# Patient Record
Sex: FEMALE | Race: WHITE | Employment: OTHER | URBAN - METROPOLITAN AREA
[De-identification: names, ages, dates, MRNs, and addresses within clinical notes are randomized per-mention and may not be internally consistent; named-entity substitution may affect disease eponyms.]

---

## 2019-06-08 ENCOUNTER — APPOINTMENT (OUTPATIENT)
Dept: GENERAL RADIOLOGY | Age: 69
End: 2019-06-08
Payer: MEDICARE

## 2019-06-08 ENCOUNTER — HOSPITAL ENCOUNTER (EMERGENCY)
Age: 69
Discharge: HOME | End: 2019-06-08
Payer: MEDICARE

## 2019-06-08 VITALS
TEMPERATURE: 97.7 F | BODY MASS INDEX: 27.31 KG/M2 | WEIGHT: 160 LBS | DIASTOLIC BLOOD PRESSURE: 88 MMHG | SYSTOLIC BLOOD PRESSURE: 170 MMHG | HEART RATE: 83 BPM | OXYGEN SATURATION: 94 % | HEIGHT: 64 IN

## 2019-06-08 DIAGNOSIS — M79.662 PAIN AND SWELLING OF LEFT LOWER LEG: ICD-10-CM

## 2019-06-08 DIAGNOSIS — M54.16 LUMBAR BACK PAIN WITH RADICULOPATHY AFFECTING LEFT LOWER EXTREMITY: Primary | ICD-10-CM

## 2019-06-08 DIAGNOSIS — M79.89 PAIN AND SWELLING OF LEFT LOWER LEG: ICD-10-CM

## 2019-06-08 LAB
ANION GAP SERPL CALCULATED.3IONS-SCNC: 13 MMOL/L (ref 3–16)
BASOPHILS ABSOLUTE: 0 K/UL (ref 0–0.2)
BASOPHILS RELATIVE PERCENT: 0.6 %
BUN BLDV-MCNC: 14 MG/DL (ref 7–20)
CALCIUM SERPL-MCNC: 10.2 MG/DL (ref 8.3–10.6)
CHLORIDE BLD-SCNC: 97 MMOL/L (ref 99–110)
CO2: 24 MMOL/L (ref 21–32)
CREAT SERPL-MCNC: 0.7 MG/DL (ref 0.6–1.2)
D DIMER: <200 NG/ML DDU (ref 0–229)
EOSINOPHILS ABSOLUTE: 0.2 K/UL (ref 0–0.6)
EOSINOPHILS RELATIVE PERCENT: 3.1 %
GFR AFRICAN AMERICAN: >60
GFR NON-AFRICAN AMERICAN: >60
GLUCOSE BLD-MCNC: 207 MG/DL (ref 70–99)
HCT VFR BLD CALC: 41.2 % (ref 36–48)
HEMOGLOBIN: 13.6 G/DL (ref 12–16)
INR BLD: 1.05 (ref 0.86–1.14)
LYMPHOCYTES ABSOLUTE: 1.1 K/UL (ref 1–5.1)
LYMPHOCYTES RELATIVE PERCENT: 17.7 %
MCH RBC QN AUTO: 30.4 PG (ref 26–34)
MCHC RBC AUTO-ENTMCNC: 32.9 G/DL (ref 31–36)
MCV RBC AUTO: 92.4 FL (ref 80–100)
MONOCYTES ABSOLUTE: 0.5 K/UL (ref 0–1.3)
MONOCYTES RELATIVE PERCENT: 7.5 %
NEUTROPHILS ABSOLUTE: 4.5 K/UL (ref 1.7–7.7)
NEUTROPHILS RELATIVE PERCENT: 71.1 %
PDW BLD-RTO: 13.2 % (ref 12.4–15.4)
PLATELET # BLD: 282 K/UL (ref 135–450)
PMV BLD AUTO: 8.2 FL (ref 5–10.5)
POTASSIUM REFLEX MAGNESIUM: 4.7 MMOL/L (ref 3.5–5.1)
PROTHROMBIN TIME: 12 SEC (ref 9.8–13)
RBC # BLD: 4.46 M/UL (ref 4–5.2)
SODIUM BLD-SCNC: 134 MMOL/L (ref 136–145)
WBC # BLD: 6.3 K/UL (ref 4–11)

## 2019-06-08 PROCEDURE — 80048 BASIC METABOLIC PNL TOTAL CA: CPT

## 2019-06-08 PROCEDURE — 85379 FIBRIN DEGRADATION QUANT: CPT

## 2019-06-08 PROCEDURE — 85025 COMPLETE CBC W/AUTO DIFF WBC: CPT

## 2019-06-08 PROCEDURE — 99284 EMERGENCY DEPT VISIT MOD MDM: CPT

## 2019-06-08 PROCEDURE — 6370000000 HC RX 637 (ALT 250 FOR IP): Performed by: PHYSICIAN ASSISTANT

## 2019-06-08 PROCEDURE — 73502 X-RAY EXAM HIP UNI 2-3 VIEWS: CPT

## 2019-06-08 PROCEDURE — 85610 PROTHROMBIN TIME: CPT

## 2019-06-08 PROCEDURE — 72100 X-RAY EXAM L-S SPINE 2/3 VWS: CPT

## 2019-06-08 RX ORDER — METHOCARBAMOL 750 MG/1
750 TABLET, FILM COATED ORAL EVERY 8 HOURS PRN
Qty: 30 TABLET | Refills: 0 | Status: SHIPPED | OUTPATIENT
Start: 2019-06-08 | End: 2019-06-18

## 2019-06-08 RX ORDER — TRAMADOL HYDROCHLORIDE 50 MG/1
50 TABLET ORAL EVERY 6 HOURS PRN
Qty: 20 TABLET | Refills: 0 | Status: SHIPPED | OUTPATIENT
Start: 2019-06-08 | End: 2019-06-15

## 2019-06-08 RX ORDER — ACETAMINOPHEN 500 MG
1000 TABLET ORAL ONCE
Status: COMPLETED | OUTPATIENT
Start: 2019-06-08 | End: 2019-06-08

## 2019-06-08 RX ORDER — NAPROXEN 500 MG/1
500 TABLET ORAL 2 TIMES DAILY PRN
Qty: 20 TABLET | Refills: 0 | Status: ON HOLD | OUTPATIENT
Start: 2019-06-08 | End: 2020-10-23 | Stop reason: HOSPADM

## 2019-06-08 RX ADMIN — ACETAMINOPHEN 1000 MG: 500 TABLET, FILM COATED ORAL at 13:21

## 2019-06-08 ASSESSMENT — ENCOUNTER SYMPTOMS
ABDOMINAL PAIN: 0
CHEST TIGHTNESS: 0
WHEEZING: 0
DIARRHEA: 0
SHORTNESS OF BREATH: 0
NAUSEA: 0
SORE THROAT: 0
COUGH: 0
COLOR CHANGE: 0
VOMITING: 0
BACK PAIN: 1

## 2019-06-08 ASSESSMENT — PAIN DESCRIPTION - LOCATION: LOCATION: LEG

## 2019-06-08 ASSESSMENT — PAIN DESCRIPTION - ORIENTATION: ORIENTATION: LEFT

## 2019-06-08 ASSESSMENT — PAIN SCALES - GENERAL
PAINLEVEL_OUTOF10: 9
PAINLEVEL_OUTOF10: 9

## 2019-06-08 NOTE — ED PROVIDER NOTES
**EVALUATED BY ADVANCED PRACTICE PROVIDER**        Chris [address] ENCOUNTER      Pt Name: Tito Guzman  RBW:[id number]  [place] 1950  Date of evaluation: 6/8/2019  Provider: Soto Adams PA-C      Chief Complaint:    Chief Complaint   Patient presents with    Leg Pain     left thigh/leg pain that started at work this am.        Nursing Notes, Past Medical Hx, Past Surgical Hx, Social Hx, Allergies, and Family Hx were all reviewed and agreed with or any disagreements were addressed in the HPI.    HPI:  (Location, Duration, Timing, Severity,Quality, Assoc Sx, Context, Modifying factors)  This is a  77 y.o. female presents the emergency department with reports of achy pain and discomfort in her left leg. Patient states that this began this morning. She reports that she was fine yesterday. She was standing at work when she experiences severe pain and discomfort in her leg. She states that she feels it over the lateral border of her hip, and into the region of her thigh. She states it's worse with physical activity. She reports that she did take some Aleve this morning for this pain and discomfort that the symptoms of increased. She goes on to report that she's had problems with this leg in the past.  She had had an injury from a fall and maybe even had some difficulty as it pertains to her back but states the pain is never felt like this. She goes on to report she is a heavy achiness. She states she has had some swelling in her left lower extremity but states \"she thinks it's always swollen\". Patient states that she has had previous lumbar surgery. She states that she has had radicular symptoms in the past but this does not like that. She goes on to report at the present time she is not currently experiencing any chest pain and her shortness of breath.   She states she has not had any recent injuries and or falls which could've precipitated anemia above-mentioned symptoms. Upon further questioning she denies a history of DVT and her PE. She goes on to report that she had been anticoagulated with Eliquis for some kind of cardiac issue related to a clot in her heart. Patient states she has not had any recent travel. She goes on to report she is not having fevers and or chills. She denies abdominal pain. She denies nausea vomiting or diarrhea. She denies hematuria or flank pain. She denies dysuria, urgency, frequency. She denies bowel or bladder dysfunction. She denies saddle anesthesia. She denies numbness and or tingling. Patient reports that she was able to independently ambulate here into the emergency department today. Patient reports that she was also able to operate a motor vehicle to drive herself here. Because of increasing levels of pain and discomfort she presents for evaluation and treatment. Currently reporting pain and discomfort to be 9 out of 10.       PastMedical/Surgical History:      Diagnosis Date    Actinic keratosis     Arthritis     Asthma     UNSPECIFIED    Atrial fibrillation (HCC)     CAD (coronary artery disease)     Depression     worse with pain    Diabetes mellitus ([facility] [address]     TYPE II    Disc displacement, lumbar     Diverticulosis of colon     Hard of hearing     left    Hyperlipidemia     Hypertension     Ischemic heart disease     Past heart attack     28 YRS AGO    Poor vision     legally blind    Right leg weakness     Scoliosis          Procedure Laterality Date    APPENDECTOMY      BACK SURGERY      CARDIAC SURGERY       SECTION      ONE    CORONARY ARTERY BYPASS GRAFT      HAND SURGERY      LUMPS/BONE SPURS    LUMBAR [facility] [address] Last SURGERY  8-    Microlumbar discectomy L4-5 right    LUMBAR SPINE SURGERY  10-11-12    MICROLUMBAR RE-EXPLORATION L4-5 RIGHT, DECOMPRESSION OF    OVARIAN CYST REMOVAL      SKIN BIOPSY      PRE CANCERS FROZEN OFF    TONSILLECTOMY  VASCULAR SURGERY         Medications:  Previous Medications    ALBUTEROL (PROVENTIL HFA;VENTOLIN HFA) 108 (90 BASE) MCG/ACT INHALER    Inhale 2 puffs into the lungs every 6 hours as needed. ALBUTEROL (PROVENTIL) (2.5 MG/3ML) 0.083% NEBULIZER SOLUTION    Take 2.5 mg by nebulization every 6 hours as needed for Wheezing    ASPIRIN 81 MG TABLET    Take 243 mg by mouth daily    BUDESONIDE-FORMOTEROL (SYMBICORT) 160-4.5 MCG/ACT AERO    Inhale 2 puffs into the lungs 2 times daily    GLIMEPIRIDE (AMARYL) 4 MG TABLET    Take 4 mg by mouth every morning (before breakfast)    METFORMIN (GLUCOPHAGE-XR) 500 MG XR TABLET    Take 2,000 mg by mouth daily (with breakfast)     METOPROLOL (TOPROL-XL) 50 MG XL TABLET    Take 50 mg by mouth daily. MOMETASONE (ASMANEX) 220 MCG/INH INHALER    Inhale 2 puffs into the lungs daily as needed. SAXAGLIPTIN (ONGLYZA) 5 MG TABS TABLET    Take 5 mg by mouth daily. SERTRALINE (ZOLOFT) 100 MG TABLET    Take 100 mg by mouth daily         Review of Systems:  Review of Systems   Constitutional: Negative for activity change, chills and fever. HENT: Negative for ear pain and sore throat. Respiratory: Negative for cough, chest tightness, shortness of breath and wheezing. Cardiovascular: Positive for leg swelling. Negative for chest pain and palpitations. Gastrointestinal: Negative for abdominal pain, diarrhea, nausea and vomiting. Genitourinary: Negative for dysuria and flank pain. Musculoskeletal: Positive for arthralgias, back pain (history of) and gait problem. Negative for neck pain and neck stiffness. Skin: Negative for color change and wound. Neurological: Negative for dizziness, seizures, syncope, weakness, light-headedness, numbness and headaches. Positives and Pertinent negatives as per HPI. Except as noted above in the ROS, problem specific ROS was completed and is negative.     Physical Exam:  Physical Exam   Constitutional: She is oriented to person, place, and time. She appears well-developed and well-nourished. No distress. HENT:   Head: Normocephalic and atraumatic. Right Ear: External ear normal.   Left Ear: External ear normal.   Eyes: Conjunctivae are normal. Right eye exhibits no discharge. Left eye exhibits no discharge. No scleral icterus. Neck: Normal range of motion. No JVD present. Cardiovascular: Normal rate and regular rhythm. Exam reveals no gallop and no friction rub. No murmur heard. Pulmonary/Chest: Effort normal and breath sounds normal. No accessory muscle usage. No respiratory distress. She has no wheezes. She has no rhonchi. She has no rales. Musculoskeletal:        Left hip: She exhibits decreased range of motion, tenderness and bony tenderness. She exhibits normal strength, no swelling, no crepitus, no deformity and no laceration. Left knee: Normal.        Lumbar back: She exhibits decreased range of motion, tenderness and pain. She exhibits no bony tenderness, no swelling, no edema, no deformity, no laceration, no spasm and normal pulse. Right lower leg: She exhibits tenderness and swelling. She exhibits no bony tenderness, no edema, no deformity and no laceration. Patient does have some mild tenderness noted of the bilateral lumbar paraspinous musculature. She has no bere midline tenderness. Previous surgical sites benign. Patient has a negative straight leg raise. The patient demonstrates 5/5 muscle strength to hip flexion, knee flexion/extension, plantar/dorsiflexion of the ankle and extensor hallicus longus testing. Patient has tenderness to palpation of the lateral complex of the hip. She has negative Trendelenburg sign. Negative logroll test.  Range of motion through the hip is good. She has good range of motion noted to the knee. Left calf is noted to have increased circumferential girth 2 cm larger than the right. No palpable cords. No significant varicosities.   No evidence of venous congestion. Calf is supple. Homans test is positive only on the left. Neurological: She is alert and oriented to person, place, and time. She has normal strength. No cranial nerve deficit or sensory deficit. Coordination normal. GCS eye subscore is 4. GCS verbal subscore is 5. GCS motor subscore is 6. Skin: Skin is warm and dry. She is not diaphoretic. Psychiatric: She has a normal mood and affect. Her behavior is normal.   Nursing note and vitals reviewed. MEDICAL DECISION MAKING    Vitals:    Vitals:    06/08/19 1256   BP: (!) 170/88   Pulse: 83   Temp: 97.7 °F (36.5 °C)   TempSrc: Oral   SpO2: 94%   Weight: 160 lb (72.6 kg)   Height: 5' 4\" (1.626 m)       LABS:  Labs Reviewed   BASIC METABOLIC PANEL W/ REFLEX TO MG FOR LOW K - Abnormal; Notable for the following components:       Result Value    Sodium 134 (*)     Chloride 97 (*)     Glucose 207 (*)     All other components within normal limits    Narrative:     Performed at:  [facility] [address]   Phone [phone]   CBC WITH AUTO DIFFERENTIAL    Narrative:     Performed at:  [facility] [address]   Phone [phone]   PROTIME-INR    Narrative:     Performed at:  [facility] [address]   Phone [phone]   D-DIMER, QUANTITATIVE    Narrative:     Performed at:  [facility] [address] [facility] [address]  [address]   Phone [phone] of labs reviewed and werenegative at this time or not returned at the time of this note.     RADIOLOGY:   Non-plain film images such as CT, Ultrasound and MRI are read by the radiologist. Trip Eubanks PA-C have directly visualized the radiologic plain film image(s) with the below findings:      Interpretation per the Radiologist below, if available at the time of elevated at 207 today. Coagulation profile is unremarkable and d-dimer is negative predictive value of less than 200. Radiographs of the hip demonstrate bilateral osteoarthritis were radius grafts of the lumbar spine demonstrate severe multilevel degenerative disc disease no significant at L4-L5 as well as L5 on S1 where she's had previous surgery. Patient's clinical presentation again does appear most consistent with a left lumbar radiculopathy but of DVT will need to be ruled out. Utilizing shared decision making and explaining the d-dimer I do believe that the patient is a low probability of a DVT in her left lower extremity and I do not believe that she needs to be prophylaxed in the interim and light of the above-mentioned. She is aware she still needs a Doppler examination the left leg. She has also been given very strict potential instructions for return. She will need medications and follow-up arranged on an outpatient basis with Dr. Missy Ponce who is her treating primary care provider. The patient tolerated their visit well. I evaluated the patient. The physician was available for consultation as needed. The patient and / or the family were informed of the results of anytests, a time was given to answer questions, a plan was proposed and they agreed with plan. CLINICAL IMPRESSION:  1. Lumbar back pain with radiculopathy affecting left lower extremity    2.  Pain and swelling of left lower leg (chronic)        DISPOSITION: Discharged to home        PATIENT REFERRED TO:  Bina Reyes [address]  [phone]    Schedule an appointment as soon as possible for a visit       [facility] Emergency Department  [address]  [phone]    If symptoms worsen      DISCHARGE MEDICATIONS:  New Prescriptions    METHOCARBAMOL (ROBAXIN-750) 750 MG TABLET    Take 1 tablet by mouth every 8 hours as needed (muscle cramps or pain)    NAPROXEN (NAPROSYN) 500 MG TABLET    Take 1 tablet by mouth 2 times daily as needed for Pain    TRAMADOL (ULTRAM) 50 MG TABLET    Take 1 tablet by mouth every 6 hours as needed for Pain for up to 7 days. DISCONTINUED MEDICATIONS:  Discontinued Medications    CEPHALEXIN (KEFLEX) 500 MG CAPSULE    Take 1 capsule by mouth 4 times daily    DULOXETINE (CYMBALTA) 60 MG CAPSULE    Take 60 mg by mouth daily. GABAPENTIN (NEURONTIN) 300 MG CAPSULE    Take 300 mg by mouth 4 times daily. GLUCOSE BLOOD VI TEST STRIPS (ASCENSIA AUTODISC VI;ONE TOUCH ULTRA TEST VI) STRIP    by In Vitro route as needed. As needed. LOVASTATIN (MEVACOR) 40 MG TABLET    Take 40 mg by mouth nightly. RIVAROXABAN (XARELTO) 20 MG TABS TABLET    Take 20 mg by mouth    VALACYCLOVIR (VALTREX) 500 MG TABLET    Take 500 mg by mouth daily as needed.             (Please note the MDM and HPI sections of this note were completed with a voice recognition program.  Efforts weremade to edit the dictations but occasionally words are mis-transcribed.)    Electronically signed, Yennifer Ball PA-C,          Rohan Giordano PA-C  06/08/19 4496

## 2019-06-08 NOTE — ED NOTES
Pt Discharged in stable condition, VSS, no signs of distress, discharge instructions and meds reviewed. Pt verbalizes understanding and states no further questions or concerns unaddressed.        Serge Perez RN  06/08/19 7624

## 2019-07-11 ENCOUNTER — HOSPITAL ENCOUNTER (EMERGENCY)
Age: 69
Discharge: HOME | End: 2019-07-11
Payer: MEDICARE

## 2019-07-11 VITALS
WEIGHT: 158 LBS | TEMPERATURE: 98.1 F | OXYGEN SATURATION: 97 % | HEART RATE: 78 BPM | HEIGHT: 64 IN | SYSTOLIC BLOOD PRESSURE: 173 MMHG | BODY MASS INDEX: 26.98 KG/M2 | DIASTOLIC BLOOD PRESSURE: 82 MMHG | RESPIRATION RATE: 16 BRPM

## 2019-07-11 DIAGNOSIS — S81.812A LACERATION OF LEFT LOWER EXTREMITY, INITIAL ENCOUNTER: Primary | ICD-10-CM

## 2019-07-11 PROCEDURE — 4500000022 HC ED LEVEL 2 PROCEDURE

## 2019-07-11 PROCEDURE — 99282 EMERGENCY DEPT VISIT SF MDM: CPT

## 2019-07-11 RX ORDER — ASPIRIN 325 MG
325 TABLET ORAL DAILY
Status: ON HOLD | COMMUNITY
End: 2020-10-23 | Stop reason: HOSPADM

## 2019-07-11 RX ORDER — CLOPIDOGREL BISULFATE 75 MG/1
75 TABLET ORAL DAILY
Status: ON HOLD | COMMUNITY
End: 2020-10-23 | Stop reason: HOSPADM

## 2019-07-11 ASSESSMENT — ENCOUNTER SYMPTOMS
DIARRHEA: 0
COUGH: 0
ABDOMINAL PAIN: 0
RHINORRHEA: 0
WHEEZING: 0
VOMITING: 0
NAUSEA: 0
SHORTNESS OF BREATH: 0

## 2020-10-20 ENCOUNTER — APPOINTMENT (OUTPATIENT)
Dept: GENERAL RADIOLOGY | Age: 70
DRG: 061 | End: 2020-10-20
Payer: MEDICARE

## 2020-10-20 ENCOUNTER — APPOINTMENT (OUTPATIENT)
Dept: CT IMAGING | Age: 70
DRG: 061 | End: 2020-10-20
Payer: MEDICARE

## 2020-10-20 ENCOUNTER — APPOINTMENT (OUTPATIENT)
Dept: MRI IMAGING | Age: 70
DRG: 061 | End: 2020-10-20
Payer: MEDICARE

## 2020-10-20 ENCOUNTER — HOSPITAL ENCOUNTER (INPATIENT)
Age: 70
LOS: 3 days | Discharge: NURSING, REHAB OR LONG-TERM CARE | DRG: 061 | End: 2020-10-23
Attending: EMERGENCY MEDICINE | Admitting: INTERNAL MEDICINE
Payer: MEDICARE

## 2020-10-20 PROBLEM — I63.9 ACUTE CVA (CEREBROVASCULAR ACCIDENT) (HCC): Status: ACTIVE | Noted: 2020-10-20

## 2020-10-20 LAB
A/G RATIO: 1.8 (ref 1.1–2.2)
ALBUMIN SERPL-MCNC: 4.5 G/DL (ref 3.4–5)
ALP BLD-CCNC: 97 U/L (ref 40–129)
ALT SERPL-CCNC: 14 U/L (ref 10–40)
ANION GAP SERPL CALCULATED.3IONS-SCNC: 11 MMOL/L (ref 3–16)
APTT: 32 SEC (ref 24.2–36.2)
AST SERPL-CCNC: 18 U/L (ref 15–37)
BASOPHILS ABSOLUTE: 0 K/UL (ref 0–0.2)
BASOPHILS RELATIVE PERCENT: 0.5 %
BILIRUB SERPL-MCNC: 0.3 MG/DL (ref 0–1)
BUN BLDV-MCNC: 10 MG/DL (ref 7–20)
CALCIUM SERPL-MCNC: 10.1 MG/DL (ref 8.3–10.6)
CHLORIDE BLD-SCNC: 101 MMOL/L (ref 99–110)
CO2: 24 MMOL/L (ref 21–32)
CREAT SERPL-MCNC: 0.7 MG/DL (ref 0.6–1.2)
EKG ATRIAL RATE: 74 BPM
EKG DIAGNOSIS: NORMAL
EKG Q-T INTERVAL: 384 MS
EKG QRS DURATION: 122 MS
EKG QTC CALCULATION (BAZETT): 467 MS
EKG R AXIS: -62 DEGREES
EKG T AXIS: -21 DEGREES
EKG VENTRICULAR RATE: 89 BPM
EOSINOPHILS ABSOLUTE: 0.1 K/UL (ref 0–0.6)
EOSINOPHILS RELATIVE PERCENT: 1.2 %
GFR AFRICAN AMERICAN: >60
GFR NON-AFRICAN AMERICAN: >60
GLOBULIN: 2.5 G/DL
GLUCOSE BLD-MCNC: 127 MG/DL (ref 70–99)
GLUCOSE BLD-MCNC: 224 MG/DL (ref 70–99)
GLUCOSE BLD-MCNC: 230 MG/DL (ref 70–99)
HCT VFR BLD CALC: 40.3 % (ref 36–48)
HEMOGLOBIN: 13.3 G/DL (ref 12–16)
INR BLD: 0.97 (ref 0.86–1.14)
LYMPHOCYTES ABSOLUTE: 1.1 K/UL (ref 1–5.1)
LYMPHOCYTES RELATIVE PERCENT: 11.9 %
MCH RBC QN AUTO: 30.9 PG (ref 26–34)
MCHC RBC AUTO-ENTMCNC: 33.1 G/DL (ref 31–36)
MCV RBC AUTO: 93.4 FL (ref 80–100)
MONOCYTES ABSOLUTE: 0.5 K/UL (ref 0–1.3)
MONOCYTES RELATIVE PERCENT: 5.7 %
NEUTROPHILS ABSOLUTE: 7.5 K/UL (ref 1.7–7.7)
NEUTROPHILS RELATIVE PERCENT: 80.7 %
PDW BLD-RTO: 13 % (ref 12.4–15.4)
PERFORMED ON: ABNORMAL
PERFORMED ON: ABNORMAL
PLATELET # BLD: 265 K/UL (ref 135–450)
PMV BLD AUTO: 8.6 FL (ref 5–10.5)
POTASSIUM SERPL-SCNC: 4.2 MMOL/L (ref 3.5–5.1)
PRO-BNP: 741 PG/ML (ref 0–124)
PROTHROMBIN TIME: 11.2 SEC (ref 10–13.2)
RBC # BLD: 4.32 M/UL (ref 4–5.2)
SODIUM BLD-SCNC: 136 MMOL/L (ref 136–145)
TOTAL PROTEIN: 7 G/DL (ref 6.4–8.2)
TROPONIN: <0.01 NG/ML
WBC # BLD: 9.3 K/UL (ref 4–11)

## 2020-10-20 PROCEDURE — 6360000004 HC RX CONTRAST MEDICATION: Performed by: PHYSICIAN ASSISTANT

## 2020-10-20 PROCEDURE — 85730 THROMBOPLASTIN TIME PARTIAL: CPT

## 2020-10-20 PROCEDURE — 93010 ELECTROCARDIOGRAM REPORT: CPT | Performed by: INTERNAL MEDICINE

## 2020-10-20 PROCEDURE — 85610 PROTHROMBIN TIME: CPT

## 2020-10-20 PROCEDURE — 2500000003 HC RX 250 WO HCPCS: Performed by: PHYSICIAN ASSISTANT

## 2020-10-20 PROCEDURE — 6360000002 HC RX W HCPCS: Performed by: PHYSICIAN ASSISTANT

## 2020-10-20 PROCEDURE — 83880 ASSAY OF NATRIURETIC PEPTIDE: CPT

## 2020-10-20 PROCEDURE — 94640 AIRWAY INHALATION TREATMENT: CPT

## 2020-10-20 PROCEDURE — 2000000000 HC ICU R&B

## 2020-10-20 PROCEDURE — 6370000000 HC RX 637 (ALT 250 FOR IP): Performed by: INTERNAL MEDICINE

## 2020-10-20 PROCEDURE — 94761 N-INVAS EAR/PLS OXIMETRY MLT: CPT

## 2020-10-20 PROCEDURE — 96374 THER/PROPH/DIAG INJ IV PUSH: CPT

## 2020-10-20 PROCEDURE — 3E03317 INTRODUCTION OF OTHER THROMBOLYTIC INTO PERIPHERAL VEIN, PERCUTANEOUS APPROACH: ICD-10-PCS | Performed by: INTERNAL MEDICINE

## 2020-10-20 PROCEDURE — 70496 CT ANGIOGRAPHY HEAD: CPT

## 2020-10-20 PROCEDURE — 2580000003 HC RX 258: Performed by: PHYSICIAN ASSISTANT

## 2020-10-20 PROCEDURE — 2580000003 HC RX 258: Performed by: INTERNAL MEDICINE

## 2020-10-20 PROCEDURE — 99285 EMERGENCY DEPT VISIT HI MDM: CPT

## 2020-10-20 PROCEDURE — 93005 ELECTROCARDIOGRAM TRACING: CPT | Performed by: PHYSICIAN ASSISTANT

## 2020-10-20 PROCEDURE — 36415 COLL VENOUS BLD VENIPUNCTURE: CPT

## 2020-10-20 PROCEDURE — 80053 COMPREHEN METABOLIC PANEL: CPT

## 2020-10-20 PROCEDURE — 84484 ASSAY OF TROPONIN QUANT: CPT

## 2020-10-20 PROCEDURE — 85025 COMPLETE CBC W/AUTO DIFF WBC: CPT

## 2020-10-20 PROCEDURE — 70450 CT HEAD/BRAIN W/O DYE: CPT

## 2020-10-20 PROCEDURE — 70551 MRI BRAIN STEM W/O DYE: CPT

## 2020-10-20 PROCEDURE — 71045 X-RAY EXAM CHEST 1 VIEW: CPT

## 2020-10-20 RX ORDER — SODIUM CHLORIDE 0.9 % (FLUSH) 0.9 %
10 SYRINGE (ML) INJECTION PRN
Status: DISCONTINUED | OUTPATIENT
Start: 2020-10-20 | End: 2020-10-23 | Stop reason: HOSPADM

## 2020-10-20 RX ORDER — BUDESONIDE AND FORMOTEROL FUMARATE DIHYDRATE 160; 4.5 UG/1; UG/1
2 AEROSOL RESPIRATORY (INHALATION) 2 TIMES DAILY
Status: DISCONTINUED | OUTPATIENT
Start: 2020-10-20 | End: 2020-10-23 | Stop reason: HOSPADM

## 2020-10-20 RX ORDER — DEXTROSE MONOHYDRATE 25 G/50ML
12.5 INJECTION, SOLUTION INTRAVENOUS PRN
Status: DISCONTINUED | OUTPATIENT
Start: 2020-10-20 | End: 2020-10-23 | Stop reason: HOSPADM

## 2020-10-20 RX ORDER — NICOTINE POLACRILEX 4 MG
15 LOZENGE BUCCAL PRN
Status: DISCONTINUED | OUTPATIENT
Start: 2020-10-20 | End: 2020-10-23 | Stop reason: HOSPADM

## 2020-10-20 RX ORDER — GLIMEPIRIDE 2 MG/1
4 TABLET ORAL
Status: DISCONTINUED | OUTPATIENT
Start: 2020-10-21 | End: 2020-10-23 | Stop reason: HOSPADM

## 2020-10-20 RX ORDER — SODIUM CHLORIDE 0.9 % (FLUSH) 0.9 %
10 SYRINGE (ML) INJECTION 2 TIMES DAILY
Status: DISCONTINUED | OUTPATIENT
Start: 2020-10-20 | End: 2020-10-23 | Stop reason: HOSPADM

## 2020-10-20 RX ORDER — SODIUM CHLORIDE 0.9 % (FLUSH) 0.9 %
10 SYRINGE (ML) INJECTION PRN
Status: DISCONTINUED | OUTPATIENT
Start: 2020-10-20 | End: 2020-10-20

## 2020-10-20 RX ORDER — SODIUM CHLORIDE 0.9 % (FLUSH) 0.9 %
10 SYRINGE (ML) INJECTION EVERY 12 HOURS SCHEDULED
Status: DISCONTINUED | OUTPATIENT
Start: 2020-10-20 | End: 2020-10-20

## 2020-10-20 RX ORDER — DEXTROSE MONOHYDRATE 25 G/50ML
12.5 INJECTION, SOLUTION INTRAVENOUS
Status: DISCONTINUED | OUTPATIENT
Start: 2020-10-20 | End: 2020-10-20

## 2020-10-20 RX ORDER — ALOGLIPTIN 6.25 MG/1
25 TABLET, FILM COATED ORAL DAILY
Status: DISCONTINUED | OUTPATIENT
Start: 2020-10-21 | End: 2020-10-23 | Stop reason: HOSPADM

## 2020-10-20 RX ORDER — DEXTROSE MONOHYDRATE 50 MG/ML
100 INJECTION, SOLUTION INTRAVENOUS PRN
Status: DISCONTINUED | OUTPATIENT
Start: 2020-10-20 | End: 2020-10-23 | Stop reason: HOSPADM

## 2020-10-20 RX ORDER — METFORMIN HYDROCHLORIDE 500 MG/1
2000 TABLET, EXTENDED RELEASE ORAL
Status: DISCONTINUED | OUTPATIENT
Start: 2020-10-21 | End: 2020-10-23 | Stop reason: HOSPADM

## 2020-10-20 RX ORDER — 0.9 % SODIUM CHLORIDE 0.9 %
50 INTRAVENOUS SOLUTION INTRAVENOUS ONCE
Status: COMPLETED | OUTPATIENT
Start: 2020-10-20 | End: 2020-10-20

## 2020-10-20 RX ORDER — EMPAGLIFLOZIN 10 MG/1
10 TABLET, FILM COATED ORAL DAILY
Status: ON HOLD | COMMUNITY
End: 2020-11-02 | Stop reason: HOSPADM

## 2020-10-20 RX ORDER — LABETALOL HYDROCHLORIDE 5 MG/ML
10 INJECTION, SOLUTION INTRAVENOUS ONCE
Status: COMPLETED | OUTPATIENT
Start: 2020-10-20 | End: 2020-10-20

## 2020-10-20 RX ORDER — METOPROLOL SUCCINATE 50 MG/1
50 TABLET, EXTENDED RELEASE ORAL DAILY
Status: DISCONTINUED | OUTPATIENT
Start: 2020-10-20 | End: 2020-10-23 | Stop reason: HOSPADM

## 2020-10-20 RX ADMIN — ALTEPLASE 62.3 MG: KIT at 10:34

## 2020-10-20 RX ADMIN — ALTEPLASE 6.9 MG: KIT at 10:31

## 2020-10-20 RX ADMIN — SERTRALINE HYDROCHLORIDE 100 MG: 50 TABLET, FILM COATED ORAL at 16:41

## 2020-10-20 RX ADMIN — METOPROLOL SUCCINATE 50 MG: 50 TABLET, EXTENDED RELEASE ORAL at 16:41

## 2020-10-20 RX ADMIN — LABETALOL HYDROCHLORIDE 10 MG: 5 INJECTION INTRAVENOUS at 10:26

## 2020-10-20 RX ADMIN — Medication 10 ML: at 20:17

## 2020-10-20 RX ADMIN — SODIUM CHLORIDE 50 ML: 9 INJECTION, SOLUTION INTRAVENOUS at 11:37

## 2020-10-20 RX ADMIN — IOPAMIDOL 75 ML: 755 INJECTION, SOLUTION INTRAVENOUS at 08:49

## 2020-10-20 RX ADMIN — Medication 2 PUFF: at 20:35

## 2020-10-20 ASSESSMENT — ENCOUNTER SYMPTOMS
RHINORRHEA: 0
DIARRHEA: 0
VOMITING: 0
SHORTNESS OF BREATH: 0
NAUSEA: 0
COUGH: 0
ABDOMINAL PAIN: 0

## 2020-10-20 ASSESSMENT — PAIN SCALES - GENERAL: PAINLEVEL_OUTOF10: 0

## 2020-10-20 NOTE — ED NOTES
Pt able to lift right arm in the air and hold it for 10 seconds without a drift which is an improvement. Pt resting comfortably with no signs of distress. Denies any needs at this time. Bed locked and in lowest position with both side rails raise. Call light within reach. Writer remains at bedside.      Anthony Sandoval, RN  10/20/20 1026

## 2020-10-20 NOTE — PLAN OF CARE
Problem: Falls - Risk of:  Goal: Will remain free from falls  Description: Will remain free from falls  10/20/2020 1849 by Anthony Gunn RN  Outcome: Ongoing  10/20/2020 1848 by Anthony Gunn RN  Outcome: Ongoing   Bed alarm in place    Problem: Skin Integrity:  Goal: Will show no infection signs and symptoms  Description: Will show no infection signs and symptoms  10/20/2020 1849 by Anthony Gunn RN  Outcome: Ongoing  10/20/2020 1848 by Anthony Gunn RN  Outcome: Ongoing   Pt able to turn in bed as needed. On specialty mattress. Problem: ACTIVITY INTOLERANCE/IMPAIRED MOBILITY  Goal: Mobility/activity is maintained at optimum level for patient  10/20/2020 1849 by Anthony Gunn RN  Outcome: Ongoing  10/20/2020 1848 by nAthony Gunn RN  Outcome: Ongoing   Rt side limited fine motor skills    Problem: COMMUNICATION IMPAIRMENT  Goal: Ability to express needs and understand communication  10/20/2020 1849 by Anthony Gunn RN  Outcome: Ongoing  10/20/2020 1848 by Anthony Gunn RN  Outcome: Ongoing   Expressive aphasia    Problem: Serum Glucose Level - Abnormal:  Goal: Ability to maintain appropriate glucose levels has stabilized  Description: Ability to maintain appropriate glucose levels has stabilized  10/20/2020 1849 by Anthony Gunn RN  Outcome: Ongoing  10/20/2020 1848 by Anthony Gunn RN  Outcome: Ongoing   Will continue to monitor glucose.  On oral hypoglycemics at home

## 2020-10-20 NOTE — ED NOTES
Bed: 02  Expected date:   Expected time:   Means of arrival:   Comments:  Stroke alert     Pedro Field RN  10/20/20 2755

## 2020-10-20 NOTE — ED NOTES
Tele stroke used and MD requested MRi to determine if tPA shall be given. MRI checklist completed and sent to MRI.      Kathryn Sanchez RN  10/20/20 6586

## 2020-10-20 NOTE — FLOWSHEET NOTE
10/20/20 1315   Provider Notification   Reason for Communication Evaluate   Provider Name [facility]   Provider Notification Physician   Method of Communication Secure Message   Response Waiting for response   Notification Time 1315   Pt nauseated suddenly, no vomiting , NIHSS stable. Do I need to notify stoke team or can we give her something?

## 2020-10-20 NOTE — ED NOTES
Pt reporting she feels less \"weird\". Pt is speaking in full sentences and able to move her right arm better. Pt also reporting she is feeling sensation in her right arm.      Chico Gold RN  10/20/20 1121

## 2020-10-20 NOTE — ED NOTES
Pt resting comfortably with no signs of distress. Denies any needs at this time. Bed locked and in lowest position with both side rails raise. Call light within reach. Writer remains at bedside, no s/s of bleeding or decreased mental status noted. Will continue to monitor.      Renee Marshall RN  10/20/20 2071

## 2020-10-20 NOTE — ED NOTES
Pharmacy to Dose Alteplase    Patient Weight: 76.9 kg  Alteplase Bolus: 0.09 mg/kg x 76.9 kg = 6.9 mg  Alteplase Infusion: 0.81 mg/kg x 76.9 kg = 62.3 mg    Sophia CastanonD  PGY1 Pharmacy Resident  [id number]

## 2020-10-20 NOTE — CONSULTS
Subjective:      Nicko Drake is a 65 y.o. woman with a PMH of atrial fibrillation not on A/C, CAD with a CABG in the past who presents after waking up this morning with R sided weakness, aphasia, and dysarthria. Ms. Shannon Nuñez is healthy and functional at baseline. She was last definitively well yesterday evening at ~8 pm when her daughter talked to her. Then. She awoke this morning at ~8am, at which point she reported feeling \"funny. \" She went to the bathroom and noticed that her R sided was weak. She presented to the [facility] Emergency Department, at approximately 8:30 am. CT was negative, and CT-A showed chronic occlusion of the R carotid with intracranial reconstitution, mild-moderate stenosis in the l common carotid and in the cavernosal carotid. Considering that the patient woke up with these deficits, decisions was made to obtain a STAT MRI. A Stat MRI was obtained by ~10am, which I personally reviewed with Dr. Ralph La from [facility]. The verdict was DWI hyperintensity in the L MCA territory with no corresponding FLAIR abnormalities. She required a few Prn doses of labetalol to get her blood pressure into the <180 range prior to tPA. Bolus given at 10:30am.      Outside reports reviewed: CT head with no acute process. CT A as described with multifocal chronic stenoses and occlusions. MRI with DWI bright region in distal LMCA with no corresponding FLAIR hyperintensity.       Patient Active Problem List    Diagnosis Date Noted    Acute CVA (cerebrovascular accident) ([address] 10/20/2020    Lumbar radiculopathy 10/11/2012     Past Medical History:   Diagnosis Date    Actinic keratosis     Arthritis     Asthma     UNSPECIFIED    Atrial fibrillation ([address]     CAD (coronary artery disease)     Depression     worse with pain    Diabetes mellitus ([address]     TYPE II    Disc displacement, lumbar     Diverticulosis of colon     Hard of hearing     left    Hyperlipidemia     Hypertension     Tobacco Use    Smoking status: Former Smoker     Packs/day: 1.00     Years: 40.00     Pack years: 40.00    Smokeless tobacco: Never Used   Substance and Sexual Activity    Alcohol use: No     Comment: RARELY NOW    Drug use: No    Sexual activity: Not on file   Lifestyle    Physical activity     Days per week: Not on file     Minutes per session: Not on file    Stress: Not on file   Relationships    Social connections     Talks on phone: Not on file     Gets together: Not on file     Attends Rastafarian service: Not on file     Active member of club or organization: Not on file     Attends meetings of clubs or organizations: Not on file     Relationship status: Not on file    Intimate partner violence     Fear of current or ex partner: Not on file     Emotionally abused: Not on file     Physically abused: Not on file     Forced sexual activity: Not on file   Other Topics Concern    Not on file   Social History Narrative    Not on file       Review of Systems  A comprehensive review of systems was negative. Objective:     BP (!) 169/75   Pulse 102   Resp 24   Wt 169 lb 8 oz (76.9 kg)   SpO2 94%   BMI 29.09 kg/m²   NIH Stroke Scale      Interval: Baseline  Time: 11:05 AM  Person Administering Scale: Alysia Brooks    Administer stroke scale items in the order listed. Record performance in each category after each subscale exam. Do not go back and change scores. Follow directions provided for each exam technique. Scores should reflect what the patient does, not what the clinician thinks the patient can do. The clinician should record answers while administering the exam and work quickly. Except where indicated, the patient should not be coached (i.e., repeated requests to patient to make a special effort). 1a  Level of consciousness: 0=alert; keenly responsive   1b. LOC questions:  0=Performs both tasks correctly   1c. LOC commands: 0=Performs both tasks correctly   2.   Best Gaze: 0=normal 3.  Visual: 1=Partial hemianopia   4. Facial Palsy: 1=Minor paralysis (flattened nasolabial fold, asymmetric on smiling)   5a. Motor left arm: 0=No drift, limb holds 90 (or 45) degrees for full 10 seconds   5b. Motor right arm: 2=Some effort against gravity, limb cannot get to or maintain (if cured) 90 (or 45) degrees, drifts down to bed, but has some effort against gravity   6a. motor left le=No drift, limb holds 90 (or 45) degrees for full 10 seconds   6b  Motor right le=Drift, limb holds 90 (or 45) degrees but drifts down before full 10 seconds: does not hit bed   7. Limb Ataxia: 0=Absent   8. Sensory: 1=Mild to moderate sensory loss; patient feels pinprick is less sharp or is dull on the affected side; there is a loss of superficial pain with pinprick but patient is aware She is being touched   9. Best Language:  2   10. Dysarthria: 1=Mild to moderate, patient slurs at least some words and at worst, can be understood with some difficulty   11. Extinction and Inattention: 0=No abnormality   12. Distal motor function: 0=Normal    Total:   9         Lab Review  Lab Results   Component Value Date    WBC 9.3 10/20/2020    RBC 4.32 10/20/2020     10/20/2020     Lab Results   Component Value Date    CREATININE 0.7 10/20/2020    BUN 10 10/20/2020    GLUCOSE 230 10/20/2020     Lab Results   Component Value Date    APTT 32.0 10/20/2020     Lab Results   Component Value Date    INR 0.97 10/20/2020         Assessment:   65year old woman with a PMH of afib not on A/C per patient choice who presents with aphasia and R sided weakness from distal LMCA stroke. Based upon the [facility] trial, she is a candidate for IV tPA. I discussed the risks and benefits of this medication with the patient and daughter, including the risk of major intracranial bleeding and worsening of her symptoms.  The patient and daughter understood the risks and requested we proceed with treatment.  -Post tPA protocol, maintain SBP<180,

## 2020-10-20 NOTE — ED NOTES
Pt states she was normal when she went to bed last night. Upon waking this morning she said she got up around 730 and her leg just felt weird. Pt then called daughter and EMS was called. Pt has hx of afib and is not on blood thinners.      Airam Lopes, RN  10/20/20 1007

## 2020-10-20 NOTE — ED NOTES
NIH assessed, modified performed due to pt having some trouble understanding gaze commands.      Zi Shukla RN  10/20/20 0576

## 2020-10-20 NOTE — ED PROVIDER NOTES
improvement in her motor function after ministration of TPA. Further management per the admitting medical staff and consulting neurology service. Treatment for chronic atrial fibrillation anticoagulation will be necessary as well as her dual antiplatelet agents she has a history of chronic thrombosis now of the carotid which is not a surgically amenable lesion as per neurosurgery. Total critical care time provided today was 65  minutes. This excludes seperately billable procedures and family discussion time. Critical care time provided for obtaining history, conducting a physical exam, performing and monitoring interventions, ordering, collecting and interpreting tests, and establishing medical decision-making. There was a potential for life/limb threatening pathology requiring close evaluation and intervention with concern for patient decompensation. Impression: Hemiplegia dysarthria acute stroke.   Atrial fibrillation chronic      Salvador Castanon MD  53/20/66 7825

## 2020-10-20 NOTE — FLOWSHEET NOTE
Pt admitted to 3912 from ED. NIHSS conducted together. Then got VS. Pt unable to see any fingers from the side. Daughter here. Pt has expressive apphasia and sometimes gets upset with not getting the right words out. Pt has dark bruises on her right shoulder, hip and lower rt arm. Stated that she fell at home.

## 2020-10-20 NOTE — CARE COORDINATION
[facility] Case Management Discharge Planning Assessment     RN/SW discharge planner met with patient (and family member) to discuss reason for admission, current living situation, and potential needs at the time of discharge    Insurance/Demographics Verified:    Current Living Arrangements   [] Apartment [] Condo [] Hotel [] Homeless [x] House [] Shelter   []  SNF           []  LTC     Confirmed w/:   Living w/: [x] Alone [] Children [] Parent [] Spouse          Primary Care Provider    Khushi Neither - updated in EPIC Last Seen:   9/11/20   Specialty Providers   [x] n/a  Last Seen:          Level of Functioning / DME   [x] no DME [] Independent w/ ADLs [] Dependent w/ ADLs   [] BiPAP/CPAP [] BSC [] Cane [] Rollator   [] Hospital Bed [] Home O2   w/   [] Scooter [] Shower Chair [] Gregon hCas [] Wheelchair          Current [employer]   [x] n/a  []  CoA: w/: [] HD [] PD   [] [address]                                           [] [place]   [] RN   [] PT   [] OT   [] SW         Medication compliance issues: No - uses [facility] [address]    Financial issues that could impact healthcare: NO    Discussed and provided facilities of choice if transition to a skilled nursing facility is required at the time of discharge. Tentative Discharge Planning   [x] Acute Rehab   - CVA [] Home Alone [] Home w/ Family   [] HHC        [] [place]   []  RN   [] PT   [] OT   [] SW   [] Hospice [] LTAC/Select   [] SNF /  [] LTC        Discussed with patient and/or family that on the day of discharge home tentative time of discharge will be between 10 AM and noon.      Transportation at the time of discharge: TBD    Met with patient and her daughter Qasim Alejandro at bedside to confirm information    Marcel Law RN BSN  Case Management  [phone]

## 2020-10-20 NOTE — ED PROVIDER NOTES
Cardiovascular: Negative for chest pain. Gastrointestinal: Negative for abdominal pain, diarrhea, nausea and vomiting. Genitourinary: Negative for difficulty urinating, dysuria and hematuria. Neurological: Positive for weakness. Negative for dizziness, numbness and headaches. Positives and Pertinent negatives as per HPI. Except as noted above in the ROS, all other systems were reviewed and negative. PAST MEDICAL HISTORY     Past Medical History:   Diagnosis Date    Actinic keratosis     Arthritis     Asthma     UNSPECIFIED    Atrial fibrillation ([address]     CAD (coronary artery disease)     Depression     worse with pain    Diabetes mellitus ([address]     TYPE II    Disc displacement, lumbar     Diverticulosis of colon     Hard of hearing     left    Hyperlipidemia     Hypertension     Ischemic heart disease     Past heart attack     29 YRS AGO    Poor vision     legally blind    Right leg weakness     Scoliosis          SURGICAL HISTORY     Past Surgical History:   Procedure Laterality Date    APPENDECTOMY      BACK SURGERY      CARDIAC SURGERY  1984     SECTION      ONE    CORONARY ARTERY BYPASS GRAFT      HAND SURGERY      LUMPS/BONE SPURS    LUMBAR One [address] Last SURGERY  8-    Microlumbar discectomy L4-5 right    LUMBAR SPINE SURGERY  10-11-12    MICROLUMBAR RE-EXPLORATION L4-5 RIGHT, DECOMPRESSION OF    OVARIAN CYST REMOVAL      SKIN BIOPSY      PRE CANCERS FROZEN OFF    TONSILLECTOMY      VASCULAR SURGERY           CURRENTMEDICATIONS       Previous Medications    ALBUTEROL (PROVENTIL HFA;VENTOLIN HFA) 108 (90 BASE) MCG/ACT INHALER    Inhale 2 puffs into the lungs every 6 hours as needed.       ALBUTEROL (PROVENTIL) (2.5 MG/3ML) 0.083% NEBULIZER SOLUTION    Take 2.5 mg by nebulization every 6 hours as needed for Wheezing    ASPIRIN 325 MG TABLET    Take 325 mg by mouth daily    ASPIRIN 81 MG TABLET    Take 243 mg by mouth daily    BUDESONIDE-FORMOTEROL DIAGNOSTIC RESULTS   LABS:    Labs Reviewed   COMPREHENSIVE METABOLIC PANEL - Abnormal; Notable for the following components:       Result Value    Glucose 230 (*)     All other components within normal limits    Narrative:     Performed at:  [facility] [address]   Phone [phone]   BRAIN NATRIURETIC PEPTIDE - Abnormal; Notable for the following components:    Pro- (*)     All other components within normal limits    Narrative:     Performed at:  [facility] [address], [place]   Phone [phone]   POCT GLUCOSE - Abnormal; Notable for the following components:    POC Glucose 224 (*)     All other components within normal limits    Narrative:     Performed at:  [facility] [address], [place]   Phone [phone]   CBC WITH AUTO DIFFERENTIAL    Narrative:     Performed at:  [facility] [address]   Phone [phone]   TROPONIN    Narrative:     Performed at:  [facility] [address], [place]   Phone [phone]   APTT    Narrative:     Performed at:  [facility] [address]   Phone [phone]   PROTIME-INR    Narrative:     Performed at:  [facility] [address]   Phone [phone]       All other labs were within normal range or not returned as of this dictation. EKG: All EKG's are interpreted by the Emergency Department Physician in the absence of a cardiologist.  Please see their note for interpretation of EKG.       RADIOLOGY:   Non-plain film images such as CT, Ultrasound and MRI are read by the radiologist. Plain radiographic images are visualized and preliminarily interpreted by the ED Provider with the below findings:        Interpretation per the Radiologist below, if available at the time of this note:    XR CHEST PORTABLE   Final Result   Findings as above which may be related to congestive heart failure and edema. MRI BRAIN WO CONTRAST MR WITNESS   Final Result   Acute infarction in the left parietal temporal lobes and posterior left   insula cortex. Acute infarction at the junction of the posterior left temporal lobe and the   left occipital lobe. Mild parenchymal volume loss. Mild chronic microvascular disease. Decreased normal flow void in the right internal carotid artery, consistent   with known occlusion. Results were reported to Dr. Koko Alfredo at 9:54 a.m. on October 20, 2020. CTA HEAD NECK W CONTRAST   Final Result   Complete occlusion of the right internal carotid artery starting from its   origin with retro-fill at the level of the supraclinoid segment. 60% stenosis in the left cavernous/supraclinoid internal carotid artery. 40% stenosis at the origin of the left common carotid artery. Results were reported to Dr. Nuha Srivastava at 7:86 a.m. on October 20, 2020. CT HEAD WO CONTRAST   Preliminary Result   No evidence of acute intracranial abnormality. Critical results were called by Dr. Eliane Mcbride. Lonny Smith MD to Dr. Nuha Srivastava on   07/22/4101 at 08:59. Ct Head Wo Contrast    Result Date: 10/20/2020  EXAMINATION: CT OF THE HEAD WITHOUT CONTRAST,  10/20/2020 8:38 am TECHNIQUE: CT of the head was performed without the administration of intravenous contrast. Dose modulation, iterative reconstruction, and/or weight based adjustment of the mA/kV was utilized to reduce the radiation dose to as low as reasonably achievable. COMPARISON: 06/01/2007.  HISTORY: ORDERING SYSTEM PROVIDED HISTORY: Slurred speech, R arm weakness TECHNOLOGIST PROVIDED HISTORY: Reason for exam:-> Slurred speech, R arm weakness Has a \"code stroke\" or appearance of the lungs bilaterally, new. Findings as above which may be related to congestive heart failure and edema. Cta Head Neck W Contrast    Result Date: 10/20/2020  EXAMINATION: CTA OF THE HEAD AND NECK WITH CONTRAST 10/20/2020 8:40 am: TECHNIQUE: CTA of the head and neck was performed with the administration of intravenous contrast. Multiplanar reformatted images are provided for review. MIP images are provided for review. Stenosis of the internal carotid arteries measured using NASCET criteria. Dose modulation, iterative reconstruction, and/or weight based adjustment of the mA/kV was utilized to reduce the radiation dose to as low as reasonably achievable. COMPARISON: Noncontrast CT head from earlier today HISTORY: ORDERING SYSTEM PROVIDED HISTORY: r/o stroke r arm weakness TECHNOLOGIST PROVIDED HISTORY: Reason for exam:->r/o stroke r arm weakness FINDINGS: CTA NECK: AORTIC ARCH/ARCH VESSELS: There is retroesophageal course of the right subclavian artery, normal variant. No dissection or arterial injury. No significant stenosis of the brachiocephalic or subclavian arteries. CAROTID ARTERIES: There is complete occlusion of the right internal carotid artery starting from its origin with retro-fill at the level of the supraclinoid segment. There is 40% stenosis at the origin of the left common carotid artery. Otherwise, no acute abnormality or flow-limiting stenosis in the left internal carotid artery or remainder of the bilateral common carotid arteries by NASCET criteria. VERTEBRAL ARTERIES: No dissection, arterial injury, or significant stenosis. SOFT TISSUES: The patient is status post median sternotomy. There is septal thickening at the lung apices, likely related to minimal pulmonary vascular congestion. There is bronchial wall thickening, likely related to small airway disease or bronchiolitis. No cervical or superior mediastinal lymphadenopathy.   There is mild prominence of the bilateral palatine and lingual tonsils, likely reactive. No acute abnormality of the salivary glands. There is 1.2 cm right thyroid nodule, does not require follow-up due to small size. BONES: No acute osseous abnormality. There are moderate degenerative changes in the cervical spine. CTA HEAD: ANTERIOR CIRCULATION: There is complete occlusion of the right internal carotid artery starting from its origin with retro-fill at the level of the supraclinoid segment. There is 60% stenosis in the left cavernous/supraclinoid internal carotid artery. No significant stenosis of the anterior cerebral, or middle cerebral arteries. No aneurysm. POSTERIOR CIRCULATION: There is fetal origin of the left PCA, a normal variant. The right posterior communicating artery is patent. No significant stenosis of the vertebral, basilar, or posterior cerebral arteries. No aneurysm. OTHER: No dural venous sinus thrombosis on this non-dedicated study. BRAIN: There is mild parenchymal volume loss. There is periventricular white matter low attenuation, likely related to minimal chronic microvascular disease. No mass effect or midline shift. No extra-axial fluid collection. The gray-white differentiation is maintained. Complete occlusion of the right internal carotid artery starting from its origin with retro-fill at the level of the supraclinoid segment. 60% stenosis in the left cavernous/supraclinoid internal carotid artery. 40% stenosis at the origin of the left common carotid artery. Results were reported to Dr. Willian Cornell at 4:88 a.m. on October 20, 2020.      Mri Brain Wo Contrast Mr Witness    Result Date: 10/20/2020  EXAMINATION: MRI OF THE BRAIN WITHOUT CONTRAST  10/20/2020 9:40 am TECHNIQUE: Multiplanar multisequence MRI of the brain was performed without the administration of intravenous contrast. COMPARISON: Noncontrast CT head and CTA head and neck from earlier today HISTORY: ORDERING SYSTEM PROVIDED HISTORY: stroke TECHNOLOGIST PROVIDED HISTORY: Reason for exam:->stroke Reason for Exam: Wake-up stroke, right sided weakness, right facial droop, speech problems, MR Witness brain protocol FINDINGS: INTRACRANIAL STRUCTURES/VENTRICLES: There is acute infarction in the left parietal temporal lobes and posterior left insula cortex. There is acute infarction at the junction of the posterior left temporal lobe and the left occipital lobe. There is no significant associated mass effect or significant associated signal abnormality on FLAIR images in these regions. There is mild parenchymal volume loss. There is periventricular and subcortical white matter FLAIR hyperintensity, likely related to mild chronic microvascular disease. No midline shift. No evidence of an acute intracranial hemorrhage. No evidence of hydrocephalus. There is decreased normal flow void in the right internal carotid artery, consistent with known occlusion. Acute infarction in the left parietal temporal lobes and posterior left insula cortex. Acute infarction at the junction of the posterior left temporal lobe and the left occipital lobe. Mild parenchymal volume loss. Mild chronic microvascular disease. Decreased normal flow void in the right internal carotid artery, consistent with known occlusion. Results were reported to Dr. Idalia Lagunas at 9:54 a.m. on October 20, 2020. PROCEDURES   Unless otherwise noted below, none     Procedures    CRITICAL CARE TIME   Critical Care  There was a high probability of life-threatening clinical deterioration in the patient's condition requiring my urgent intervention. Total critical care time with the patient was 45 minutes excluding separately reportable procedures.   Critical care required due to patients acute CVA, multiple imaging studies obtained, TPA, and [address]    CONSULTS:  None      EMERGENCY DEPARTMENT COURSE and DIFFERENTIAL DIAGNOSIS/MDM:   Vitals:    Vitals:    10/20/20 1025 10/20/20 1030 risks/benefits of TPA, patient and daughter are aware of these, they do elect for TPA, blood pressure was controlled with labetalol, TPA bolus and infusion is running. Does have a complete occlusion of the right internal carotid artery from its origin with retrofilled the level of the supraclinoid segment, neurosurgery was consulted, this is a chronic finding, that this is not a contraindication to receiving TPA. Lab work is otherwise unremarkable, patient will need to be admitted for further care and evaluation, the hospitalist was agreed for admission. Patient and family are stable for admission    FINAL IMPRESSION      1. Cerebrovascular accident (CVA), unspecified mechanism ([facility] [address]          DISPOSITION/PLAN   DISPOSITION Decision To Admit 10/20/2020 10:30:07 AM      PATIENT REFERREDTO:  No follow-up provider specified.     DISCHARGE MEDICATIONS:  New Prescriptions    No medications on file       DISCONTINUED MEDICATIONS:  Discontinued Medications    No medications on file              (Please note that portions of this note were completed with a voice recognition program.  Efforts were made to edit the dictations but occasionally words are mis-transcribed.)    Meredith Rodriguez PA-C (electronically signed)           Meredith Rodriguez PA-C  10/20/20 7838

## 2020-10-20 NOTE — ED NOTES
Report given to Ghassan Rand RN at bedside. Pt alert and oriented and shows no signs of distress at time of transfer to [address] [place] [address]. Pt taken to room by Tyson Allen RN in bed and NIH done at handoff.        Kathryn Sanchez RN  10/20/20 8553

## 2020-10-20 NOTE — ED NOTES
tPA started per Stroke team. Pt a/ox4, NIH charted. Medicine verified by Joanne Sharp Pharmacist, and [facility] Pharmacist. Daughter at bedside. Writer remains at bedside.      Kamla Damon RN  10/20/20 1037

## 2020-10-20 NOTE — ED NOTES
Pt resting comfortably with no signs of distress. Denies any needs at this time. Bed locked and in lowest position with both side rails raise. Call light within reach. Writer remains at bedside. No bleeding or complications noted at this time.      Valerio Mcneil RN  10/20/20 0118

## 2020-10-21 ENCOUNTER — APPOINTMENT (OUTPATIENT)
Dept: CT IMAGING | Age: 70
DRG: 061 | End: 2020-10-21
Payer: MEDICARE

## 2020-10-21 LAB
GLUCOSE BLD-MCNC: 126 MG/DL (ref 70–99)
PERFORMED ON: ABNORMAL

## 2020-10-21 PROCEDURE — 92610 EVALUATE SWALLOWING FUNCTION: CPT

## 2020-10-21 PROCEDURE — 6370000000 HC RX 637 (ALT 250 FOR IP): Performed by: INTERNAL MEDICINE

## 2020-10-21 PROCEDURE — 94640 AIRWAY INHALATION TREATMENT: CPT

## 2020-10-21 PROCEDURE — 2580000003 HC RX 258: Performed by: INTERNAL MEDICINE

## 2020-10-21 PROCEDURE — 2000000000 HC ICU R&B

## 2020-10-21 PROCEDURE — 92523 SPEECH SOUND LANG COMPREHEN: CPT

## 2020-10-21 PROCEDURE — 99223 1ST HOSP IP/OBS HIGH 75: CPT | Performed by: PSYCHIATRY & NEUROLOGY

## 2020-10-21 PROCEDURE — 92526 ORAL FUNCTION THERAPY: CPT

## 2020-10-21 PROCEDURE — 70450 CT HEAD/BRAIN W/O DYE: CPT

## 2020-10-21 PROCEDURE — 94761 N-INVAS EAR/PLS OXIMETRY MLT: CPT

## 2020-10-21 RX ADMIN — METOPROLOL SUCCINATE 50 MG: 50 TABLET, EXTENDED RELEASE ORAL at 09:32

## 2020-10-21 RX ADMIN — Medication 2 PUFF: at 08:10

## 2020-10-21 RX ADMIN — GLIMEPIRIDE 4 MG: 2 TABLET ORAL at 06:00

## 2020-10-21 RX ADMIN — SERTRALINE HYDROCHLORIDE 100 MG: 50 TABLET, FILM COATED ORAL at 09:32

## 2020-10-21 RX ADMIN — METFORMIN HYDROCHLORIDE 2000 MG: 500 TABLET, EXTENDED RELEASE ORAL at 09:32

## 2020-10-21 RX ADMIN — ALOGLIPTIN 25 MG: 6.25 TABLET, FILM COATED ORAL at 09:34

## 2020-10-21 RX ADMIN — Medication 10 ML: at 21:40

## 2020-10-21 RX ADMIN — Medication 10 ML: at 09:32

## 2020-10-21 RX ADMIN — Medication 2 PUFF: at 20:10

## 2020-10-21 ASSESSMENT — PAIN SCALES - GENERAL
PAINLEVEL_OUTOF10: 0

## 2020-10-21 NOTE — CARE COORDINATION
Chart reviewed from multiple sources for Stroke Team: Dr. Shoshana Vasquez    Date of admission: 10/20/2020  Today's date: 10/21/20    Hospital Diagnoses:  1. Cerebrovascular accident (CVA), unspecified mechanism ([facility] [address]          ED arrival: 0832  IV tPA bolus: 1031  DTN (minutes): 119  Delay for acute MRI and the patient was hypertensive SBP >180    Shakira Edmonds is 65 y.o. female with PMHx of 65 y.o. woman with a PMH of atrial fibrillation not on A/C, CAD s/p CABG who presented on 10/20/2020  after waking up that morning with R sided weakness, aphasia, and dysarthria. This was considered a WAKEUP stroke and a STAT MRI was obtained for consideration of tPA, which revealed an area of DWI hyperintensity in the L MCA territory with no corresponding FLAIR hyperintensity. Presenting NIHSS 9. She was given tPA after multiple doses of PRN labetalol to bring her SBP <180, and admitted to the ICU for further management. Today, s/p tPA exam is clinically improved with an NIHSS of 5. She has mild expressive aphasia and dysarthria, but is able to follow commands. Her right sided weakness is resolved. She also has some mild sensory loss on the right side. Radiology:    CT HEAD WO CONTRAST 10/21   Final Result   Evolving infarct with small amount of parenchymal hemorrhage in the left   frontal lobe portion of the infarct. MRI BRAIN WO CONTRAST MR WITNESS 10/20   Final Result   Acute infarction in the left parietal temporal lobes and posterior left   insula cortex. Acute infarction at the junction of the posterior left temporal lobe and the   left occipital lobe. Mild parenchymal volume loss. Mild chronic microvascular disease. Decreased normal flow void in the right internal carotid artery, consistent   with known occlusion. Results were reported to Dr. Shoshana Vasquez at 9:54 a.m. on October 20, 2020.          CTA HEAD NECK W CONTRAST 10/20   Final Result   Complete occlusion of the right internal carotid artery starting from its   origin with retro-fill at the level of the supraclinoid segment. 60% stenosis in the left cavernous/supraclinoid internal carotid artery. 40% stenosis at the origin of the left common carotid artery. Results were reported to Dr. Lexis Pruitt at 5:65 a.m. on October 20, 2020. CT HEAD WO CONTRAST 10/20   Final Result   No evidence of acute intracranial abnormality. Critical results were called by Dr. Tanya Mancilla. Lexie Millan MD to Dr. Lexis Pruitt on   23/50/6059 at 08:59.              Stroke Risk Factor(s): Afib, CAD   No components found for: HGBA1C, LDL

## 2020-10-21 NOTE — PROGRESS NOTES
Hospitalist Progress Note      PCP: Yadi Piper    Date of Admission: 10/20/2020    Chief Complaint:   Right sided weakness  Expressive aphasia    Hospital Course:   Gilberto de la torre 65 y. o. female presents to the emergency department today for evaluation for right-sided facial droop, right arm weakness.  The patient states that she woke up around 730 this morning, around 730/8 AM this morning she noticed that her face was drooping when she went to the restroom and she states that she noticed some weakness to her right arm.  EMS was called.  The patient is a history of A. fib, she is on aspirin and Plavix.  She is not on any blood thinners.  The patient believes that she was awake, and then her symptoms started.  The patient has no headaches. Hilario Alberts has no visual changes.  The patient denies any chest pain or shortness of breath.  She denies any recent illnesses.  She denies any fall or injury    Subjective:   Doing well  CT shows a small bleed. Symptomatically patient is better but still has expressive aphasia.       Medications:  Reviewed    Infusion Medications    dextrose       Scheduled Medications    budesonide-formoterol  2 puff Inhalation BID    glimepiride  4 mg Oral QAM AC    metFORMIN  2,000 mg Oral Daily with breakfast    metoprolol succinate  50 mg Oral Daily    alogliptin  25 mg Oral Daily    sertraline  100 mg Oral Daily    sodium chloride flush  10 mL Intravenous BID     PRN Meds: glucose, dextrose, glucagon (rDNA), dextrose, sodium chloride flush      Intake/Output Summary (Last 24 hours) at 10/21/2020 1811  Last data filed at 10/21/2020 1445  Gross per 24 hour   Intake 830 ml   Output 2275 ml   Net -1445 ml       Physical Exam Performed:    BP (!) 148/72   Pulse 78   Temp 99.3 °F (37.4 °C) (Temporal)   Resp 22   Ht 5' 3\" (1.6 m)   Wt 155 lb 13.8 oz (70.7 kg)   SpO2 96%   BMI 27.61 kg/m²     General appearance: No apparent distress appears stated age and cooperative. HEENT Normal cephalic, atraumatic without obvious deformity. Pupils equal, round, and reactive to light. Extra ocular muscles intact. Conjunctivae/corneas clear. Neck: Supple, No jugular venous distention/bruits. Trachea midline without thyromegaly or adenopathy with full range of motion. Lungs: Clear to auscultation, bilaterally without Rales/Wheezes/Rhonchi with good respiratory effort. Heart: irregular rhythm with Normal rate and  S1/S2 without murmurs, rubs or gallops, point of maximum impulse non-displaced  Abdomen: Soft, non-tender or non-distended without rigidity or guarding and positive bowel sounds all four quadrants. Extremities: No clubbing, cyanosis, or edema bilaterally. Full range of motion without deformity and normal gait intact. Skin: Skin color, texture, turgor normal.  No rashes or lesions. Neurologic: Alert demonstrating expressive aphasia strength on the right is good. Mental status: Alert, difficult to assess because of expressive aphasia   Capillary Refill: Acceptable  < 3 seconds  Peripheral Pulses: +3 Easily felt, not easily obliterated with pressure       Labs:   Recent Labs     10/20/20  0825   WBC 9.3   HGB 13.3   HCT 40.3        Recent Labs     10/20/20  0825      K 4.2      CO2 24   BUN 10   CREATININE 0.7   CALCIUM 10.1     Recent Labs     10/20/20  0825   AST 18   ALT 14   BILITOT 0.3   ALKPHOS 97     Recent Labs     10/20/20  0825   INR 0.97     Recent Labs     10/20/20  0825   TROPONINI <0.01       Urinalysis:    No results found for: Dellie Raiza, BACTERIA, RBCUA, BLOODU, [address], [address] [place] [address]    Radiology:  CT HEAD WO CONTRAST   Final Result   Evolving infarct with small amount of parenchymal hemorrhage in the left   frontal lobe portion of the infarct. XR CHEST PORTABLE   Final Result   Findings as above which may be related to congestive heart failure and edema.          MRI BRAIN WO CONTRAST MR WITNESS   Final Result   Acute infarction in the left parietal temporal lobes and posterior left   insula cortex. Acute infarction at the junction of the posterior left temporal lobe and the   left occipital lobe. Mild parenchymal volume loss. Mild chronic microvascular disease. Decreased normal flow void in the right internal carotid artery, consistent   with known occlusion. Results were reported to Dr. Deidra Montanez at 9:54 a.m. on October 20, 2020. CTA HEAD NECK W CONTRAST   Final Result   Complete occlusion of the right internal carotid artery starting from its   origin with retro-fill at the level of the supraclinoid segment. 60% stenosis in the left cavernous/supraclinoid internal carotid artery. 40% stenosis at the origin of the left common carotid artery. Results were reported to Dr. Willian Cornell at 7:64 a.m. on October 20, 2020. CT HEAD WO CONTRAST   Final Result   No evidence of acute intracranial abnormality. Critical results were called by Dr. Marlena Prescott. [place] Simeon MD to Dr. Willian Cornell on   05/01/0737 at 08:59. Assessment/Plan:    Active Hospital Problems    Diagnosis    Acute CVA (cerebrovascular accident) ([facility] [address] [I63.9]     . Acute stroke  - felt to be debilitating and within the time frame so TPA initiated in the ED.  - patient is reportedly improved from where she was when she came to the ED. - will admit to ICU to monitor as she has gotten TPA. - Hold ASA and plavix for now. - will need repeat CT if has HA or vomiting  -repeat  Ct 24 hours post TPA admin shows hemorrhagic conversion with a tiny bleed in the area of the stroke  - neuro is consulted. Patient is not showing any worsening and is improving.      2. A.fib  - was not on any type of AC prior. Will likely need to be on DOAC when she leaves for the A.fib.       3.  Diabetes mellitus 2  - usually controlled with oral meds  -continue these, may add sliding scale if sugars are running high.       DVT Prophylaxis: SCDs  Diet: DIET DYSPHAGIA SOFT AND BITE-SIZED; No Drinking Straw  Code Status: Prior    PT/OT Eval Status: eval and treat     Dispo - CCC    30 MIN of critical care time spent.      Robbin Bedolla MD

## 2020-10-21 NOTE — H&P
Hospital Medicine History & Physical      PCP: Richard Grigsby    Date of Admission: 10/20/2020    Date of Service: Pt seen/examined on 10/20/2020 and Admitted to Inpatient     Chief Complaint:    Wake up stroke, right sided weakness, word finding difficulty. History Of Present Illness:  Jeniffer Mathew is a 65 y.o. female presents to the emergency department today for evaluation for right-sided facial droop, right arm weakness. The patient states that she woke up around 730 this morning, around 730/8 AM this morning she noticed that her face was drooping when she went to the restroom and she states that she noticed some weakness to her right arm. EMS was called. The patient is a history of A. fib, she is on aspirin and Plavix. She is not on any blood thinners. The patient believes that she was awake, and then her symptoms started. The patient has no headaches. She has no visual changes. The patient denies any chest pain or shortness of breath. She denies any recent illnesses.   She denies any fall or injury  Past Medical History:        Diagnosis Date    Actinic keratosis     Arthritis     Asthma     UNSPECIFIED    Atrial fibrillation (HCC)     CAD (coronary artery disease)     Depression     worse with pain    Diabetes mellitus ([facility] [address]     TYPE II    Disc displacement, lumbar     Diverticulosis of colon     Hard of hearing     left    Hyperlipidemia     Hypertension     Ischemic heart disease     Past heart attack     29 YRS AGO    Poor vision     legally blind    Right leg weakness     Scoliosis        Past Surgical History:        Procedure Laterality Date    APPENDECTOMY      BACK SURGERY      CARDIAC SURGERY  1984     SECTION      ONE    CORONARY ARTERY BYPASS GRAFT      HAND SURGERY      LUMPS/BONE SPURS    LUMBAR One Arch Last SURGERY 8-    Microlumbar discectomy L4-5 right    LUMBAR SPINE SURGERY  10-11-12    MICROLUMBAR RE-EXPLORATION L4-5 RIGHT, DECOMPRESSION OF    OVARIAN CYST REMOVAL      SKIN BIOPSY      PRE CANCERS FROZEN OFF    TONSILLECTOMY      VASCULAR SURGERY         Medications Prior to Admission:    Prior to Admission medications    Medication Sig Start Date End Date Taking? Authorizing Provider   empagliflozin (JARDIANCE) 10 MG tablet Take 10 mg by mouth daily   Yes Historical Provider, MD   clopidogrel (PLAVIX) 75 MG tablet Take 75 mg by mouth daily   Yes Historical Provider, MD   sertraline (ZOLOFT) 100 MG tablet Take 100 mg by mouth daily   Yes Historical Provider, MD   metformin (GLUCOPHAGE-XR) 500 MG XR tablet Take 2,000 mg by mouth daily (with breakfast)    Yes Historical Provider, MD   aspirin 325 MG tablet Take 325 mg by mouth daily    Historical Provider, MD   naproxen (NAPROSYN) 500 MG tablet Take 1 tablet by mouth 2 times daily as needed for Pain 6/8/19 6/18/19  Park Blair PA-C   aspirin 81 MG tablet Take 243 mg by mouth daily    Historical Provider, MD   glimepiride (AMARYL) 4 MG tablet Take 4 mg by mouth every morning (before breakfast)    Historical Provider, MD   budesonide-formoterol (SYMBICORT) 160-4.5 MCG/ACT AERO Inhale 2 puffs into the lungs 2 times daily    Historical Provider, MD   albuterol (PROVENTIL) (2.5 MG/3ML) 0.083% nebulizer solution Take 2.5 mg by nebulization every 6 hours as needed for Wheezing    Historical Provider, MD   saxagliptin (ONGLYZA) 5 MG TABS tablet Take 5 mg by mouth daily. Historical Provider, MD   metoprolol (TOPROL-XL) 50 MG XL tablet Take 50 mg by mouth daily. Historical Provider, MD   albuterol (PROVENTIL HFA;VENTOLIN HFA) 108 (90 BASE) MCG/ACT inhaler Inhale 2 puffs into the lungs every 6 hours as needed. Historical Provider, MD   mometasone [facility]) 220 MCG/INH inhaler Inhale 2 puffs into the lungs daily as needed.     Historical Provider, MD Allergies:  Morphine and Codeine    Social History:  The patient currently lives at home     TOBACCO:   reports that she has quit smoking. She has a 40.00 pack-year smoking history. She has never used smokeless tobacco.  ETOH:   reports no history of alcohol use. Family History:  Reviewed in detail and negative for DM, Early CAD, Cancer, CVA. Positive as follows:        Problem Relation Age of Onset    Bipolar Disorder Mother     Mental Illness Mother         SUICIDE ATTEMPTS    Diabetes Mother     High Blood Pressure Mother     Heart Disease Father     Heart Attack Father     Heart Surgery Father     Early Death Father     Cancer Father     High Blood Pressure Father     Heart Disease Sister     Heart Surgery Sister     High Blood Pressure Sister     Diabetes Sister        REVIEW OF SYSTEMS:   Positive for right sided arm weakness, word finding difficulty. and as noted in the HPI. All other systems reviewed and negative. PHYSICAL EXAM:    BP (!) 153/64   Pulse 85   Temp 99 °F (37.2 °C) (Temporal)   Resp 20   Ht 5' 3\" (1.6 m)   Wt 155 lb 13.8 oz (70.7 kg)   SpO2 98%   BMI 27.61 kg/m²     General appearance: No apparent distress appears stated age and cooperative. HEENT Normal cephalic, atraumatic without obvious deformity. Pupils equal, round, and reactive to light. Extra ocular muscles intact. Conjunctivae/corneas clear. Neck: Supple, No jugular venous distention/bruits. Trachea midline without thyromegaly or adenopathy with full range of motion. Lungs: Clear to auscultation, bilaterally without Rales/Wheezes/Rhonchi with good respiratory effort. Heart: irregular rhythm with Normal rate and  S1/S2 without murmurs, rubs or gallops, point of maximum impulse non-displaced  Abdomen: Soft, non-tender or non-distended without rigidity or guarding and positive bowel sounds all four quadrants. Extremities: No clubbing, cyanosis, or edema bilaterally.   Full range of motion without deformity and normal gait intact. Skin: Skin color, texture, turgor normal.  No rashes or lesions. Neurologic: Alert demonstrating expressive aphasia and decreased strength, RUE, can lift right leg against gravity but decreased sensation. Mental status: Alert, difficult to assess because of expressive aphasia   Capillary Refill: Acceptable  < 3 seconds  Peripheral Pulses: +3 Easily felt, not easily obliterated with pressure      CXR:  I have reviewed the CXR with the following interpretation: clear   EKG:  I have reviewed the EKG with the following interpretation:    Atrial fibrillationRight bundle branch blockLeft anterior fascicular block Bifascicular block  Moderate voltage criteria for LVH, may be normal variantAbnormal ECGSince previous tracing 11/11/12,atrial fibrillation has replaced sinus rhythm         CBC   Recent Labs     10/20/20  0825   WBC 9.3   HGB 13.3   HCT 40.3         RENAL  Recent Labs     10/20/20  0825      K 4.2      CO2 24   BUN 10   CREATININE 0.7     LFT'S  Recent Labs     10/20/20  0825   AST 18   ALT 14   BILITOT 0.3   ALKPHOS 97     COAG  Recent Labs     10/20/20  0825   INR 0.97     CARDIAC ENZYMES  Recent Labs     10/20/20  0825   TROPONINI <0.01       U/A:  No results found for: NITRITE, COLORU, WBCUA, RBCUA, MUCUS, BACTERIA, CLARITYU, SPECGRAV, LEUKOCYTESUR, BLOODU, GLUCOSEU, AMORPHOUS    ABG  No results found for: DKJ8PMH, BEART, K4HAJKNF, PHART, THGBART, TKI6BMD, PO2ART, PIN8KWC        Active Hospital Problems    Diagnosis Date Noted    Acute CVA (cerebrovascular accident) ([facility] [address] [I63.9] 10/20/2020         PHYSICIANS CERTIFICATION:    I certify that Kennedy Diop is expected to be hospitalized for Greater than 2 midnights based on the following assessment and plan:      ASSESSMENT/PLAN:    1.  Acute stroke  - felt to be debilitating and within the time frame so TPA initiated in the ED.  - patient is reportedly improved from where she was when she came to the ED. - will admit to ICU to monitor as she has gotten TPA. - Hold ASA and plavix for now. - will need repeat CT if has HA or vomiting  -repeat 24 hours post TPA admin. 2. A.fib  - was not on any type of AC prior. Will likely need to be on DOAC when she leaves for the A.fib.    3. Diabetes mellitus 2  - usually controlled with oral meds  -continue these, may add sliding scale if sugars are running high. DVT Prophylaxis: SCDs  Diet: DIET DYSPHAGIA SOFT AND BITE-SIZED; No Drinking Straw  Code Status: Prior  PT/OT Eval Status: eval and treat. Speech eval and treat      Dispo - admit to ICU    30 minutes of critical care time spent. Paula Cosme MD    Thank you Jacob Chandra for the opportunity to be involved in this patient's care.  If you have any questions or concerns please feel free to contact me at [phone]

## 2020-10-21 NOTE — PROGRESS NOTES
Speech Language/Pathology   SPEECH LANGUAGE AND CLINICAL BEDSIDE SWALLOWING EVALUATION   Speech Therapy Department     Patient Name:  Shakira Edmonds  :  1950  Pain level: Pt denies pain at this time  Medical Diagnosis:  Acute CVA (cerebrovascular accident) ([facility] [I63.9]     Georgette Calderon is a 65 y.o. female presents to the emergency department today for evaluation for right-sided facial droop, right arm weakness. The patient states that she woke up around 730 this morning, around 730/8 AM this morning she noticed that her face was drooping when she went to the restroom and she states that she noticed some weakness to her right arm. EMS was called. The patient is a history of A. fib, she is on aspirin and Plavix. She is not on any blood thinners. The patient believes that she was awake, and then her symptoms started. The patient has no headaches. She has no visual changes. The patient denies any chest pain or shortness of breath. She denies any recent illnesses. She denies any fall or injury  MRI completed on 10/21/20 with the following results:  Impression    Acute infarction in the left parietal temporal lobes and posterior left    insula cortex.         Acute infarction at the junction of the posterior left temporal lobe and the    left occipital lobe.         Mild parenchymal volume loss.         Mild chronic microvascular disease.         Decreased normal flow void in the right internal carotid artery, consistent    with known occlusion. Speech Therapy/Clinical Swallow Evaluation order received. Pt is currently alert and verbally responsive but presents with impaired self expression with expressive aphasia noted with open ended verbal attempts. Pt is interactive with intermittent appropriate spontaneous verbalizations noted (see SLP Evaluation, below).  Pt currently held NPO pending SLP Clinical Swallow Evaluation at bedside    CLINICAL SWALLOW EVALUATION:  Dysphagia Treatment Diagnosis:  Mild Oropharyngeal Dysphagia     Impressions: Pt demonstrates mild R facial droop at rest. Suspected oral apraxia noted with attempted completion of OME with visual demonstration required and with perseveration of oral movements noted. Mild reduced oral strength, ROM and coordination noted with po trials. Mild/mod reduced bolus control, mastication and A-P propulsion noted with all textures. Clinical symptoms of mild delayed swallow initiation, mild/mod reduced laryngeal excursion and intermittent suspected reduced pharyngeal clearing noted with all textures. Impaired consistent pharyngeal sensation is suspected with intermittent ABSENT timely swallow initiation noted with thin liquids in small volume amounts. Although no overt signs of aspiration noted with any texture, inconsistent sensation for swallow initiation increases aspiration potential if precautions are not followed. Dietary Recommendations: Dysphagia III Soft and Bite-Sized with thin liquids/no straws/meds with puree    Strategies: 90 degree positioning with all p.o. intake; small bites/sips; alternate textures through meal; reduce rate of intake; No straws     Dysphagia Treatment/Goals: Speech therapy for dysphagia tx 3-5 times per week. ST.) Pt will tolerate recommended diet without s/s of aspiration   2.) If clinical symptoms of penetration/aspiration continue to be noted,Pt will tolerate MBS to r/o aspiration and determine appropriate diet/liquid level. 3.) Pt will tolerate diet advance to least restricted diet, as clinically indicated, with no signs of aspiration   4.) Pt will improve oral motor function via bolus control exercises      SPEECH LANGUAGE EVALUATION:  Speech Language Treatment Diagnosis:  Speech Language Deficits    Speech Language Impressions:Pt presents with oral and verbal apraxia impacting volitional speech production.  Moderate expressive aphasia also noted characterized by fluent aphasia, perseveration and impaired functional self expression. Although the Pt demonstrates improved auditory processing/comprehension in conversation, impaired accuracy of Y/N responses with perseveration of \"yes\" noted. Pt demonstrates orientation to person and place given active questioning. However, high level cognitive function unable to be assess at this time due to improved expressive and receptive function. Oral Motor exam/Motor Speech:  · Suspected oral apraxia with impaired motor initiation and need for multimodality prompts to imitate OME.; motor perseveration also noted  · No overt dysarthria noted with intermittent spontaneous verbalizations     Verbal Expression:   · Fluent expressive aphasia with persistent perseveration noted  · Intermittent spontaneous greetings/closings noted   · Confrontational object namin% spontaneously; 50% with sentence completion format  · Impaired functional self expression due to expressive aphasia; Pt demonstrates spontaneous use of gestures to assist with functional self expression    Auditory Comprehension:   · Improved auditory processing/comprehension in the context of conversation  · 1-unit commands; 80%; 2- unit commands: 70%  · Simple Y/N questions: <50% due to perseveration of yes response    Cognitive-Linguistic:  · Pt demonstrates orientation to person/place with multimodality prompting  · Moderately impaired self monitoring/self correction of verbal errors  · High level cognitive linguistic function unable to be fully assessed due to expressive/receptive deficits     Plan: Speech therapy 3-5 times a weeks for speech-language treatment, and dysphagia treatment     Discharge Recommendations:  Pt will benefit from continued skilled Speech Therapy for Speech and Dysphagia services, prior to returning home. Consult to Inpatient ARU is recommended      Speech Language Short-term goals: 1. Pt will improve single word initiation via graded tasks to 80%   2.Pt will improve functional verbal expression via multimodality graded tasks to 80%   3. Pt will improveauditory processing/comprehension of commands and questions to 80%, via graded tasks   4. Pt will improve self monitoring/self correction of verbal errors  via graded tasks to 80%  5. Ongoing assessment of cognitive linguistic function and receptive and expressive skills improve    Patient/Family Education:Education, results and recommendations given to the Pt and nurse, who verbalized understanding    Timed Code Treatment: 0 min    Total Treatment Time: 45 min     If patient discharges prior to next session this note will serve as a discharge summary.      Desi Lopez [id number]   Speech-Language Pathologist

## 2020-10-21 NOTE — CONSULTS
NEUROLOGY CONSULTATION     Patient's Name :   Yoli Mancilla        YOB: 1950                    Date of Consultation : 10/21/2020 3:46 PM    Referring Physician :  Alisa Christopher MD    Reason for Consultation :  Acute stroke, status post TPA    HISTORY OF PRESENT ILLNESS      Ralph Smith is a 65 y.o. female   History was obtained from patient and from dictations in the chart. Additional history was obtained from the patient's daughter at the bedside. Patient has known atrial fibrillation. She had been on Xarelto in the past but this was stopped a while ago because of recurrent falls unsteadiness and increased risk of head injury. Patient developed acute onset of right-sided facial weakness and then weakness of the right arm and leg. Patient had been on aspirin and Plavix. Patient came to the hospital.  She was evaluated by the stroke team and given TPA. Patient symptoms improved somewhat but she still has significant aphasia and right-sided weakness. Repeat CT head done 24 hours after the TPA infusion shows mild hemorrhagic conversion of the left hemispheric stroke. Patient denies any headache at this time. There is no vertigo or diplopia. Symptom onset was acute abrupt and moderately severe without any other aggravating or relieving factors. REVIEW OF SYSTEMS     Denies any chest pain palpitations breathlessness abdominal pain fever or weight loss. Rest of the 14 system review was reviewed and was negative except for the symptoms noted above.     Past Medical History:   Diagnosis Date    Actinic keratosis     Arthritis     Asthma     UNSPECIFIED    Atrial fibrillation (HCC)     CAD (coronary artery disease)     Depression     worse with pain    Diabetes mellitus ([address]     TYPE II    Disc displacement, lumbar     Diverticulosis of colon     Hard of hearing     left    Hyperlipidemia     Hypertension     Ischemic heart disease     Past heart attack     28 YRS AGO    Poor vision     legally blind    Right leg weakness     Scoliosis      Family History   Problem Relation Age of Onset    Bipolar Disorder Mother     Mental Illness Mother         SUICIDE ATTEMPTS    Diabetes Mother     High Blood Pressure Mother     Heart Disease Father     Heart Attack Father     Heart Surgery Father     Early Death Father     Cancer Father     High Blood Pressure Father     Heart Disease Sister     Heart Surgery Sister     High Blood Pressure Sister     Diabetes Sister      Social History     Socioeconomic History    Marital status:       Spouse name: None    Number of children: None    Years of education: None    Highest education level: None   Occupational History    None   Social Needs    Financial resource strain: None    Food insecurity     Worry: None     Inability: None    Transportation needs     Medical: None     Non-medical: None   Tobacco Use    Smoking status: Former Smoker     Packs/day: 1.00     Years: 40.00     Pack years: 40.00    Smokeless tobacco: Never Used   Substance and Sexual Activity    Alcohol use: No     Comment: RARELY NOW    Drug use: No    Sexual activity: None   Lifestyle    Physical activity     Days per week: None     Minutes per session: None    Stress: None   Relationships    Social connections     Talks on phone: None     Gets together: None     Attends Mosque service: None     Active member of club or organization: None     Attends meetings of clubs or organizations: None     Relationship status: None    Intimate partner violence     Fear of current or ex partner: None     Emotionally abused: None     Physically abused: None     Forced sexual activity: None   Other Topics Concern    None   Social History Narrative    None     Current Facility-Administered Medications   Medication Dose Route Frequency Provider Last Rate Last Dose    budesonide-formoterol (SYMBICORT) 160-4.5 MCG/ACT inhaler 2 puff  2 puff Inhalation BID Goyo Milligan MD   2 puff at 10/21/20 0810    glimepiride (AMARYL) tablet 4 mg  4 mg Oral QAM AC Goyo Milligan MD   4 mg at 10/21/20 0600    metFORMIN (GLUCOPHAGE-XR) extended release tablet 2,000 mg  2,000 mg Oral Daily with breakfast Goyo Milligan MD   2,000 mg at 10/21/20 0932    metoprolol succinate (TOPROL XL) extended release tablet 50 mg  50 mg Oral Daily Goyo Milligan MD   50 mg at 10/21/20 0932    alogliptin (NESINA) tablet 25 mg  25 mg Oral Daily Goyo Milligan MD   25 mg at 10/21/20 6336    sertraline (ZOLOFT) tablet 100 mg  100 mg Oral Daily Goyo Milligan MD   100 mg at 10/21/20 0932    glucose (GLUTOSE) 40 % oral gel 15 g  15 g Oral PRN Goyo Milligan MD        dextrose 50 % IV solution  12.5 g Intravenous PRN Goyo Milligan MD        glucagon (rDNA) injection 1 mg  1 mg Intramuscular PRN Goyo Milligan MD        dextrose 5 % solution  100 mL/hr Intravenous PRN Goyo Milligan MD        sodium chloride flush 0.9 % injection 10 mL  10 mL Intravenous PRN Goyo Milligan MD        sodium chloride flush 0.9 % injection 10 mL  10 mL Intravenous BID Goyo Milligan MD   10 mL at 10/21/20 0932     Allergies   Allergen Reactions    Morphine Other (See Comments)     BLOOD PRESSURE BOTTOMED OUT    Codeine Nausea Only and Other (See Comments)     SWEATS       PHYSICAL EXAMINATION:  BP (!) 124/53   Pulse 92   Temp 99.3 °F (37.4 °C) (Temporal)   Resp 23   Ht 5' 3\" (1.6 m)   Wt 155 lb 13.8 oz (70.7 kg)   SpO2 92%   BMI 27.61 kg/m²   Appearance: Well appearing, well nourished and in no distress  Mental Status Exam: Patient is awake and alert. She has significant expressive aphasia and hence she is having difficulty participating in detailed mental status exam.  Memory could not be assessed.   Judgment was normal.  Fund of knowledge was normal..  Language : Expressive aphasia  Funduscopic Exam: sharp disc margins  Cranial Nerves:   II: Visual fields:  Full to confrontation  III: Pupils:  equal, round, reactive to light  III,IV,VI: Extra Ocular Movements are intact. No nystagmus  V: Facial sensation is intact to pin prick and light touch  VII: Right facial weakness  VIII: Hearing:  Intact to finger rub bilaterally  IX: Palate  elevation is symmetric  XI: Shoulder shrug is intact  XII: Tongue movements are normal  Motor:  Muscle tone is diminished on the right side  Strength is 4/5 on the right side and 5/5 on the left side  Sensory: Intact to light touch and  pin prick in all four extremities  Coordination:  Normal  Finger to Nose and Heel to Mitchell on the left side and impaired on the right side    . Reflexes:  DTR 1 and symmetric bilaterally  Plantar response: Flexor on the left side and extensor on the right side  Gait: Gait is impaired  Romberg: negative  Vascular: No carotid bruit bilaterally        DATA:  LABS:  General Labs:    CBC:   Lab Results   Component Value Date    WBC 9.3 10/20/2020    RBC 4.32 10/20/2020    HGB 13.3 10/20/2020    HCT 40.3 10/20/2020    MCV 93.4 10/20/2020    MCH 30.9 10/20/2020    MCHC 33.1 10/20/2020    RDW 13.0 10/20/2020     10/20/2020    MPV 8.6 10/20/2020     BMP:    Lab Results   Component Value Date     10/20/2020    K 4.2 10/20/2020    K 4.7 06/08/2019     10/20/2020    CO2 24 10/20/2020    BUN 10 10/20/2020    LABALBU 4.5 10/20/2020    CREATININE 0.7 10/20/2020    CALCIUM 10.1 10/20/2020    GFRAA >60 10/20/2020    GFRAA >60 11/11/2012    LABGLOM >60 10/20/2020    GLUCOSE 230 10/20/2020     RADIOLOGY REVIEW:  I have reviewed radiology image(s) and reports(s) of: MRI brain and CT head    IMPRESSION :  Acute embolic cerebral infarction  MRI brain images were independently reviewed with the patient and her family at the bedside  CT head images were also independently reviewed  The CT head done 24 hours after TPA shows mild hemorrhagic transformation  Patient has known atrial fibrillation. CT angiogram showed complete occlusion of the right internal carotid artery and a 60% stenosis in the left cavernous portion internal carotid artery. Patient Active Problem List   Diagnosis    Lumbar radiculopathy    Acute CVA (cerebrovascular accident) ([address]     RECOMMENDATIONS ;  Discussed at length with patient and her daughter at the bedside  Discussed with patient's nurse  Because of the hemorrhagic transformation in the stroke, we will hold all antiplatelet therapy including aspirin and Plavix for the next 48 hours. I will repeat her CT head on Friday and if there is no worsening of the minimal hemorrhagic transformation, we could hopefully start her back on low-dose aspirin. Since patient had been determined to be at increased risk of falls in the past with unsteadiness and was high risk for anticoagulation, she may be a candidate for left atrial appendage closure. Patient has a regular cardiologist and this could be discussed as an outpatient with him. Thank you for this consultation. Please note a portion of this chart was generated using dragon dictation software. Although every effort was made to ensure the accuracy of this automated transcription, some errors in transcription may have occurred.

## 2020-10-22 LAB
GLUCOSE BLD-MCNC: 180 MG/DL (ref 70–99)
GLUCOSE BLD-MCNC: 201 MG/DL (ref 70–99)
GLUCOSE BLD-MCNC: 317 MG/DL (ref 70–99)
PERFORMED ON: ABNORMAL

## 2020-10-22 PROCEDURE — 6370000000 HC RX 637 (ALT 250 FOR IP): Performed by: INTERNAL MEDICINE

## 2020-10-22 PROCEDURE — 99233 SBSQ HOSP IP/OBS HIGH 50: CPT | Performed by: PSYCHIATRY & NEUROLOGY

## 2020-10-22 PROCEDURE — 97530 THERAPEUTIC ACTIVITIES: CPT

## 2020-10-22 PROCEDURE — 94761 N-INVAS EAR/PLS OXIMETRY MLT: CPT

## 2020-10-22 PROCEDURE — 2580000003 HC RX 258: Performed by: INTERNAL MEDICINE

## 2020-10-22 PROCEDURE — 92526 ORAL FUNCTION THERAPY: CPT

## 2020-10-22 PROCEDURE — 97116 GAIT TRAINING THERAPY: CPT

## 2020-10-22 PROCEDURE — 97162 PT EVAL MOD COMPLEX 30 MIN: CPT

## 2020-10-22 PROCEDURE — 2000000000 HC ICU R&B

## 2020-10-22 PROCEDURE — 97129 THER IVNTJ 1ST 15 MIN: CPT

## 2020-10-22 PROCEDURE — 92507 TX SP LANG VOICE COMM INDIV: CPT

## 2020-10-22 PROCEDURE — 94640 AIRWAY INHALATION TREATMENT: CPT

## 2020-10-22 PROCEDURE — 97166 OT EVAL MOD COMPLEX 45 MIN: CPT

## 2020-10-22 PROCEDURE — 97535 SELF CARE MNGMENT TRAINING: CPT

## 2020-10-22 RX ADMIN — SERTRALINE HYDROCHLORIDE 100 MG: 50 TABLET, FILM COATED ORAL at 09:38

## 2020-10-22 RX ADMIN — Medication 2 PUFF: at 08:16

## 2020-10-22 RX ADMIN — METFORMIN HYDROCHLORIDE 2000 MG: 500 TABLET, EXTENDED RELEASE ORAL at 09:38

## 2020-10-22 RX ADMIN — Medication 2 PUFF: at 20:05

## 2020-10-22 RX ADMIN — ALOGLIPTIN 25 MG: 6.25 TABLET, FILM COATED ORAL at 09:38

## 2020-10-22 RX ADMIN — Medication 10 ML: at 09:39

## 2020-10-22 RX ADMIN — METOPROLOL SUCCINATE 50 MG: 50 TABLET, EXTENDED RELEASE ORAL at 09:38

## 2020-10-22 RX ADMIN — GLIMEPIRIDE 4 MG: 2 TABLET ORAL at 09:38

## 2020-10-22 RX ADMIN — Medication 10 ML: at 21:01

## 2020-10-22 ASSESSMENT — PAIN SCALES - GENERAL
PAINLEVEL_OUTOF10: 0

## 2020-10-22 NOTE — PROGRESS NOTES
Occupational Therapy   Occupational Therapy Initial Assessment  Date: 10/22/2020   Patient Name: Li Bermudez  MRN: [id number]     : 1950    Date of Service: 10/22/2020    Discharge Recommendations: Li Bermudez scored a 12 on the AM-PAC ADL Inpatient form. Current research shows that an AM-PAC score of 17 or less is typically not associated with a discharge to the patient's home setting. Based on the patient's AM-PAC score and their current ADL deficits, it is recommended that the patient have 5-7 sessions per week of Occupational Therapy at d/c to increase the patient's independence. At this time, this patient demonstrates the endurance, and/or tolerance for 3 hours of therapy each day, with a treatment frequency of 5-7x/wk. Please see assessment section for further patient specific details. If patient discharges prior to next session this note will serve as a discharge summary. Please see below for the latest assessment towards goals. OT Equipment Recommendations  Equipment Needed: No(Continue to assess pending progress)    Assessment   Performance deficits / Impairments: Decreased functional mobility ; Decreased strength;Decreased endurance;Decreased coordination;Decreased sensation;Decreased safe awareness;Decreased ADL status; Decreased balance;Decreased cognition;Decreased high-level IADLs;Decreased fine motor control  Assessment: Pt is far below baseline of function and requires assistance for ADL and functional transfers at this time. Pt would benefit from further skilled OT services to maximize independence and to return to OF. Treatment Diagnosis: Above deficits associated with Left acute CVA  Prognosis: Good  Decision Making: Medium Complexity  OT Education: OT Role;Plan of Care;Transfer Training;ADL Adaptive Strategies; Equipment  Patient Education: Pt verbalized understanding of plan of care, assistance needed for transfer training, and adaptive equipment for feeding, would benefit from reinforcement  Barriers to Learning: language / aphasia  REQUIRES OT FOLLOW UP: Yes  Activity Tolerance  Activity Tolerance: Patient Tolerated treatment well;Treatment limited secondary to medical complications (free text)  Activity Tolerance: Symptomatic with ambulation leading to hypotension, able to recover quickly after rest break  Safety Devices  Safety Devices in place: Yes  Type of devices: All fall risk precautions in place;Call light within reach; Patient at risk for falls;Gait belt;Nurse notified; Left in chair;Chair alarm in place  Restraints  Initially in place: No           Patient Diagnosis(es): The encounter diagnosis was Cerebrovascular accident (CVA), unspecified mechanism ([address] has a past medical history of Actinic keratosis, Arthritis, Asthma, Atrial fibrillation ([address] CAD (coronary artery disease), Depression, Diabetes mellitus ([address] Disc displacement, lumbar, Diverticulosis of colon, Hard of hearing, Hyperlipidemia, Hypertension, Ischemic heart disease, Past heart attack, Poor vision, Right leg weakness, and Scoliosis. has a past surgical history that includes Coronary artery bypass graft;  section; Appendectomy; ovarian cyst removal; Hand surgery; Cardiac surgery (); skin biopsy; vascular surgery; Tonsillectomy; Lumbar disc surgery (8-); Lumbar spine surgery (10-11-12); and back surgery. Treatment Diagnosis: Above deficits associated with Left acute CVA      Restrictions  Restrictions/Precautions  Restrictions/Precautions: Fall Risk, Modified Diet(High fall risk; Diet dysphagia soft and bite sized, no drinking straw)  Required Braces or Orthoses?: No  Position Activity Restriction  Other position/activity restrictions: Jeniffer Mathew is a 65 y.o. female presents to the emergency department today for evaluation for right-sided facial droop, right arm weakness.   The patient states that she woke up around 730 this morning, around 730/8 AM this morning she noticed that her face was drooping when she went to the restroom and she states that she noticed some weakness to her right arm. EMS was called. The patient is a history of A. fib, she is on aspirin and Plavix. She is not on any blood thinners. The patient believes that she was awake, and then her symptoms started. TPA given 10/20. Today, s/p tPA exam is clinically improved with an NIHSS of 5. She has mild expressive aphasia and dysarthria, but is able to follow commands. Her right sided weakness is resolved. She also has some mild sensory loss on the right side. The CT head done 24 hours after TPA shows mild hemorrhagic transformation. Acute embolic cerebral infarction    Subjective   General  Chart Reviewed: Yes  Patient assessed for rehabilitation services?: Yes  Additional Pertinent Hx: Afib, CAD s/p CABG, DM2  Family / Caregiver Present: No  Diagnosis: Acute CVA (left)  Subjective  Subjective: Patient supine in bed upon arrival, agreeable to OT/PT evaluation. Pt pleasant and motivated during evaluation.   Vital Signs  Pulse: 83  Resp: 20  BP: (!) 131/106(error/not accurate)  MAP (mmHg): 112  Social/Functional History  Social/Functional History  Lives With: Alone  Type of Home: House  Home Layout: One level  Home Access: Level entry  Bathroom Shower/Tub: Tub/Shower unit  Bathroom Toilet: Standard  Bathroom Equipment: Hand-held shower, Grab bars in shower, Grab bars around toilet  Home Equipment: Rolling walker, Cane, Reacher  ADL Assistance: Independent  Homemaking Assistance: Independent  Homemaking Responsibilities: Yes  Ambulation Assistance: Independent  Transfer Assistance: Independent  Active : Yes  Leisure & Hobbies: playing games on tablet  Additional Comments: Pt unable to report number of falls, sounds like multiple falls due to being dizzy       Objective   Vision: Within Functional Limits  Hearing: Within functional limits       Observation/Palpation  Posture: Good  Observation: R UE/LE bruising  Balance  Sitting Balance: Stand by assistance(CGA - SBA)  Standing Balance: Minimal assistance(w/ RW)  Functional Mobility  Functional - Mobility Device: Rolling Walker  Activity: Other  Assist Level: Minimal assistance  Functional Mobility Comments: Pt ambulated from EOB ~10' to the door w/ cues to fix shuffling gait; pt became symptomatic and had a ~5 min seated rest break on EOB. BP taken when symptomatic 92/55 (HR 62), BP after 2 min when pt was asymptomatic 97/49 (62). Pt adamant about walking to the chair, took 4-5 steps from EOB > chair w/ Min(a)  ADL  Feeding: Adaptive utensils (comment); Setup(Built up utensils due to decreased grasp strength and coordination in RUE - set up to open container and cut food)  Grooming: Setup(Washed face EOB)  UE Bathing: Setup;Stand by assistance;Verbal cueing(Warm wipes EOB)  LE Bathing: Setup;Verbal cueing;Minimal assistance(VC to wash legs, anticipate min A for posterior lc)  UE Dressing: Setup(gown)  LE Dressing: Dependent/Total(Don socks EOB; pt felt weak and afraid to fall which prevented completing this task)  Tone RUE  RUE Tone: Normotonic  Tone LUE  LUE Tone: Normotonic  Coordination  Movements Are Fluid And Coordinated: No  Coordination and Movement description: Decreased speed;Decreased accuracy; Right UE;Fine motor impairments     Bed mobility  Sit to Supine: Stand by assistance  Scooting: Stand by assistance  Transfers  Stand Step Transfers: Minimal assistance(w/ RW)  Sit to stand: Minimal assistance  Stand to sit: Minimal assistance  Vision - Basic Assessment  Prior Vision: Other (add comment)  Visual History: Cataracts  Patient Visual Report: No visual complaint reported. Visual Field Cut: No  Oculo Motor Control: WNL  Cognition  Overall Cognitive Status: Exceptions  Arousal/Alertness: Appropriate responses to stimuli(Increased time to respond due to aphasia)  Following Commands:  Follows one step commands with increased time;Follows one step commands with repetition(Unable to consistently follow multistep commands when ambulating with RW)  Attention Span: Appears intact  Memory: Appears intact  Safety Judgement: Good awareness of safety precautions(Pt continously said she felt weaker on R side and aware of needing assistance for transfers/ambulation)  Problem Solving: Decreased awareness of errors;Assistance required to correct errors made;Assistance required to generate solutions;Assistance required to identify errors made;Assistance required to implement solutions  Insights: Decreased awareness of deficits  Initiation: Requires cues for some  Sequencing: Requires cues for some        Sensation  Overall Sensation Status: Impaired  Light Touch: Partial deficits in the RLE;Partial deficits in the LUE  Additional Comments: Pt reports feeling light touch, cannot describe where she is being touched on UEs; reports having more feeling in LUE, reports numbness in RUE        LUE AROM (degrees)  LUE AROM : WFL  RUE AROM (degrees)  RUE AROM : WFL  RUE General AROM: slight lag with increased time require  LUE Strength  Gross LUE Strength: WFL  L Hand General: 5/5  RUE Strength  Gross RUE Strength: WFL  R Hand General: 4+/5  RUE Strength Comment: Bilateral strength with hand squeeze; RUE feels weaker in general, however it is [facility]         Plan   Plan  Times per week: 5-7x/wk  Times per day: Daily  Current Treatment Recommendations: Strengthening, ROM, Balance Training, Safety Education & Training, Self-Care / ADL, Endurance Training, Functional Mobility Training, Gait Training, Cognitive Reorientation, Patient/Caregiver Education & Training, Neuromuscular Re-education      AM-PAC Score  AM-[facility] Inpatient Daily Activity Raw Score: 12 (10/22/20 1013)  AM-PAC Inpatient ADL T-Scale Score : 30.6 (10/22/20 1013)  ADL Inpatient CMS 0-100% Score: 66.57 (10/22/20 1013)  ADL Inpatient CMS G-Code Modifier : CL (10/22/20 1013)    Goals  Short term

## 2020-10-22 NOTE — PROGRESS NOTES
Hospitalist Progress Note      PCP: Pebbles Conner    Date of Admission: 10/20/2020    Chief Complaint:   Right sided weakness  Expressive aphasia    Hospital Course:   Cristin Metcalf a 65 y. o. female presents to the emergency department today for evaluation for right-sided facial droop, right arm weakness.  The patient states that she woke up around 730 this morning, around 730/8 AM this morning she noticed that her face was drooping when she went to the restroom and she states that she noticed some weakness to her right arm.  EMS was called.  The patient is a history of A. fib, she is on aspirin and Plavix.  She is not on any blood thinners.  The patient believes that she was awake, and then her symptoms started.  The patient has no headaches. Rodolfo Camargo has no visual changes.  The patient denies any chest pain or shortness of breath.  She denies any recent illnesses.  She denies any fall or injury    Subjective:   Doing well  CT shows a small bleed. Symptomatically patient is better but still has expressive aphasia.       Medications:  Reviewed    Infusion Medications    dextrose       Scheduled Medications    budesonide-formoterol  2 puff Inhalation BID    glimepiride  4 mg Oral QAM AC    metFORMIN  2,000 mg Oral Daily with breakfast    metoprolol succinate  50 mg Oral Daily    alogliptin  25 mg Oral Daily    sertraline  100 mg Oral Daily    sodium chloride flush  10 mL Intravenous BID     PRN Meds: glucose, dextrose, glucagon (rDNA), dextrose, sodium chloride flush      Intake/Output Summary (Last 24 hours) at 10/22/2020 1728  Last data filed at 10/22/2020 1458  Gross per 24 hour   Intake 10 ml   Output 1525 ml   Net -1515 ml       Physical Exam Performed:    BP (!) 131/106 Comment: error/not accurate  Pulse 83   Temp 100 °F (37.8 °C) (Temporal)   Resp 20   Ht 5' 3\" (1.6 m)   Wt 163 lb 9.3 oz (74.2 kg)   SpO2 96%   BMI 28.98 kg/m²     General appearance: No apparent distress appears stated age and cooperative. HEENT Normal cephalic, atraumatic without obvious deformity. Pupils equal, round, and reactive to light. Extra ocular muscles intact. Conjunctivae/corneas clear. Neck: Supple, No jugular venous distention/bruits. Trachea midline without thyromegaly or adenopathy with full range of motion. Lungs: Clear to auscultation, bilaterally without Rales/Wheezes/Rhonchi with good respiratory effort. Heart: irregular rhythm with Normal rate and  S1/S2 without murmurs, rubs or gallops, point of maximum impulse non-displaced  Abdomen: Soft, non-tender or non-distended without rigidity or guarding and positive bowel sounds all four quadrants. Extremities: No clubbing, cyanosis, or edema bilaterally. Full range of motion without deformity and normal gait intact. Skin: Skin color, texture, turgor normal.  No rashes or lesions. Neurologic: Alert demonstrating expressive aphasia strength on the right is good. Mental status: Alert, difficult to assess because of expressive aphasia   Capillary Refill: Acceptable  < 3 seconds  Peripheral Pulses: +3 Easily felt, not easily obliterated with pressure       Labs:   Recent Labs     10/20/20  0825   WBC 9.3   HGB 13.3   HCT 40.3        Recent Labs     10/20/20  0825      K 4.2      CO2 24   BUN 10   CREATININE 0.7   CALCIUM 10.1     Recent Labs     10/20/20  0825   AST 18   ALT 14   BILITOT 0.3   ALKPHOS 97     Recent Labs     10/20/20  0825   INR 0.97     Recent Labs     10/20/20  0825   TROPONINI <0.01       Urinalysis:    No results found for: Jake Parent, BACTERIA, RBCUA, BLOODU, [address], [address]    Radiology:  CT HEAD WO CONTRAST   Final Result   Evolving infarct with small amount of parenchymal hemorrhage in the left   frontal lobe portion of the infarct. XR CHEST PORTABLE   Final Result   Findings as above which may be related to congestive heart failure and edema.          MRI BRAIN WO CONTRAST MR WITNESS   Final Result usually controlled with oral meds  -continue these, may add sliding scale if sugars are running high.       DVT Prophylaxis: SCDs  Diet: DIET DYSPHAGIA SOFT AND BITE-SIZED;  No Drinking Straw  Code Status: Prior    PT/OT Eval Status: eval and treat     Dispo - Lc Sanchez MD

## 2020-10-22 NOTE — PLAN OF CARE
Problem: Falls - Risk of:  Goal: Will remain free from falls  Description: Will remain free from falls  Outcome: Ongoing  Goal: Absence of physical injury  Description: Absence of physical injury  Outcome: Ongoing     Problem: Skin Integrity:  Goal: Will show no infection signs and symptoms  Description: Will show no infection signs and symptoms  Outcome: Ongoing  Goal: Absence of new skin breakdown  Description: Absence of new skin breakdown  Outcome: Ongoing     Problem: HEMODYNAMIC STATUS  Goal: Patient has stable vital signs and fluid balance  Outcome: Ongoing     Problem: ACTIVITY INTOLERANCE/IMPAIRED MOBILITY  Goal: Mobility/activity is maintained at optimum level for patient  Outcome: Ongoing     Problem: COMMUNICATION IMPAIRMENT  Goal: Ability to express needs and understand communication  Outcome: Ongoing     Problem: Serum Glucose Level - Abnormal:  Goal: Ability to maintain appropriate glucose levels has stabilized  Description: Ability to maintain appropriate glucose levels has stabilized  Outcome: Ongoing

## 2020-10-22 NOTE — PROGRESS NOTES
NEUROLOGY FOLLOWUP    HISTORY OF PRESENT ILLNESS :     Edilma Martinez is a 65 y.o. female   History was obtained from the patient and dictations in the chart. Additional history was obtained from the patient's nurse at the bedside. Patient speech may be a little better today. She continues to have some right-sided weakness. She denies any headache. Detailed history:  Patient has known atrial fibrillation. She had been on Xarelto in the past but this was stopped a while ago because of recurrent falls unsteadiness and increased risk of head injury. Patient developed acute onset of right-sided facial weakness and then weakness of the right arm and leg. Patient had been on aspirin and Plavix. Patient came to the hospital.  She was evaluated by the stroke team and given TPA. Patient symptoms improved somewhat but she still has significant aphasia and right-sided weakness. Repeat CT head done 24 hours after the TPA infusion shows mild hemorrhagic conversion of the left hemispheric stroke. Patient denies any headache at this time. There is no vertigo or diplopia.   Symptom onset was acute abrupt and moderately severe without any other aggravating or relieving factors    REVIEW OF SYSTEMS     [2]  Constitutional:  []   Chills   []  Fatigue   []  Fevers   []  Malaise   []  Weight loss     [] Denies all of the above    Respiratory:   []  Cough    []  Shortness of breath         [] Denies all of the above     Cardiovascular:   []  Chest pain    []  Exertional chest pressure/discomfort           [] Palpitations    []  Syncope     [] Denies all of the above    Past Medical History:   Diagnosis Date    Actinic keratosis     Arthritis     Asthma     UNSPECIFIED    Atrial fibrillation ([facility] [address]     CAD (coronary artery disease)     Depression     worse with pain    Diabetes mellitus ([address]     TYPE II    Disc displacement, lumbar     Diverticulosis of colon     Hard of hearing     left    Hyperlipidemia     Hypertension     Ischemic heart disease     Past heart attack     29 YRS AGO    Poor vision     legally blind    Right leg weakness     Scoliosis      Family History   Problem Relation Age of Onset    Bipolar Disorder Mother     Mental Illness Mother         SUICIDE ATTEMPTS    Diabetes Mother     High Blood Pressure Mother     Heart Disease Father     Heart Attack Father     Heart Surgery Father     Early Death Father     Cancer Father     High Blood Pressure Father     Heart Disease Sister     Heart Surgery Sister     High Blood Pressure Sister     Diabetes Sister      Social History     Socioeconomic History    Marital status:       Spouse name: None    Number of children: None    Years of education: None    Highest education level: None   Occupational History    None   Social Needs    Financial resource strain: None    Food insecurity     Worry: None     Inability: None    Transportation needs     Medical: None     Non-medical: None   Tobacco Use    Smoking status: Former Smoker     Packs/day: 1.00     Years: 40.00     Pack years: 40.00    Smokeless tobacco: Never Used   Substance and Sexual Activity    Alcohol use: No     Comment: RARELY NOW    Drug use: No    Sexual activity: None   Lifestyle    Physical activity     Days per week: None     Minutes per session: None    Stress: None   Relationships    Social connections     Talks on phone: None     Gets together: None     Attends Mandaen service: None     Active member of club or organization: None     Attends meetings of clubs or organizations: None     Relationship status: None    Intimate partner violence     Fear of current or ex partner: None     Emotionally abused: None     Physically abused: None     Forced sexual activity: None   Other Topics Concern    None   Social History Narrative    None        PHYSICAL EXAMINATION BP (!) 111/49   Pulse 73   Temp 100 °F (37.8 °C) (Temporal)   Resp 18   Ht 5' 3\" (1.6 m)   Wt 163 lb 9.3 oz (74.2 kg)   SpO2 96%   BMI 28.98 kg/m²   This is a well-nourished patient in no acute distress  Patient is awake and alert. Patient has some expressive aphasia even though it is slightly improved compared to yesterday  Pupils equal round reacting to light. Visual fields full. External ocular movements intact. Right facial weakness. Tongue midline. Strength is 4/5 on the right side and 5/5 on the left side. Sensory exam normal.  Coordination impaired on the right side. Plantar response extensor on the right side. Gait impaired. No carotid bruit. No neck stiffness. DATA :  LABS:  General Labs:    CBC:   Lab Results   Component Value Date    WBC 9.3 10/20/2020    RBC 4.32 10/20/2020    HGB 13.3 10/20/2020    HCT 40.3 10/20/2020    MCV 93.4 10/20/2020    MCH 30.9 10/20/2020    MCHC 33.1 10/20/2020    RDW 13.0 10/20/2020     10/20/2020    MPV 8.6 10/20/2020     BMP:    Lab Results   Component Value Date     10/20/2020    K 4.2 10/20/2020    K 4.7 06/08/2019     10/20/2020    CO2 24 10/20/2020    BUN 10 10/20/2020    LABALBU 4.5 10/20/2020    CREATININE 0.7 10/20/2020    CALCIUM 10.1 10/20/2020    GFRAA >60 10/20/2020    GFRAA >60 11/11/2012    LABGLOM >60 10/20/2020    GLUCOSE 230 10/20/2020     RADIOLOGY REVIEW:  I have reviewed radiology image(s) and reports(s) of: MRI brain and CT head      IMPRESSION :  Acute embolic cerebral infarction  Expressive aphasia right-sided weakness  Ataxia  The CT head done 24 hours after TPA shows mild hemorrhagic transformation  Patient has known atrial fibrillation.   CT angiogram showed complete occlusion of the right internal carotid artery and a 60% stenosis in the left cavernous portion internal carotid artery    Patient Active Problem List   Diagnosis    Lumbar radiculopathy    Acute CVA (cerebrovascular accident) ([facility] [address] RECOMMENDATIONS :  Discussed with patient  Discussed with patient's nurse  Repeat CT head tomorrow. If there is no worsening of the hemorrhagic transformation, we can restart low-dose aspirin. Patient may be an ideal candidate for watchman procedure given her atrial fibrillation, risk of falls and high risk of anticoagulation. She has a regular neurologist at [address] [place] [address] and I have told her to follow-up with her cardiologist after discharge to decide on the watchman procedure          Please note a portion of this chart was generated using dragon dictation software. Although every effort was made to ensure the accuracy of this automated transcription, some errors in transcription may have occurred.          Ralph Ramirez M.D.

## 2020-10-22 NOTE — CARE COORDINATION
Discharge planning note:    Chart reviewed for discharge planning. Barrier(s) to discharge-  Repeat CT to track stability on Friday    Tentative discharge plan-  PT/OT evals pending    Tentative discharge date-  Fri or Sat    Case management will continue to follow progress and update discharge plan as needed.       Andrea Brownlee RN BSN  Case Management  [phone]

## 2020-10-22 NOTE — PROGRESS NOTES
these concerns and return to independent level of function. Treatment Diagnosis: Decreased functional mobility with transfers and ambulation, decreased balance/strength/endurance  Prognosis: Good  Decision Making: Medium Complexity  Clinical Presentation: evolving  PT Education: PT Role;Plan of Care  Patient Education: Discussed DC plan with pt, verbalized understanding  Barriers to Learning: language - difficulty following multi-step commands during transfers and ambulation and expressive aphasia  REQUIRES PT FOLLOW UP: Yes  Activity Tolerance  Activity Tolerance: Patient limited by fatigue;Patient limited by endurance  Activity Tolerance: Pt was limited this session by fatigue and endurance with transfers and ambulation. She was unable to make it to chair after ambulation due to fatigue, but was then able to continue after sitting rest.       Patient Diagnosis(es): The encounter diagnosis was Cerebrovascular accident (CVA), unspecified mechanism ([address] has a past medical history of Actinic keratosis, Arthritis, Asthma, Atrial fibrillation ([address] CAD (coronary artery disease), Depression, Diabetes mellitus ([address] Disc displacement, lumbar, Diverticulosis of colon, Hard of hearing, Hyperlipidemia, Hypertension, Ischemic heart disease, Past heart attack, Poor vision, Right leg weakness, and Scoliosis. has a past surgical history that includes Coronary artery bypass graft;  section; Appendectomy; ovarian cyst removal; Hand surgery; Cardiac surgery (); skin biopsy; vascular surgery; Tonsillectomy; Lumbar disc surgery (8-); Lumbar spine surgery (10-11-12); and back surgery.     Restrictions  Restrictions/Precautions  Restrictions/Precautions: Fall Risk, Modified Diet(High fall risk; Diet dysphagia soft and bite sized, no drinking straw)  Required Braces or Orthoses?: No  Position Activity Restriction  Other position/activity restrictions: Nicko Drake is a 65 y.o. female presents to the emergency department today for evaluation for right-sided facial droop, right arm weakness. The patient states that she woke up around 730 this morning, around 730/8 AM this morning she noticed that her face was drooping when she went to the restroom and she states that she noticed some weakness to her right arm. EMS was called. The patient is a history of A. fib, she is on aspirin and Plavix. She is not on any blood thinners. The patient believes that she was awake, and then her symptoms started. TPA given 10/20. Today, s/p tPA exam is clinically improved with an NIHSS of 5. She has mild expressive aphasia and dysarthria, but is able to follow commands. Her right sided weakness is resolved. She also has some mild sensory loss on the right side. Vision/Hearing  Vision: Within Functional Limits  Hearing: Within functional limits     Subjective  General  Chart Reviewed: Yes  Patient assessed for rehabilitation services?: Yes  Additional Pertinent Hx: Pt demonstrates expressive aphasia, difficulty with word finding and speech  Family / Caregiver Present: No  Diagnosis: Acute CVA (cerbrovascular accident)  Follows Commands: Impaired  Other (Comment): Pt able to understand commands, but has greater difficulty following multi-step commands during transfers and ambulation secondary to CVA  General Comment  Comments: Pt was found laying in bed awake and with alarm on  Subjective  Subjective: Pt denies pain at this time. Was having difficulty with word finding and speech during subjective history. Responded much better to yes/no or simple word answer type of questions like \"I live in a house\"  Pain Screening  Patient Currently in Pain: Denies  Vital Signs  Patient Currently in Pain: Denies     Orientation  Orientation  Overall Orientation Status: Within Functional Limits(Difficulty answering due to expressive aphasia and word finding issues.  Was able to give correct answers with stating orientation questions in yes/no fashion. Unsure of accuracy of yes/no responses. Social/Functional History  Social/Functional History  Lives With: Alone  Type of Home: House  Home Layout: One level  Home Access: Level entry  Bathroom Shower/Tub: Tub/Shower unit  Bathroom Toilet: Standard  Bathroom Equipment: Hand-held shower, Grab bars in shower, Grab bars around toilet  Home Equipment: Rolling walker, Cane, Reacher  ADL Assistance: [address]: Independent  Homemaking Responsibilities: Yes  Ambulation Assistance: Independent  Transfer Assistance: Independent  Active : Yes  Leisure & Hobbies: playing games on tablet  Additional Comments: Pt unable to report number of falls, sounds like multiple falls due to being dizzy  Cognition      Objective  Observation/Palpation  Posture: Good  Observation: R UE/LE bruising    AROM RLE (degrees)  RLE AROM: WFL  AROM LLE (degrees)  LLE AROM : WFL  Strength RLE  Strength RLE: WFL  Comment: hip flexion 4/5, knee extension 4+/5, dorsiflexion 5/5  Strength LLE  Strength LLE: WFL  Comment: hip flexion 4/5, knee extension 4+/5, dorsiflexion 5/5  Coordination  Heel to Shin: Normal(performed in supine)  Sensation  Overall Sensation Status: Impaired(Pt reported less feeling in R lateral LE and R foot compared to the L)  Bed mobility  Sit to Supine: Stand by assistance  Scooting: Stand by assistance  Comment: HOB elevated  Transfers  Sit to Stand: Minimal Assistance(x2 from EOB with RW)  Stand to sit: Minimal Assistance(x1 to EOB, x1 to chair with RW)  Ambulation  Ambulation?: Yes  Ambulation 1  Surface: level tile  Device: Rolling Walker  Assistance: Minimal assistance  Quality of Gait: Pt demonstrated small steps with a semi-step through gait pattern. She had a widened SARWAT as well while relying heavily on UE support on walker  Gait Deviations: Slow Pooja; Increased SARWAT; Decreased step length;Decreased step height  Distance: 10 feet (in room from EOB back to EOB)+ 4-5 steps (from EOB to recliner)  Comments: Pt was greatly fatigued with small amounts of ambulation and required sitting rest EOB prior to moving to recliner. BP was monitored since pt was expressing fatigue and reported increase in dizziness. BP was 90/55. Pt rested for 2-3 min EOB. BP was then 103/54. Pt reported less dizziness though and was able to complete a few steps to recliner from EOB. Stairs/Curb  Stairs?: No     Balance  Posture: Fair  Sitting - Static: Fair;+(CGA to SBA during EOB sitting for about 10 min.)  Sitting - Dynamic: Fair;+(CGA to SBA for reaching outside SARWAT sitting EOB.)  Standing - Static: Fair(Shay and RW used for balance in standing)  Standing - Dynamic: Fair(Shay and RW used for balance while walking)  Comments: Pt reported weakness in RUE while sitting EOB and felt like she may fall over. CGA to SBA given, but pt was able to maintain balance in neutral without falling over. Plan   Plan  Times per week: 3-5x/week  Times per day: Daily  Current Treatment Recommendations: Strengthening, Balance Training, Functional Mobility Training, Transfer Training, Gait Training, Safety Education & Training  Safety Devices  Type of devices:  All fall risk precautions in place, Call light within reach, Chair alarm in place, Patient at risk for falls, Nurse notified, Left in chair  Restraints  Initially in place: No    AM-PAC Score  AM-PAC Inpatient Mobility Raw Score : 17 (10/22/20 1022)  AM-PAC Inpatient T-Scale Score : 42.13 (10/22/20 1022)  Mobility Inpatient CMS 0-100% Score: 50.57 (10/22/20 1022)  Mobility Inpatient CMS G-Code Modifier : CK (10/22/20 1022)     Goals  Short term goals  Time Frame for Short term goals: at discharge  Short term goal 1: Bed mobility with supervision  Short term goal 2: Sit to stand transfers with CGA and RW  Short term goal 3: Ambulation of 50 feet with CGA and RW  Patient Goals   Patient goals : Pt will return to home at baseline function     Therapy Time   Individual Concurrent Group Co-treatment   Time In 4179         Time Out 0931         Minutes 56         Timed Code Treatment Minutes: Bure 190, SPT  Cat Hoang, LEXIE   Therapist observed and directed the above evaluation.  Thanks, Francisco Owens, DPT [id number]

## 2020-10-22 NOTE — PROGRESS NOTES
Speech Language Pathology  Speech  Language And Dysphagia Treatment Note    Name: Jeniffer Mathew  : 1950  Medical Diagnosis: Acute CVA (cerebrovascular accident) [facility]) [I63.9]  Treatment Diagnosis: Oropharyngeal dysphagia; Speech Language Deficits; Cognitive Deficits  Pain: 0/10 reported by Pt    Patient's response to therapy:  Pt awake, alert and more verbally responsive this date. However, Pt demonstrates increased frustration with inability to verbalize wants/needs this date. Pt also noted with paper and pen at bedside. Written communication is not functional for the Pt at this time, and was clarified with the Pt and nurse. Dysphagia Treatment:  Current Diet Level: Dysphagia III Soft and Bite-Sized with thin liquids/no straws/meds with puree  Tolerance of Current Diet Level: no overt signs of aspiration reported    Assessment of Texture Tolerance:  -Impressions: Improved facial symmetry noted but Pt continues to present with mild R facial weakness. With po trials, premature bolus loss to pharynx continues to be noted with thin liquids. Improved timely oral clearing noted with solids. Pt impulsive with self feeding thins via cup with multiple successive swallows noted with self feeding. Improved consistency of swallow initiation noted with all textures. However, reduced pharyngeal sensation with ABSENT timely swallow initiation noted with multiple successive sips of thins via cup. Delayed cough noted with thins via successive sips and with a thin liquid \"rinse\" given in combination with solids    Dysphagia Goals, addressed this date:  1.) Pt will tolerate recommended diet without s/s of aspiration   2.) If clinical symptoms of penetration/aspiration continue to be noted,Pt will tolerate MBS to r/o aspiration and determine appropriate diet/liquid level.    3.) Pt will tolerate diet advance to least restricted diet, as clinically indicated, with no signs of aspiration   4.) Pt will improve oral motor function via bolus control exercises     Diet and Treatment Recommendations:  Continue current diet per established plan of care    Speech Language Treatment:  Impressions: Pt demonstrates increased spontaneous speech this date in the context of conversation. However moderately impaired functional self expression of wants needs with increased frustration noted this date. Improved auditory processing/comprehension of basic commands and Y/N questions. Moderate/severely impaired short term recall of new learning and recent events. Speech Language STG, addressed this date: 1. Pt will improve single word initiation via graded tasks to 80% (ongoing 10/22/2020)   · Automatics: counting 1-5 and 1-10: min cues with visual prompts  · Automatic pairs: 50% with reduced understanding of concept  · Closed set sentence completion: 75%   2. Pt will improve functional verbal expression via multimodality graded tasks to 80% (ongoing 10/22/2020)   · Object namin%; does not benefit from semantic cues; phonemic cues: 1/2  · Printed word prompts: 50% with mod cues  · Written expression: perseveration and aphasic responses  · Picture ID( f=2): 80%  · Printed word ID: 80%  · Functional use of communication board: (pictures): 50%; words/sentences: 0%  3. Pt will improveauditory processing/comprehension of commands and questions to 80%, via graded tasks (ongoing 10/22/2020)   · 1-unit commands:60%  · BASIC Y/N questions: 90%  · Complex Y/N questions: <50%  4. Pt will improve self monitoring/self correction of verbal errors  via graded tasks to 80%(ongoing 10/22/2020)   · Requires prompting for awareness and self correction of perseveration errors with written and verbal attempts  5. Ongoing assessment of cognitive linguistic function and receptive and expressive skills improve (ongoing 10/22/2020)  · Orientation: with active questioning and Y/N format: oriented to person, place and situation in general  · Moderately impaired short term recall of recent events, prior dysphagia treatment and recent new information     Patient/Family Education:Education given to the Pt and nurse, who verbalized understanding    Assessment: Patient progressing toward goals    Plan: Continue as per plan of care    Discharge Recommendations: Pt will benefit from continued skilled Speech Therapy for Speech and Dysphagia services, prior to returning home. Treatment time  Timed Code Treatment Minutes: 15 min  Total Treatment time: 40 min    If patient discharges prior to next session this note will serve as a discharge summary.       London Thompson [id number]   -

## 2020-10-22 NOTE — PROGRESS NOTES
Pt resting in bed. Alert and follows commands appropriately. Appears comfortable in no distress. NIHSS remains at 5, with mild expressive aphasia and dysarthria. [facility] assessment charted, see flowsheet for assessment. Call light is in reach. Instructed to use for assistance. Denies needs at this time. Will monitor.

## 2020-10-23 ENCOUNTER — APPOINTMENT (OUTPATIENT)
Dept: CT IMAGING | Age: 70
DRG: 061 | End: 2020-10-23
Payer: MEDICARE

## 2020-10-23 ENCOUNTER — HOSPITAL ENCOUNTER (INPATIENT)
Age: 70
LOS: 10 days | Discharge: HOME HEALTH | DRG: 057 | End: 2020-11-02
Attending: PHYSICAL MEDICINE & REHABILITATION | Admitting: PHYSICAL MEDICINE & REHABILITATION
Payer: MEDICARE

## 2020-10-23 VITALS
OXYGEN SATURATION: 95 % | TEMPERATURE: 97 F | RESPIRATION RATE: 18 BRPM | BODY MASS INDEX: 28.12 KG/M2 | HEIGHT: 63 IN | HEART RATE: 82 BPM | SYSTOLIC BLOOD PRESSURE: 160 MMHG | WEIGHT: 158.73 LBS | DIASTOLIC BLOOD PRESSURE: 72 MMHG

## 2020-10-23 PROBLEM — I63.9 ACUTE ISCHEMIC STROKE (HCC): Status: ACTIVE | Noted: 2020-10-23

## 2020-10-23 LAB
GLUCOSE BLD-MCNC: 124 MG/DL (ref 70–99)
GLUCOSE BLD-MCNC: 174 MG/DL (ref 70–99)
PERFORMED ON: ABNORMAL
PERFORMED ON: ABNORMAL

## 2020-10-23 PROCEDURE — 94640 AIRWAY INHALATION TREATMENT: CPT

## 2020-10-23 PROCEDURE — 97535 SELF CARE MNGMENT TRAINING: CPT

## 2020-10-23 PROCEDURE — 70450 CT HEAD/BRAIN W/O DYE: CPT

## 2020-10-23 PROCEDURE — 97129 THER IVNTJ 1ST 15 MIN: CPT

## 2020-10-23 PROCEDURE — 92526 ORAL FUNCTION THERAPY: CPT

## 2020-10-23 PROCEDURE — 6370000000 HC RX 637 (ALT 250 FOR IP): Performed by: PHYSICAL MEDICINE & REHABILITATION

## 2020-10-23 PROCEDURE — 94760 N-INVAS EAR/PLS OXIMETRY 1: CPT

## 2020-10-23 PROCEDURE — 97116 GAIT TRAINING THERAPY: CPT

## 2020-10-23 PROCEDURE — 94761 N-INVAS EAR/PLS OXIMETRY MLT: CPT

## 2020-10-23 PROCEDURE — 2580000003 HC RX 258: Performed by: INTERNAL MEDICINE

## 2020-10-23 PROCEDURE — 97530 THERAPEUTIC ACTIVITIES: CPT

## 2020-10-23 PROCEDURE — 1280000000 HC REHAB R&B

## 2020-10-23 PROCEDURE — 92507 TX SP LANG VOICE COMM INDIV: CPT

## 2020-10-23 PROCEDURE — 6370000000 HC RX 637 (ALT 250 FOR IP): Performed by: INTERNAL MEDICINE

## 2020-10-23 PROCEDURE — 99233 SBSQ HOSP IP/OBS HIGH 50: CPT | Performed by: PSYCHIATRY & NEUROLOGY

## 2020-10-23 RX ORDER — INSULIN LISPRO 100 [IU]/ML
0-6 INJECTION, SOLUTION INTRAVENOUS; SUBCUTANEOUS NIGHTLY
Status: DISCONTINUED | OUTPATIENT
Start: 2020-10-23 | End: 2020-10-26

## 2020-10-23 RX ORDER — TRAZODONE HYDROCHLORIDE 50 MG/1
50 TABLET ORAL NIGHTLY PRN
Status: DISCONTINUED | OUTPATIENT
Start: 2020-10-23 | End: 2020-11-02 | Stop reason: HOSPADM

## 2020-10-23 RX ORDER — INSULIN LISPRO 100 [IU]/ML
0-12 INJECTION, SOLUTION INTRAVENOUS; SUBCUTANEOUS
Status: DISCONTINUED | OUTPATIENT
Start: 2020-10-24 | End: 2020-10-26

## 2020-10-23 RX ORDER — SODIUM CHLORIDE 0.9 % (FLUSH) 0.9 %
10 SYRINGE (ML) INJECTION 2 TIMES DAILY
Status: CANCELLED | OUTPATIENT
Start: 2020-10-23

## 2020-10-23 RX ORDER — HYDRALAZINE HYDROCHLORIDE 25 MG/1
50 TABLET, FILM COATED ORAL EVERY 8 HOURS PRN
Status: CANCELLED | OUTPATIENT
Start: 2020-10-23

## 2020-10-23 RX ORDER — ATORVASTATIN CALCIUM 40 MG/1
40 TABLET, FILM COATED ORAL NIGHTLY
Qty: 30 TABLET | Refills: 3 | Status: ON HOLD | OUTPATIENT
Start: 2020-10-23 | End: 2020-11-02 | Stop reason: SDUPTHER

## 2020-10-23 RX ORDER — METOPROLOL SUCCINATE 50 MG/1
50 TABLET, EXTENDED RELEASE ORAL DAILY
Status: CANCELLED | OUTPATIENT
Start: 2020-10-24

## 2020-10-23 RX ORDER — SODIUM CHLORIDE 0.9 % (FLUSH) 0.9 %
10 SYRINGE (ML) INJECTION PRN
Status: CANCELLED | OUTPATIENT
Start: 2020-10-23

## 2020-10-23 RX ORDER — ACETAMINOPHEN 325 MG/1
650 TABLET ORAL EVERY 4 HOURS PRN
Status: DISCONTINUED | OUTPATIENT
Start: 2020-10-23 | End: 2020-11-02 | Stop reason: HOSPADM

## 2020-10-23 RX ORDER — ATORVASTATIN CALCIUM 40 MG/1
40 TABLET, FILM COATED ORAL NIGHTLY
Status: DISCONTINUED | OUTPATIENT
Start: 2020-10-23 | End: 2020-10-23 | Stop reason: HOSPADM

## 2020-10-23 RX ORDER — DEXTROSE MONOHYDRATE 25 G/50ML
12.5 INJECTION, SOLUTION INTRAVENOUS PRN
Status: DISCONTINUED | OUTPATIENT
Start: 2020-10-23 | End: 2020-11-02 | Stop reason: HOSPADM

## 2020-10-23 RX ORDER — ONDANSETRON 4 MG/1
4 TABLET, ORALLY DISINTEGRATING ORAL EVERY 8 HOURS PRN
Status: CANCELLED | OUTPATIENT
Start: 2020-10-23

## 2020-10-23 RX ORDER — ALOGLIPTIN 25 MG/1
25 TABLET, FILM COATED ORAL DAILY
Status: DISCONTINUED | OUTPATIENT
Start: 2020-10-24 | End: 2020-11-02 | Stop reason: HOSPADM

## 2020-10-23 RX ORDER — DEXTROSE MONOHYDRATE 25 G/50ML
12.5 INJECTION, SOLUTION INTRAVENOUS PRN
Status: CANCELLED | OUTPATIENT
Start: 2020-10-23

## 2020-10-23 RX ORDER — TRAMADOL HYDROCHLORIDE 50 MG/1
50 TABLET ORAL EVERY 6 HOURS PRN
Status: CANCELLED | OUTPATIENT
Start: 2020-10-23

## 2020-10-23 RX ORDER — GLIMEPIRIDE 2 MG/1
4 TABLET ORAL
Status: CANCELLED | OUTPATIENT
Start: 2020-10-24

## 2020-10-23 RX ORDER — ACETAMINOPHEN 325 MG/1
650 TABLET ORAL EVERY 4 HOURS PRN
Status: CANCELLED | OUTPATIENT
Start: 2020-10-23

## 2020-10-23 RX ORDER — SODIUM CHLORIDE 0.9 % (FLUSH) 0.9 %
10 SYRINGE (ML) INJECTION 2 TIMES DAILY
Status: DISCONTINUED | OUTPATIENT
Start: 2020-10-23 | End: 2020-10-25

## 2020-10-23 RX ORDER — TRAZODONE HYDROCHLORIDE 50 MG/1
50 TABLET ORAL NIGHTLY PRN
Status: CANCELLED | OUTPATIENT
Start: 2020-10-23

## 2020-10-23 RX ORDER — DEXTROSE MONOHYDRATE 50 MG/ML
100 INJECTION, SOLUTION INTRAVENOUS PRN
Status: CANCELLED | OUTPATIENT
Start: 2020-10-23

## 2020-10-23 RX ORDER — GLIMEPIRIDE 2 MG/1
4 TABLET ORAL
Status: DISCONTINUED | OUTPATIENT
Start: 2020-10-24 | End: 2020-11-02 | Stop reason: HOSPADM

## 2020-10-23 RX ORDER — NICOTINE POLACRILEX 4 MG
15 LOZENGE BUCCAL PRN
Status: CANCELLED | OUTPATIENT
Start: 2020-10-23

## 2020-10-23 RX ORDER — TRAMADOL HYDROCHLORIDE 50 MG/1
50 TABLET ORAL EVERY 6 HOURS PRN
Status: DISCONTINUED | OUTPATIENT
Start: 2020-10-23 | End: 2020-11-02 | Stop reason: HOSPADM

## 2020-10-23 RX ORDER — NICOTINE POLACRILEX 4 MG
15 LOZENGE BUCCAL PRN
Status: DISCONTINUED | OUTPATIENT
Start: 2020-10-23 | End: 2020-11-02 | Stop reason: HOSPADM

## 2020-10-23 RX ORDER — ALOGLIPTIN 6.25 MG/1
25 TABLET, FILM COATED ORAL DAILY
Status: CANCELLED | OUTPATIENT
Start: 2020-10-24

## 2020-10-23 RX ORDER — BUDESONIDE AND FORMOTEROL FUMARATE DIHYDRATE 160; 4.5 UG/1; UG/1
2 AEROSOL RESPIRATORY (INHALATION) 2 TIMES DAILY
Status: CANCELLED | OUTPATIENT
Start: 2020-10-23

## 2020-10-23 RX ORDER — BUDESONIDE AND FORMOTEROL FUMARATE DIHYDRATE 160; 4.5 UG/1; UG/1
2 AEROSOL RESPIRATORY (INHALATION) 2 TIMES DAILY
Status: DISCONTINUED | OUTPATIENT
Start: 2020-10-23 | End: 2020-11-02 | Stop reason: HOSPADM

## 2020-10-23 RX ORDER — ONDANSETRON 4 MG/1
4 TABLET, ORALLY DISINTEGRATING ORAL EVERY 8 HOURS PRN
Status: DISCONTINUED | OUTPATIENT
Start: 2020-10-23 | End: 2020-11-02 | Stop reason: HOSPADM

## 2020-10-23 RX ORDER — METOPROLOL SUCCINATE 50 MG/1
50 TABLET, EXTENDED RELEASE ORAL DAILY
Status: DISCONTINUED | OUTPATIENT
Start: 2020-10-24 | End: 2020-11-02 | Stop reason: HOSPADM

## 2020-10-23 RX ORDER — HYDRALAZINE HYDROCHLORIDE 25 MG/1
50 TABLET, FILM COATED ORAL EVERY 8 HOURS PRN
Status: DISCONTINUED | OUTPATIENT
Start: 2020-10-23 | End: 2020-11-02 | Stop reason: HOSPADM

## 2020-10-23 RX ORDER — SODIUM CHLORIDE 0.9 % (FLUSH) 0.9 %
10 SYRINGE (ML) INJECTION PRN
Status: DISCONTINUED | OUTPATIENT
Start: 2020-10-23 | End: 2020-11-02 | Stop reason: HOSPADM

## 2020-10-23 RX ORDER — METFORMIN HYDROCHLORIDE 500 MG/1
2000 TABLET, EXTENDED RELEASE ORAL
Status: CANCELLED | OUTPATIENT
Start: 2020-10-24

## 2020-10-23 RX ORDER — DEXTROSE MONOHYDRATE 50 MG/ML
100 INJECTION, SOLUTION INTRAVENOUS PRN
Status: DISCONTINUED | OUTPATIENT
Start: 2020-10-23 | End: 2020-11-02 | Stop reason: HOSPADM

## 2020-10-23 RX ORDER — ASPIRIN 81 MG/1
81 TABLET, CHEWABLE ORAL DAILY
Qty: 30 TABLET | Refills: 3 | Status: ON HOLD | OUTPATIENT
Start: 2020-10-23 | End: 2020-11-02 | Stop reason: SDUPTHER

## 2020-10-23 RX ORDER — ATORVASTATIN CALCIUM 40 MG/1
40 TABLET, FILM COATED ORAL NIGHTLY
Status: DISCONTINUED | OUTPATIENT
Start: 2020-10-23 | End: 2020-11-02 | Stop reason: HOSPADM

## 2020-10-23 RX ORDER — DIPHENHYDRAMINE HCL 25 MG
25 TABLET ORAL EVERY 6 HOURS PRN
Status: DISCONTINUED | OUTPATIENT
Start: 2020-10-23 | End: 2020-11-02 | Stop reason: HOSPADM

## 2020-10-23 RX ORDER — DIPHENHYDRAMINE HCL 25 MG
25 TABLET ORAL EVERY 6 HOURS PRN
Status: CANCELLED | OUTPATIENT
Start: 2020-10-23

## 2020-10-23 RX ADMIN — SERTRALINE HYDROCHLORIDE 100 MG: 50 TABLET, FILM COATED ORAL at 09:56

## 2020-10-23 RX ADMIN — Medication 2 PUFF: at 10:18

## 2020-10-23 RX ADMIN — GLIMEPIRIDE 4 MG: 2 TABLET ORAL at 09:56

## 2020-10-23 RX ADMIN — DESMOPRESSIN ACETATE 40 MG: 0.2 TABLET ORAL at 22:08

## 2020-10-23 RX ADMIN — TRAMADOL HYDROCHLORIDE 50 MG: 50 TABLET, FILM COATED ORAL at 22:15

## 2020-10-23 RX ADMIN — Medication 10 ML: at 09:56

## 2020-10-23 RX ADMIN — Medication 2 PUFF: at 19:23

## 2020-10-23 RX ADMIN — METOPROLOL SUCCINATE 50 MG: 50 TABLET, EXTENDED RELEASE ORAL at 09:56

## 2020-10-23 RX ADMIN — METFORMIN HYDROCHLORIDE 2000 MG: 500 TABLET, EXTENDED RELEASE ORAL at 09:55

## 2020-10-23 RX ADMIN — ALOGLIPTIN 25 MG: 6.25 TABLET, FILM COATED ORAL at 09:55

## 2020-10-23 ASSESSMENT — PAIN DESCRIPTION - LOCATION
LOCATION: BACK
LOCATION: BACK

## 2020-10-23 ASSESSMENT — PAIN DESCRIPTION - PROGRESSION: CLINICAL_PROGRESSION: NOT CHANGED

## 2020-10-23 ASSESSMENT — PAIN SCALES - GENERAL
PAINLEVEL_OUTOF10: 6
PAINLEVEL_OUTOF10: 0
PAINLEVEL_OUTOF10: 2
PAINLEVEL_OUTOF10: 0

## 2020-10-23 ASSESSMENT — PAIN DESCRIPTION - DESCRIPTORS
DESCRIPTORS: ACHING
DESCRIPTORS: DISCOMFORT

## 2020-10-23 ASSESSMENT — PAIN DESCRIPTION - PAIN TYPE
TYPE: ACUTE PAIN
TYPE: ACUTE PAIN

## 2020-10-23 ASSESSMENT — PAIN DESCRIPTION - FREQUENCY: FREQUENCY: INTERMITTENT

## 2020-10-23 ASSESSMENT — PAIN DESCRIPTION - ONSET: ONSET: ON-GOING

## 2020-10-23 ASSESSMENT — PAIN DESCRIPTION - ORIENTATION
ORIENTATION: LEFT
ORIENTATION: LEFT

## 2020-10-23 NOTE — PROGRESS NOTES
for duration of session. She demonstrates frustration in regards to her communication deficits. Pt intermittently able to produce intact spontaneous phrases however overall demonstrated moderate expressive aphasia with anomia, paraphasias, perseveration and syntax errors. She has some awareness for errors however, is unable to consistently self correct. Extensive education was provided to pt re; aphasia, strategies, recommended ongoing therapy etc. Pt reported she has been utilizing paper communication board provided to her and demonstrated improved ability to navigate board to communicate simple wants/needs. Speech Language STG, addressed this date: 1. Pt will improve single word initiation via graded tasks to 80% (ongoing 10/23/2020)   · Automatics: counting 1-5 and 1-10: min/mod cues with approx 80% accuracy   · Closed set sentence completion: 75%   ·   2. Pt will improve functional verbal expression via multimodality graded tasks to 80% (ongoing 10/23/2020)   · Object namin% continues to only benefit from phonemic cues   · Picture ID( f=2): 90%  · Printed word ID: 80%  · Functional use of communication board: (pictures):75% accuracy   · Pt demonstrated difficulty communicating functional information (I.e., name/ with paraphasias)     3. Pt will improveauditory processing/comprehension of commands and questions to 80%, via graded tasks (ongoing 10/23/2020)   · 2-unit commands:75%  · BASIC Y/N questions: 90%    4. Pt will improve self monitoring/self correction of verbal errors  via graded tasks to 80%(ongoing 10/23/2020)   · Pt demonstrating minimally improved awareness for errors in conversation however, demonstrates some perseveration and difficulty with self correction of errors     5. Ongoing assessment of cognitive linguistic function and receptive and expressive skills improve (ongoing 10/23/2020)  · Orientation: with active questioning and Y/N format: oriented to person, place and situation in general  · Appropriate sustained attention to task   · Difficulty noted with alternating attention  · Suspected reduced short term recall     Patient/Family Education:Education given to the Pt and nurse, who verbalized understanding    Assessment: Patient progressing toward goals    Plan: Continue as per plan of care    Discharge Recommendations: Pt will benefit from continued skilled Speech Therapy for Speech and Dysphagia services, prior to returning home. Treatment time  Timed Code Treatment Minutes: 15 min  Total Treatment time: 40 min    If patient discharges prior to next session this note will serve as a discharge summary.       Pati Rock, [address] [facility]  Speech Language Pathologist      -

## 2020-10-23 NOTE — H&P
Patient: Zach Yates  [id number]  Date: 10/23/2020      Chief Complaint: difficulty speaking    History of Present Illness/Hospital Course:  68-year-old female with a history of asthma, A. Fib, diabetes, depression, low back pain, HTN, and HLD who was admitted on 10/20 with right-sided weakness and word finding difficulty. CT head was unremarkable. CTA revealed a complete occlusion of the right ICA, 60% stenosis of the left ICA. MRI revealed an acute infarct in the left parietal temporal lobes and posterior left insular cortex. She is deemed a candidate for TPA and received the medication on the same date. Postop CT had revealed a small amount of parenchymal hemorrhage in the left frontal lobe infarct zone. Follow-up CT had revealed stable parenchymal hemorrhage. Neurology evaluated and suggested aspirin and statin with routine DVT prophylaxis. She is not deemed a candidate for long-term anticoagulation. She was evaluated by therapy suggested continuing inpatient setting prior to returning home. She is aphasic and frustrated by her aphasia currently. Prior Level of Function:  Independent    Current Level of Function:  Darrius BREAUX     has a past medical history of Actinic keratosis, Arthritis, Asthma, Atrial fibrillation ([address] CAD (coronary artery disease), Depression, Diabetes mellitus ([address] Disc displacement, lumbar, Diverticulosis of colon, Hard of hearing, Hyperlipidemia, Hypertension, Ischemic heart disease, Past heart attack, Poor vision, Right leg weakness, and Scoliosis. has a past surgical history that includes Coronary artery bypass graft;  section; Appendectomy; ovarian cyst removal; Hand surgery; Cardiac surgery (); skin biopsy; vascular surgery; Tonsillectomy; Lumbar disc surgery (8-); Lumbar spine surgery (10-11-12); and back surgery. reports that she has quit smoking. She has a 40.00 pack-year smoking history.  She has never used smokeless tobacco. She reports that she does not drink alcohol or use drugs. family history includes Bipolar Disorder in her mother; Cancer in her father; Diabetes in her mother and sister; Early Death in her father; Heart Attack in her father; Heart Disease in her father and sister; Heart Surgery in her father and sister; High Blood Pressure in her father, mother, and sister; Mental Illness in her mother.     Allergies: Morphine and Codeine    Current Facility-Administered Medications   Medication Dose Route Frequency Provider Last Rate Last Dose    dextrose 5 % solution  100 mL/hr Intravenous PRN Frankie Boothe MD        dextrose 50 % IV solution  12.5 g Intravenous PRN Frankie Boothe MD        glucagon (rDNA) injection 1 mg  1 mg Intramuscular PRN Frankie Boothe MD        glucose (GLUTOSE) 40 % oral gel 15 g  15 g Oral PRN Frankie Boothe MD        acetaminophen (TYLENOL) tablet 650 mg  650 mg Oral Q4H PRN Frankie Boothe MD        [START ON 10/24/2020] enoxaparin (LOVENOX) injection 40 mg  40 mg Subcutaneous Daily MD Taras Grullon ON 10/24/2020] alogliptin (NESINA) tablet 25 mg  25 mg Oral Daily Frankie Boothe MD        budesonide-formoterol (SYMBICORT) 160-4.5 MCG/ACT inhaler 2 puff  2 puff Inhalation BID Frankie Boothe MD        diphenhydrAMINE (BENADRYL) tablet 25 mg  25 mg Oral Q6H PRN Frankie Boothe MD        [START ON 10/24/2020] glimepiride (AMARYL) tablet 4 mg  4 mg Oral QAM AC Frankie Boothe MD        hydrALAZINE (APRESOLINE) tablet 50 mg  50 mg Oral Q8H PRN MD Taras Grullon ON 10/24/2020] metFORMIN (GLUCOPHAGE-XR) extended release tablet 2,000 mg  2,000 mg Oral Daily with breakfast Frankie Boothe MD        [START ON 10/24/2020] metoprolol succinate (TOPROL XL) extended release tablet 50 mg  50 mg Oral Daily Frankie Boothe MD        ondansetron (ZOFRAN-ODT) disintegrating tablet 4 mg  4 mg Oral Q8H PRN Frankie Boothe MD        [START ON 10/24/2020] sertraline icterus noted; pupils equal, round. HENT: Atraumatic, normocephalic; Oral mucosa moist  Neck: Trachea midline, neck supple. No thyromegaly noted. CV: Regular rate and rhythm, no murmur rub or gallop noted  Resp: Lungs clear to auscultation bilaterally, no rales wheezes or ronchi, no retractions. Respirations unlabored. GI: Soft, nontender, nondistended. Normal bowel sounds. No palpable masses. Neuro: Alert, orientation limited by aphasia, appropriate. Able to answer simple and automatic responses. Difficulty with more in depth responses. No cranial nerve deficits appreciated. Sensation intact to light touch. Motor examination reveals normal strength in all four limbs diffusely. Reflexes normal and symmetric. Skin: Normal temperature and turgor  MSK: No joint abnormalities noted. Ext: No significant edema appreciated. No varicosities.     Lab Results   Component Value Date    WBC 9.3 10/20/2020    HGB 13.3 10/20/2020    HCT 40.3 10/20/2020    MCV 93.4 10/20/2020     10/20/2020     Lab Results   Component Value Date    INR 0.97 10/20/2020    INR 1.05 06/08/2019    PROTIME 11.2 10/20/2020    PROTIME 12.0 06/08/2019     Lab Results   Component Value Date    CREATININE 0.7 10/20/2020    BUN 10 10/20/2020     10/20/2020    K 4.2 10/20/2020     10/20/2020    CO2 24 10/20/2020     Lab Results   Component Value Date    ALT 14 10/20/2020    AST 18 10/20/2020    ALKPHOS 97 10/20/2020    BILITOT 0.3 10/20/2020       Most recent imaging studies revealed   EXAMINATION:    CT OF THE HEAD WITHOUT CONTRAST  10/23/2020 3:17 pm         TECHNIQUE:    CT of the head was performed without the administration of intravenous    contrast. Dose modulation, iterative reconstruction, and/or weight based    adjustment of the mA/kV was utilized to reduce the radiation dose to as low    as reasonably achievable.         COMPARISON:    10/21/2020         HISTORY:    ORDERING SYSTEM PROVIDED HISTORY: Follow-up on hemorrhagic transformation of    stroke, status post TPA, please compare with previous CT head    TECHNOLOGIST PROVIDED HISTORY:    Has a \"code stroke\" or \"stroke alert\" been called? ->No    Reason for exam:->Follow-up on hemorrhagic transformation of stroke, status    post TPA, please compare with previous CT head    Reason for Exam: post TPA    Acuity: Acute    Type of Exam: Subsequent/Follow-up         FINDINGS:    BRAIN/VENTRICLES: Ventricles are stable in size, shape, and position.         Ill-defined area of hypodensity in the left cerebral hemisphere is again    identified, compatible with changes from recent infarct.         There is subtle petechial hemorrhage again seen in the region of the left    frontal lobe, similar to prior.         There is mild periventricular hypodensity seen.         There is intracranial atherosclerosis.         ORBITS: The visualized portion of the orbits demonstrate no acute abnormality.         SINUSES: No air-fluid levels in the sinuses.  There is mild bowing and    spurring of the nasal septum.         SOFT TISSUES/SKULL:  No acute abnormality of the visualized skull or soft    tissues.              Impression    No significant change in small amount of parenchymal hemorrhage in the left    frontal lobe portion of the recent infarct.       EXAMINATION:    MRI OF THE BRAIN WITHOUT CONTRAST  10/20/2020 9:40 am         TECHNIQUE:    Multiplanar multisequence MRI of the brain was performed without the    administration of intravenous contrast.         COMPARISON:    Noncontrast CT head and CTA head and neck from earlier today         HISTORY:    ORDERING SYSTEM PROVIDED HISTORY: stroke    TECHNOLOGIST PROVIDED HISTORY:    Reason for exam:->stroke    Reason for Exam: Wake-up stroke, right sided weakness, right facial droop,    speech problems, MR Witness brain protocol         FINDINGS:    INTRACRANIAL STRUCTURES/VENTRICLES: There is acute infarction in the left    parietal temporal lobes and posterior left insula cortex.  There is acute    infarction at the junction of the posterior left temporal lobe and the left    occipital lobe. Carlos Luke is no significant associated mass effect or    significant associated signal abnormality on FLAIR images in these regions.         There is mild parenchymal volume loss.  There is periventricular and    subcortical white matter FLAIR hyperintensity, likely related to mild chronic    microvascular disease.  No midline shift. No evidence of an acute    intracranial hemorrhage.  No evidence of hydrocephalus.         There is decreased normal flow void in the right internal carotid artery,    consistent with known occlusion.              Impression    Acute infarction in the left parietal temporal lobes and posterior left    insula cortex.         Acute infarction at the junction of the posterior left temporal lobe and the    left occipital lobe.         Mild parenchymal volume loss.         Mild chronic microvascular disease.         Decreased normal flow void in the right internal carotid artery, consistent    with known occlusion. EXAMINATION:    CTA OF THE HEAD AND NECK WITH CONTRAST 10/20/2020 8:40 am:         TECHNIQUE:    CTA of the head and neck was performed with the administration of intravenous    contrast. Multiplanar reformatted images are provided for review.  MIP images    are provided for review. Stenosis of the internal carotid arteries measured    using NASCET criteria.  Dose modulation, iterative reconstruction, and/or    weight based adjustment of the mA/kV was utilized to reduce the radiation    dose to as low as reasonably achievable.         COMPARISON:    Noncontrast CT head from earlier today         HISTORY:    ORDERING SYSTEM PROVIDED HISTORY: r/o stroke r arm weakness    TECHNOLOGIST PROVIDED HISTORY:    Reason for exam:->r/o stroke r arm weakness         FINDINGS:         CTA NECK:         AORTIC ARCH/ARCH VESSELS: There is retroesophageal course of the right    subclavian artery, normal variant.  No dissection or arterial injury.  No    significant stenosis of the brachiocephalic or subclavian arteries.         CAROTID ARTERIES: There is complete occlusion of the right internal carotid    artery starting from its origin with retro-fill at the level of the    supraclinoid segment.  There is 40% stenosis at the origin of the left common    carotid artery.  Otherwise, no acute abnormality or flow-limiting stenosis in    the left internal carotid artery or remainder of the bilateral common carotid    arteries by NASCET criteria.         VERTEBRAL ARTERIES: No dissection, arterial injury, or significant stenosis.         SOFT TISSUES: The patient is status post median sternotomy.  There is septal    thickening at the lung apices, likely related to minimal pulmonary vascular    congestion.  There is bronchial wall thickening, likely related to small    airway disease or bronchiolitis.  No cervical or superior mediastinal    lymphadenopathy. Noreene Shouts is mild prominence of the bilateral palatine and    lingual tonsils, likely reactive.  No acute abnormality of the salivary    glands.  There is 1.2 cm right thyroid nodule, does not require follow-up due    to small size.         BONES: No acute osseous abnormality.  There are moderate degenerative changes    in the cervical spine.              CTA HEAD:         ANTERIOR CIRCULATION: There is complete occlusion of the right internal    carotid artery starting from its origin with retro-fill at the level of the    supraclinoid segment.  There is 60% stenosis in the left    cavernous/supraclinoid internal carotid artery.  No significant stenosis of    the anterior cerebral, or middle cerebral arteries.  No aneurysm.         POSTERIOR CIRCULATION: There is fetal origin of the left PCA, a normal    variant.  The right posterior communicating artery is patent.  No significant    stenosis of the except as documented below in the assessment and plan. By signing this document, I acknowledge that I have personally performed a  full physical examination on this patient within 24 hours of admission to  this inpatient rehabilitation facility and have determined the patient to be  able to tolerate the above course of treatment at an intensive level for a  reasonable period of time. I will be completing a detailed individualized  Plan of Care for this patient by day four of the patients stay based upon the  Preadmission Screen, this Post-Admission Evaluation, and the therapy  evaluations. Assessment and Plan:  Acute ischemic infarct: s/p tPA. ASA, statin. PT/OT/SLP    Aphasia: SLP    ICA occlusion and stenosis: FU with neuro regarding DAPT. DM: amaryl 4, metformin 2000 daily, nesina 25, SSI    HTN: toprol 50      HLD: lipitor 40    Depression: zoloft 100 per home regimen    Afib: holding AC at this time    Bowels: Per protocol  Bladder: Per protocol   Sleep: Trazodone provided prn. Pain: tylenol, tramadol   DVT PPx: Lovenox   ELOS: 14-17    Jeremy Adams MD 10/23/2020, 6:55 PM     * This document was created using dictation software. While all precautions were taken to ensure accuracy, errors may have occurred. Please disregard any typographical errors.

## 2020-10-23 NOTE — PROGRESS NOTES
Pt resting in bed. AAO and cooperative. Appears comfortable in no distress. NIHSS handoff score of 6, grossly unchanged. Assessment charted. No complaints. Call light is in reach. Instructed to use for assistance. Denies needs at this time. Will monitor.

## 2020-10-23 NOTE — CONSULTS
Yvon Warren  10/23/2020  [id number]    Rehab Brief Consult:    Patient is able to tolerate 3 hours of therapy 5 days per week and is medically appropriate for rehab at the Acute Rehabilitation Unit. Approved for admission today if CT stable and Neuro approves transfer. Orders placed in anticipation for transfer today. Thank you for the consultation.     Deborah Marcum MD 10/23/2020 1:56 PM

## 2020-10-23 NOTE — PROGRESS NOTES
Spoke with daughter about POC. She states that if not approved by medicare to go to [facility], she would like her to come home and she can take 4 weeks off from work to be with patient. She prefers that she get the therapy from ARU from a safety standpoint though. She plans to bring dinner and clothing for pt this evening. No further questions.

## 2020-10-23 NOTE — PROGRESS NOTES
4 Eyes Skin Assessment     NAME:  Tatiana Andrade OF BIRTH:  1950  MEDICAL RECORD NUMBER:  [id number]    The patient is being assess for  Admission    I agree that 2 RN's have performed a thorough Head to Toe Skin Assessment on the patient. ALL assessment sites listed below have been assessed. Areas assessed by both nurses:    Full head to toe completed        Does the Patient have a Wound?  No noted wound(s)       Kevin Prevention initiated:  No   Wound Care Orders initiated:  No    Pressure Injury (Stage 3,4, Unstageable, DTI, NWPT, and Complex wounds) if present place consult order under [de-identified] No    New and Established Ostomies if present place consult order under : No      Nurse 1 eSignature: Electronically signed by Brandon Taylor RN on 10/23/20 at 6:40 PM EDT    **SHARE this note so that the co-signing nurse is able to place an eSignature**    Nurse 2 eSignature: Electronically signed by Rik Lerma RN on 10/23/20 at 6:47 PM EDT

## 2020-10-23 NOTE — PROGRESS NOTES
[address], [address]  [phone]      Luca Tovar    : 1950  Acct #: [de-identified]  MRN: [id number]  PHYSICIAN:  Phil Daily MD  Primary Problem    Patient Active Problem List   Diagnosis    Lumbar radiculopathy    Acute CVA (cerebrovascular accident) [facility])    Received intravenous tissue plasminogen activator (tPA) in emergency department    HTN (hypertension), benign    Nontraumatic cortical hemorrhage of left cerebral hemisphere [facility]    Dyslipidemia    PAF (paroxysmal atrial fibrillation) ([facility]    Acute ischemic stroke [facility])       Rehabilitation Diagnosis:  Acute Left Ischemic Infarct     ADMIT DATE:10/23/2020    Patient Goals: To regain strength, function, endurance, and return home to be able to care for herself. Admitting Impairments: Stroke - Right Body Involvement (Left Brain)  Activities: Impaired Eating, Hygiene, Bathing, Dressing, Bed Mobility, Transfers, Ambulation, Stairs, and Endurance. Participation: Prior to admission, patient was living at home alone, independent with all mobility and activities, was driving.      CARE PLAN     NURSING:  Luca Tovar while on this unit will:  [] Be continent of bowel and bladder     [x] Have an adequate number of bowel movements  [] Urinate with no urinary retention >300ml in bladder  [] Complete bladder protocol with godinez removal  [] Maintain O2 SATs at ___%  [x] Have pain managed while on ARU       [] Be pain free by discharge   [x] Have no skin breakdown while on ARU  [] Have improved skin integrity via wound measurements  [] Have no signs/symptoms of infection at the wound site  [x] Be free from injury during hospitalization   [x] Complete education with patient/family with understanding demonstrated for:  [x] Adjustment   [] Other:     Nursing Interventions will include:  [] bowel/bladder training   [x] education for medical assistive devices [x] medication education   [] O2 saturation management   [x] energy conservation   [x] stress management techniques   [x] fall prevention   [x] alarms protocol   [x] seating and positioning   [] skin/wound care   [x] pressure relief instruction   [] dressing changes     [] infection protection   [x] DVT prophylaxis  [x] assistance with in room safety with transfers to bed, toilet, wheelchair, shower   [x] bathroom activities and hygiene  [] Other:    Patient/Caregiver Education for:  [x] Disease/sustained injury/management     [x] Medication Use  [] Surgical intervention  [x] Safety  [x] Body mechanics and or joint protection  [x] Health maintenance     [] Other:     PHYSICAL THERAPY:  Goals:                  Short term goals  Time Frame for Short term goals: 7-14 days  Short term goal 1: Patient will perform bed mobility MOD I. Short term goal 2: Patient will perform sit-stand MOD I w/ LRAD. Short term goal 3: Patient will perform bed-chair transfer MOD I w/ LRAD. Short term goal 4: Patient will perform 150' of ambulation MOD I w/ LRAD. Short term goal 5: Patient will perform car transfer MOD I w/ LRAD. These goals were reviewed with this patient at the time of assessment and Jeniffer Mathew is in agreement.      Plan of Care: Pt to be seen 5 out of 7 days per week per ARU protocol (60 minutes with PT)                  Current Treatment Recommendations: Strengthening, Balance Training, Functional Mobility Training, Transfer Training, Gait Training, Safety Education & Training, Neuromuscular Re-education, Endurance Training, Patient/Caregiver Education & Training, Equipment Evaluation, Education, & procurement    OCCUPATIONAL THERAPY:  Goals:             Short term goals  Time Frame for Short term goals: discharge  Short term goal 1: Mod I for functional mobility for ADL tasks w/RW  Short term goal 2: Mod I for functional transfers to ADL surfaces w/RW  Short term goal 3: Mod I UB discharge planning, patient/family counseling,   and coordination with insurance during ARU stay. Rangel Other will be seen a minimum of 3 hours of therapy per day, a minimum of 5 out of 7 days per week  (please see above for specific treatment plan per PT/OT/SLP). [] In this rare instance due to the nature of this patient's medical involvement, this patient will be seen 15 hours per week (900 minutes within a 7 day period). In addition, dietician/nutritionist may monitor calorie count as well as intake and collaboratively work with SLP on dietary upgrades. Neuropsychology/Psychology may evaluate and provide necessary support. Medical issues being managed closely and that require 24 hour availability of a physician:  [x] Swallowing Precautions  [x] Bowel/Bladder Fx  [] Weight bearing precautions  [] Wound Care    [x] Pain Mgmt   [] Infection Protection  [x] DVT Prophylaxis   [x] Fall Precautions  [x] Fluid/Electrolyte/Nutrition Balance  [x] Voice Protection   [] Respiratory  [] Other:    Medical Prognosis: [x] Good  [] Fair    [] Guarded   Total expected IRF days 14 Days  Anticipated discharge destination:Home  [x] Home Independently   [] Home with supervision    []SNF     [] Other                                           Physician anticipated functional outcomes:  Patient will progress to being independent with all mobility and activities upon discharge. IPOC brief synthesis: 64-year-old female with a history of asthma, A. Fib, diabetes, depression, low back pain, HTN, and HLD who was admitted on 10/20 with right-sided weakness and word finding difficulty. CT head was unremarkable. CTA revealed a complete occlusion of the right ICA, 60% stenosis of the left ICA. MRI revealed an acute infarct in the left parietal temporal lobes and posterior left insular cortex. She is deemed a candidate for TPA and received the medication on the same date.  Postop CT had revealed a small amount of parenchymal hemorrhage in the left frontal lobe infarct zone. Follow-up CT had revealed stable parenchymal hemorrhage. Neurology evaluated and suggested aspirin and statin with routine DVT prophylaxis. She is not deemed a candidate for long-term anticoagulation. She was evaluated by therapy suggested continuing inpatient setting prior to returning home. She is admitted with the above goal.      I have reviewed this initial plan of care and agree with its contents:    Title   Name    Date    Time    Physician: Electronically signed by Anurag Reid MD on 10/26/2020 at 12:49 PM      Case Mgmt: Orville Dawson, MSW, LSW, 10/24/2020, 0915    OT: MARLON MagallanesEd., OTR/L, [id number], [id number]    PT: Young Avitia, PT, DPT, ATC-R [id number] 10/24/2020 @0898    RN: Zara Loo RN BSN 10/23/2020 @ 1800    ST: July Piper M.A.  CCC-SLP, 10/24/2020 @ [address]    :  Khalida Latham PT, [place] [id number]  10/26/2020 2646

## 2020-10-23 NOTE — DISCHARGE SUMMARY
Hospital Medicine Discharge Summary    Patient: Casimiro Ziegler     Gender: female  : 1950   Age: 65 y.o. MRN: [id number]    Admitting Physician: John Navarrete MD  Discharge Physician: John Navarrete MD     Code Status: Prior     Admit Date: 10/20/2020   Discharge Date:   10/23/2020    Disposition:  ARU    Discharge Diagnoses: Active Hospital Problems    Diagnosis Date Noted    Received intravenous tissue plasminogen activator (tPA) in emergency department [Z92.82]     HTN (hypertension), benign [I10]     Nontraumatic cortical hemorrhage of left cerebral hemisphere ([facility] [address] [I61.1]     Dyslipidemia [E78.5]     PAF (paroxysmal atrial fibrillation) (HCC) [I48.0]     Acute CVA (cerebrovascular accident) ([facility] [address] [I63.9] 10/20/2020       Follow-up appointments:  two weeks    Outpatient to do list: FOLLOW UP    Condition at Discharge:  Stable    Hospital Course:   Meagan Crowder a 65 y. o. female presents to the emergency department today for evaluation for right-sided facial droop, right arm weakness.  The patient states that she woke up around 730 this morning, around 730/8 AM this morning she noticed that her face was drooping when she went to the restroom and she states that she noticed some weakness to her right arm.  EMS was called.  The patient is a history of A. fib, she is on aspirin and Plavix.  She is not on any blood thinners.  The patient believes that she was awake, and then her symptoms started.  The patient has no headaches.  She has no visual changes.  The patient denies any chest pain or shortness of breath.  She denies any recent illnesses.  She denies any fall or injury. 1. Acute stroke  - felt to be debilitating and within the time frame so TPA initiated in the ED.  - patient is reportedly improved from where she was when she came to the ED. - will admit to ICU to monitor as she has gotten TPA.    - will need repeat CT if has HA or vomiting  -repeat  Ct 24 hours post TPA admin shows hemorrhagic conversion with a tiny bleed in the area of the stroke  - neuro is consulted. Patient is not showing any worsening and is improving. REPEAT CT shows no worsening on tiny bleed. Will resume ASA and  Hold Plavix for now  Stable to D/C to ARU  Her insurance has approved.      2. A.fib  - was not on any type of AC prior. Will likely need to be on DOAC when she leaves for the A.fib.  -her CHADSVASC is 5  - Will hold off on AC at this time because of recent small  intra-infarct conversion. resume asa and hold off on plavix for now. 3. Diabetes mellitus 2  - usually controlled with oral meds  -continue these, may add sliding scale if sugars are running high.  - carb control diet.       Discharge Medications:   Current Discharge Medication List      START taking these medications    Details   atorvastatin (LIPITOR) 40 MG tablet Take 1 tablet by mouth nightly  Qty: 30 tablet, Refills: 3      aspirin (ASPIRIN CHILDRENS) 81 MG chewable tablet Take 1 tablet by mouth daily  Qty: 30 tablet, Refills: 3           Current Discharge Medication List        Current Discharge Medication List      CONTINUE these medications which have NOT CHANGED    Details   empagliflozin (JARDIANCE) 10 MG tablet Take 10 mg by mouth daily      sertraline (ZOLOFT) 100 MG tablet Take 100 mg by mouth daily      metformin (GLUCOPHAGE-XR) 500 MG XR tablet Take 2,000 mg by mouth daily (with breakfast)       glimepiride (AMARYL) 4 MG tablet Take 4 mg by mouth every morning (before breakfast)      budesonide-formoterol (SYMBICORT) 160-4.5 MCG/ACT AERO Inhale 2 puffs into the lungs 2 times daily      albuterol (PROVENTIL) (2.5 MG/3ML) 0.083% nebulizer solution Take 2.5 mg by nebulization every 6 hours as needed for Wheezing      saxagliptin (ONGLYZA) 5 MG TABS tablet Take 5 mg by mouth daily. metoprolol (TOPROL-XL) 50 MG XL tablet Take 50 mg by mouth daily.         mometasone (ASMANEX) 220 MCG/INH inhaler seconds  Peripheral Pulses: +3 Easily felt, not easily obliterated with pressure     Labs: For convenience and continuity at follow-up the following most recent labs are provided:    Lab Results   Component Value Date    WBC 9.3 10/20/2020    HGB 13.3 10/20/2020    HCT 40.3 10/20/2020    MCV 93.4 10/20/2020     10/20/2020     10/20/2020    K 4.2 10/20/2020    K 4.7 06/08/2019     10/20/2020    CO2 24 10/20/2020    BUN 10 10/20/2020    CREATININE 0.7 10/20/2020    CALCIUM 10.1 10/20/2020    ALKPHOS 97 10/20/2020    ALT 14 10/20/2020    AST 18 10/20/2020    BILITOT 0.3 10/20/2020    LABALBU 4.5 10/20/2020     Lab Results   Component Value Date    INR 0.97 10/20/2020    INR 1.05 06/08/2019       Radiology:      Ct head 10/23  No significant change in small amount of parenchymal hemorrhage in the left    frontal lobe portion of the recent infarct.             Ct Head Wo Contrast    Result Date: 10/21/2020  EXAMINATION: CT OF THE HEAD WITHOUT CONTRAST  10/21/2020 12:36 pm TECHNIQUE: CT of the head was performed without the administration of intravenous contrast. Dose modulation, iterative reconstruction, and/or weight based adjustment of the mA/kV was utilized to reduce the radiation dose to as low as reasonably achievable. COMPARISON: None. HISTORY: ORDERING SYSTEM PROVIDED HISTORY: 24 hour post tPA TECHNOLOGIST PROVIDED HISTORY: Reason for exam:->24 hour post tPA Has a \"code stroke\" or \"stroke alert\" been called? ->No Reason for Exam: 24 hr post tpa Acuity: Acute Type of Exam: Initial FINDINGS: BRAIN/VENTRICLES: Interval development of some small amount of parenchymal hemorrhage in the left frontal lobe within evolving area of infarct corresponding to the area of restricted diffusion seen on prior MRI of brain. There is evolving area of infarct noted in the left occipital parietal junction corresponding to the MRI study. . ORBITS: The visualized portion of the orbits demonstrate no acute abnormality. SINUSES: The visualized paranasal sinuses and mastoid air cells demonstrate no acute abnormality. SOFT TISSUES/SKULL:  No acute abnormality of the visualized skull or soft tissues. Evolving infarct with small amount of parenchymal hemorrhage in the left frontal lobe portion of the infarct. Ct Head Wo Contrast    Result Date: 10/21/2020  EXAMINATION: CT OF THE HEAD WITHOUT CONTRAST,  10/20/2020 8:38 am TECHNIQUE: CT of the head was performed without the administration of intravenous contrast. Dose modulation, iterative reconstruction, and/or weight based adjustment of the mA/kV was utilized to reduce the radiation dose to as low as reasonably achievable. COMPARISON: 06/01/2007. HISTORY: ORDERING SYSTEM PROVIDED HISTORY: Slurred speech, R arm weakness TECHNOLOGIST PROVIDED HISTORY: Reason for exam:-> Slurred speech, R arm weakness Has a \"code stroke\" or \"stroke alert\" been called? ->Yes Reason for Exam: Slurred speech, R arm weakness Acuity: Unknown Type of Exam: Unknown FINDINGS: BRAIN/VENTRICLES: The ventricular system is normal in appearance however slightly increased in prominence when compared to the study of 01/01/2007. No evidence of mass effect or midline shift. There has been interval increase in degree of prominence of sulci overlying convexities of cerebral hemispheres and cerebellum. There is associated mild prominence of extra-axial spaces overlying the cerebral hemispheres. A combination of findings is consistent with changes related to developing atrophy. Very minimal low attenuation within periventricular white matter is nonspecific however may represent changes of minimal ischemic leukoencephalopathy in a patient in this age group. No abnormal extra-axial fluid collections are identified. There is atherosclerotic calcification of distal internal carotid arteries. ORBITS: The visualized portion of the orbits demonstrate no acute abnormality.  SINUSES: The visualized paranasal sinuses and mastoid air cells demonstrate no acute abnormality. SOFT TISSUES/SKULL:  No acute abnormality of the visualized skull or soft tissues. No evidence of acute intracranial abnormality. Critical results were called by Dr. Dorcas Giang. Han Moreno MD to Dr. Giovanni Kim on 09/23/5412 at 08:59. Xr Chest Portable    Result Date: 10/20/2020  EXAMINATION: ONE XRAY VIEW OF THE CHEST 10/20/2020 8:51 am COMPARISON: November 11, 2012 HISTORY: ORDERING SYSTEM PROVIDED HISTORY: SOB TECHNOLOGIST PROVIDED HISTORY: Reason for exam:->SOB Reason for Exam: Cerebrovascular Accident (Pt in by EMS from home. Stroke alert called from field. Pt experiencing dysarthria, right side weakness, and facial droop.) Acuity: Acute Type of Exam: Initial FINDINGS: Cardiac silhouette is enlarged. No pneumothorax or pleural effusion. No consolidation. Diffuse interstitial prominence and hazy appearance of the lungs bilaterally, new. Findings as above which may be related to congestive heart failure and edema. Cta Head Neck W Contrast    Result Date: 10/20/2020  EXAMINATION: CTA OF THE HEAD AND NECK WITH CONTRAST 10/20/2020 8:40 am: TECHNIQUE: CTA of the head and neck was performed with the administration of intravenous contrast. Multiplanar reformatted images are provided for review. MIP images are provided for review. Stenosis of the internal carotid arteries measured using NASCET criteria. Dose modulation, iterative reconstruction, and/or weight based adjustment of the mA/kV was utilized to reduce the radiation dose to as low as reasonably achievable. COMPARISON: Noncontrast CT head from earlier today HISTORY: ORDERING SYSTEM PROVIDED HISTORY: r/o stroke r arm weakness TECHNOLOGIST PROVIDED HISTORY: Reason for exam:->r/o stroke r arm weakness FINDINGS: CTA NECK: AORTIC ARCH/ARCH VESSELS: There is retroesophageal course of the right subclavian artery, normal variant. No dissection or arterial injury.   No significant stenosis of the brachiocephalic or subclavian arteries. CAROTID ARTERIES: There is complete occlusion of the right internal carotid artery starting from its origin with retro-fill at the level of the supraclinoid segment. There is 40% stenosis at the origin of the left common carotid artery. Otherwise, no acute abnormality or flow-limiting stenosis in the left internal carotid artery or remainder of the bilateral common carotid arteries by NASCET criteria. VERTEBRAL ARTERIES: No dissection, arterial injury, or significant stenosis. SOFT TISSUES: The patient is status post median sternotomy. There is septal thickening at the lung apices, likely related to minimal pulmonary vascular congestion. There is bronchial wall thickening, likely related to small airway disease or bronchiolitis. No cervical or superior mediastinal lymphadenopathy. There is mild prominence of the bilateral palatine and lingual tonsils, likely reactive. No acute abnormality of the salivary glands. There is 1.2 cm right thyroid nodule, does not require follow-up due to small size. BONES: No acute osseous abnormality. There are moderate degenerative changes in the cervical spine. CTA HEAD: ANTERIOR CIRCULATION: There is complete occlusion of the right internal carotid artery starting from its origin with retro-fill at the level of the supraclinoid segment. There is 60% stenosis in the left cavernous/supraclinoid internal carotid artery. No significant stenosis of the anterior cerebral, or middle cerebral arteries. No aneurysm. POSTERIOR CIRCULATION: There is fetal origin of the left PCA, a normal variant. The right posterior communicating artery is patent. No significant stenosis of the vertebral, basilar, or posterior cerebral arteries. No aneurysm. OTHER: No dural venous sinus thrombosis on this non-dedicated study. BRAIN: There is mild parenchymal volume loss.   There is periventricular white matter low attenuation, likely related to minimal chronic microvascular disease. No mass effect or midline shift. No extra-axial fluid collection. The gray-white differentiation is maintained. Complete occlusion of the right internal carotid artery starting from its origin with retro-fill at the level of the supraclinoid segment. 60% stenosis in the left cavernous/supraclinoid internal carotid artery. 40% stenosis at the origin of the left common carotid artery. Results were reported to Dr. Margaux Lim at 5:82 a.m. on October 20, 2020. Mri Brain Wo Contrast Mr Witness    Result Date: 10/20/2020  EXAMINATION: MRI OF THE BRAIN WITHOUT CONTRAST  10/20/2020 9:40 am TECHNIQUE: Multiplanar multisequence MRI of the brain was performed without the administration of intravenous contrast. COMPARISON: Noncontrast CT head and CTA head and neck from earlier today HISTORY: ORDERING SYSTEM PROVIDED HISTORY: stroke TECHNOLOGIST PROVIDED HISTORY: Reason for exam:->stroke Reason for Exam: Wake-up stroke, right sided weakness, right facial droop, speech problems, MR Witness brain protocol FINDINGS: INTRACRANIAL STRUCTURES/VENTRICLES: There is acute infarction in the left parietal temporal lobes and posterior left insula cortex. There is acute infarction at the junction of the posterior left temporal lobe and the left occipital lobe. There is no significant associated mass effect or significant associated signal abnormality on FLAIR images in these regions. There is mild parenchymal volume loss. There is periventricular and subcortical white matter FLAIR hyperintensity, likely related to mild chronic microvascular disease. No midline shift. No evidence of an acute intracranial hemorrhage. No evidence of hydrocephalus. There is decreased normal flow void in the right internal carotid artery, consistent with known occlusion. Acute infarction in the left parietal temporal lobes and posterior left insula cortex.  Acute infarction at the junction of the posterior left temporal lobe and the left occipital lobe. Mild parenchymal volume loss. Mild chronic microvascular disease. Decreased normal flow void in the right internal carotid artery, consistent with known occlusion. Results were reported to Dr. Yohana Meehan at 9:54 a.m. on October 20, 2020. EKG     Had a.fib        The patient was seen and examined on day of discharge and this discharge summary is in conjunction with any daily progress note from day of discharge. Time Spent on discharge is 30 minutes  in the examination, evaluation, counseling and review of medications and discharge plan. Note that greater than 30 minutes was spent in preparing discharge papers, discussing discharge with patient, medication review, etc.       Signed:    Whit Lyn MD   10/23/2020      Thank you Christina Milton for the opportunity to be involved in this patient's care.  If you have any questions or concerns please feel free to contact me at [phone]

## 2020-10-23 NOTE — PROGRESS NOTES
Vickey Celestin  Neurology Follow-up  [facility] Neurology    Date of Service: 10/23/2020    Subjective:   CC: Follow up today regarding: Acute stroke, status post TPA and hemorrhagic conversion. This is my first encounter with the patient. The patient was seen by Dr. Nano Hermosillo. I was able to review the patient's record, imaging, notes from different physicians and recent events. The patient is a 65y.o.  years old female with history of A. fib on DOAC, hypertension hyperlipidemia who was admitted to the hospital 2 days ago with acute aphasia. Symptoms started within TPA window. She received TPA and is admitted to the ICU. Further work-up with MRI of the brain from 3 days ago showed acute left MCA stroke. Patient was somewhat different with her neuro exam and subsequent CT of the head from 2 days ago showed small left hemorrhagic conversion. Repeat CT from today is pending. The patient is about the same. Has fluent aphasia but denies any headache, neck or back pain or visual changes or chest pain. No focal weakness. Other review of system was unremarkable. ROS:   A 10-12 system obtained and updated today and is unremarkable except as mentioned in my HPI    family history includes Bipolar Disorder in her mother; Cancer in her father; Diabetes in her mother and sister; Early Death in her father; Heart Attack in her father; Heart Disease in her father and sister; Heart Surgery in her father and sister; High Blood Pressure in her father, mother, and sister; Mental Illness in her mother.   Past Medical History:   Diagnosis Date    Actinic keratosis     Arthritis     Asthma     UNSPECIFIED    Atrial fibrillation (HCC)     CAD (coronary artery disease)     Depression     worse with pain    Diabetes mellitus (HCC)     TYPE II    Disc displacement, lumbar     Diverticulosis of colon     Hard of hearing     left    Hyperlipidemia     Hypertension     Ischemic heart disease     Past heart attack     28 YRS AGO    Poor vision     legally blind    Right leg weakness     Scoliosis      Current Facility-Administered Medications   Medication Dose Route Frequency Provider Last Rate Last Dose    budesonide-formoterol (SYMBICORT) 160-4.5 MCG/ACT inhaler 2 puff  2 puff Inhalation BID Debby Quan MD   2 puff at 10/23/20 1018    glimepiride (AMARYL) tablet 4 mg  4 mg Oral QAM AC Debby Quan MD   4 mg at 10/23/20 8596    metFORMIN (GLUCOPHAGE-XR) extended release tablet 2,000 mg  2,000 mg Oral Daily with breakfast Debby Quan MD   2,000 mg at 10/23/20 0955    metoprolol succinate (TOPROL XL) extended release tablet 50 mg  50 mg Oral Daily Debby Quan MD   50 mg at 10/23/20 7728    alogliptin (NESINA) tablet 25 mg  25 mg Oral Daily Debby Quan MD   25 mg at 10/23/20 0955    sertraline (ZOLOFT) tablet 100 mg  100 mg Oral Daily Debby Quan MD   100 mg at 10/23/20 0956    glucose (GLUTOSE) 40 % oral gel 15 g  15 g Oral PRN Debby Quan MD        dextrose 50 % IV solution  12.5 g Intravenous PRN Debby Quan MD        glucagon (rDNA) injection 1 mg  1 mg Intramuscular PRN Debby Quan MD        dextrose 5 % solution  100 mL/hr Intravenous PRN Debby Quan MD        sodium chloride flush 0.9 % injection 10 mL  10 mL Intravenous PRN Debby Quan MD        sodium chloride flush 0.9 % injection 10 mL  10 mL Intravenous BID Debby Quan MD   10 mL at 10/23/20 2600     Allergies   Allergen Reactions    Morphine Other (See Comments)     BLOOD PRESSURE BOTTOMED OUT    Codeine Nausea Only and Other (See Comments)     SWEATS      reports that she has quit smoking. She has a 40.00 pack-year smoking history. She has never used smokeless tobacco. She reports that she does not drink alcohol or use drugs.      Objective:  Exam:  Constitutional:   Vitals:    10/23/20 0700 10/23/20 0800 10/23/20 0841 10/23/20 0900   BP: (!) 157/72 (!) 155/57  (!) 160/72   Pulse: 72 72  82   Resp: 20 17 22 18   Temp:    97 °F (36.1 °C)   TempSrc:    Temporal   SpO2: 98% 97% 100% 95%   Weight:       Height:         General appearance:  Normal development and appear in no acute distress. Eye: No icterus. Fundus: Cannot be examined due to recent COVID-19 restrictions  Neck: supple  Cardiovascular:  No lower leg edema with good pulsation. Mental Status:   AAO x1  Fluent aphasia with paraphasic error  Perseveration  Good attention today  Unable to assess recent remote memory or fund of knowledge due to aphasia  Cranial Nerves:   II: Visual fields: Full. Pupils: equal, round, reactive to light  III,IV,VI: Extra Ocular Movements are intact. No nystagmus  V: Facial sensation is intact  VII: Facial strength and movements: intact and symmetric  IX: Palate elevation is symmetric  XI: Shoulder shrug is intact  XII: Tongue movements are normal  Musculoskeletal: 5/5 in all 4 extremities. Tone: Normal tone. Reflexes: Symmetric 2+ in both arms and legs  Coordination: no pronator drift, no dysmetria with FNF. Normal REM. Sensation: normal to all modalities in both arms and legs. Gait/Posture: Not tested due to poor cooperation      Data:  LABS:   Lab Results   Component Value Date     10/20/2020    K 4.2 10/20/2020    K 4.7 06/08/2019     10/20/2020    CO2 24 10/20/2020    BUN 10 10/20/2020    CREATININE 0.7 10/20/2020    GFRAA >60 10/20/2020    GFRAA >60 11/11/2012    LABGLOM >60 10/20/2020    GLUCOSE 230 10/20/2020    CALCIUM 10.1 10/20/2020     Lab Results   Component Value Date    WBC 9.3 10/20/2020    RBC 4.32 10/20/2020    HGB 13.3 10/20/2020    HCT 40.3 10/20/2020    MCV 93.4 10/20/2020    RDW 13.0 10/20/2020     10/20/2020     Lab Results   Component Value Date    INR 0.97 10/20/2020    PROTIME 11.2 10/20/2020     Neuroimaging was independently reviewed by me and discussed results with the patient. Reviewed CT head from October 20 and 21.   I reviewed blood testing and other test results and discussed results with the patient  Reviewed notes from different physicians. Impression:  Acute left ischemic MCA stroke with mild hemorrhagic conversion, status post TPA. Residual fluent aphasia  Paroxysmal A. fib  Hypertension, not controlled  Diabetes, not controlled  Hyperlipidemia      Recommendation  Awaiting repeat CT head today  Likely will start baby aspirin and DVT prophylaxis today  Continue speech and swallow evaluation  PT OT  Telemetry  Insulin sliding scale  Blood sugar monitor and control  Check A1c and lipid panel  Statin  PPI  Continue current blood pressure medications  DC planning when medically stable  So far I am not sure if she will be a good candidate for long-term anticoagulation  We will follow        Maylin Fuentes MD   [phone]      This dictation was generated by voice recognition computer software. Although all attempts are made to edit the dictation for accuracy, there may be errors in the transcription that are not intended.

## 2020-10-23 NOTE — PROGRESS NOTES
Report given to ARU charge nurse. No further questions. Belongings packed and placed with pt.  Transport requested

## 2020-10-23 NOTE — PROGRESS NOTES
Physical Therapy  Facility/Department: [facility] ICU  Daily Treatment Note  NAME: Yvon Warren  : [id number]  MRN: [id number]    Date of Service: 10/23/2020    Discharge Recommendations: Yvon Warren scored a 17/24 on the AM-PAC short mobility form. Current research shows that an AM-PAC score of 17 or less is typically not associated with a discharge to the patient's home setting. Based on the patient's AM-PAC score and their current functional mobility deficits, it is recommended that the patient have 5-7 sessions per week of Physical Therapy at d/c to increase the patient's independence. At this time, this patient demonstrates the endurance, and/or tolerance for 3 hours of therapy each day, with a treatment frequency of 5-7x/wk. Please see assessment section for further patient specific details. If patient discharges prior to next session this note will serve as a discharge summary. Please see below for the latest assessment towards goals. PT Equipment Recommendations  Equipment Needed: No(has RW at home)    Assessment   Body structures, Functions, Activity limitations: Decreased functional mobility ; Decreased strength;Decreased endurance;Decreased balance  Assessment: Pt continues to require Min A for transfers however required decreased assist with ambulation with use of RW this date and able to increase ambulation distance. Pt would benefit from continued skilled PT services to address deficits. Treatment Diagnosis: Decreased functional mobility with transfers and ambulation, decreased balance/strength/endurance  Prognosis: Good  Decision Making: Medium Complexity  Clinical Presentation: evolving  PT Education: PT Role;Plan of Care;Goals;Transfer Training;General Safety;Orientation; Functional Mobility Training;Gait Training  Patient Education: Discussed DC plan with pt, verbalized understanding  Barriers to Learning: language- difficulty following multi-step commands during transfers and ambulation and expressive aphasia  REQUIRES PT FOLLOW UP: Yes  Activity Tolerance  Activity Tolerance: Patient limited by fatigue;Patient limited by endurance  Activity Tolerance: pt with progressive fatigue during shower, requiring ambulation to transport chair and returning to room via transport chair     Patient Diagnosis(es): The encounter diagnosis was Cerebrovascular accident (CVA), unspecified mechanism ([facility] [address] has a past medical history of Actinic keratosis, Arthritis, Asthma, Atrial fibrillation ([address] CAD (coronary artery disease), Depression, Diabetes mellitus ([facility] [address] Disc displacement, lumbar, Diverticulosis of colon, Hard of hearing, Hyperlipidemia, Hypertension, Ischemic heart disease, Past heart attack, Poor vision, Right leg weakness, and Scoliosis. has a past surgical history that includes Coronary artery bypass graft;  section; Appendectomy; ovarian cyst removal; Hand surgery; Cardiac surgery (); skin biopsy; vascular surgery; Tonsillectomy; Lumbar disc surgery (8-); Lumbar spine surgery (10-11-12); and back surgery. Restrictions  Restrictions/Precautions  Restrictions/Precautions: Fall Risk, Modified Diet(High fall risk; Diet dysphagia soft and bite sized, no drinking straw)  Required Braces or Orthoses?: No  Position Activity Restriction  Other position/activity restrictions: Vickey Celestin is a 65 y.o. female presents to the emergency department today for evaluation for right-sided facial droop, right arm weakness. The patient states that she woke up around 730 this morning, around 730/8 AM this morning she noticed that her face was drooping when she went to the restroom and she states that she noticed some weakness to her right arm. EMS was called. The patient is a history of A. fib, she is on aspirin and Plavix. She is not on any blood thinners. The patient believes that she was awake, and then her symptoms started. TPA given 10/20.  Today, s/p tPA exam is management ~20-25 minutes total)  Standing - Static: Fair  Standing - Dynamic: Fair            G-Code     OutComes Score       AM-PAC Score  AM-PAC Inpatient Mobility Raw Score : 17 (10/23/20 1535)  AM-PAC Inpatient T-Scale Score : 42.13 (10/23/20 1535)  Mobility Inpatient CMS 0-100% Score: 50.57 (10/23/20 1535)  Mobility Inpatient CMS G-Code Modifier : CK (10/23/20 1535)          Goals  Short term goals  Time Frame for Short term goals: at discharge  Short term goal 1: Bed mobility with supervision  Short term goal 2: Sit to stand transfers with CGA and RW  Short term goal 3: Ambulation of 50 feet with CGA and RW  Patient Goals   Patient goals : Pt will return to home at baseline function  NO GOALS MET [address]  Times per week: 5-7  Times per day: Daily  Current Treatment Recommendations: Strengthening, Balance Training, Functional Mobility Training, Transfer Training, Gait Training, Safety Education & Training, Neuromuscular Re-education  Safety Devices  Type of devices:  All fall risk precautions in place, Patient at risk for falls, Nurse notified, Gait belt(pt left seated in transport chair with transporter to take her to CT scan, RN aware)  Restraints  Initially in place: No     Therapy Time   Individual Concurrent Group Co-treatment   Time In       1420   Time Out       1513   Minutes       53        Timed Code Treatment Minutes:  53  Total Treatment Minutes:  [address] [facility], 455 [facility], 316 [facility]

## 2020-10-23 NOTE — PROGRESS NOTES
Addressed fasting blood sugars with MD. Order to make diet carb controlled and continue with current medications. No need to check BS ACHS, will check with morning labs.

## 2020-10-23 NOTE — DISCHARGE INSTR - COC
Continuity of Care Form    Patient Name: Fern Patterson   :    MRN:  [id number]    Admit date:  10/20/2020  Discharge date:  ***    Code Status Order: Prior   Advance Directives:   Yunior Duff [address] Directive Type of Healthcare Directive Copy in [address] Agent's Name Healthcare Agent's Phone Number    10/20/20 7051  Other (Comment) Pt unable to clearly state, Aphasia -- -- -- -- --          Admitting Physician:  Guanako Atkinson MD  PCP: Pebbles Conner    Discharging Nurse: [facility] Unit/Room#: RFA-3570/4158-09  Discharging Unit Phone Number: ***    Emergency Contact:   Extended Emergency Contact Information  Primary Emergency Contact: Vania Busby  Address: DAUGHTER  Home Phone: [phone]  Relation: Child    Past Surgical History:  Past Surgical History:   Procedure Laterality Date    APPENDECTOMY      BACK SURGERY     [address] [place] CORONARY ARTERY BYPASS GRAFT      HAND SURGERY      LUMPS/BONE SPURS    LUMBAR [address]  8-    Microlumbar discectomy L4-5 right    LUMBAR SPINE SURGERY  10-11-12    MICROLUMBAR RE-EXPLORATION L4-5 RIGHT, DECOMPRESSION OF    OVARIAN CYST REMOVAL      SKIN BIOPSY      PRE CANCERS FROZEN OFF    TONSILLECTOMY      VASCULAR SURGERY         Immunization History:   Immunization History   Administered Date(s) Administered    Tdap (Boostrix, Adacel) 2016       Active Problems:  Patient Active Problem List   Diagnosis Code    Lumbar radiculopathy M54.16    Acute CVA (cerebrovascular accident) ([facility] [address] I63.9    Received intravenous tissue plasminogen activator (tPA) in emergency department Z92.82    HTN (hypertension), benign I10    Nontraumatic cortical hemorrhage of left cerebral hemisphere ([address] I61.1    Dyslipidemia E78.5    PAF (paroxysmal atrial fibrillation) ([address] I48.0       Isolation/Infection: Isolation          No Isolation        Patient Infection Status     None to display          Nurse Assessment:  Last Vital Signs: BP (!) 160/72   Pulse 82   Temp 97 °F (36.1 °C) (Temporal)   Resp 18   Ht 5' 3\" (1.6 m)   Wt 158 lb 11.7 oz (72 kg)   SpO2 95%   BMI 28.12 kg/m²     Last documented pain score (0-10 scale): Pain Level: 0  Last Weight:   Wt Readings from Last 1 Encounters:   10/23/20 158 lb 11.7 oz (72 kg)     Mental Status:  {IP PT MENTAL STATUS:20030}    IV Access:  { KAZ IV ACCESS:468005780}    Nursing Mobility/ADLs:  Walking   {CHP DME YMMM:627323342}  Transfer  {P DME KXHA:601938226}  Bathing  {CHP DME ZXGD:945639011}  Dressing  {CHP DME CDHH:218579745}  Toileting  {P DME OVDT:200399748}  Feeding  {[facility] DME WOJO:034042698}  Med Admin  {P DME DEEL:807652201}  Med Delivery   { KAZ MED Delivery:529051296}    Wound Care Documentation and Therapy:        Elimination:  Continence:   · Bowel: {YES / CZ:99355}  · Bladder: {YES / SA:73400}  Urinary Catheter: {Urinary Catheter:528049483}   Colostomy/Ileostomy/Ileal Conduit: {YES / NX:91787}       Date of Last BM: ***    Intake/Output Summary (Last 24 hours) at 10/23/2020 1424  Last data filed at 10/23/2020 1200  Gross per 24 hour   Intake 250 ml   Output 1575 ml   Net -1325 ml     I/O last 3 completed shifts:   In: 10 [I.V.:10]  [facility] [Urine:1275]    Safety Concerns:     [address] Safety Concerns:275053828}    Impairments/Disabilities:      [address] Impairments/Disabilities:082757634}    Nutrition Therapy:  Current Nutrition Therapy:   [address] Diet List:606997100}    Routes of Feeding: {CHP DME Other Feedings:924215885}  Liquids: {Slp liquid thickness:69040}  Daily Fluid Restriction: {CHP DME Yes amt example:688227929}  Last Modified Barium Swallow with Video (Video Swallowing Test): {Done Not Done YNYI:421880060}    Treatments at the Time of Hospital Discharge:   Respiratory Treatments: ***  Oxygen Therapy:  {Therapy; copd oxygen:33540}  Ventilator:    {MH CC Vent NRPI:846271609}    Rehab Therapies: {THERAPEUTIC INTERVENTION:9480736601}  Weight Bearing Status/Restrictions: 50[address] DAVID Weight Bearin}  Other Medical Equipment (for information only, NOT a DME order):  {EQUIPMENT:615630593}  Other Treatments: ***    Patient's personal belongings (please select all that are sent with patient):  {CHP DME Belongings:020667827}    RN SIGNATURE:  {Esignature:337628550}    CASE MANAGEMENT/SOCIAL WORK SECTION    Inpatient Status Date: 10/20/2020    Readmission Risk Assessment Score:  Readmission Risk              Risk of Unplanned Readmission:        9           Discharging to Facility/ Agency   · Name: Denita HERNANDEZ  · Address:  · Phone: [phone]  · Fax:    Dialysis Facility (if applicable)   · Name:  · Address:  · Dialysis Schedule:  · Phone:  · Fax:    / signature: ANT Beltrán, RN  [phone]    PHYSICIAN SECTION    Prognosis: {Prognosis:4101780298}    Condition at Discharge: [address] Patient Condition:003087288}    Rehab Potential (if transferring to Rehab): {Prognosis:0764615138}    Recommended Labs or Other Treatments After Discharge: ***    Physician Certification: I certify the above information and transfer of Stanley Hogan  is necessary for the continuing treatment of the diagnosis listed and that she requires {Admit to Appropriate Level of Care:50098} for {GREATER/LESS:897660013} 30 days.      Update Admission H&P: {CHP DME Changes in CAORE:101360154}    PHYSICIAN SIGNATURE:  {Esignature:359823967}

## 2020-10-23 NOTE — PROGRESS NOTES
Tolerated treatment well;Patient limited by fatigue  Activity Tolerance: Pt fatigued towards end of shower session, however able to complete ADL tasks with increased time  Safety Devices  Safety Devices in place: Not Applicable  Type of devices: Nurse notified(Pt placed in transport chair after shower for CT scan; RN aware)         Patient Diagnosis(es): The encounter diagnosis was Cerebrovascular accident (CVA), unspecified mechanism ([address] has a past medical history of Actinic keratosis, Arthritis, Asthma, Atrial fibrillation ([address] CAD (coronary artery disease), Depression, Diabetes mellitus ([address] Disc displacement, lumbar, Diverticulosis of colon, Hard of hearing, Hyperlipidemia, Hypertension, Ischemic heart disease, Past heart attack, Poor vision, Right leg weakness, and Scoliosis. has a past surgical history that includes Coronary artery bypass graft;  section; Appendectomy; ovarian cyst removal; Hand surgery; Cardiac surgery (); skin biopsy; vascular surgery; Tonsillectomy; Lumbar disc surgery (8-); Lumbar spine surgery (10-11-12); and back surgery. Restrictions  Restrictions/Precautions  Restrictions/Precautions: Fall Risk, Modified Diet(High fall risk; Diet dysphagia soft and bite sized, no drinking straw)  Required Braces or Orthoses?: No  Position Activity Restriction  Other position/activity restrictions: Li Bermudez is a 65 y.o. female presents to the emergency department today for evaluation for right-sided facial droop, right arm weakness. The patient states that she woke up around 730 this morning, around 730/8 AM this morning she noticed that her face was drooping when she went to the restroom and she states that she noticed some weakness to her right arm. EMS was called. The patient is a history of A. fib, she is on aspirin and Plavix. She is not on any blood thinners. The patient believes that she was awake, and then her symptoms started. TPA given 10/20. Today, s/p tPA exam is clinically improved with an NIHSS of 5. She has mild expressive aphasia and dysarthria, but is able to follow commands. Her right sided weakness is resolved. She also has some mild sensory loss on the right side. Subjective   General  Chart Reviewed: Yes  Patient assessed for rehabilitation services?: Yes  Additional Pertinent Hx: Afib, CAD s/p CABG, DM2  Response to previous treatment: Patient with no complaints from previous session  Family / Caregiver Present: No  Diagnosis: Acute CVA (left)  Subjective  Subjective: Pt seated in chair upon arrival, agreeable to cotx session. Vital Signs  Patient Currently in Pain: Denies   Orientation  Orientation  Overall Orientation Status: Within Functional Limits  Objective    ADL  Grooming: Setup(Brushed hair seated)  UE Bathing: Stand by assistance; Increased time to complete;Setup(pt thorough with washing upper body)  LE Bathing: Stand by assistance;Setup; Increased time to complete(Pt able to wash legs/feet and lean towards left in order to do lc area)  UE Dressing: Setup(gown)  LE Dressing: Minimal assistance(Don socks w/ SBA/setup. Fleeta Pancoast w/ min(a) to don over hips)  Additional Comments: Pt participated in a full shower w/ OT/PT and required SBA as well as setup for bathing;        Balance  Sitting Balance: Supervision  Standing Balance: Minimal assistance(w/ RW)  Functional Mobility  Functional - Mobility Device: Rolling Walker  Activity: Other  Assist Level: Minimal assistance  Functional Mobility Comments: Pt ambulated from chair to door ~10' w/ min A and hand hold; pt ambulated 25' w/ RW, required ~2 standing rest breaks due to feeling lightheaded, which passed with short break in stance. Pt then ambulated another 25' towards shower chair. Pt fatigued after shower, was able to ambulate 10' w/ hand hold and min A to transport chair outside of shower room.   Shower Transfers  Shower - Transfer From: Susie Farris - Transfer Type: To  Shower - Transfer To: Shower seat without back  Shower - Technique: Ambulating  Shower Transfers: Minimal assistance  Bed mobility  Comment: Not addressed, pt seated in chair at beginning of session and seated in transport chair at end of session. Transfers  Stand Step Transfers: Minimal assistance(w/ RW)  Sit to stand: Minimal assistance(x1 from chair, x1 from shower chair)  Stand to sit: Minimal assistance        Cognition  Overall Cognitive Status: Exceptions  Arousal/Alertness: Appropriate responses to stimuli(Increased time to respond due to aphasia)  Following Commands: Follows one step commands with increased time; Follows one step commands with repetition; Follows multistep commands with increased time; Follows multistep commands with repitition(Able to follow multistep commands while ambulating in hallway towards shower room)  Attention Span: Appears intact  Memory: Appears intact  Safety Judgement: Good awareness of safety precautions(Requested hand hold to walk towards door; aware of deficits)  Problem Solving: Decreased awareness of errors;Assistance required to correct errors made;Assistance required to generate solutions;Assistance required to identify errors made;Assistance required to implement solutions  Insights: Decreased awareness of deficits(Very aware of deficits)  Initiation: Requires cues for some  Sequencing: Requires cues for some         Plan   Plan  Times per week: 5-7x/wk  Times per day: Daily  Current Treatment Recommendations: Strengthening, ROM, Balance Training, Safety Education & Training, Self-Care / ADL, Endurance Training, Functional Mobility Training, Gait Training, Cognitive Reorientation, Patient/Caregiver Education & Training, Neuromuscular Re-education    AM-[facility] Score  AM-[facility] Inpatient Daily Activity Raw Score: 17 (10/23/20 1547)  AM-PAC Inpatient ADL T-Scale Score : 37.26 (10/23/20 1547)  ADL Inpatient CMS 0-100% Score: 50.11 (10/23/20 1547)  ADL Inpatient CMS G-Code Modifier : CK (10/23/20 1547)    Goals  Short term goals  Time Frame for Short term goals: discharge  Short term goal 1: SPV UB/LB Dressing-- setup UB, min(a) LB 10/23  Short term goal 2: SPV Bathing tasks-- SBA 10/23  Short term goal 3: Mod I Bed mobility-- ongoing 10/23  Short term goal 4: CGA Fxl transfers-- Min(a) 10/23  Short term goal 5: CGA Fxl mobility-- continue for consistency 10/23  Long term goals  Time Frame for Long term goals : LTG=STG       Therapy Time   Individual Concurrent Group Co-treatment   Time In       1420   Time Out       1513   Minutes       53     Timed Code Treatment Minutes:  53 minutes    Total Treatment Minutes:  53 minutes     JOSE MANUEL Godfrey    OT provided direct supervision to student, facilitated in making skilled judgements throughout duration of session.     JANINE Torrez OTR/L [id number]   Jenna Pandya OT

## 2020-10-23 NOTE — CARE COORDINATION
CM RN updated that ARU authorization obtained. Per Gibson Cleary liaison, if CT normal patient can come today. Dr. Prince Cuello and Angeli Berrios, ICU RN, updated.      EMIL GaleanoN, RN  [phone]

## 2020-10-23 NOTE — PROGRESS NOTES
Shift assessment complete and report received from nightshift nurse. NIHSS completed together- no change from previous. Assisted pt to chair using walker- pt tolerated well. Pt wants to take shower today, will coordinate plan with PT/OT today. Transfer order for Med/Surg placed. Updated pt on POC for the day. No further questions.

## 2020-10-23 NOTE — CARE COORDINATION
Prior authorization request for ARU submitted to insurance 10/22. Auth received 10/23, patient can admit if cleared by acute team per Dr. Tammy Peterson today or Sunday, 10/25. Please call with any questions.      Thank you,     Harriet Zapata RN Clinical liaison [facility]  [phone]

## 2020-10-23 NOTE — PLAN OF CARE
Problem: Mobility - Impaired:  Goal: Mobility will improve  Description: Mobility will improve  Outcome: Ongoing     Problem: Falls - Risk of:  Goal: Will remain free from falls  Description: Will remain free from falls  Outcome: Ongoing  Goal: Absence of physical injury  Description: Absence of physical injury  Outcome: Ongoing     Problem: COMMUNICATION IMPAIRMENT  Goal: Ability to express needs and understand communication  Outcome: Ongoing

## 2020-10-24 LAB
GLUCOSE BLD-MCNC: 152 MG/DL (ref 70–99)
GLUCOSE BLD-MCNC: 175 MG/DL (ref 70–99)
GLUCOSE BLD-MCNC: 68 MG/DL (ref 70–99)
GLUCOSE BLD-MCNC: 74 MG/DL (ref 70–99)
GLUCOSE BLD-MCNC: 76 MG/DL (ref 70–99)
PERFORMED ON: ABNORMAL
PERFORMED ON: NORMAL
PERFORMED ON: NORMAL

## 2020-10-24 PROCEDURE — 92523 SPEECH SOUND LANG COMPREHEN: CPT

## 2020-10-24 PROCEDURE — 94761 N-INVAS EAR/PLS OXIMETRY MLT: CPT

## 2020-10-24 PROCEDURE — 92610 EVALUATE SWALLOWING FUNCTION: CPT

## 2020-10-24 PROCEDURE — 94640 AIRWAY INHALATION TREATMENT: CPT

## 2020-10-24 PROCEDURE — 1280000000 HC REHAB R&B

## 2020-10-24 PROCEDURE — 97116 GAIT TRAINING THERAPY: CPT

## 2020-10-24 PROCEDURE — 97530 THERAPEUTIC ACTIVITIES: CPT

## 2020-10-24 PROCEDURE — 97162 PT EVAL MOD COMPLEX 30 MIN: CPT

## 2020-10-24 PROCEDURE — 97535 SELF CARE MNGMENT TRAINING: CPT

## 2020-10-24 PROCEDURE — 6370000000 HC RX 637 (ALT 250 FOR IP): Performed by: PHYSICAL MEDICINE & REHABILITATION

## 2020-10-24 PROCEDURE — 97166 OT EVAL MOD COMPLEX 45 MIN: CPT

## 2020-10-24 PROCEDURE — 6360000002 HC RX W HCPCS: Performed by: PHYSICAL MEDICINE & REHABILITATION

## 2020-10-24 RX ADMIN — ENOXAPARIN SODIUM 40 MG: 40 INJECTION SUBCUTANEOUS at 10:13

## 2020-10-24 RX ADMIN — INSULIN LISPRO 2 UNITS: 100 INJECTION, SOLUTION INTRAVENOUS; SUBCUTANEOUS at 12:18

## 2020-10-24 RX ADMIN — GLIMEPIRIDE 4 MG: 2 TABLET ORAL at 06:40

## 2020-10-24 RX ADMIN — Medication 2 PUFF: at 08:05

## 2020-10-24 RX ADMIN — METFORMIN HYDROCHLORIDE 2000 MG: 500 TABLET, EXTENDED RELEASE ORAL at 10:13

## 2020-10-24 RX ADMIN — TRAMADOL HYDROCHLORIDE 50 MG: 50 TABLET, FILM COATED ORAL at 17:15

## 2020-10-24 RX ADMIN — ALOGLIPTIN 25 MG: 25 TABLET, FILM COATED ORAL at 10:13

## 2020-10-24 RX ADMIN — SERTRALINE HYDROCHLORIDE 100 MG: 50 TABLET ORAL at 10:12

## 2020-10-24 RX ADMIN — DESMOPRESSIN ACETATE 40 MG: 0.2 TABLET ORAL at 22:33

## 2020-10-24 RX ADMIN — Medication 2 PUFF: at 19:37

## 2020-10-24 RX ADMIN — METOPROLOL SUCCINATE 50 MG: 50 TABLET, EXTENDED RELEASE ORAL at 10:12

## 2020-10-24 RX ADMIN — INSULIN LISPRO 2 UNITS: 100 INJECTION, SOLUTION INTRAVENOUS; SUBCUTANEOUS at 10:17

## 2020-10-24 ASSESSMENT — PAIN DESCRIPTION - ORIENTATION: ORIENTATION: LEFT;RIGHT

## 2020-10-24 ASSESSMENT — PAIN DESCRIPTION - ONSET: ONSET: ON-GOING

## 2020-10-24 ASSESSMENT — PAIN SCALES - GENERAL
PAINLEVEL_OUTOF10: 5
PAINLEVEL_OUTOF10: 0
PAINLEVEL_OUTOF10: 4
PAINLEVEL_OUTOF10: 0

## 2020-10-24 ASSESSMENT — PAIN DESCRIPTION - DESCRIPTORS: DESCRIPTORS: DISCOMFORT

## 2020-10-24 ASSESSMENT — PAIN DESCRIPTION - LOCATION: LOCATION: BACK

## 2020-10-24 ASSESSMENT — PAIN DESCRIPTION - PROGRESSION: CLINICAL_PROGRESSION: GRADUALLY IMPROVING

## 2020-10-24 ASSESSMENT — PAIN DESCRIPTION - PAIN TYPE: TYPE: ACUTE PAIN

## 2020-10-24 ASSESSMENT — PAIN DESCRIPTION - FREQUENCY: FREQUENCY: INTERMITTENT

## 2020-10-24 NOTE — PROGRESS NOTES
Speech Language Pathology  Facility/Department: [facility] ACUTE REHAB UNIT  Initial Speech/Language/Cognitive Assessment    NAME: Marylen Cost  :    MRN: [id number]  ADMISSION DATE: 10/23/2020  ADMITTING DIAGNOSIS: has Lumbar radiculopathy; Acute CVA (cerebrovascular accident) ([facility] [address] Received intravenous tissue plasminogen activator (tPA) in emergency department; HTN (hypertension), benign; Nontraumatic cortical hemorrhage of left cerebral hemisphere [facility]); Dyslipidemia; PAF (paroxysmal atrial fibrillation) ([facility] [address] and Acute ischemic stroke ([facility] [address] on their problem list.        HPI: Per chart review, \"65year-old female with a history of asthma, A. Fib, diabetes, depression, low back pain, HTN, and HLD who was admitted on 10/20 with right-sided weakness and word finding difficulty. CT head was unremarkable. CTA revealed a complete occlusion of the right ICA, 60% stenosis of the left ICA. MRI revealed an acute infarct in the left parietal temporal lobes and posterior left insular cortex. She is deemed a candidate for TPA and received the medication on the same date. Postop CT had revealed a small amount of parenchymal hemorrhage in the left frontal lobe infarct zone. Follow-up CT had revealed stable parenchymal hemorrhage. Neurology evaluated and suggested aspirin and statin with routine DVT prophylaxis. She is not deemed a candidate for long-term anticoagulation. She was evaluated by therapy suggested continuing inpatient setting prior to returning home. She is aphasic and frustrated by her aphasia currently. \"    Date of Eval: 10/24/2020   Evaluating Therapist: Cecilia Duverney, SLP    RECENT RESULTS  CT OF HEAD/MRI 10/22/20  Impression    No significant change in small amount of parenchymal hemorrhage in the left    frontal lobe portion of the recent infarct.         Pain:  Pain Assessment  Pain Assessment: Faces  Pain Level: 0  Helms-Young Pain Rating: Hurts even more  Patient's Stated Pain Goal: No pain  Pain Type: Acute pain  Pain Location: Back  Pain Orientation: Left  Pain Descriptors: Discomfort  Pain Frequency: Intermittent  Pain Onset: On-going  Clinical Progression: Not changed  Non-Pharmaceutical Pain Intervention(s): Repositioned    Assessment:  Aphasia Diagnosis: Severe expressive aphasia and moderate receptive aphasia   Diagnosis: Severe expressive aphasia and moderate receptive aphasia    Recommendations:  Requires SLP Intervention: Yes  Duration/Frequency of Treatment: 60 min a day/5x a week  D/C Recommendations: To be determined       Plan:   Goals:  Short-term Goals  Timeframe for Short-term Goals: 2 weeks  Goal 1: Patient will increase object/picture identification to > 24% accuracy given min verbal cues. Goal 2: Patient will improve recognition of body parts to 80% accuracy given min verbal cues. Goal 3: Patient will verbalize automatic speech tasks with 80% accuracy given min verbal cues  Goal 4: Pt will name object/ pictures with 60% accuracy given min cues. Goal 5: Pt will complete basic expressive language tasks (sentence completion, responsive naming, etc) to 80% accuracy with min cues  Long-term Goals  Timeframe for Long-term Goals: 3 weeks  Goal 1: Pt will improve expressive/receptive language tasks to highest functional level to decreased burden of care upon discharge. Patient/family involved in developing goals and treatment plan: yes    Subjective:  General  Chart Reviewed: Yes  Patient assessed for rehabilitation services?: Yes  Family / Caregiver Present: No  Subjective  Subjective: Patient sitting upright in chair, willing to cooperate for therapy. Social/Functional History  Lives With: Alone  Active : Yes  Vision  Vision: Within Functional Limits  Hearing  Hearing: Within functional limits        Objective:     Oral/Motor  Oral Motor: Exceptions to [facility]  Labial Strength: Reduced  Lingual ROM: Reduced left; Reduced right  Lingual Strength: Reduced  Lingual Coordination: Reduced    Auditory Comprehension  Comprehension: Exceptions  Yes/No Questions: Mild  Basic Questions: Mild  Complex Questions: Moderate  Two Step Basic Commands: Moderate  Multistep Basic Commands: Moderate  Common Objects: Mild  Pictures: Mild    Reading Comprehension  Reading Status: Exceptions to [facility]  Phrases Impairment Severity: Mild   Sentence Impairment Severity: Moderate    Expression  Primary Mode of Expression: Verbal    Verbal Expression  Verbal Expression: Exceptions to functional limits  Repetition: Moderate  Automatic Speech: Mild  Confrontation: Severe  Responsive: Severe  Conversation: Severe    Written Expression  Dominant Hand: Right  Written Expression: Exceptions to [facility]  Dictation Impairment Severity: Sentence    Motor Speech  Motor Speech: Within Functional Limits    Pragmatics/Social Functioning  Pragmatics: Within functional limits    Cognition:      Orientation  Overall Orientation Status: Within Normal Limits  Attention  Attention: Within Functional Limits      Prognosis:  Speech Therapy Prognosis  Prognosis: Fair  Prognosis Considerations: Previous Level of Function;Severity of Impairments; Family/Community Support;Participation Level  Individuals consulted  Consulted and agree with results and recommendations: Patient    Education:  Patient Education: Educated pt re: current diet level with recommendations  Patient Education Response: Verbalizes understanding  Safety Devices in place: Yes  Type of devices: All fall risk precautions in place; Left in chair;Call light within reach; Chair alarm in place    Therapy Time:   Individual Concurrent Group Co-treatment   Time In 1130         Time Out 1200         Minutes [address] .  [id number] [place] [address] [id number]  Speech-Language Pathologist   10/24/2020 1:28 PM

## 2020-10-24 NOTE — PROGRESS NOTES
Physical Therapy    Facility/Department: [facility] ACUTE REHAB UNIT  Initial Assessment    NAME: Cris Rivers  : 1950  MRN: [id number]    Date of Service: 10/24/2020    Discharge Recommendations:  S Level 3, Home with Home health PT   PT Equipment Recommendations  Equipment Needed: No  Other: Patient has RW at home. Assessment   Body structures, Functions, Activity limitations: Decreased functional mobility ; Decreased strength;Decreased endurance;Decreased balance;Decreased sensation;Decreased ADL status; Decreased posture;Decreased safe awareness;Decreased cognition  Assessment: Patient presents w/ RUE and RLE weakness, impaired sensation, and impaired coordination following CVA leading to functional mobility deficits as noted above. Recommend skilled therapy to address deficits in an attempt to return patient to prior functional level. Treatment Diagnosis: Decreased functional mobility with transfers and ambulation, decreased balance/strength/endurance  Prognosis: Good  Decision Making: Medium Complexity  Clinical Presentation: Stable. PT Education: PT Role;Plan of Care;Goals;Transfer Training;General Safety;Orientation; Functional Mobility Training;Gait Training  Barriers to Learning: expressive aphasia, cognition  REQUIRES PT FOLLOW UP: Yes  Activity Tolerance  Activity Tolerance: Patient limited by fatigue;Patient limited by endurance       Patient Diagnosis(es): There were no encounter diagnoses. has a past medical history of Actinic keratosis, Arthritis, Asthma, Atrial fibrillation ([address] CAD (coronary artery disease), Depression, Diabetes mellitus ([address] Disc displacement, lumbar, Diverticulosis of colon, Hard of hearing, Hyperlipidemia, Hypertension, Ischemic heart disease, Past heart attack, Poor vision, Right leg weakness, and Scoliosis. has a past surgical history that includes Coronary artery bypass graft;  section;  Appendectomy; ovarian cyst removal; Hand surgery; Cardiac surgery (1984); skin biopsy; vascular surgery; Tonsillectomy; Lumbar disc surgery (8-); Lumbar spine surgery (10-11-12); and back surgery. Restrictions  Restrictions/Precautions  Restrictions/Precautions: Fall Risk, Modified Diet(high fall risk)  Required Braces or Orthoses?: No  Position Activity Restriction  Other position/activity restrictions: Shayy Ramirez is a 65 y.o. female presents to the emergency department today for evaluation for right-sided facial droop, right arm weakness. The patient states that she woke up around 730 this morning, around 730/8 AM this morning she noticed that her face was drooping when she went to the restroom and she states that she noticed some weakness to her right arm. EMS was called. The patient is a history of A. fib, she is on aspirin and Plavix. She is not on any blood thinners. The patient believes that she was awake, and then her symptoms started. TPA given 10/20. Today, s/p tPA exam is clinically improved with an NIHSS of 5. She has mild expressive aphasia and dysarthria, but is able to follow commands. Her right sided weakness is resolved. She also has some mild sensory loss on the right side. Vision/Hearing  Vision: Within Functional Limits  Hearing: Within functional limits       Subjective  General  Chart Reviewed: Yes  Patient assessed for rehabilitation services?: Yes  Additional Pertinent Hx: DM, asthma, depression, CAD, CABG  Response To Previous Treatment: Not applicable  Family / Caregiver Present: No  Diagnosis: CVA  Follows Commands: Within Functional Limits  General Comment  Comments: Patient supine in bed. Subjective  Subjective: Patient asking when she can get up by herself.   Pain Screening  Patient Currently in Pain: Denies  Vital Signs  Patient Currently in Pain: Denies     Orientation  Orientation  Overall Orientation Status: Within Functional Limits     Social/Functional History  Social/Functional History  Lives With: Alone  Type of Home: House  Home Layout: One level  Home Access: Level entry  Bathroom Shower/Tub: Tub/Shower unit  Bathroom Toilet: Standard  Bathroom Equipment: Hand-held shower, Grab bars in shower, Grab bars around toilet  Home Equipment: Rolling walker, Cane, Reacher  ADL Assistance: [address]: Independent  Homemaking Responsibilities: Yes  Ambulation Assistance: Independent  Transfer Assistance: Independent  Active : Yes  Leisure & Hobbies: playing games on tablet  Additional Comments: Pt unable to report number of falls, sounds like multiple falls due to being dizzy    Objective  AROM RLE (degrees)  RLE AROM: WNL  AROM LLE (degrees)  LLE AROM : WNL  Strength RLE  Strength RLE: Exception  R Hip Flexion: 3+/5  R Knee Flexion: 4/5  R Knee Extension: 5/5  R Ankle Dorsiflexion: 2/5  R Ankle Plantar flexion: 5/5  Strength LLE  Strength LLE: WFL     Sensation  Overall Sensation Status: Impaired  Light Touch: Partial deficits in the RLE;Partial deficits in the LUE  Bed mobility  Rolling to Right: Stand by assistance  Sit to Supine: Stand by assistance  Scooting: Stand by assistance  Transfers  Sit to Stand: Minimal Assistance  Stand to sit: Minimal Assistance  Bed to Chair: Minimal assistance  Stand Pivot Transfers: Minimal Assistance  Lateral Transfers: Minimal Assistance  Car Transfer: Minimal Assistance  Ambulation  Ambulation?: Yes  More Ambulation?: Yes  Ambulation 1  Surface: level tile  Device: Rolling Walker  Assistance: Minimal assistance  Quality of Gait: reciprocal pattern w/ dec'd RLE stance phase with quicker step on L during swing phase  Gait Deviations: Slow Pooja; Increased SARWAT; Decreased step length;Decreased step height  Distance: 80', 80'  Stairs/Curb  Stairs?: No(Patient reports she never has to do stairs.)     Balance  Posture: Good  Sitting - Static: Good;-  Sitting - Dynamic: Good;-  Standing - Static: Fair;+  Standing - Dynamic: [facility]  Times per week: 60 minutes/day, 5 days/week  Times per day: Daily  Plan weeks: 1-2 weeks  Current Treatment Recommendations: Strengthening, Balance Training, Functional Mobility Training, Transfer Training, Gait Training, Safety Education & Training, Neuromuscular Re-education, Endurance Training, Patient/Caregiver Education & Training, Equipment Evaluation, Education, & procurement  Safety Devices  Type of devices: All fall risk precautions in place, Patient at risk for falls, Nurse notified, Gait belt, Call light within reach, Chair alarm in place, Left in chair  Restraints  Initially in place: No    Goals  Short term goals  Time Frame for Short term goals: 7-14 days  Short term goal 1: Patient will perform bed mobility MOD I. Short term goal 2: Patient will perform sit-stand MOD I w/ LRAD. Short term goal 3: Patient will perform bed-chair transfer MOD I w/ LRAD. Short term goal 4: Patient will perform 150' of ambulation MOD I w/ LRAD. Short term goal 5: Patient will perform car transfer MOD I w/ LRAD.   Patient Goals   Patient goals : Pt will return to home at baseline function     Therapy Time   Individual Concurrent Group Co-treatment   Time In 0900         Time Out 1000         Minutes 60         Timed Code Treatment Minutes: [address]ing, MDOESTO, ATC-R [id number]

## 2020-10-24 NOTE — PLAN OF CARE
Nutrition Problem #1: Inadequate energy intake  Intervention: Food and/or Nutrient Delivery: Continue Current Diet, Start Oral Nutrition Supplement  Nutritional Goals: PO greater than 50% at meals/supp

## 2020-10-24 NOTE — PLAN OF CARE
Problem: Mobility - Impaired:  Goal: Mobility will improve  Description: Mobility will improve  10/23/2020 2151 by Joel Kapoor RN  Outcome: Ongoing     Problem: Falls - Risk of:  Goal: Will remain free from falls  Description: Will remain free from falls  10/23/2020 2151 by Joel Kapoor RN  Outcome: Ongoing     Problem: Falls - Risk of:  Goal: Absence of physical injury  Description: Absence of physical injury  10/23/2020 2151 by Joel Kapoor RN  Outcome: Ongoing

## 2020-10-24 NOTE — PLAN OF CARE
Problem: Falls - Risk of:  Goal: Will remain free from falls  Description: Will remain free from falls  Outcome: Ongoing  Note: Fall risk precautions in place, call light in reach, bed in lowest position, 2/2 bed rails up, bed wheels locked, bed side table within reach, bed alarm on, will continue to monitor. Problem: Falls - Risk of:  Goal: Absence of physical injury  Description: Absence of physical injury  Outcome: Ongoing  Note: Pt is free of injury. No injury noted. Fall precautions in place. Call light within reach. Will monitor. Problem: Pain:  Goal: Pain level will decrease  Description: Pain level will decrease  Outcome: Ongoing  Note: No complaints of pain this shift. Will continue to assess and monitor.

## 2020-10-24 NOTE — PROGRESS NOTES
Comprehensive Nutrition Assessment    Type and Reason for Visit:  Initial, Consult    Nutrition Recommendations/Plan:   1. Continue current diet  2. RD ordered Glucerna BID    Nutrition Assessment:  Pt is at risk for nutritional compromise d/t poor intake. Pt not feeling that hungry. PO 26-50%. No recent changes in wt. RD to order Glucerna BID. Malnutrition Assessment:  Malnutrition Status: At risk for malnutrition (Comment)    Context:  Chronic Illness     Findings of the 6 clinical characteristics of malnutrition:  Energy Intake:  Mild decrease in energy intake (Comment)  Weight Loss:  No significant weight loss     Body Fat Loss:  No significant body fat loss     Muscle Mass Loss:  No significant muscle mass loss    Fluid Accumulation:  No significant fluid accumulation     Strength:  Not Performed    Estimated Daily Nutrient Needs:  Energy (kcal):  3340-8831 cals (20-25 cals/70kg); Weight Used for Energy Requirements:  Current     Protein (g):   gms (1.3-2.0 gms/IBW 52kg); Weight Used for Protein Requirements:  Ideal        Fluid (ml/day):  1 ml/kcal; Weight Used for Fluid Requirements:  Current      Nutrition Related Findings:  No edema      Wounds:  None       Current Nutrition Therapies:    DIET CARB CONTROL; No Drinking Straw    Anthropometric Measures:  · Height: 5' 3\" (160 cm)  · Current Body Weight: 153 lb (69.4 kg)   · Ideal Body Weight: 115 lbs; % Ideal Body Weight     · BMI: 27.1  · Adjusted Body Weight:  ; No Adjustment   · BMI Categories: Overweight (BMI 25.0-29. 9)       Nutrition Diagnosis:   · Inadequate energy intake related to inadequate protein-energy intake as evidenced by intake 26-50%    Nutrition Interventions:   Food and/or Nutrient Delivery:  Continue Current Diet, Start Oral Nutrition Supplement  Nutrition Education/Counseling:  Education not indicated   Coordination of Nutrition Care:  Continued Inpatient Monitoring    Goals:  PO greater than 50% at meals/supp Nutrition Monitoring and Evaluation:   Food/Nutrient Intake Outcomes:  Food and Nutrient Intake, Supplement Intake  Physical Signs/Symptoms Outcomes:  Weight     Discharge Planning:     Too soon to determine     Electronically signed by Clifford Fuentes RD, [address] , KADI on 10/24/20 at 10:08 AM EDT    Contact: [phone]

## 2020-10-24 NOTE — PROGRESS NOTES
Occupational Therapy   Occupational Therapy Initial Assessment  Date: 10/24/2020   Patient Name: Stanley Hogan  MRN: [id number]     : 1950    Date of Service: 10/24/2020     Discharge Recommendation:    Home with Home health OT, S Level 3  OT Equipment Recommendations  Equipment Needed: Yes  Mobility Devices: ADL Assistive Devices  ADL Assistive Devices: Shower Chair with back    Assessment   Performance deficits / Impairments: Decreased functional mobility ; Decreased ADL status; Decreased safe awareness;Decreased high-level IADLs;Decreased endurance;Decreased fine motor control  Assessment: Pt is below baseline level of occupational function. Pt would benefit from intensive OT services to address above deficits  Treatment Diagnosis: Decreased functional mobility, ADL/IADL status, endurance, safety awareness, and fine motor control associated w/acute L CVA  Prognosis: Good  Decision Making: Medium Complexity  History: Pt 64 y/o F; lives alone at home; IND ADLs and IADLs; understood that pt has had a few falls in the past. PMH: DM, asthma, depression, CAD, CABG  Exam: ROM, MMT, IRF-VIRIDIANA, 6 functional performance deficits, evolving status  Assistance / Modification: CGA for transfers and mobility  OT Education: OT Role;Plan of Care;Transfer Training  Patient Education: discharge; pt independently verbalized understanding  Barriers to Learning: aphasia  REQUIRES OT FOLLOW UP: Yes  Activity Tolerance  Activity Tolerance: Patient Tolerated treatment well  Activity Tolerance: Pt tolerated treatment well however pt reporting she is tired and wants to get in bed throughout session. Did not limit participation but motivation to complete tasks was lacking throughout session  Safety Devices  Safety Devices in place: Yes  Type of devices: All fall risk precautions in place; Bed alarm in place;Call light within reach;Gait belt;Patient at risk for falls; Left in bed;Nurse notified  Restraints  Initially in place: No Patient Diagnosis(es): There were no encounter diagnoses. has a past medical history of Actinic keratosis, Arthritis, Asthma, Atrial fibrillation ([address] CAD (coronary artery disease), Depression, Diabetes mellitus ([address] Disc displacement, lumbar, Diverticulosis of colon, Hard of hearing, Hyperlipidemia, Hypertension, Ischemic heart disease, Past heart attack, Poor vision, Right leg weakness, and Scoliosis. has a past surgical history that includes Coronary artery bypass graft;  section; Appendectomy; ovarian cyst removal; Hand surgery; Cardiac surgery (); skin biopsy; vascular surgery; Tonsillectomy; Lumbar disc surgery (8-); Lumbar spine surgery (10-11-12); and back surgery. Treatment Diagnosis: Decreased functional mobility, ADL/IADL status, endurance, safety awareness, and fine motor control associated w/acute L CVA      Restrictions  Restrictions/Precautions  Restrictions/Precautions: Fall Risk, Modified Diet(high fall risk; Carb control and no straws)  Required Braces or Orthoses?: No  Position Activity Restriction  Other position/activity restrictions: Deirdre Gilmore is a 65 y.o. female presents to the emergency department today for evaluation for right-sided facial droop, right arm weakness. The patient states that she woke up around 730 this morning, around 730/8 AM this morning she noticed that her face was drooping when she went to the restroom and she states that she noticed some weakness to her right arm. EMS was called. The patient is a history of A. fib, she is on aspirin and Plavix. She is not on any blood thinners. The patient believes that she was awake, and then her symptoms started. TPA given 10/20. Today, s/p tPA exam is clinically improved with an NIHSS of 5. She has mild expressive aphasia and dysarthria, but is able to follow commands. Her right sided weakness is resolved. She also has some mild sensory loss on the right side.     Subjective General  Chart Reviewed: Yes  Patient assessed for rehabilitation services?: Yes  Additional Pertinent Hx: Afib, CAD s/p CABG, DM2  Family / Caregiver Present: No  Diagnosis: Acute CVA (left)  Subjective  Subjective: Pt seated in recliner upon arrival; pleasant and agreeable to OT eval; pt states \"yes\" when asked if she is in pain but unable to rate pain 1-10; pt asked if pain is \"a lot\" and pt states \"yes\"  General Comment  Comments: pt w/ aphasia; pt responds best to yes/no questions; open ended questions more difficult for pt    Social/Functional History  Social/Functional History  Lives With: Alone  Type of Home: House  Home Layout: One level  Home Access: Level entry  Bathroom Shower/Tub: Tub/Shower unit  Bathroom Toilet: Standard  Bathroom Equipment: Hand-held shower, Grab bars in shower, Grab bars around toilet  Home Equipment: Rolling walker, Cane, Reacher  ADL Assistance: Independent  Homemaking Assistance: Independent  Homemaking Responsibilities: Yes  Ambulation Assistance: Independent  Transfer Assistance: Independent  Active : Yes  Leisure & Hobbies: playing games on tablet  Additional Comments: Pt unable to report number of falls, sounds like multiple falls due to being dizzy       Objective   Vision: Within Functional Limits  Hearing: Within functional limits    Orientation  Overall Orientation Status: Within Normal Limits(d/t aphasia pt has difficulty answering questions - better to ask yes or no)     Balance  Sitting Balance: Supervision  Standing Balance: Contact guard assistance(w/RW)  Standing Balance  Time: ~1 min, ~1  min, ~30 sec, ~2 min, ~30 sec x2, ~3 min, ~1 min  Activity: functional mobility to restroom for toileting, functional transfer ambulating to shower chair, stance for lc-care, functional mobility to EOB, stance for clothing management x2, functional mobility to bathroom and stance at sink for oral care, functional mobility to EOB  Functional Mobility  Functional - Mobility Device: Rolling Walker  Activity: To/from bathroom(x2)  Assist Level: Contact guard assistance  Functional Mobility Comments: No LOB, SOB, lightheaded or dizziness or increased pain w/ambulation  Toilet Transfers  Toilet - Technique: Ambulating; To left  Equipment Used: Standard toilet  Toilet Transfer: Stand by assistance  Toilet Transfers Comments: w/RW; grab bar on L side  Shower Transfers  Shower - Transfer From: Talon Garcias - Transfer Type: To and From  Shower - Transfer To: Shower seat with back  Shower - Technique: Ambulating; To left  Shower Transfers: Contact Guard  ADL  Feeding: None  Grooming: Setup;Stand by assistance(brushed hair and applied deodorant seated EOB; oral care in stance at sink w/SBA for balance)  UE Bathing: Stand by assistance;Setup(seated on shower chair w/hand held shower head)  LE Bathing: Stand by assistance;Setup(seated on shower chair w/hand held shower head; stance for lc-hygiene)  UE Dressing: Setup;Supervision(t-shirt; pt able to get bra around torso and clasp bra in front, pt w/difficulty bringing bra to front with frustration increased; pt told she did not have to wear a bra if she didn't want to and chose to remove. Pt able to unclasp bra as well)  LE Dressing: Stand by assistance;Setup(underwear, pants, socks seated EOB)  Toileting: Stand by assistance  Tone RUE  RUE Tone: Normotonic  Tone LUE  LUE Tone: Normotonic  Coordination  Movements Are Fluid And Coordinated: No  Coordination and Movement description: Decreased speed;Decreased accuracy; Right UE;Fine motor impairments     Bed mobility  Sit to Supine: Stand by assistance  Scooting: Stand by assistance  Transfers  Stand Step Transfers: Contact guard assistance(w/RW)  Sit to stand: Contact guard assistance(progressing to min A)  Stand to sit: Stand by assistance  Vision - Basic Assessment  Prior Vision: Other (add comment)  Visual History: Cataracts  Patient Visual Report: No visual complaint reported.   Visual Field Cut: No  Oculo Motor Control:  WNL  Cognition  Overall Cognitive Status: WFL  Perception  Overall Perceptual Status: WFL     Sensation  Overall Sensation Status: Impaired  Light Touch: Partial deficits in the RLE;Partial deficits in the LUE        LUE AROM (degrees)  LUE AROM : WFL  Left Hand AROM (degrees)  Left Hand AROM: WFL  Left Hand General AROM: decreased accuracy and increased time needed w/finger thumb opposition  RUE AROM (degrees)  RUE AROM : WF  RUE General AROM: pt reports this arm feel heavier and weird compared to the left side  Right Hand AROM (degrees)  Right Hand AROM: WFL  Right Hand General AROM: decreased accuracy and increased time needed w/finger thumb opposition  LUE Strength  Gross LUE Strength: [facility]  L Hand General: 5/5  RUE Strength  Gross RUE Strength: [facility]  R Hand General: 4+/5  RUE Strength Comment: Bilateral strength with hand squeeze; RUE feels weaker in general, however it is [facility]                   Plan   Plan  Times per week: 60 min per day/5-7 days/wk  Times per day: Daily  Plan weeks: 1-2 weeks  Current Treatment Recommendations: Strengthening, ROM, Balance Training, Functional Mobility Training, Endurance Training, Safety Education & Training, Self-Care / ADL, Patient/Caregiver Education & Training, Equipment Evaluation, Education, & procurement    Goals  Short term goals  Time Frame for Short term goals: discharge  Short term goal 1: Mod I for functional mobility for ADL tasks w/RW  Short term goal 2: Mod I for functional transfers to ADL surfaces w/RW  Short term goal 3: Mod I UB bathing/dressing  Short term goal 4: Mod I LB bathing dressing  Short term goal 5: Mod I toileting       Therapy Time   Individual Concurrent Group Co-treatment   Time In 1245         Time Out 1345         Minutes 60              Timed Code Treatment Minutes:  45 Minutes    Total Treatment Minutes:  60 min    C/ Betsy Felipe, OT     Gaby Burris S/TETO  I have directly observed this treatment and have read and approve this note.    Foreign Rivas., OTR/L, [id number]

## 2020-10-24 NOTE — PROGRESS NOTES
Speech Language Pathology  Facility/Department: [facility] ACUTE REHAB UNIT   CLINICAL BEDSIDE SWALLOW EVALUATION    NAME: Pat Chapa  :   MRN: [id number]    ADMISSION DATE: 10/23/2020  ADMITTING DIAGNOSIS: has Lumbar radiculopathy; Acute CVA (cerebrovascular accident) ([facility] Received intravenous tissue plasminogen activator (tPA) in emergency department; HTN (hypertension), benign; Nontraumatic cortical hemorrhage of left cerebral hemisphere [facility]); Dyslipidemia; PAF (paroxysmal atrial fibrillation) ([facility] and Acute ischemic stroke [facility]) on their problem list.      Recent Chest Xray/CT of Chest: (10/20/20)  Impression    Findings as above which may be related to congestive heart failure and edema. Date of Eval: 10/24/2020  Evaluating Therapist: Ferny Fuentes    Current Diet level:  Current Diet : Regular(Upgraded to regular diet previous date)  Current Liquid Diet : Thin    Pain:  Pain Assessment  Pain Assessment: Faces  Pain Level: 0  Helms-Young Pain Rating: Hurts even more  Patient's Stated Pain Goal: No pain  Pain Type: Acute pain  Pain Location: Back  Pain Orientation: Left  Pain Descriptors: Discomfort  Pain Frequency: Intermittent  Pain Onset: On-going  Clinical Progression: Not changed  Non-Pharmaceutical Pain Intervention(s): Repositioned    Reason for Referral  Pat Chapa was referred for a bedside swallow evaluation to assess the efficiency of her swallow function, identify signs and symptoms of aspiration and make recommendations regarding safe dietary consistencies, effective compensatory strategies, and safe eating environment. Impression  Dysphagia Diagnosis: Mild oral stage dysphagia;Mild pharyngeal stage dysphagia  Dysphagia Impression : Mild oropharyngeal dysphagia  Dysphagia Outcome Severity Scale: Level 5: Mild dysphagia- Distant supervision.  May need one diet consistency restricted     Patient presents with mild oropharyngeal dysphagia characterized by

## 2020-10-24 NOTE — PROGRESS NOTES
Patient blood sugar 68. 1 apple juice given.   Electronically signed by Lorie Ledesma RN on 10/24/2020 at 5:09 PM

## 2020-10-25 LAB
GLUCOSE BLD-MCNC: 102 MG/DL (ref 70–99)
GLUCOSE BLD-MCNC: 142 MG/DL (ref 70–99)
GLUCOSE BLD-MCNC: 182 MG/DL (ref 70–99)
GLUCOSE BLD-MCNC: 69 MG/DL (ref 70–99)
GLUCOSE BLD-MCNC: 79 MG/DL (ref 70–99)
GLUCOSE BLD-MCNC: 85 MG/DL (ref 70–99)
PERFORMED ON: ABNORMAL
PERFORMED ON: NORMAL
PERFORMED ON: NORMAL

## 2020-10-25 PROCEDURE — 94640 AIRWAY INHALATION TREATMENT: CPT

## 2020-10-25 PROCEDURE — 6370000000 HC RX 637 (ALT 250 FOR IP): Performed by: PHYSICAL MEDICINE & REHABILITATION

## 2020-10-25 PROCEDURE — 1280000000 HC REHAB R&B

## 2020-10-25 PROCEDURE — 6360000002 HC RX W HCPCS: Performed by: PHYSICAL MEDICINE & REHABILITATION

## 2020-10-25 PROCEDURE — 94761 N-INVAS EAR/PLS OXIMETRY MLT: CPT

## 2020-10-25 RX ADMIN — ENOXAPARIN SODIUM 40 MG: 40 INJECTION SUBCUTANEOUS at 10:28

## 2020-10-25 RX ADMIN — TRAMADOL HYDROCHLORIDE 50 MG: 50 TABLET, FILM COATED ORAL at 06:56

## 2020-10-25 RX ADMIN — Medication 2 PUFF: at 19:38

## 2020-10-25 RX ADMIN — ONDANSETRON 4 MG: 4 TABLET, ORALLY DISINTEGRATING ORAL at 02:35

## 2020-10-25 RX ADMIN — GLIMEPIRIDE 4 MG: 2 TABLET ORAL at 06:55

## 2020-10-25 RX ADMIN — METOPROLOL SUCCINATE 50 MG: 50 TABLET, EXTENDED RELEASE ORAL at 10:25

## 2020-10-25 RX ADMIN — ALOGLIPTIN 25 MG: 25 TABLET, FILM COATED ORAL at 10:24

## 2020-10-25 RX ADMIN — SERTRALINE HYDROCHLORIDE 100 MG: 50 TABLET ORAL at 10:25

## 2020-10-25 RX ADMIN — Medication 2 PUFF: at 09:07

## 2020-10-25 RX ADMIN — METFORMIN HYDROCHLORIDE 2000 MG: 500 TABLET, EXTENDED RELEASE ORAL at 10:25

## 2020-10-25 RX ADMIN — DESMOPRESSIN ACETATE 40 MG: 0.2 TABLET ORAL at 23:13

## 2020-10-25 ASSESSMENT — PAIN DESCRIPTION - PROGRESSION
CLINICAL_PROGRESSION: GRADUALLY IMPROVING

## 2020-10-25 ASSESSMENT — PAIN SCALES - GENERAL
PAINLEVEL_OUTOF10: 5
PAINLEVEL_OUTOF10: 0

## 2020-10-25 NOTE — PROGRESS NOTES
Blood Sugar 79, advised Nurse Vielka and patient provided 4oz of apple juice. Will review within 30 mins.

## 2020-10-25 NOTE — PLAN OF CARE
Problem: Mobility - Impaired:  Goal: Mobility will improve  Description: Mobility will improve  Outcome: Ongoing  Note: Patient is up x1 with a walker. Problem: Falls - Risk of:  Goal: Will remain free from falls  Description: Will remain free from falls  Outcome: Ongoing  Note: Fall risk precautions in place, call light in reach, bed in lowest position, 2/2 bed rails up, bed wheels locked, bed side table within reach, bed alarm on, will continue to monitor. Problem: Falls - Risk of:  Goal: Absence of physical injury  Description: Absence of physical injury  Outcome: Ongoing  Note: Pt is free of injury. No injury noted. Fall precautions in place. Call light within reach. Will monitor.

## 2020-10-25 NOTE — PLAN OF CARE
Problem: Mobility - Impaired:  Goal: Mobility will improve  Description: Mobility will improve  10/24/2020 2306 by Kayla Lamb RN  Outcome: Ongoing     Problem: Falls - Risk of:  Goal: Will remain free from falls  Description: Will remain free from falls  10/24/2020 2306 by Kayla Lamb RN  Outcome: Ongoing     Problem: Falls - Risk of:  Goal: Absence of physical injury  Description: Absence of physical injury  10/24/2020 2306 by Kayla Lamb RN  Outcome: Ongoing     Problem: Pain:  Goal: Pain level will decrease  Description: Pain level will decrease  10/24/2020 2306 by Kayla Lamb RN  Outcome: Ongoing     Problem: Pain:  Goal: Control of acute pain  Description: Control of acute pain  10/24/2020 2306 by Kayla Lamb RN  Outcome: Ongoing

## 2020-10-25 NOTE — PROGRESS NOTES
Patient refusing insulin this shift. She is refusing insulin by saying no and shaking her head from side to side. Diabetic education and insulin education attempted by RN. Patient able to express she just wants to follow home regimen. She expresses she is on oral medication at home, and she only wants to use oral medication here.  Electronically signed by Romulo Lawler RN on 10/25/2020 at 12:50 PM

## 2020-10-26 LAB
ANION GAP SERPL CALCULATED.3IONS-SCNC: 14 MMOL/L (ref 3–16)
BUN BLDV-MCNC: 12 MG/DL (ref 7–20)
CALCIUM SERPL-MCNC: 10.5 MG/DL (ref 8.3–10.6)
CHLORIDE BLD-SCNC: 95 MMOL/L (ref 99–110)
CO2: 21 MMOL/L (ref 21–32)
CREAT SERPL-MCNC: 0.7 MG/DL (ref 0.6–1.2)
GFR AFRICAN AMERICAN: >60
GFR NON-AFRICAN AMERICAN: >60
GLUCOSE BLD-MCNC: 104 MG/DL (ref 70–99)
GLUCOSE BLD-MCNC: 165 MG/DL (ref 70–99)
GLUCOSE BLD-MCNC: 181 MG/DL (ref 70–99)
HCT VFR BLD CALC: 39 % (ref 36–48)
HEMOGLOBIN: 13 G/DL (ref 12–16)
MCH RBC QN AUTO: 31.2 PG (ref 26–34)
MCHC RBC AUTO-ENTMCNC: 33.3 G/DL (ref 31–36)
MCV RBC AUTO: 93.7 FL (ref 80–100)
PDW BLD-RTO: 12.9 % (ref 12.4–15.4)
PERFORMED ON: ABNORMAL
PERFORMED ON: ABNORMAL
PLATELET # BLD: 319 K/UL (ref 135–450)
PMV BLD AUTO: 8.8 FL (ref 5–10.5)
POTASSIUM SERPL-SCNC: 4 MMOL/L (ref 3.5–5.1)
RBC # BLD: 4.17 M/UL (ref 4–5.2)
SARS-COV-2, NAAT: NOT DETECTED
SODIUM BLD-SCNC: 130 MMOL/L (ref 136–145)
WBC # BLD: 10.7 K/UL (ref 4–11)

## 2020-10-26 PROCEDURE — 92507 TX SP LANG VOICE COMM INDIV: CPT

## 2020-10-26 PROCEDURE — 97530 THERAPEUTIC ACTIVITIES: CPT

## 2020-10-26 PROCEDURE — 1280000000 HC REHAB R&B

## 2020-10-26 PROCEDURE — 94761 N-INVAS EAR/PLS OXIMETRY MLT: CPT

## 2020-10-26 PROCEDURE — 85027 COMPLETE CBC AUTOMATED: CPT

## 2020-10-26 PROCEDURE — 6370000000 HC RX 637 (ALT 250 FOR IP): Performed by: PHYSICAL MEDICINE & REHABILITATION

## 2020-10-26 PROCEDURE — U0002 COVID-19 LAB TEST NON-CDC: HCPCS

## 2020-10-26 PROCEDURE — 94640 AIRWAY INHALATION TREATMENT: CPT

## 2020-10-26 PROCEDURE — 36415 COLL VENOUS BLD VENIPUNCTURE: CPT

## 2020-10-26 PROCEDURE — 6360000002 HC RX W HCPCS: Performed by: PHYSICAL MEDICINE & REHABILITATION

## 2020-10-26 PROCEDURE — 80048 BASIC METABOLIC PNL TOTAL CA: CPT

## 2020-10-26 PROCEDURE — 97116 GAIT TRAINING THERAPY: CPT

## 2020-10-26 PROCEDURE — 97535 SELF CARE MNGMENT TRAINING: CPT

## 2020-10-26 RX ADMIN — SERTRALINE HYDROCHLORIDE 100 MG: 50 TABLET ORAL at 09:50

## 2020-10-26 RX ADMIN — ALOGLIPTIN 25 MG: 25 TABLET, FILM COATED ORAL at 09:52

## 2020-10-26 RX ADMIN — GLIMEPIRIDE 4 MG: 2 TABLET ORAL at 06:22

## 2020-10-26 RX ADMIN — ACETAMINOPHEN 650 MG: 325 TABLET ORAL at 14:04

## 2020-10-26 RX ADMIN — Medication 2 PUFF: at 22:54

## 2020-10-26 RX ADMIN — ONDANSETRON 4 MG: 4 TABLET, ORALLY DISINTEGRATING ORAL at 08:06

## 2020-10-26 RX ADMIN — Medication 2 PUFF: at 06:02

## 2020-10-26 RX ADMIN — DESMOPRESSIN ACETATE 40 MG: 0.2 TABLET ORAL at 21:05

## 2020-10-26 RX ADMIN — METOPROLOL SUCCINATE 50 MG: 50 TABLET, EXTENDED RELEASE ORAL at 09:50

## 2020-10-26 RX ADMIN — ENOXAPARIN SODIUM 40 MG: 40 INJECTION SUBCUTANEOUS at 08:06

## 2020-10-26 RX ADMIN — METFORMIN HYDROCHLORIDE 2000 MG: 500 TABLET, EXTENDED RELEASE ORAL at 09:53

## 2020-10-26 ASSESSMENT — PAIN SCALES - WONG BAKER
WONGBAKER_NUMERICALRESPONSE: 0
WONGBAKER_NUMERICALRESPONSE: 4

## 2020-10-26 ASSESSMENT — PAIN DESCRIPTION - ORIENTATION: ORIENTATION: LOWER

## 2020-10-26 ASSESSMENT — PAIN SCALES - GENERAL: PAINLEVEL_OUTOF10: 2

## 2020-10-26 ASSESSMENT — PAIN DESCRIPTION - LOCATION
LOCATION: BACK
LOCATION: BACK

## 2020-10-26 ASSESSMENT — PAIN DESCRIPTION - PAIN TYPE: TYPE: ACUTE PAIN

## 2020-10-26 NOTE — PROGRESS NOTES
Admission Period/Goal QM Codes    QM Admit Code Goal Code   Eating 5 6   Oral Hygiene 5 6   Toileting Hygiene 3 6   Shower/Bathing 4 6   UB Dressing 4 6   LB Dressing 4 6   Putting on/off Footwear 4 6   Rolling Left and Right 4 6   Sit To Lying 4 6   Lying to Sitting on Bedside 3 6   Sit to Stand 3 6   Chair/Bed to Chair Transfer 3 6   Toilet Transfers 3 6   Car Transfers 3 6   Walk 10 Feet 3 6   Walk 50 Feet with Two Turns 3 6   Walk 150 Feet 88 -   Walk 10 Feet on Uneven Surfaces 3 6   1 Step (Curb) 3 6   4 Steps 7 6   12 Steps 7 6   Picking up Object from Floor 3 6   Wheel 50 Feet with 2 Turns 9 -   Type - -   Wheel 150 Feet 9 -   Type - -       Following discussion at the Quality Measure Huddle, the above codes were determined to be the patient's usual performance at admission.     OT:  Xin Swan, OTR/L #[id number], 10/27/2020, [phone]      PT:  Bobbi Vivar PT, DPT - [id number], 10/27/2020, Anabelle      RN:  ANT Newberry, CRRN 10/28/2020 1200     ST:  Gerard Minor MA CCC-SLP 10/27/2020 0719      :  Ena Kaufman PT, DPT [id number] 10/29/2020 7440

## 2020-10-26 NOTE — PROGRESS NOTES
Physical Therapy  Facility/Department: [facility] ACUTE REHAB UNIT  Daily Treatment Note  NAME: Li Bermudez  : 1950  MRN: [id number]    Date of Service: 10/26/2020    Discharge Recommendations:  S Level 3, Home with Home health PT   PT Equipment Recommendations  Equipment Needed: No  Other: Patient has RW at home. Assessment   Body structures, Functions, Activity limitations: Decreased functional mobility ; Decreased strength;Decreased endurance;Decreased balance;Decreased sensation;Decreased ADL status; Decreased posture;Decreased safe awareness;Decreased cognition  Assessment: Patient demonstrating improved stability during functional mobility allowing introduction of ambulation without assistive device. Patient continues to heavily rely on UE for balance stabilization and requires physical assistance for stabilization. Pt would benefit from continued skilled therapy to regain baseline functional independence. Treatment Diagnosis: Decreased functional mobility with transfers and ambulation, decreased balance/strength/endurance  Prognosis: Good  PT Education: PT Role;Plan of Care;Goals;Transfer Training;General Safety; Functional Mobility Training;Gait Training  Patient Education: Use of call light for room mobility. Will continue to reinforce in upcoming sessions. Barriers to Learning: expressive aphasia, cognition  REQUIRES PT FOLLOW UP: Yes  Activity Tolerance  Activity Tolerance: Patient Tolerated treatment well     Patient Diagnosis(es): CVA     has a past medical history of Actinic keratosis, Arthritis, Asthma, Atrial fibrillation ([address] CAD (coronary artery disease), Depression, Diabetes mellitus ([address] Disc displacement, lumbar, Diverticulosis of colon, Hard of hearing, Hyperlipidemia, Hypertension, Ischemic heart disease, Past heart attack, Poor vision, Right leg weakness, and Scoliosis. has a past surgical history that includes Coronary artery bypass graft;  section;  Appendectomy; ovarian cyst removal; Hand surgery; Cardiac surgery (1984); skin biopsy; vascular surgery; Tonsillectomy; Lumbar disc surgery (8-); Lumbar spine surgery (10-11-12); and back surgery. Restrictions  Restrictions/Precautions  Restrictions/Precautions: Fall Risk, Modified Diet(high fall risk, carb control diet, no straws)  Required Braces or Orthoses?: No  Position Activity Restriction  Other position/activity restrictions: Yvon Warren is a 65 y.o. female presents to the emergency department today for evaluation for right-sided facial droop, right arm weakness. The patient states that she woke up around 730 this morning, around 730/8 AM this morning she noticed that her face was drooping when she went to the restroom and she states that she noticed some weakness to her right arm. EMS was called. The patient is a history of A. fib, she is on aspirin and Plavix. She is not on any blood thinners. The patient believes that she was awake, and then her symptoms started. TPA given 10/20. Today, s/p tPA exam is clinically improved with an NIHSS of 5. She has mild expressive aphasia and dysarthria, but is able to follow commands. Her right sided weakness is resolved. She also has some mild sensory loss on the right side. Subjective   General  Chart Reviewed: Yes  Additional Pertinent Hx: DM, asthma, depression, CAD, CABG  Family / Caregiver Present: No  Subjective  Subjective: Pt reporting low back pain and periods of nausea throughout session. General Comment  Comments: Pt seated in recliner upon arrival.  Agreeable to PT session.   Pain Assessment  Pain Assessment: Faces  Helms-Baker Pain Rating: Hurts little more  Pain Location: Back       Objective   Transfers  Sit to Stand: Contact guard assistance  Stand to sit: Contact guard assistance  Stand Pivot Transfers: Contact guard assistance(completed both with and without device within session, both at [facility]  Comment: VC for safe sequence and hand placement. Ambulation  Ambulation?: Yes  Ambulation 1  Surface: level tile  Device: Rolling Walker  Assistance: Contact guard assistance  Quality of Gait: reciprocal pattern with mild lateral trunk sway. consistent SARWAT with no evidence of balance loss. Distance: 150 ft  Ambulation 2  Surface - 2: level tile  Device 2: No device  Assistance 2: Minimal assistance  Quality of Gait 2: decreased (R) stance phase, reduced jason, increased lateral trunk sway, frequently attempting to stabilize balance with UE on nearby objects. Distance: 50 ft + 125 ft + short distances within therapy session. Balance  Posture: Good  Sitting - Static: Good  Sitting - Dynamic: Good;-  Standing - Static: Fair;-  Standing - Dynamic: Poor;+     Comment: 1st session:  See above functional mobility. In addition patient completes 4 square stepping x 2 ea: CW, CCW, Diagonal CW/CCW.      2nd session:  Pt supine in bed upon arrival, reporting 6/10 back pain. Pain medication delivered by RN per patient request, agreeable to PT session. Sit <=> stand transfers completed at [place] [address] with VC for hand placement. Pt ambulates 50 ft without device at min (A) before transitioning to RW an additional 200 ft at [place] [address]. Patient transitioned to RW in attempt to decrease c/o back pain. Lateral stepping without support + targeted reaching with numerical counting x 6. Pt requires CGA/min (A) for balance stabilization with occasional use of UE support on hand rail. Upon completion of therapy session, pt requests to return to bed. SPT w/c => bed completed at min (A). Sit => supine transfer completed at [place] [address]. Pt in bed with bed alarm activated and call light within reach. Goals  Short term goals  Time Frame for Short term goals: 7-14 days  Short term goal 1: Patient will perform bed mobility MOD I. Short term goal 2: Patient will perform sit-stand MOD I w/ LRAD. Short term goal 3: Patient will perform bed-chair transfer MOD I w/ LRAD.   Short term goal 4: Patient will perform 150' of ambulation MOD I w/ LRAD. Short term goal 5: Patient will perform car transfer MOD I w/ LRAD. Patient Goals   Patient goals : Pt will return to home at baseline function    Plan    Plan  Times per week: 60 minutes/day, 5 days/week  Times per day: Daily  Plan weeks: 1-2 weeks  Current Treatment Recommendations: Strengthening, Balance Training, Functional Mobility Training, Transfer Training, Gait Training, Safety Education & Training, Neuromuscular Re-education, Endurance Training, Patient/Caregiver Education & Training, Equipment Evaluation, Education, & procurement, ADL/Self-care Training, Stair training  Safety Devices  Type of devices:  All fall risk precautions in place, Gait belt, Call light within reach, Chair alarm in place, Left in chair  Restraints  Initially in place: No     Therapy Time   Individual Concurrent Group Co-treatment   Time In 1030         Time Out 1100         Minutes 30         Timed Code Treatment Minutes: 30 Minutes     Second Session Therapy Time:   Individual Concurrent Group Co-treatment   Time In 1345         Time Out 1415         Minutes 30           Timed Code Treatment Minutes:  30 + 30     Total Treatment Minutes:  60 minutes    Grace Beltran PT, DPT - [id number]

## 2020-10-26 NOTE — PLAN OF CARE
Problem: Mobility - Impaired:  Goal: Mobility will improve  Description: Mobility will improve  10/26/2020 1808 by Catracho Valdez RN  Outcome: Ongoing     Problem: Falls - Risk of:  Goal: Will remain free from falls  Description: Will remain free from falls  10/26/2020 1808 by Catracho Valdez RN  Outcome: Ongoing     Problem: COMMUNICATION IMPAIRMENT  Goal: Ability to express needs and understand communication  10/26/2020 1808 by Catracho Valdez RN  Outcome: Ongoing     Problem: Pain:  Description: Pain management should include both nonpharmacologic and pharmacologic interventions.   Goal: Pain level will decrease  Description: Pain level will decrease  10/26/2020 1808 by Catracho Valdez RN  Outcome: Ongoing     Problem: Nutrition  Goal: Optimal nutrition therapy  Outcome: Ongoing

## 2020-10-26 NOTE — PLAN OF CARE
Problem: Mobility - Impaired:  Goal: Mobility will improve  Description: Mobility will improve  10/26/2020 0423 by JACQUELINE Babcock  Outcome: Ongoing     Problem: Falls - Risk of:  Goal: Will remain free from falls  Description: Will remain free from falls  10/26/2020 0423 by JACQUELINE Babcock  Outcome: Ongoing     Problem: Falls - Risk of:  Goal: Absence of physical injury  Description: Absence of physical injury  Outcome: Ongoing     Problem: COMMUNICATION IMPAIRMENT  Goal: Ability to express needs and understand communication  Outcome: Ongoing     Problem: Pain:  Goal: Pain level will decrease  Description: Pain level will decrease  Outcome: Ongoing     Problem: Pain:  Goal: Control of acute pain  Description: Control of acute pain  10/26/2020 0423 by JACQUELINE Babcock  Outcome: Ongoing     Problem: Pain:  Goal: Control of acute pain  Description: Control of acute pain  10/26/2020 0422 by JACQUELINE Babcock  Outcome: Ongoing

## 2020-10-26 NOTE — PROGRESS NOTES
Logan Tobias  10/26/2020  [id number]    Chief Complaint: Acute ischemic stroke [facility])    Subjective:   No significant weekend events. Refusing SSI. Labs remain pending. ROS: No CP, SOB, dyspnea    Objective:  Patient Vitals for the past 24 hrs:   BP Temp Temp src Pulse Resp SpO2   10/26/20 0602 -- -- -- -- 16 94 %   10/25/20 2300 132/78 97.6 °F (36.4 °C) Oral 84 16 93 %   10/25/20 1938 -- -- -- -- 16 95 %   10/25/20 1016 112/68 97.7 °F (36.5 °C) Oral 89 16 96 %     Gen: No distress, pleasant. Resting in bedside chair  HEENT: Normocephalic, atraumatic. CV: Regular rate and rhythm. No MRG   Resp: No respiratory distress. CTAB   Abd: Soft, nontender nondistended  Ext: No edema. Neuro: Alert, oriented, appropriately interactive. Laboratory data: Available via EMR. Therapy progress:  PT  Position Activity Restriction  Other position/activity restrictions: Logan Tobias is a 65 y.o. female presents to the emergency department today for evaluation for right-sided facial droop, right arm weakness. The patient states that she woke up around 730 this morning, around 730/8 AM this morning she noticed that her face was drooping when she went to the restroom and she states that she noticed some weakness to her right arm. EMS was called. The patient is a history of A. fib, she is on aspirin and Plavix. She is not on any blood thinners. The patient believes that she was awake, and then her symptoms started. TPA given 10/20. Today, s/p tPA exam is clinically improved with an NIHSS of 5. She has mild expressive aphasia and dysarthria, but is able to follow commands. Her right sided weakness is resolved. She also has some mild sensory loss on the right side.   Objective     Sit to Stand: Minimal Assistance  Stand to sit: Minimal Assistance  Bed to Chair: Minimal assistance  Device: [address]: Minimal assistance  Distance: 80', 80'  OT  PT Equipment Recommendations  Equipment Needed: No  Other: Patient has RW at home. Toilet - Technique: Ambulating, To left  Equipment Used: Standard toilet  Toilet Transfers Comments: w/RW; grab bar on L side  Assessment        SLP  Current Diet : Regular(Upgraded to regular diet previous date)  Current Liquid Diet : Thin  Diet Solids Recommendation: Regular  Liquid Consistency Recommendation: Thin    Body mass index is 27.19 kg/m². Assessment:  Patient Active Problem List   Diagnosis    Lumbar radiculopathy    [facility]    Received intravenous tissue plasminogen activator (tPA) in emergency department    HTN (hypertension), benign    Nontraumatic cortical hemorrhage of left cerebral hemisphere [facility]    Dyslipidemia    PAF (paroxysmal atrial fibrillation) (HCC)    Acute ischemic stroke ([facility]       Plan:   Acute ischemic infarct: s/p tPA. ASA, statin. PT/OT/SLP     Aphasia: SLP     ICA occlusion and stenosis: FU with neuro regarding DAPT.     DM: amaryl 4, metformin 2000 daily, nesina 25, SSI - refusing, d/c. Monitor on routine blood work     HTN: toprol 50       HLD: lipitor 40     Depression: zoloft 100 per home regimen     Afib: holding AC at this time due to fall risk     Bowels: Per protocol  Bladder: Per protocol   Sleep: Trazodone provided prn. Pain: tylenol, tramadol   DVT PPx: Lovenox     Indira Patton MD 10/26/2020, 9:20 AM    * This document was created using dictation software. While all precautions were taken to ensure accuracy, errors may have occurred. Please disregard any typographical errors.

## 2020-10-26 NOTE — PROGRESS NOTES
Occupational Therapy  Facility/Department: [facility] ACUTE REHAB UNIT  Daily Treatment Note  NAME: Pat Chapa  : 1950  MRN: [id number]    Date of Service: 10/26/2020    Discharge Recommendations:  Home with Home health OT, S Level 3  OT Equipment Recommendations  Equipment Needed: Yes  Mobility Devices: ADL Assistive Devices  ADL Assistive Devices: Transfer Tub Bench    Assessment   Performance deficits / Impairments: Decreased functional mobility ; Decreased ADL status; Decreased safe awareness;Decreased high-level IADLs;Decreased endurance;Decreased fine motor control  OT Education: OT Role;Plan of Care;Transfer Training;ADL Adaptive Strategies  Patient Education: pt verbalized understanding however would benefit from continued reinforcement  REQUIRES OT FOLLOW UP: Yes  Activity Tolerance  Activity Tolerance: Patient Tolerated treatment well;Patient limited by fatigue  Activity Tolerance: frequent rest breaks required throughout session  Safety Devices  Safety Devices in place: Yes  Type of devices: Call light within reach; Chair alarm in place; Left in chair; All fall risk precautions in place         Patient Diagnosis(es): CVA      has a past medical history of Actinic keratosis, Arthritis, Asthma, Atrial fibrillation ([address] CAD (coronary artery disease), Depression, Diabetes mellitus ([address] Disc displacement, lumbar, Diverticulosis of colon, Hard of hearing, Hyperlipidemia, Hypertension, Ischemic heart disease, Past heart attack, Poor vision, Right leg weakness, and Scoliosis. has a past surgical history that includes Coronary artery bypass graft;  section; Appendectomy; ovarian cyst removal; Hand surgery; Cardiac surgery (); skin biopsy; vascular surgery; Tonsillectomy; Lumbar disc surgery (8-); Lumbar spine surgery (10-11-12); and back surgery.     Restrictions  Restrictions/Precautions  Restrictions/Precautions: Fall Risk, Modified Diet(high fall risk, carb control diet, no straws)  Required Braces or Orthoses?: No  Position Activity Restriction  Other position/activity restrictions: Nicko Drake is a 65 y.o. female presents to the emergency department today for evaluation for right-sided facial droop, right arm weakness. The patient states that she woke up around 730 this morning, around 730/8 AM this morning she noticed that her face was drooping when she went to the restroom and she states that she noticed some weakness to her right arm. EMS was called. The patient is a history of A. fib, she is on aspirin and Plavix. She is not on any blood thinners. The patient believes that she was awake, and then her symptoms started. TPA given 10/20. Today, s/p tPA exam is clinically improved with an NIHSS of 5. She has mild expressive aphasia and dysarthria, but is able to follow commands. Her right sided weakness is resolved. She also has some mild sensory loss on the right side. Subjective   General  Chart Reviewed: Yes  Patient assessed for rehabilitation services?: Yes  Additional Pertinent Hx: Afib, CAD s/p CABG, DM2  Response to previous treatment: Patient with no complaints from previous session  Family / Caregiver Present: No  Diagnosis: Acute CVA (left)  Subjective  Subjective: Pt seated in recliner at entry, agreeable to OT session  General Comment  Comments: pt w/ expressive aphasia  Vital Signs  Patient Currently in Pain: Denies       Objective    ADL  Grooming: Setup;Contact guard assistance(CGA to SBA for oral care, hair brushing and face washing in stance at sink)  UE Dressing: Minimal assistance;Setup(Rafael for adjusting bra around trunk once fastened in front)  LE Dressing: Setup;Stand by assistance; Increased time to complete  Toileting: Stand by assistance;Setup  Additional Comments: pt ambulated to bathroom for grooming/toileting need, once in stance at sink pt w/ brief symptoms of N/T, pt returned to recliner for UB/LB dressing- stances up to RW for clothing management voer hips        Balance  Sitting Balance: Supervision  Standing Balance: Contact guard assistance(CGA to SBA)  Standing Balance  Time: ~15min total  Activity: functional mobility, transfers, ADL completion, static stance for table top task  Comment: no LOB noted  Functional Mobility  Functional - Mobility Device: Rolling Walker  Activity: To/from bathroom; To/From therapy gym  Assist Level: Contact guard assistance  Functional Mobility Comments: ~127ft x2 + 20ft  Toilet Transfers  Toilet - Technique: Ambulating  Equipment Used: Standard toilet  Toilet Transfer: Contact guard assistance  Tub Transfers  Tub - Transfer From: Walker  Tub - Transfer Type: To and From  Tub - Transfer To: Transfer tub bench  Tub - Technique: Ambulating  Tub Transfers: Contact guard  Tub Transfers Comments: dry tub transfer to simulate home set-up, pt stating her Dtr is planning on purchasing a TTB for pt at d/c, cues for technique, CGA to/from edge of TTB, SBA for lifting LEs over lip of tub     Transfers  Sit to stand: Stand by assistance  Stand to sit: Stand by assistance                       Cognition  Following Commands: Follows one step commands consistently  Attention Span: Appears intact  Memory: Appears intact  Insights: Decreased awareness of deficits  Sequencing: Requires cues for some              Additional Activities Comment  Additional Activities: matching activity in stance at high table with focus on standing tolerance, visual scanning/memory and manual dexterity- use of R and L UE w/ R hand demonstrating decreased coordination, ~3min stance prior to requiring rest break, ~25% of activity completed during stance.                           Plan   Plan  Times per week: 60 min per day/5-7 days/wk  Times per day: Daily  Plan weeks: 1-2 weeks  Current Treatment Recommendations: Strengthening, ROM, Balance Training, Functional Mobility Training, Endurance Training, Safety Education & Training, Self-Care / ADL, Patient/Caregiver Education & Training, Equipment Evaluation, Education, & procurement          Goals  Short term goals  Time Frame for Short term goals: 1-2 weeks  Short term goal 1: Mod I for functional mobility for ADL tasks w/RW  Short term goal 2: Mod I for functional transfers to ADL surfaces w/RW  Short term goal 3: Mod I UB bathing/dressing  Short term goal 4: Mod I LB bathing dressing  Short term goal 5: Mod I toileting  Long term goals  Time Frame for Long term goals : LTG=STG       Therapy Time   Individual Concurrent Group Co-treatment   Time In 0830         Time Out 0930         Minutes 60              Timed Code Treatment Minutes: 60  Total Treatment Minutes:  [address] [facility], OTR/L #[id number]

## 2020-10-26 NOTE — PROGRESS NOTES
ACUTE REHAB UNIT  SPEECH/LANGUAGE PATHOLOGY      [x] Daily  [] Weekly Care Conference Note  [] Discharge    [facility]     TOY:2/32/0938  [facility]:[id number]  Room #: FJW-8134/1629-46   Rehab Dx/Hx: Acute ischemic stroke [facility]) [I63.9]  Date of Admit: 10/23/2020      Precautions: falls and aspirations  Home situation: Pt lives at home alone. Was independent prior to admission   ST Dx: Aphasia (expressive and receptive) with suspected apraxia component; Oropharyngeal dysphagia      Daily Treatment Info:   Date: 10/26/2020   Tx session 1 Tx session 2   Pain None reported  None reported    Subjective     Pt up in chair. Alert and engaged in treatment. Completing word search puzzles (appeared accurate) upon SLP arrival.    Pt reported she wants to be able to go home. Pt reported having some trouble with memory and coming up with words prior to admission, likely age-related. Pt also indicated that her daughter will assist upon d/c.      Objective:  Goals     Pt will tolerate recommended diet with no overt s/s of aspiration. Goal not targeted this session. Goal not targeted this session. Patient will increase object/picture identification to > 14% accuracy given min verbal cues. Goal not targeted this session. Picture ID  - 78% accuracy        Patient will improve recognition of body parts to 80% accuracy given min verbal cues. Goal not targeted this session.    Body part ID  - 20% (auditory directions)   - 80% (written directions)      Patient will verbalize automatic speech tasks with 80% accuracy given min verbal cues   Auto speech   - counting to 10- 100% accuracy, minimum effort   - Happy Birthday - mod cues required, able to sustain melodic intonation, moderate phonemic errors   - Days of the week - mod/max cues required, improved accuracy with phonemic errors when reading a list of the days of the week   Auto speech   - counting to 10- 60% accuracy  - ABC's - mod cues required Pt will name object/ pictures with 60% accuracy given min cues. Goal not targeted this session. Goal not targeted this session. Pt will complete basic expressive language tasks (sentence completion, responsive naming, etc) to 80% accuracy with min cues   Goal not targeted this session. Sentence completion   - 64% accuracy   - increased difficulty with frustration        Other areas targeted: Expressive language task (personal history and wants/needs)   - Pt wrote her date of birth Independently and accurately   - Pt able to write he daughters name Independently; moderate errors for writing the name of her sister and using pronouns/description to determine relationship; improved accuracy in spelling with use of low tech letter board   - pt initiated gestures for communication breakdowns   - Independently utilized appropriate greetings and emotions with completion of structured conversation     Given ability to utilize basic writing, low tech letter board and improved accuracy with reading comprehension task; recommend use of notebook and communication boards to repair communication breakdowns. Education:   Provided explanation to pt re use of notebook and communication board. Demonstrated comprehension. Provided written and verbal eeducation re aphasia and stroke recovery. Pt demonstrated comprehension however wanting to go home. Safety Devices: [x] Call light within reach  [x] Chair alarm activated  [] Bed alarm activated  [] Other: [x] Call light within reach  [x] Chair alarm activated  [] Bed alarm activated  [] Other:      Assessment:   Speech Therapy Diagnosis  Aphasia Diagnosis: Severe expressive aphasia and moderate receptive aphasia    Current Diet Order: DIET CARB CONTROL; No Drinking Straw  Dietary Nutrition Supplements: Diabetic Oral Supplement   Recommended Form of Meds: Meds in puree  Compensatory Swallowing Strategies:  Alternate solids and liquids, Eat/Feed slowly, No straws, Upright as possible for all oral intake, Small bites/sips     Plan:     Frequency:  5days/week   60 minutes/day  Discharge Recommendations:   Barriers: aphasia and cognitive deficits   Discharge Recommendations:  [] Home independently  [x] Home with assistance []  24 hour supervision  [] SNF [] Other:  Continued SLP Treatment:  [x] Yes [] No [] TBD based on progress while on ARU [] Vital Stim indicated [] Other:   Estimated discharge date: TBD      Type of Total Treatment Minutes   Session 1   Session 2   Time In 1000 1125   Time Out 1030 1155   Timed Code Minutes  0 0   Individual Treatment Minutes  30 30   Co-Treatment Minutes      Group Treatment Minutes      Concurrent Treatment Minutes        TOTAL DAILY MINUTES:  60    Electronically Signed by     Gila Toledo M.A.  CCC-SLP S.PSarita [phone]  Speech-Language Pathologist [phone]  10/26/2020 7:48 AM

## 2020-10-27 LAB
GLUCOSE BLD-MCNC: 67 MG/DL (ref 70–99)
PERFORMED ON: ABNORMAL

## 2020-10-27 PROCEDURE — 97116 GAIT TRAINING THERAPY: CPT

## 2020-10-27 PROCEDURE — 97535 SELF CARE MNGMENT TRAINING: CPT

## 2020-10-27 PROCEDURE — 97110 THERAPEUTIC EXERCISES: CPT

## 2020-10-27 PROCEDURE — 92526 ORAL FUNCTION THERAPY: CPT

## 2020-10-27 PROCEDURE — 1280000000 HC REHAB R&B

## 2020-10-27 PROCEDURE — 97530 THERAPEUTIC ACTIVITIES: CPT

## 2020-10-27 PROCEDURE — 6370000000 HC RX 637 (ALT 250 FOR IP): Performed by: PHYSICAL MEDICINE & REHABILITATION

## 2020-10-27 PROCEDURE — 94640 AIRWAY INHALATION TREATMENT: CPT

## 2020-10-27 PROCEDURE — 97112 NEUROMUSCULAR REEDUCATION: CPT

## 2020-10-27 PROCEDURE — 92507 TX SP LANG VOICE COMM INDIV: CPT

## 2020-10-27 RX ORDER — LIDOCAINE 4 G/G
1 PATCH TOPICAL DAILY
Status: DISCONTINUED | OUTPATIENT
Start: 2020-10-27 | End: 2020-10-30

## 2020-10-27 RX ADMIN — GLIMEPIRIDE 4 MG: 2 TABLET ORAL at 07:07

## 2020-10-27 RX ADMIN — METOPROLOL SUCCINATE 50 MG: 50 TABLET, EXTENDED RELEASE ORAL at 08:35

## 2020-10-27 RX ADMIN — METFORMIN HYDROCHLORIDE 2000 MG: 500 TABLET, EXTENDED RELEASE ORAL at 08:36

## 2020-10-27 RX ADMIN — ALOGLIPTIN 25 MG: 25 TABLET, FILM COATED ORAL at 08:35

## 2020-10-27 RX ADMIN — DESMOPRESSIN ACETATE 40 MG: 0.2 TABLET ORAL at 22:03

## 2020-10-27 RX ADMIN — ACETAMINOPHEN 650 MG: 325 TABLET ORAL at 16:41

## 2020-10-27 RX ADMIN — SERTRALINE HYDROCHLORIDE 100 MG: 50 TABLET ORAL at 08:35

## 2020-10-27 RX ADMIN — Medication 2 PUFF: at 06:02

## 2020-10-27 RX ADMIN — Medication 2 PUFF: at 19:28

## 2020-10-27 RX ADMIN — ACETAMINOPHEN 650 MG: 325 TABLET ORAL at 22:03

## 2020-10-27 ASSESSMENT — PAIN SCALES - WONG BAKER
WONGBAKER_NUMERICALRESPONSE: 0

## 2020-10-27 ASSESSMENT — PAIN DESCRIPTION - DESCRIPTORS: DESCRIPTORS: DISCOMFORT

## 2020-10-27 ASSESSMENT — PAIN DESCRIPTION - PAIN TYPE
TYPE: ACUTE PAIN
TYPE: ACUTE PAIN

## 2020-10-27 ASSESSMENT — PAIN DESCRIPTION - ONSET: ONSET: ON-GOING

## 2020-10-27 ASSESSMENT — PAIN DESCRIPTION - LOCATION
LOCATION: BACK
LOCATION: BACK

## 2020-10-27 ASSESSMENT — PAIN SCALES - GENERAL
PAINLEVEL_OUTOF10: 5
PAINLEVEL_OUTOF10: 2
PAINLEVEL_OUTOF10: 2
PAINLEVEL_OUTOF10: 0

## 2020-10-27 ASSESSMENT — PAIN DESCRIPTION - ORIENTATION
ORIENTATION: LOWER
ORIENTATION: LOWER

## 2020-10-27 ASSESSMENT — PAIN DESCRIPTION - FREQUENCY: FREQUENCY: INTERMITTENT

## 2020-10-27 ASSESSMENT — PAIN DESCRIPTION - PROGRESSION: CLINICAL_PROGRESSION: GRADUALLY IMPROVING

## 2020-10-27 NOTE — PROGRESS NOTES
heart attack, Poor vision, Right leg weakness, and Scoliosis. has a past surgical history that includes Coronary artery bypass graft;  section; Appendectomy; ovarian cyst removal; Hand surgery; Cardiac surgery (); skin biopsy; vascular surgery; Tonsillectomy; Lumbar disc surgery (8-); Lumbar spine surgery (10-11-12); and back surgery. Restrictions  Restrictions/Precautions  Restrictions/Precautions: Fall Risk, Modified Diet(high fall risk, carb control diet, no straws)  Required Braces or Orthoses?: No  Position Activity Restriction  Other position/activity restrictions: Yoli Mancilla is a 65 y.o. female presents to the emergency department today for evaluation for right-sided facial droop, right arm weakness. The patient states that she woke up around 730 this morning, around 730/8 AM this morning she noticed that her face was drooping when she went to the restroom and she states that she noticed some weakness to her right arm. EMS was called. The patient is a history of A. fib, she is on aspirin and Plavix. She is not on any blood thinners. The patient believes that she was awake, and then her symptoms started. TPA given 10/20. Today, s/p tPA exam is clinically improved with an NIHSS of 5. She has mild expressive aphasia and dysarthria, but is able to follow commands. Her right sided weakness is resolved. She also has some mild sensory loss on the right side.     Subjective   General  Chart Reviewed: Yes  Patient assessed for rehabilitation services?: Yes  Additional Pertinent Hx: Afib, CAD s/p CABG, DM2  Response to previous treatment: Patient with no complaints from previous session  Family / Caregiver Present: No  Diagnosis: Acute CVA (left)  Subjective  Subjective: Pt seated in recliner at entry, agreeable to OT session and shower  General Comment  Comments: pt w/ expressive aphasia  Vital Signs  Patient Currently in Pain: Denies        Objective    ADL  Grooming: Setup;Stand by assistance(hair brushing in stance at sink; face washing and oral care seated w/ set-up)  UE Bathing: Setup;Supervision  LE Bathing: Setup;Supervision  UE Dressing: Setup;Supervision  LE Dressing: Setup;Supervision  Toileting: Setup;Supervision  Additional Comments: Pt ambulated to/from bathroom for ADLs, UB/LB bathing and dressing completed seated on shower chair, toileting completed post bathing        Balance  Sitting Balance: Supervision  Standing Balance: Stand by assistance  Standing Balance  Time: ~10min total  Activity: functional mobility, transfers, ADL completion  Comment: no LOB noted    Functional Mobility  Functional - Mobility Device: Rolling Walker  Activity: To/from bathroom;Transport items; Retrieve items  Assist Level: Stand by assistance  Functional Mobility Comments: pt transported clothing items from closet via RW; recliner>closet>bathroom>recliner>EOB ~30ft total    Toilet Transfers  Toilet - Technique: Ambulating  Equipment Used: Standard toilet  Toilet Transfer: Stand by assistance    Shower Transfers  Shower - Transfer From: Alexandra Nissen - Transfer Type: To and From  Shower - Transfer To: Shower seat with back  Shower - Technique: Ambulating  Shower Transfers: Stand by assistance    Bed mobility  Sit to Supine: Supervision  Scooting: Supervision  Transfers  Sit to stand: Stand by assistance  Stand to sit: Stand by assistance              Cognition  Overall Cognitive Status: WFL  Attention Span: Appears intact  Memory: Appears intact  Safety Judgement: Good awareness of safety precautions  Initiation: Requires cues for some  Sequencing: Requires cues for some                    Type of ROM/Therapeutic Exercise  Type of ROM/Therapeutic Exercise: AROM  Comment: B UE TE seated in recliner w/ medium resistance theraband for strengthening and UE coordination- 15 reps x1 of horizontal abduction, forward punches, shoulder flexion, elbow flexion/extension, rest breaks as needed.

## 2020-10-27 NOTE — PROGRESS NOTES
Physical Therapy  Facility/Department: [facility] ACUTE REHAB UNIT  Daily Treatment Note  NAME: Radha Xie  : 1950  MRN: [id number]    Date of Service: 10/27/2020    Discharge Recommendations:  S Level 3, Home with Home health PT   PT Equipment Recommendations  Equipment Needed: No  Other: Patient has RW at home. Assessment   Body structures, Functions, Activity limitations: Decreased functional mobility ; Decreased strength;Decreased endurance;Decreased balance;Decreased sensation;Decreased ADL status; Decreased posture;Decreased safe awareness;Decreased cognition  Assessment: Patient demonstrating improve balance recovery strategies on this date but continues to require safety cueing and use of RW at this time. Patient ambulation restricted to use of RW vs no device due to ongoing c/o low back pain. Pt would benefit from continued skilled therapy services to regain baseline functional independence  Treatment Diagnosis: Decreased functional mobility with transfers and ambulation, decreased balance/strength/endurance  Prognosis: Good  PT Education: PT Role;Plan of Care;Goals;Transfer Training;General Safety; Functional Mobility Training;Gait Training  Patient Education: Use of call light for room mobility. Will continue to reinforce in upcoming sessions. Barriers to Learning: expressive aphasia, cognition  REQUIRES PT FOLLOW UP: Yes  Activity Tolerance  Activity Tolerance: Patient Tolerated treatment well     Patient Diagnosis(es): CVA     has a past medical history of Actinic keratosis, Arthritis, Asthma, Atrial fibrillation ([address] CAD (coronary artery disease), Depression, Diabetes mellitus ([address] Disc displacement, lumbar, Diverticulosis of colon, Hard of hearing, Hyperlipidemia, Hypertension, Ischemic heart disease, Past heart attack, Poor vision, Right leg weakness, and Scoliosis. has a past surgical history that includes Coronary artery bypass graft;  section;  Appendectomy; ovarian cyst removal; Hand surgery; Cardiac surgery (1984); skin biopsy; vascular surgery; Tonsillectomy; Lumbar disc surgery (8-); Lumbar spine surgery (10-11-12); and back surgery. Restrictions  Restrictions/Precautions  Restrictions/Precautions: Fall Risk, Modified Diet(high fall risk, carb control diet, no straws)  Required Braces or Orthoses?: No  Position Activity Restriction  Other position/activity restrictions: Juan Carlos Rivas is a 65 y.o. female presents to the emergency department today for evaluation for right-sided facial droop, right arm weakness. The patient states that she woke up around 730 this morning, around 730/8 AM this morning she noticed that her face was drooping when she went to the restroom and she states that she noticed some weakness to her right arm. EMS was called. The patient is a history of A. fib, she is on aspirin and Plavix. She is not on any blood thinners. The patient believes that she was awake, and then her symptoms started. TPA given 10/20. Today, s/p tPA exam is clinically improved with an NIHSS of 5. She has mild expressive aphasia and dysarthria, but is able to follow commands. Her right sided weakness is resolved. She also has some mild sensory loss on the right side. Subjective   General  Chart Reviewed: Yes  Additional Pertinent Hx: DM, asthma, depression, CAD, CABG  Family / Caregiver Present: No  Subjective  Subjective: Pt reporting 4/10 low back pain with use of communication board. Denies intervention. General Comment  Comments: Pt seated in recliner upon arrival.  Agreeable to PT session.           Objective   Transfers  Sit to Stand: Contact guard assistance  Stand to sit: Contact guard assistance  Stand Pivot Transfers: Contact guard assistance(w/c => recliner without device)  Ambulation  Ambulation?: Yes  Ambulation 1  Surface: level tile  Device: Rolling Walker  Assistance: Contact guard assistance  Quality of Gait: reciprocal pattern with mild lateral trunk sway. consistent SARWAT with no evidence of balance loss. Distance: 175 ft  Comments: Pt distractable by surrounding enviornment throughout ambulation     Balance  Posture: Good  Sitting - Static: Good  Sitting - Dynamic: Good;-  Standing - Static: Fair  Standing - Dynamic: Fair;-     Comment: 1st session:  See above functional mobility. In addition patient completes static SARWAT standing balance activity on airex without UE support: reaching outside SARWAT and across midline with dual task cognitive activity. Patient maintains balance at [facility] but requires extensive assist for cognitive component of task. Standing PNF chop pattern with swiss ball 2 x 5 ea. 2nd session: Pt seated in recliner upon arrival, denies pain, agreeable to PT session. Sit <=> stand transfers completed within session at [facility]. Pt ambulates 175 ft within session using RW at [facility] with same mechanics as first session. Seated stepper L2 x 7 min to improve LE strength and cardiovascular endurance. Single leg step up: 4 in with (R) UE hand support 2 x 5. Patient completes 4 square stepping x 1 ea: CW, CCW, Diagonal CW/CCW. Upon return to room, pt remains up in recliner with chair alarm and call light within reach. No new complaints following session. Goals  Short term goals  Time Frame for Short term goals: 7-14 days  Short term goal 1: Patient will perform bed mobility MOD I. Short term goal 2: Patient will perform sit-stand MOD I w/ LRAD. Short term goal 3: Patient will perform bed-chair transfer MOD I w/ LRAD. Short term goal 4: Patient will perform 150' of ambulation MOD I w/ LRAD. Short term goal 5: Patient will perform car transfer MOD I w/ LRAD.   Patient Goals   Patient goals : Pt will return to home at baseline function    Plan    Plan  Times per week: 60 minutes/day, 5 days/week  Times per day: Daily  Plan weeks: 1-2 weeks  Current Treatment Recommendations: Strengthening, Balance Training, Functional Mobility Training, Transfer Training, Gait Training, Safety Education & Training, Neuromuscular Re-education, Endurance Training, Patient/Caregiver Education & Training, Equipment Evaluation, Education, & procurement, ADL/Self-care Training, Stair training  Safety Devices  Type of devices:  All fall risk precautions in place, Gait belt, Call light within reach, Chair alarm in place, Left in chair  Restraints  Initially in place: No     Therapy Time   Individual Concurrent Group Co-treatment   Time In 0915         Time Out 0945         Minutes 30         Timed Code Treatment Minutes: 30 Minutes     Second Session Therapy Time:   Individual Concurrent Group Co-treatment   Time In 1030         Time Out 1100         Minutes 30           Timed Code Treatment Minutes:  30 + 30     Total Treatment Minutes:  60 minutes    Harvindre Hinson PT, DPT - [id number]

## 2020-10-27 NOTE — PLAN OF CARE
Problem: Mobility - Impaired:  Goal: Mobility will improve  Description: Mobility will improve  10/27/2020 1310 by Ondina Valdez RN  Outcome: Ongoing     Problem: Falls - Risk of:  Goal: Will remain free from falls  Description: Will remain free from falls  10/27/2020 1310 by Ondina Valdez RN  Outcome: Ongoing     Problem: COMMUNICATION IMPAIRMENT  Goal: Ability to express needs and understand communication  10/27/2020 1310 by Ondina Valdez RN  Outcome: Ongoing     Problem: Pain:  Description: Pain management should include both nonpharmacologic and pharmacologic interventions.   Goal: Pain level will decrease  Description: Pain level will decrease  10/27/2020 1310 by Ondina Valdez RN  Outcome: Ongoing     Problem: Nutrition  Goal: Optimal nutrition therapy  10/27/2020 1310 by Ondina Valdez RN  Outcome: Ongoing

## 2020-10-27 NOTE — PROGRESS NOTES
having difficulty ID'ing her elbow. Pointed to it to show SLP \"see I remember, here it is\". Demonstrated semantic paraphasia during description (toe)    Goal not targeted this session. Patient will verbalize automatic speech tasks with 80% accuracy given min verbal cues   Automatics:   - ABC's:  92%, given min cues for initiation   - Days of the week: required mod-max cues     Pt indicated that she feels like she can complete these tasks at certain times of the day. Provided education re inconsistent errors with apraxia of speech. Pt had written letters of the alphabet in communication notebook that she had completed outside of treatment time. Goal not targeted this session. Automatics:   - counting 1-10, 90% with initiation cues  - counting 1-20, 50%   - improved accuracy with motor planning        Pt will name object/ pictures with 60% accuracy given min cues. Goal not targeted this session. Picture naming   - 0%   - improved to 66% with lead in phrase and phonemic cues   - pt able to spell out names of pictured items using communication board with 80% accuracy     Naming pictures with written cues   - 50% (3/6)   - improved to 100%  given mod cues, phonemic and lead in phrase   Goal not targeted this session. Pt will complete basic expressive language tasks (sentence completion, responsive naming, etc) to 80% accuracy with min cues   Goal not targeted this session. Goal not targeted this session.    Auditory sentence completion   - 29% accuracy   - required mod-max cues (phonemic and visuals) to improve to 71%      Other areas targeted:   Reading comprehension /sentence completion via selection f/3:   - 80% accuracy     Structured description (personal history)   - mod-severe paraphasic errors; pt initiates use of gestures, sounds, and communication board when cued for apparent breakdowns   - pt able to grossly communicate moderately complex thoughts and maintain topic of conversation; but is partner dependent for awareness of errors/breakdowns      Education:   Provided education re aphasia and apraxia of speech. Provided education re rationale for treatment tasks and plan of care. Provided education re stroke recovery,    Safety Devices: [x] Call light within reach  [x] Chair alarm activated  [] Bed alarm activated  [] Other: [x] Call light within reach  [x] Chair alarm activated  [] Bed alarm activated  [] Other:  [x] Call light within reach  [x] Chair alarm activated  [] Bed alarm activated  [] Other:      Assessment:   Speech Therapy Diagnosis  Aphasia Diagnosis: Severe expressive aphasia and moderate receptive aphasia    Current Diet Order: DIET CARB CONTROL; No Drinking Straw  Dietary Nutrition Supplements: Low Calorie High Protein Supplement   Recommended Form of Meds: Meds in puree  Compensatory Swallowing Strategies: Alternate solids and liquids, Eat/Feed slowly, No straws, Upright as possible for all oral intake, Small bites/sips     Plan:     Frequency:  5days/week   60 minutes/day  Discharge Recommendations:   Barriers: aphasia and cognitive deficits   Discharge Recommendations:  [] Home independently  [x] Home with assistance []  24 hour supervision  [] SNF [] Other:  Continued SLP Treatment:  [x] Yes [] No [] TBD based on progress while on ARU [] Vital Stim indicated [] Other:   Estimated discharge date: 11/6/2020      Type of Total Treatment Minutes   Session 1   Session 2 Session 3   Time In 0800 1000 1130   Time Out 0830 1026 1200   Timed Code Minutes       Individual Treatment Minutes  30 26 30   Co-Treatment Minutes       Group Treatment Minutes       Concurrent Treatment Minutes         TOTAL DAILY MINUTES:  80    Electronically Signed by     Cyndi Jay M.A.  CCC-SLP SSaritaPSarita [id number]  Speech-Language Pathologist [phone]  10/27/2020 12:45 PM

## 2020-10-27 NOTE — PROGRESS NOTES
Zach Yates  10/27/2020  [id number]    Chief Complaint: Acute ischemic stroke ([address]    Subjective:   No overnight events. No current complaints. Participating well in therapy. ROS: No CP, SOB, dyspnea    Objective:  Patient Vitals for the past 24 hrs:   BP Temp Temp src Pulse Resp SpO2   10/27/20 0830 136/73 98.5 °F (36.9 °C) -- 65 16 93 %   10/27/20 0602 -- -- -- -- -- 94 %   10/26/20 2254 -- -- -- -- -- 95 %   10/26/20 1930 112/72 97.9 °F (36.6 °C) Oral 82 16 97 %   10/26/20 0945 117/77 98.3 °F (36.8 °C) -- 84 16 --     Gen: No distress, pleasant. Resting in bedside chair  HEENT: Normocephalic, atraumatic. CV: Regular rate and rhythm. No MRG   Resp: No respiratory distress. CTAB   Abd: Soft, nontender nondistended  Ext: No edema. Neuro: Alert, aphasic appropriately interactive. Laboratory data: Available via EMR. Therapy progress:  PT  Position Activity Restriction  Other position/activity restrictions: Zach Yates is a 65 y.o. female presents to the emergency department today for evaluation for right-sided facial droop, right arm weakness. The patient states that she woke up around 730 this morning, around 730/8 AM this morning she noticed that her face was drooping when she went to the restroom and she states that she noticed some weakness to her right arm. EMS was called. The patient is a history of A. fib, she is on aspirin and Plavix. She is not on any blood thinners. The patient believes that she was awake, and then her symptoms started. TPA given 10/20. Today, s/p tPA exam is clinically improved with an NIHSS of 5. She has mild expressive aphasia and dysarthria, but is able to follow commands. Her right sided weakness is resolved. She also has some mild sensory loss on the right side.   Objective     Sit to Stand: Contact guard assistance  Stand to sit: Contact guard assistance  Bed to Chair: Minimal assistance  Device: Rolling Walker  Assistance: Contact guard assistance  Distance: 150 ft  OT  PT Equipment Recommendations  Equipment Needed: No  Other: Patient has RW at home. Toilet - Technique: Ambulating  Equipment Used: Standard toilet  Toilet Transfers Comments: w/RW; grab bar on L side  Assessment        SLP  Current Diet : Regular(Upgraded to regular diet previous date)  Current Liquid Diet : Thin  Diet Solids Recommendation: Regular  Liquid Consistency Recommendation: Thin    Body mass index is 27.19 kg/m². Assessment:  Patient Active Problem List   Diagnosis    Lumbar radiculopathy    [facility]    Received intravenous tissue plasminogen activator (tPA) in emergency department    HTN (hypertension), benign    Nontraumatic cortical hemorrhage of left cerebral hemisphere ([facility]    Dyslipidemia    PAF (paroxysmal atrial fibrillation) (HCC)    Acute ischemic stroke ([facility]       Plan:   Acute ischemic infarct: s/p tPA. ASA, statin. PT/OT/SLP     Aphasia: SLP     ICA occlusion and stenosis: FU with neuro regarding DAPT.     DM: amaryl 4, metformin 2000 daily, nesina 25, Refused SSI. Monitor on routine blood work     HTN: toprol 50       HLD: lipitor 40     Depression: zoloft 100 per home regimen     Afib: holding AC at this time due to fall risk     Bowels: Per protocol  Bladder: Per protocol   Sleep: Trazodone provided prn. Pain: tylenol, tramadol   DVT PPx: Lovenox - d/c, ambulating >250'    Uvaldo Drake MD 10/27/2020, 9:18 AM    * This document was created using dictation software. While all precautions were taken to ensure accuracy, errors may have occurred. Please disregard any typographical errors.

## 2020-10-28 LAB
ANION GAP SERPL CALCULATED.3IONS-SCNC: 11 MMOL/L (ref 3–16)
BUN BLDV-MCNC: 11 MG/DL (ref 7–20)
CALCIUM SERPL-MCNC: 10.5 MG/DL (ref 8.3–10.6)
CHLORIDE BLD-SCNC: 99 MMOL/L (ref 99–110)
CO2: 23 MMOL/L (ref 21–32)
CREAT SERPL-MCNC: 0.6 MG/DL (ref 0.6–1.2)
GFR AFRICAN AMERICAN: >60
GFR NON-AFRICAN AMERICAN: >60
GLUCOSE BLD-MCNC: 121 MG/DL (ref 70–99)
GLUCOSE BLD-MCNC: 131 MG/DL (ref 70–99)
GLUCOSE BLD-MCNC: 88 MG/DL (ref 70–99)
HCT VFR BLD CALC: 36.8 % (ref 36–48)
HEMOGLOBIN: 12.3 G/DL (ref 12–16)
MCH RBC QN AUTO: 31.4 PG (ref 26–34)
MCHC RBC AUTO-ENTMCNC: 33.5 G/DL (ref 31–36)
MCV RBC AUTO: 93.7 FL (ref 80–100)
PDW BLD-RTO: 12.6 % (ref 12.4–15.4)
PERFORMED ON: ABNORMAL
PERFORMED ON: NORMAL
PLATELET # BLD: 284 K/UL (ref 135–450)
PMV BLD AUTO: 8 FL (ref 5–10.5)
POTASSIUM SERPL-SCNC: 3.8 MMOL/L (ref 3.5–5.1)
RBC # BLD: 3.93 M/UL (ref 4–5.2)
SARS-COV-2: NOT DETECTED
SODIUM BLD-SCNC: 133 MMOL/L (ref 136–145)
WBC # BLD: 7.3 K/UL (ref 4–11)

## 2020-10-28 PROCEDURE — 94761 N-INVAS EAR/PLS OXIMETRY MLT: CPT

## 2020-10-28 PROCEDURE — 97530 THERAPEUTIC ACTIVITIES: CPT

## 2020-10-28 PROCEDURE — 85027 COMPLETE CBC AUTOMATED: CPT

## 2020-10-28 PROCEDURE — 97116 GAIT TRAINING THERAPY: CPT

## 2020-10-28 PROCEDURE — 36415 COLL VENOUS BLD VENIPUNCTURE: CPT

## 2020-10-28 PROCEDURE — 6370000000 HC RX 637 (ALT 250 FOR IP): Performed by: PHYSICAL MEDICINE & REHABILITATION

## 2020-10-28 PROCEDURE — 92507 TX SP LANG VOICE COMM INDIV: CPT

## 2020-10-28 PROCEDURE — U0003 INFECTIOUS AGENT DETECTION BY NUCLEIC ACID (DNA OR RNA); SEVERE ACUTE RESPIRATORY SYNDROME CORONAVIRUS 2 (SARS-COV-2) (CORONAVIRUS DISEASE [COVID-19]), AMPLIFIED PROBE TECHNIQUE, MAKING USE OF HIGH THROUGHPUT TECHNOLOGIES AS DESCRIBED BY CMS-2020-01-R: HCPCS

## 2020-10-28 PROCEDURE — 1280000000 HC REHAB R&B

## 2020-10-28 PROCEDURE — 94640 AIRWAY INHALATION TREATMENT: CPT

## 2020-10-28 PROCEDURE — 97110 THERAPEUTIC EXERCISES: CPT

## 2020-10-28 PROCEDURE — 80048 BASIC METABOLIC PNL TOTAL CA: CPT

## 2020-10-28 RX ADMIN — Medication 2 PUFF: at 06:14

## 2020-10-28 RX ADMIN — ONDANSETRON 4 MG: 4 TABLET, ORALLY DISINTEGRATING ORAL at 20:24

## 2020-10-28 RX ADMIN — SERTRALINE HYDROCHLORIDE 100 MG: 50 TABLET ORAL at 09:09

## 2020-10-28 RX ADMIN — GLIMEPIRIDE 4 MG: 2 TABLET ORAL at 06:03

## 2020-10-28 RX ADMIN — ACETAMINOPHEN 650 MG: 325 TABLET ORAL at 09:09

## 2020-10-28 RX ADMIN — DESMOPRESSIN ACETATE 40 MG: 0.2 TABLET ORAL at 20:24

## 2020-10-28 RX ADMIN — ACETAMINOPHEN 650 MG: 325 TABLET ORAL at 20:24

## 2020-10-28 RX ADMIN — METOPROLOL SUCCINATE 50 MG: 50 TABLET, EXTENDED RELEASE ORAL at 09:09

## 2020-10-28 RX ADMIN — ALOGLIPTIN 25 MG: 25 TABLET, FILM COATED ORAL at 09:04

## 2020-10-28 RX ADMIN — METFORMIN HYDROCHLORIDE 2000 MG: 500 TABLET, EXTENDED RELEASE ORAL at 09:04

## 2020-10-28 ASSESSMENT — PAIN DESCRIPTION - PROGRESSION
CLINICAL_PROGRESSION: GRADUALLY IMPROVING

## 2020-10-28 ASSESSMENT — PAIN DESCRIPTION - ORIENTATION
ORIENTATION: LOWER
ORIENTATION: LOWER

## 2020-10-28 ASSESSMENT — PAIN SCALES - GENERAL
PAINLEVEL_OUTOF10: 2
PAINLEVEL_OUTOF10: 0
PAINLEVEL_OUTOF10: 1
PAINLEVEL_OUTOF10: 1

## 2020-10-28 ASSESSMENT — PAIN DESCRIPTION - LOCATION
LOCATION: BACK
LOCATION: BACK

## 2020-10-28 ASSESSMENT — PAIN DESCRIPTION - PAIN TYPE
TYPE: ACUTE PAIN
TYPE: ACUTE PAIN

## 2020-10-28 ASSESSMENT — PAIN DESCRIPTION - FREQUENCY: FREQUENCY: INTERMITTENT

## 2020-10-28 ASSESSMENT — PAIN DESCRIPTION - ONSET: ONSET: ON-GOING

## 2020-10-28 ASSESSMENT — PAIN DESCRIPTION - DESCRIPTORS: DESCRIPTORS: DISCOMFORT

## 2020-10-28 NOTE — PLAN OF CARE
Apnea monitor report scanned in-waiting for Dr. Tamra De Jesus to see it.  Please advise asap Problem: Mobility - Impaired:  Goal: Mobility will improve  Description: Mobility will improve  Outcome: Ongoing     Problem: Falls - Risk of:  Goal: Will remain free from falls  Description: Will remain free from falls  Outcome: Ongoing     Problem: COMMUNICATION IMPAIRMENT  Goal: Ability to express needs and understand communication  Outcome: Ongoing     Problem: Pain:  Description: Pain management should include both nonpharmacologic and pharmacologic interventions.   Goal: Pain level will decrease  Description: Pain level will decrease  Outcome: Ongoing     Problem: Nutrition  Goal: Optimal nutrition therapy  Outcome: Ongoing

## 2020-10-28 NOTE — PROGRESS NOTES
Stanley Hogan  10/28/2020  [id number]    Chief Complaint: Acute ischemic stroke ([address]    Subjective:   No overnight events. No current complaints. Participating well in therapy. Wanting to go home soon. Hyponatremia mildly improved today. ROS: No CP, SOB, dyspnea    Objective:  Patient Vitals for the past 24 hrs:   BP Temp Temp src Pulse Resp SpO2   10/28/20 0900 (!) 146/70 98.1 °F (36.7 °C) -- 93 16 --   10/28/20 0614 -- -- -- -- 16 95 %   10/27/20 2200 131/84 97.7 °F (36.5 °C) Oral 79 16 97 %   10/27/20 1928 -- -- -- -- 16 93 %     Gen: No distress, pleasant. Resting in chair  HEENT: Normocephalic, atraumatic. CV: Regular rate and rhythm. No MRG   Resp: No respiratory distress. CTAB   Abd: Soft, nontender nondistended  Ext: No edema. Neuro: Alert, aphasic appropriately interactive. Laboratory data: Available via EMR. Therapy progress:  PT  Position Activity Restriction  Other position/activity restrictions: Stanley Hogan is a 65 y.o. female presents to the emergency department today for evaluation for right-sided facial droop, right arm weakness. The patient states that she woke up around 730 this morning, around 730/8 AM this morning she noticed that her face was drooping when she went to the restroom and she states that she noticed some weakness to her right arm. EMS was called. The patient is a history of A. fib, she is on aspirin and Plavix. She is not on any blood thinners. The patient believes that she was awake, and then her symptoms started. TPA given 10/20. Today, s/p tPA exam is clinically improved with an NIHSS of 5. She has mild expressive aphasia and dysarthria, but is able to follow commands. Her right sided weakness is resolved. She also has some mild sensory loss on the right side.   Objective     Sit to Stand: Contact guard assistance  Stand to sit: Contact guard assistance  Bed to Chair: Minimal assistance  Device: Rolling Walker  Assistance: Contact guard assistance  Distance: 175 ft  OT  PT Equipment Recommendations  Equipment Needed: No  Other: Patient has RW at home. Toilet - Technique: Ambulating  Equipment Used: Standard toilet  Toilet Transfers Comments: w/RW; grab bar on L side  Assessment        SLP  Current Diet : Regular(Upgraded to regular diet previous date)  Current Liquid Diet : Thin  Diet Solids Recommendation: Regular  Liquid Consistency Recommendation: Thin    Body mass index is 27.19 kg/m². Assessment:  Patient Active Problem List   Diagnosis    Lumbar radiculopathy    [facility]    Received intravenous tissue plasminogen activator (tPA) in emergency department    HTN (hypertension), benign    Nontraumatic cortical hemorrhage of left cerebral hemisphere ([facility]    Dyslipidemia    PAF (paroxysmal atrial fibrillation) (HCC)    Acute ischemic stroke ([facility]       Plan:   Acute ischemic infarct: s/p tPA. ASA, statin. PT/OT/SLP     Aphasia: SLP     ICA occlusion and stenosis: FU with neuro regarding DAPT.     DM: amaryl 4, metformin 2000 daily, nesina 25, Refused SSI. Monitor on routine blood work     HTN: toprol 50       HLD: lipitor 40     Depression: zoloft 100 per home regimen     Afib: holding AC at this time due to fall risk     Bowels: Per protocol  Bladder: Per protocol   Sleep: Trazodone provided prn. Pain: tylenol, tramadol   DVT PPx: ambulating >250'    Uvaldo Drake MD 10/28/2020, 9:13 AM    * This document was created using dictation software. While all precautions were taken to ensure accuracy, errors may have occurred. Please disregard any typographical errors.

## 2020-10-28 NOTE — PROGRESS NOTES
Occupational Therapy  Facility/Department: [facility] ACUTE REHAB UNIT  Daily Treatment Note  NAME: Cuate Colvin  : 1950  MRN: [id number]    Date of Service: 10/28/2020    Discharge Recommendations:  Home with Home health OT, S Level 3  OT Equipment Recommendations  Equipment Needed: Yes  Mobility Devices: ADL Assistive Devices  ADL Assistive Devices: Transfer Tub Bench    Assessment   Performance deficits / Impairments: Decreased functional mobility ; Decreased ADL status; Decreased safe awareness;Decreased high-level IADLs;Decreased endurance;Decreased fine motor control  Assessment: Pt is below baseline level of occupational function. Pt would benefit from intensive OT services to address above deficits. pt is progressing toward ADL goals and is able to complete tasks w/ SBA/SUP. Pt demonstrates increased ability to complete functional transfers/mobility, pt still limited by fatigue however noted to improve this date. FMC/manual dexterity deficits progressing. Treatment Diagnosis: Decreased functional mobility, ADL/IADL status, endurance, safety awareness, and fine motor control associated w/acute L CVA  Prognosis: Good  OT Education: OT Role;Plan of Care;IADL Safety  Patient Education: pt verbalized understanding however would benefit from continued reinforcement to ensure independent understanding  REQUIRES OT FOLLOW UP: Yes  Activity Tolerance  Activity Tolerance: Patient Tolerated treatment well  Safety Devices  Safety Devices in place: Yes  Type of devices: Left in chair;Call light within reach; Chair alarm in place; All fall risk precautions in place         Patient Diagnosis(es): CVA     has a past medical history of Actinic keratosis, Arthritis, Asthma, Atrial fibrillation ([address] CAD (coronary artery disease), Depression, Diabetes mellitus ([address] Disc displacement, lumbar, Diverticulosis of colon, Hard of hearing, Hyperlipidemia, Hypertension, Ischemic heart disease, Past heart attack, Poor vision, Right leg weakness, and Scoliosis. has a past surgical history that includes Coronary artery bypass graft;  section; Appendectomy; ovarian cyst removal; Hand surgery; Cardiac surgery (); skin biopsy; vascular surgery; Tonsillectomy; Lumbar disc surgery (8-); Lumbar spine surgery (10-11-12); and back surgery. Restrictions  Restrictions/Precautions  Restrictions/Precautions: Fall Risk, Modified Diet(fall risk, carb control diet, no straws)  Required Braces or Orthoses?: No  Position Activity Restriction  Other position/activity restrictions: Connie Armando is a 65 y.o. female presents to the emergency department today for evaluation for right-sided facial droop, right arm weakness. The patient states that she woke up around 730 this morning, around 730/8 AM this morning she noticed that her face was drooping when she went to the restroom and she states that she noticed some weakness to her right arm. EMS was called. The patient is a history of A. fib, she is on aspirin and Plavix. She is not on any blood thinners. The patient believes that she was awake, and then her symptoms started. TPA given 10/20. Today, s/p tPA exam is clinically improved with an NIHSS of 5. She has mild expressive aphasia and dysarthria, but is able to follow commands. Her right sided weakness is resolved. She also has some mild sensory loss on the right side.     Subjective   General  Chart Reviewed: Yes  Patient assessed for rehabilitation services?: Yes  Additional Pertinent Hx: Afib, CAD s/p CABG, DM2  Response to previous treatment: Patient with no complaints from previous session  Family / Caregiver Present: No  Diagnosis: Acute CVA (left)  Subjective  Subjective: Pt supine in bed at entry, agreeable to OT session  General Comment  Comments: pt w/ expressive aphasia  Vital Signs  Patient Currently in Pain: Denies        Objective    ADL  Additional Comments: Pt declined all ADL needs     Instrumental ADL's  Instrumental ADLs: Yes  Health Management  Health Management Level of Assistance: Supervision  Health Management: simulated medication management task to assess pt safety- pt sorted 6 medications according to lables, pt able to accurately count and read all lables, task modified to simulate morning/AM medication only secondary to pt not having PM/nightly at home, pt initiated self-checking and verbalized organizational method as she completed task, pt has mailed/home delivery of medications at baseline and verbalized safe disposal method w/ yes/no response to therapist questions. Pt with difficulty opening all medication bottles and manipulating small beads (\"pills\")- educated on twist top options, plan to continue FMC/manual dexterity tasks to improve. Balance  Sitting Balance: Supervision  Standing Balance: Stand by assistance(SBA-SUP)  Standing Balance  Time: ~8min total  Activity: functional mobility, transfers  Comment: no LOB noted    Functional Mobility  Functional - Mobility Device: Rolling Walker  Activity: Other(to/from dining area)  Assist Level: Stand by assistance  Functional Mobility Comments: 120ft x2    Bed mobility  Supine to Sit: Supervision  Scooting: Supervision  Comment: HOB slightly elevated  Transfers  Sit to stand: Supervision  Stand to sit: Supervision  Transfer Comments: consistent safe hand placement/RW safety              Cognition  Overall Cognitive Status: [facility]               Additional Activities Comment  Additional Activities: [address] and pinch/ strengthening- pt removed 7 small items from medium resistance putty- increased time required and Rafael to locate last small bead in putty.                           Plan   Plan  Times per week: 60 min per day/5-7 days/wk  Times per day: Daily  Plan weeks: 1-2 weeks  Current Treatment Recommendations: Strengthening, ROM, Balance Training, Functional Mobility Training, Endurance Training, Safety Education & Training, Self-Care / ADL, Patient/Caregiver Education & Training, Equipment Evaluation, Education, & procurement               Goals  Short term goals  Time Frame for Short term goals: 1-2 weeks  Short term goal 1: Mod I for functional mobility for ADL tasks w/RW  Short term goal 2: Mod I for functional transfers to ADL surfaces w/RW  Short term goal 3: Mod I UB bathing/dressing  Short term goal 4: Mod I LB bathing dressing  Short term goal 5: Mod I toileting  Long term goals  Time Frame for Long term goals : LTG=STG       Therapy Time   Individual Concurrent Group Co-treatment   Time In 0730         Time Out 0830         Minutes 60           Timed Code Treatment Minutes:  60  Total Treatment Minutes:  [address], OTR/L #[id number]

## 2020-10-28 NOTE — PATIENT CARE CONFERENCE
[facility]  Inpatient Rehabilitation  Weekly Team Conference Note    Patient Name: Benoit Lo        MRN: [id number]    : 1950  (65 y.o.)  Gender: female      Diagnosis: CVA    The team conference for this patient was held on 10/29/20 at 11:00am by:  Pari Colin MD    CASE MANAGEMENT:  Assessment:     PSYCHOLOGY:  Assessment:     PHYSICAL THERAPY:    Bed Mobility:   Scooting: Supervision    Transfers:  Sit to Stand: Contact guard assistance  Stand to sit: Contact guard assistance  Bed to Chair: Minimal assistance  Comment: 1st session:  See above functional mobility. In addition patient completes static SARWAT standing balance activity on airex without UE support: reaching outside SARWAT and across midline with dual task cognitive activity. Patient maintains balance at [facility] but requires extensive assist for cognitive component of task. Standing PNF chop pattern with swiss ball 2 x 5 ea. Ambulation 1  Surface: level tile  Device: Rolling Walker  Assistance: Contact guard assistance  Quality of Gait: reciprocal pattern with mild lateral trunk sway. consistent SARWAT with no evidence of balance loss.   Gait Deviations: Slow Pooja, Increased SARWAT, Decreased step length, Decreased step height  Distance: 175 ft  Comments: Pt distractable by surrounding enviornment throughout ambulation    QM:  Roll Left and Right  Assistance Needed: Independent  CARE Score: 6  Discharge Goal: Independent  Sit to Lying  Assistance Needed: Supervision or touching assistance  CARE Score: 4  Discharge Goal: Independent  Lying to Sitting on Side of Bed  Assistance Needed: Supervision or touching assistance  CARE Score: 4  Discharge Goal: Independent  Sit to Stand  Assistance Needed: Supervision or touching assistance  CARE Score: 4  Discharge Goal: Independent  Chair/Bed-to-Chair Transfer  Assistance Needed: Partial/moderate assistance  CARE Score: 3  Discharge Goal: Independent  Car Transfer  Assistance Needed: Partial/moderate assistance  CARE Score: 3  Discharge Goal: Independent  Walk 10 Feet  Assistance Needed: Partial/moderate assistance  CARE Score: 3  Discharge Goal: Independent  Walk 50 Feet with Two Turns  Assistance Needed: Partial/moderate assistance  CARE Score: 3  Discharge Goal: Independent  Walk 150 Feet  Reason if not Attempted: Not attempted due to medical condition or safety concerns  CARE Score: 88  Walking 10 Feet on Uneven Surfaces  Assistance Needed: Partial/moderate assistance  CARE Score: 3  Discharge Goal: Independent  1 Step (Curb)  Assistance Needed: Partial/moderate assistance  CARE Score: 3  Discharge Goal: Independent  4 Steps  Reason if not Attempted: Patient refused  CARE Score: 7  Discharge Goal: Independent  12 Steps  Reason if not Attempted: Patient refused  CARE Score: 7  Discharge Goal: Independent  Picking Up Object  Assistance Needed: Partial/moderate assistance  CARE Score: 3  Discharge Goal: Independent  Wheelchair Ability  Uses a Wheelchair and/or Scooter?: No    SPEECH THERAPY:    Diet Level:DIET CARB CONTROL; No Drinking Straw  Dietary Nutrition Supplements: Low Calorie High Protein Supplement    Assessment: Pt making progress towards goals, specifically with functional communication. Demonstrating improvements in reading comprehension and written expression which she uses to repair breakdowns related to word finding and paraphasic errors. Pt demonstrates signs of apraxia related to poor repetition, oral groping and inconsistent speech errors. Pt has moderate to significant paraphasias (phonemic and semantic) for structured speech tasks. Benefits from lead in phrase and visual cues. Pt will continue to benefit from intensive SLP treatment in the inpatient setting and upon d/c from the ARU. Pt actively completes and works on speech tasks outside of treatment.      OCCUPATIONAL THERAPY:    ADL:   ADL  Feeding: None  Grooming: Setup, Stand by assistance(hair brushing in stance at sink; face washing and oral care seated w/ set-up)  UE Bathing: Setup, Supervision  LE Bathing: Setup, Supervision  UE Dressing: Setup, Supervision  LE Dressing: Setup, Supervision  Toileting: Setup, Supervision  Additional Comments: Pt declined all ADL needs    Toilet Transfers: Toilet Transfers  Toilet - Technique: Ambulating  Equipment Used: Standard toilet  Toilet Transfer: Stand by assistance  Toilet Transfers Comments: w/RW; grab bar on L side    Tub/ShowerTransfers:  Tub Transfers  Tub - Transfer From: [place] [employer]  Tub - Transfer Type: To and From  Tub - Transfer To: Transfer tub bench  Tub - Technique: Ambulating  Tub Transfers: Contact guard  Tub Transfers Comments: dry tub transfer to simulate home set-up, pt stating her Dtr is planning on purchasing a TTB for pt at d/c, cues for technique, CGA to/from edge of TTB, SBA for lifting LEs over lip of tub  Shower Transfers  Shower - Transfer From: [place] - Transfer Type: To and From  Shower - Transfer To:  Shower seat with back  Shower - Technique: Ambulating  Shower Transfers: Stand by assistance    QM:  Eating  Assistance Needed: Setup or clean-up assistance  CARE Score: 5  Discharge Goal: Independent  Oral Hygiene  Assistance Needed: Setup or clean-up assistance  CARE Score: 5  Discharge Goal: Independent  Toileting Hygiene  Assistance Needed: Supervision or touching assistance  CARE Score: 4  Discharge Goal: Independent  Toilet Transfer  Assistance Needed: Supervision or touching assistance  CARE Score: 4  Discharge Goal: Independent  Shower/Bathe Self  Assistance Needed: Supervision or touching assistance  CARE Score: 4  Discharge Goal: Independent  Upper Body Dressing  Assistance Needed: Setup or clean-up assistance  CARE Score: 5  Discharge Goal: Independent  Lower Body Dressing  Assistance Needed: Supervision or touching assistance  CARE Score: 4  Discharge Goal: Independent  Putting On/Taking Off Footwear  Assistance Needed: Setup or clean-up assistance  Comment: socks only  CARE Score: 5  Discharge Goal: Independent    NUTRITION:  Weight: 153 lb 8 oz (69.6 kg) / Body mass index is 27.19 kg/m². Diet Order: CCC  Supplements:Ensure HP BID    Fair po intake. Dislikes Ensure HP & declines ONS @ this time. Con't to encourage po intake. Please see nutrition note for details. NURSING[de-identified]     [place] Fall Risk Score:40  Wounds/Incisions/Ulcers  Pain: with patient  Consultations/Labs/X-rays: CBC every MWF      BMP every MWF     Risk for Readmission: 11%  Critical Items: If High Risk, consider the following recommendations:Home Health Nursing    Patient/Family Education provided by team:    Discharge Plan   Estimated Length of Stay:8 days  Destination: home health  Pass:No  Services at Discharge:  [facility] OT/PT S3  Equipment at Discharge: TTB, RW  Factors facilitating achievement of predicted outcomes:  high PLOF, motivated   Barriers to the achievement of predicted outcomes: aphasia  Patient Goals:Pt will return to home at baseline function,  ,     Rehab Team Members in attendance for Team Conference:  Maddi Mcallister, MSW, LSW    Mike Albarran RD, LD    [place], [place]. D, Neuropsychologist     Ana Maria Hernandez, OTR/L    Teresita Crowley, PT, DPT    Alisson Weems M.A., [address] JACQUELINE Keen PT, DPT,     I approve the established interdisciplinary plan of care as documented within the medical record of Luca Correaangel.     Phil Daily MD  Electronically signed by Phil Daily MD on 10/29/2020 at 11:58 AM

## 2020-10-28 NOTE — PROGRESS NOTES
Physical Therapy  Facility/Department: [facility] ACUTE REHAB UNIT  Daily Treatment Note  NAME: Rei Ventura  : 1950  MRN: [id number]    Date of Service: 10/28/2020    Discharge Recommendations:  S Level 3, Home with Home health PT   PT Equipment Recommendations  Equipment Needed: No  Other: Patient has RW at home. Assessment   Body structures, Functions, Activity limitations: Decreased functional mobility ; Decreased strength;Decreased endurance;Decreased balance;Decreased sensation;Decreased ADL status; Decreased posture;Decreased safe awareness;Decreased cognition  Assessment: Patient demonstrating further improvements in standing balance and ambulation distance. Pt presenting with decreased distractability  but continues to require verbal cueing for safe sequence throughout mobility tasks. Pt would benefit from continued skilled therapy services to regain baseline functional independence  Treatment Diagnosis: Decreased functional mobility with transfers and ambulation, decreased balance/strength/endurance  Prognosis: Good  PT Education: PT Role;Plan of Care;Goals;Transfer Training;General Safety; Functional Mobility Training;Gait Training  Patient Education: Use of call light for room mobility. Will continue to reinforce in upcoming sessions. Barriers to Learning: expressive aphasia, cognition  REQUIRES PT FOLLOW UP: Yes  Activity Tolerance  Activity Tolerance: Patient Tolerated treatment well     Patient Diagnosis(es): CVA     has a past medical history of Actinic keratosis, Arthritis, Asthma, Atrial fibrillation ([address] CAD (coronary artery disease), Depression, Diabetes mellitus ([address] Disc displacement, lumbar, Diverticulosis of colon, Hard of hearing, Hyperlipidemia, Hypertension, Ischemic heart disease, Past heart attack, Poor vision, Right leg weakness, and Scoliosis. has a past surgical history that includes Coronary artery bypass graft;  section;  Appendectomy; ovarian cyst removal; Hand surgery; Cardiac surgery (1984); skin biopsy; vascular surgery; Tonsillectomy; Lumbar disc surgery (8-); Lumbar spine surgery (10-11-12); and back surgery. Restrictions  Restrictions/Precautions  Restrictions/Precautions: Fall Risk, Modified Diet(fall risk, carb control diet, no straws)  Required Braces or Orthoses?: No  Position Activity Restriction  Other position/activity restrictions: Deirdre Gilmore is a 65 y.o. female presents to the emergency department today for evaluation for right-sided facial droop, right arm weakness. The patient states that she woke up around 730 this morning, around 730/8 AM this morning she noticed that her face was drooping when she went to the restroom and she states that she noticed some weakness to her right arm. EMS was called. The patient is a history of A. fib, she is on aspirin and Plavix. She is not on any blood thinners. The patient believes that she was awake, and then her symptoms started. TPA given 10/20. Today, s/p tPA exam is clinically improved with an NIHSS of 5. She has mild expressive aphasia and dysarthria, but is able to follow commands. Her right sided weakness is resolved. She also has some mild sensory loss on the right side. Subjective   General  Chart Reviewed: Yes  Additional Pertinent Hx: DM, asthma, depression, CAD, CABG  Response To Previous Treatment: Not applicable  Family / Caregiver Present: No  Subjective  Subjective: Pt reporting 1/10 low back pain with use of communication board. Pain patch in place to area which appears to be assisting in pain management. General Comment  Comments: Pt seated in recliner upon arrival.  Agreeable to PT session.      Objective   Transfers  Sit to Stand: Contact guard assistance  Stand to sit: Contact guard assistance  Stand Pivot Transfers: Contact guard assistance(RW for external support)  Comment: VC for hand placement  Ambulation  Ambulation?: Yes  Ambulation 1  Surface: level tile  Device: Rolling Walker  Assistance: Contact guard assistance  Quality of Gait: reciprocal pattern with mild lateral trunk sway. consistent SARWAT with no evidence of balance loss. Distance: 185 ft + 185 ft + short distances within session. Stairs/Curb  Stairs?: Yes  Stairs  # Steps : 12  Stairs Height: 6\"  Rails: Bilateral  Assistance: Contact guard assistance  Comment: Reciprocal pattern with (B) HR     Balance  Posture: Good  Sitting - Static: Good  Sitting - Dynamic: Good;-  Standing - Static: Fair  Standing - Dynamic: Fair;-  Other exercises  Other exercises?: Yes  Other exercises 1: Standing swiss ball rotation x 10 ea  Other exercises 2: standing swiss ball ground to overhead x 10      Comment: 1st session:  See above functional mobility and exercise. 2nd session:  Pt seated in recliner upon arrival, reporting 1/10 low back pain but moderate fatigue, agreeable to PT session. Sit <=> stand transfers completed throughout session at [facility]. Pt ambulates 140 ft + 140 ft  within session using RW at [facility] with same mechanics as first session. Seated stepper L1 x *7 min to improve LE strength and cardiovascular endurance. Static standing balance completed 2 x 30 sec ea including: narrow SARWAT with EO (CGA), narrow SARWAT with EC (CGA), tandem with EO (min/mod A). Upon completion of therapy session, pt requests to return to bed. Sit => supine transfer completed at [facility]. Pt in bed with bed alarm activated and call light within reach. Goals  Short term goals  Time Frame for Short term goals: 7-14 days  Short term goal 1: Patient will perform bed mobility MOD I. Short term goal 2: Patient will perform sit-stand MOD I w/ LRAD. Short term goal 3: Patient will perform bed-chair transfer MOD I w/ LRAD. Short term goal 4: Patient will perform 150' of ambulation MOD I w/ LRAD. Short term goal 5: Patient will perform car transfer MOD I w/ LRAD.   Patient Goals   Patient goals : Pt will return to home at baseline function    Plan    Plan  Times per week: 60 minutes/day, 5 days/week  Times per day: Daily  Plan weeks: 1-2 weeks  Current Treatment Recommendations: Strengthening, Balance Training, Functional Mobility Training, Transfer Training, Gait Training, Safety Education & Training, Neuromuscular Re-education, Endurance Training, Patient/Caregiver Education & Training, Equipment Evaluation, Education, & procurement, ADL/Self-care Training, Stair training  Safety Devices  Type of devices:  All fall risk precautions in place, Gait belt, Call light within reach, Chair alarm in place, Left in chair  Restraints  Initially in place: No     Therapy Time   Individual Concurrent Group Co-treatment   Time In 1000         Time Out 1030         Minutes 30         Timed Code Treatment Minutes: 30 Minutes       Second Session Therapy Time:   Individual Concurrent Group Co-treatment   Time In 1353         Time Out 1425         Minutes 32           Timed Code Treatment Minutes:  30 + 32    Total Treatment Minutes:  62 minutes    Marian Umaña PT, DPT - [id number]

## 2020-10-28 NOTE — PROGRESS NOTES
ACUTE REHAB UNIT  SPEECH/LANGUAGE PATHOLOGY      [x] Daily  [] Weekly Care Conference Note  [] Discharge    [facility]     NLK:4/17/3697  GSE:[id number]  Room #: DML-7823/7909-93   Rehab Dx/Hx: Acute ischemic stroke [facility]) [I63.9]  Date of Admit: 10/23/2020      Precautions: falls and aspirations  Home situation: Pt lives at home alone. Was independent prior to admission   ST Dx: Aphasia (expressive and receptive) with apraxia of speech; Oropharyngeal dysphagia (resolved)     Daily Treatment Info:   Date: 10/28/2020   Tx session 1 Tx session 2   Pain None reported  None reported    Subjective     Pt up in chair. Pt was alert and willing to participate. Phone call made to pt's daughter with pt to discuss progress, plan of care, aphasia and apraxia, communication methods, effective strategies, and recommendations for assistance upon d/c. Also encouraged use of life alert / fall alert upon d/c. Pt up in chair. Engaged in treatment. Attempted to provide additional speech therapy time however upon return pt was tired and wanted to rest.      Objective:  Goals     Pt will tolerate recommended diet with no overt s/s of aspiration. GOAL MET    GOAL MET      Patient will increase object/picture identification to > 53% accuracy given min verbal cues. Goal not targeted this session. Goal not targeted this session. Patient will improve recognition of body parts to 80% accuracy given min verbal cues. Body part ID  - limited presentations   - 50%   - demonstrated appropriate recall of previous attempts with this task   - apraxia and auditory comprehension continue to limit accuracy    Goal not targeted this session.      Patient will verbalize automatic speech tasks with 80% accuracy given min verbal cues   Automatics:   - ABC's:  92%, given min cues for initiation     Melodic intonation    - Lynn  (per pt preference); able to maintain catarino with mod-significant phonemic errors Pt will name object/ pictures with 60% accuracy given min cues. Goal not targeted this session. Goal not targeted this session. Pt will complete basic expressive language tasks (sentence completion, responsive naming, etc) to 80% accuracy with min cues   Functional description:   - required mod cues for enhancing description for communication with daughter (pt utilizing gestures to SLP to explain clothing/needs to daughter on the phone)    Functional description:   - pt utilized combination of verbalization and spelling to indicate preferences   - pt with moderate errors, word finding and paraphasis; encouraged use of letter board and gestures to repair; intermittently aware of errors      Other areas targeted: Oral reading of written words:   - 60% accuracy   - improved to 100% given mod cues    Reading comprehension at the word level:   - word ID f/3 from description: 100% accuracy (10/10)     Single word repetition combined with oral reading:   - 0/10 Independently    - 7/10 using lead in phrase cues   - 3/10 unable to repeat despite max cues      Education:   See above, education provided to pt and pt's daughter. Pt and daughter verbalized understanding   Provided education re rationale for treatment tasks and plan of care. Safety Devices: [x] Call light within reach  [x] Chair alarm activated  [] Bed alarm activated  [] Other: [x] Call light within reach  [x] Chair alarm activated  [] Bed alarm activated  [] Other:      Assessment:   Speech Therapy Diagnosis  Aphasia Diagnosis: Severe expressive aphasia and moderate receptive aphasia    Current Diet Order: DIET CARB CONTROL; No Drinking Straw  Dietary Nutrition Supplements: Low Calorie High Protein Supplement   Recommended Form of Meds: Meds in puree  Compensatory Swallowing Strategies:  Alternate solids and liquids, Eat/Feed slowly, No straws, Upright as possible for all oral intake, Small bites/sips     Plan:     Frequency:  5days/week   60 minutes/day  Discharge Recommendations:   Barriers: aphasia and apraxia of speech    Discharge Recommendations:  [] Home independently  [x] Home with assistance []  24 hour supervision  [] SNF [] Other:  Continued SLP Treatment:  [x] Yes [] No [] TBD based on progress while on ARU [] Vital Stim indicated [] Other:   Estimated discharge date: 11/6/2020      Type of Total Treatment Minutes   Session 1   Session 2   Time In 0830 0930   Time Out 0900 1000   Timed Code Minutes      Individual Treatment Minutes  30 30   Co-Treatment Minutes      Group Treatment Minutes      Concurrent Treatment Minutes        TOTAL DAILY MINUTES:  60    Electronically Signed by     Dorina Wilson M.A.  CCC-SLP S.PSarita [id number]  Speech-Language Pathologist [phone]  10/28/2020 9:10 AM

## 2020-10-29 LAB
GLUCOSE BLD-MCNC: 100 MG/DL (ref 70–99)
PERFORMED ON: ABNORMAL

## 2020-10-29 PROCEDURE — 97535 SELF CARE MNGMENT TRAINING: CPT

## 2020-10-29 PROCEDURE — 94640 AIRWAY INHALATION TREATMENT: CPT

## 2020-10-29 PROCEDURE — 97112 NEUROMUSCULAR REEDUCATION: CPT

## 2020-10-29 PROCEDURE — 97530 THERAPEUTIC ACTIVITIES: CPT

## 2020-10-29 PROCEDURE — 94761 N-INVAS EAR/PLS OXIMETRY MLT: CPT

## 2020-10-29 PROCEDURE — 97116 GAIT TRAINING THERAPY: CPT

## 2020-10-29 PROCEDURE — 97110 THERAPEUTIC EXERCISES: CPT

## 2020-10-29 PROCEDURE — 1280000000 HC REHAB R&B

## 2020-10-29 PROCEDURE — 92507 TX SP LANG VOICE COMM INDIV: CPT

## 2020-10-29 PROCEDURE — 6370000000 HC RX 637 (ALT 250 FOR IP): Performed by: PHYSICAL MEDICINE & REHABILITATION

## 2020-10-29 RX ADMIN — ALOGLIPTIN 25 MG: 25 TABLET, FILM COATED ORAL at 08:30

## 2020-10-29 RX ADMIN — GLIMEPIRIDE 4 MG: 2 TABLET ORAL at 06:07

## 2020-10-29 RX ADMIN — SERTRALINE HYDROCHLORIDE 100 MG: 50 TABLET ORAL at 08:25

## 2020-10-29 RX ADMIN — Medication 2 PUFF: at 06:10

## 2020-10-29 RX ADMIN — DESMOPRESSIN ACETATE 40 MG: 0.2 TABLET ORAL at 21:32

## 2020-10-29 RX ADMIN — Medication 2 PUFF: at 20:31

## 2020-10-29 RX ADMIN — METFORMIN HYDROCHLORIDE 2000 MG: 500 TABLET, EXTENDED RELEASE ORAL at 08:30

## 2020-10-29 RX ADMIN — METOPROLOL SUCCINATE 50 MG: 50 TABLET, EXTENDED RELEASE ORAL at 08:26

## 2020-10-29 ASSESSMENT — PAIN DESCRIPTION - PROGRESSION

## 2020-10-29 ASSESSMENT — PAIN SCALES - GENERAL
PAINLEVEL_OUTOF10: 0
PAINLEVEL_OUTOF10: 0

## 2020-10-29 NOTE — PROGRESS NOTES
ACUTE REHAB UNIT  SPEECH/LANGUAGE PATHOLOGY      [x] Daily  [x] Weekly Care Conference Note  [] Discharge    [facility]     CHRIS:[birth year]  SQO:[id number]  Room #: BHD-6256/2607-86   Rehab Dx/Hx: Acute ischemic stroke [facility]) [I63.9]  Date of Admit: 10/23/2020      Precautions: falls and aspirations  Home situation: Pt lives at home alone. Was independent prior to admission   ST Dx: Aphasia (expressive and receptive) with apraxia of speech; Oropharyngeal dysphagia (resolved)     Daily Treatment Info:   Date: 10/29/2020   Tx session 1 Tx session 2   Pain None reported  None reported    Subjective     Pt seen sitting upright in recliner. Pt alert and agreeable to tx. Pt highly concerned with paying mortgage and VISA bills.  to follow-up with daughter regarding bill pay. Pt seen up in recliner. Engaged and agreeable to treatment. Pt with intermittent sadness d/t sister's situation and wanting to be with family members, no longer wanting to be in hospital.    Weekly Update: Pt making progress towards goals, specifically with functional communication. Demonstrating improvements in reading comprehension and written expression which she uses to repair breakdowns related to word finding and paraphasic errors. Pt demonstrates signs of apraxia related to poor repetition, oral groping and inconsistent speech errors. Pt has moderate to significant paraphasias (phonemic and semantic) for structured speech tasks. Benefits from lead in phrase and visual cues. Pt will continue to benefit from intensive SLP treatment in the inpatient setting and upon d/c from the ARU. Pt actively completes and works on speech tasks outside of treatment. Objective:  Goals     Pt will tolerate recommended diet with no overt s/s of aspiration. GOAL MET    GOAL MET      Patient will increase object/picture identification to > 59% accuracy given min verbal cues.    Identified written words in crossword puzzle: 5/5, 100% accy, indep Did not target this session    Continue, ongoing    Patient will improve recognition of body parts to 80% accuracy given min verbal cues. Not targeted this session ID body parts: 42% accy (5/12), indep  -most difficult with \"hands/nose\"    Continue, ongoing    Patient will verbalize automatic speech tasks with 80% accuracy given min verbal cues   Automatics:   - first and last name: 83% accy, (5/6) given min-mod cues  -Month of birthday: 50% accy, (5/10) given mod cues  -: 1950, required max cues to state \"\"     -Pt with increased success with writing down words when difficulty to produce orally    Automatics:  DENAE: 1 time through, once attempted a second time, pt perseverated on VIOLETTE  VIOLETTE: -April (3/6), 50%;  and added  (), 17% accy; July (), 17% accy  #1-10: independent x1    Continue, ongoing    Pt will name object/ pictures with 60% accuracy given min cues. Goal not targeted this session. Object naming (body parts): named \"hands\" and \"ears\" (2/7 body parts); 29% accy, given mod-max cues    Continue, ongoing      Pt will complete basic expressive language tasks (sentence completion, responsive naming, etc) to 80% accuracy with min cues   Functional description:   - pt reported concern for paying bills for VISA and mortgage and wanting extra sugar for cereal with use of written words to assist with communication. Pt highly concerned with family not being able to see pt in hospital.   -pt with moderate word finding and paraphasias noted.     Pt independently used notepad to assist in language tasks Functional description:   - pt utilized combination of verbalization, spelling and gestures to indicate wants/needs  - pt continues with moderate errors, word finding and paraphasis; encouraged use of letter board and gestures to repair; intermittently aware of errors     Continue, ongoing    Other areas targeted: N/A N/A     Education:   Provided education re rationale for treatment tasks and plan of care. Provided education re rationale for treatment tasks and plan of care. Safety Devices: [x] Call light within reach  [x] Chair alarm activated  [] Bed alarm activated  [] Other: [x] Call light within reach  [x] Chair alarm activated  [] Bed alarm activated  [] Other:      Assessment:   Speech Therapy Diagnosis  Aphasia Diagnosis: Severe expressive aphasia and moderate receptive aphasia    Current Diet Order: DIET CARB CONTROL; No Drinking Straw  Dietary Nutrition Supplements: Low Calorie High Protein Supplement   Recommended Form of Meds: Meds in puree  Compensatory Swallowing Strategies:  Alternate solids and liquids, Eat/Feed slowly, No straws, Upright as possible for all oral intake, Small bites/sips     Plan:     Frequency:  5days/week   60 minutes/day  Discharge Recommendations:   Barriers: aphasia and apraxia of speech    Discharge Recommendations:  [] Home independently  [x] Home with assistance []  24 hour supervision  [] SNF [] Other:  Continued SLP Treatment:  [x] Yes [] No [] TBD based on progress while on ARU [] Vital Stim indicated [] Other:   Estimated discharge date: 11/6/2020      Type of Total Treatment Minutes   Session 1   Session 2   Time In 0900 1245   Time Out 0930 1315   Timed Code Minutes  0 0   Individual Treatment Minutes  30 30   Co-Treatment Minutes      Group Treatment Minutes      Concurrent Treatment Minutes        TOTAL DAILY MINUTES:  60    Electronically Signed by     Katie Alvarez M.S., [address] Pathologist  [phone]

## 2020-10-29 NOTE — PROGRESS NOTES
Physical Therapy  Facility/Department: [facility] ACUTE REHAB UNIT  Daily Treatment Note  NAME: Valeria Loomis  : 1950  MRN: [id number]    Date of Service: 10/29/2020    Discharge Recommendations:  S Level 3, Home with Home health PT   PT Equipment Recommendations  Equipment Needed: No  Other: Patient has RW at home. Assessment   Body structures, Functions, Activity limitations: Decreased functional mobility ; Decreased strength;Decreased endurance;Decreased balance;Decreased sensation;Decreased ADL status; Decreased posture;Decreased safe awareness;Decreased cognition  Assessment: Pt continues to progress in all areas including improved dynamic balance resulting in progression to ambulation without device use. Pt continues to require safety cueing throughout task and presents with mild (R) LE weakness. Pt would benefit from continued skilled therapy services to regain baseline functional independence. Treatment Diagnosis: Decreased functional mobility with transfers and ambulation, decreased balance/strength/endurance  Prognosis: Good  PT Education: PT Role;Plan of Care;Goals;Transfer Training;General Safety; Functional Mobility Training;Gait Training;Disease Specific Education  Patient Education: Pt verbalized understanding. Will continue to reinforce in upcoming sessions. Barriers to Learning: expressive aphasia, cognition  REQUIRES PT FOLLOW UP: Yes  Activity Tolerance  Activity Tolerance: Patient Tolerated treatment well     Patient Diagnosis(es): CVA     has a past medical history of Actinic keratosis, Arthritis, Asthma, Atrial fibrillation ([address] CAD (coronary artery disease), Depression, Diabetes mellitus ([address] Disc displacement, lumbar, Diverticulosis of colon, Hard of hearing, Hyperlipidemia, Hypertension, Ischemic heart disease, Past heart attack, Poor vision, Right leg weakness, and Scoliosis. has a past surgical history that includes Coronary artery bypass graft;   section; Appendectomy; ovarian cyst removal; Hand surgery; Cardiac surgery (1984); skin biopsy; vascular surgery; Tonsillectomy; Lumbar disc surgery (8-); Lumbar spine surgery (10-11-12); and back surgery. Restrictions  Restrictions/Precautions  Restrictions/Precautions: Fall Risk, Modified Diet(fall risk, carb control diet, no straws)  Required Braces or Orthoses?: No  Position Activity Restriction  Other position/activity restrictions: Benoit Lo is a 65 y.o. female presents to the emergency department today for evaluation for right-sided facial droop, right arm weakness. The patient states that she woke up around 730 this morning, around 730/8 AM this morning she noticed that her face was drooping when she went to the restroom and she states that she noticed some weakness to her right arm. EMS was called. The patient is a history of A. fib, she is on aspirin and Plavix. She is not on any blood thinners. The patient believes that she was awake, and then her symptoms started. TPA given 10/20. Today, s/p tPA exam is clinically improved with an NIHSS of 5. She has mild expressive aphasia and dysarthria, but is able to follow commands. Her right sided weakness is resolved. She also has some mild sensory loss on the right side. Subjective   General  Chart Reviewed: Yes  Additional Pertinent Hx: DM, asthma, depression, CAD, CABG  Response To Previous Treatment: Not applicable  Family / Caregiver Present: No  Subjective  Subjective: Pt denies pain. Patient very emotional within session regarding current impairments and tentative d/c date. General Comment  Comments: Pt seated in recliner upon arrival.  Agreeable to PT session. Per RN patient nervous regarding mobility due to earlier episode of bowel incontinence.           Objective   Bed mobility  Rolling to Left: Independent  Rolling to Right: Independent  Supine to Sit: Independent  Sit to Supine: Independent  Scooting: Independent  Comment: Bed Patient will perform bed mobility MOD I. - goal met 10/29/2020  Short term goal 2: Patient will perform sit-stand MOD I w/ LRAD. Short term goal 3: Patient will perform bed-chair transfer MOD I w/ LRAD. Short term goal 4: Patient will perform 150' of ambulation MOD I w/ LRAD. Short term goal 5: Patient will perform car transfer MOD I w/ LRAD. Patient Goals   Patient goals : Pt will return to home at baseline function    Plan    Plan  Times per week: 60 minutes/day, 5 days/week  Times per day: Daily  Plan weeks: 1-2 weeks  Current Treatment Recommendations: Strengthening, Balance Training, Functional Mobility Training, Transfer Training, Gait Training, Safety Education & Training, Neuromuscular Re-education, Endurance Training, Patient/Caregiver Education & Training, Equipment Evaluation, Education, & procurement, ADL/Self-care Training, Stair training  Safety Devices  Type of devices:  All fall risk precautions in place, Gait belt, Call light within reach, Chair alarm in place, Left in chair  Restraints  Initially in place: No     Therapy Time   Individual Concurrent Group Co-treatment   Time In 0830         Time Out 0900         Minutes 30         Timed Code Treatment Minutes: 30 Minutes     Second Session Therapy Time:   Individual Concurrent Group Co-treatment   Time In [facility] 408         Minutes 30           Timed Code Treatment Minutes:  30 + 30     Total Treatment Minutes:  60 minutes    Phoebe Regan PT, DPT - [id number]

## 2020-10-29 NOTE — PROGRESS NOTES
Logan Tobias  10/29/2020  [id number]    Chief Complaint: Acute ischemic stroke [facility])    Subjective:   Reporting diarrhea overnight. No current complaints. Participating well in therapy. Glucoses well controlled. ROS: No CP, SOB, dyspnea    Objective:  Patient Vitals for the past 24 hrs:   BP Temp Temp src Pulse Resp SpO2   10/29/20 0800 132/78 98 °F (36.7 °C) Oral 75 16 98 %   10/29/20 0610 -- -- -- -- -- 96 %   10/28/20 2000 (!) 150/87 98 °F (36.7 °C) Oral 91 16 98 %   10/28/20 0900 (!) 146/70 98.1 °F (36.7 °C) -- 93 16 --     Gen: No distress, pleasant. Resting in bed  HEENT: Normocephalic, atraumatic. CV: Regular rate and rhythm. No MRG   Resp: No respiratory distress. CTAB   Abd: Soft, nontender nondistended  Ext: No edema. Neuro: Alert, aphasic appropriately interactive. Laboratory data: Available via EMR. Therapy progress:  PT  Position Activity Restriction  Other position/activity restrictions: Logan Tobias is a 65 y.o. female presents to the emergency department today for evaluation for right-sided facial droop, right arm weakness. The patient states that she woke up around 730 this morning, around 730/8 AM this morning she noticed that her face was drooping when she went to the restroom and she states that she noticed some weakness to her right arm. EMS was called. The patient is a history of A. fib, she is on aspirin and Plavix. She is not on any blood thinners. The patient believes that she was awake, and then her symptoms started. TPA given 10/20. Today, s/p tPA exam is clinically improved with an NIHSS of 5. She has mild expressive aphasia and dysarthria, but is able to follow commands. Her right sided weakness is resolved. She also has some mild sensory loss on the right side.   Objective     Sit to Stand: Contact guard assistance  Stand to sit: Contact guard assistance  Bed to Chair: Minimal assistance  Device: Rolling Walker  Assistance: Contact guard

## 2020-10-29 NOTE — PROGRESS NOTES
Comprehensive Nutrition Assessment    Type and Reason for Visit:  Reassess    Nutrition Recommendations/Plan:   D/c ONS per pt request    Nutrition Assessment:  Pt is at risk for nutrition compromise AEB report of decreased po intake when pt does not receive foods she likes. Pt dislikes supplements as well. Pt stated staff has not collected meal orders to receive items she would prefer. Staff made aware that pt needs meal orders taken daily to help improve intake. Will con't to encourage po intake & monitor pt's progress. Malnutrition Assessment:  Malnutrition Status:  No malnutrition    Context:  Chronic Illness       Estimated Daily Nutrient Needs:  Energy (kcal):  3957-2060 cals (20-25 cals/70kg); Weight Used for Energy Requirements:  (con't to use 70 kg for est needs)     Protein (g):   gms (1.3-2.0 gms/IBW 52kg); Weight Used for Protein Requirements:  Ideal        Fluid (ml/day):  1 ml/kcal; Method Used for Fluid Requirements:  Current      Nutrition Related Findings:  No edema noted; + BS      Wounds:  None       Current Nutrition Therapies:    DIET CARB CONTROL; No Drinking Straw  Dietary Nutrition Supplements: Low Calorie High Protein Supplement    Anthropometric Measures:  · Height: 5' 3\" (160 cm)  · Current Body Weight: 153 lb (69.4 kg)   · Admission Body Weight:      · Usual Body Weight:       · Ideal Body Weight: 115 lbs; % Ideal Body Weight 133 %   · BMI: 27.1  · Adjusted Body Weight:  ; No Adjustment   · Adjusted BMI:      · BMI Categories: Overweight (BMI 25.0-29. 9)       Nutrition Diagnosis:   · Inadequate oral intake related to inadequate protein-energy intake as evidenced by (poor intake if does not receive desired foods)      Nutrition Interventions:   Food and/or Nutrient Delivery:  Continue Current Diet, Discontinue Oral Nutrition Supplement  Nutrition Education/Counseling:  Education not indicated   Coordination of Nutrition Care:  Continue to monitor while

## 2020-10-29 NOTE — PLAN OF CARE
Problem: Mobility - Impaired:  Goal: Mobility will improve  Description: Mobility will improve  10/29/2020 0818 by Greg Phipps RN  Outcome: Ongoing  10/28/2020 2013 by Andi Severe, RN  Outcome: Ongoing  10/28/2020 1928 by Marissa Valdez RN  Outcome: Ongoing     Problem: Falls - Risk of:  Goal: Will remain free from falls  Description: Will remain free from falls  10/29/2020 0818 by Greg Phipps RN  Outcome: Ongoing  10/28/2020 2013 by Andi Severe, RN  Outcome: Ongoing  10/28/2020 1928 by Marissa Valdez RN  Outcome: Ongoing  Goal: Absence of physical injury  Description: Absence of physical injury  Outcome: Ongoing     Problem: COMMUNICATION IMPAIRMENT  Goal: Ability to express needs and understand communication  10/29/2020 0818 by Greg Phipps RN  Outcome: Ongoing  10/28/2020 2013 by Andi Severe, RN  Outcome: Ongoing  10/28/2020 1928 by Marissa Valdez RN  Outcome: Ongoing     Problem: Pain:  Goal: Pain level will decrease  Description: Pain level will decrease  10/29/2020 0818 by Greg Phipps RN  Outcome: Ongoing  10/28/2020 2013 by Andi Severe, RN  Outcome: Ongoing  10/28/2020 1928 by Marissa Valdez RN  Outcome: Ongoing  Goal: Control of acute pain  Description: Control of acute pain  Outcome: Ongoing  Goal: Control of chronic pain  Description: Control of chronic pain  Outcome: Ongoing     Problem: Nutrition  Goal: Optimal nutrition therapy  10/29/2020 0818 by Greg Phipps RN  Outcome: Ongoing  10/28/2020 1928 by Marissa Valdez RN  Outcome: Ongoing     Problem: Skin Integrity:  Goal: Will show no infection signs and symptoms  Description: Will show no infection signs and symptoms  10/29/2020 0818 by Greg Phipps RN  Outcome: Ongoing  10/28/2020 2013 by Andi Severe, RN  Outcome: Ongoing  Goal: Absence of new skin breakdown  Description: Absence of new skin breakdown  Outcome: Ongoing

## 2020-10-29 NOTE — PROGRESS NOTES
Assist patient up to chair with CGA with walker. Vital signs stable. Assessment completed. See flow sheet. Denies any pain. Will continue to monitor. Chair alarm on. Call light in reach.

## 2020-10-29 NOTE — CARE COORDINATION
Team conference/Weekly Summary     Team conference held today. Met with pt to discuss progress with therapy, barriers to discharge, and plans to return home. Estimated discharge date set for 11/6- pt was told she would only have to be here for 12 days and would prefer to go home on 11/4. Pt's dtrs to pay pt's bills. Will continue to follow for support and discharge planning.  Jefferson Rivera, MSW, LSW

## 2020-10-29 NOTE — PLAN OF CARE
Problem: Mobility - Impaired:  Goal: Mobility will improve  Description: Mobility will improve  10/28/2020 2013 by Tonia Hawkins RN  Outcome: Ongoing     Problem: Falls - Risk of:  Goal: Will remain free from falls  Description: Will remain free from falls  10/28/2020 2013 by Tonia Hawkins RN  Outcome: Ongoing     Problem: COMMUNICATION IMPAIRMENT  Goal: Ability to express needs and understand communication  10/28/2020 2013 by Tonia Hawkins RN  Outcome: Ongoing     Problem: Pain:  Goal: Pain level will decrease  Description: Pain level will decrease  10/28/2020 2013 by Tonia Hawkins RN  Outcome: Ongoing     Problem: Skin Integrity:  Goal: Will show no infection signs and symptoms  Description: Will show no infection signs and symptoms  Outcome: Ongoing

## 2020-10-29 NOTE — PLAN OF CARE
Nutrition Problem #1: Inadequate oral intake  Intervention: Food and/or Nutrient Delivery: Continue Current Diet, Discontinue Oral Nutrition Supplement  Nutritional Goals: PO greater than 50% at meals/supp

## 2020-10-29 NOTE — PROGRESS NOTES
Occupational Therapy  Facility/Department: [facility] ACUTE REHAB UNIT  Daily Treatment Note  NAME: Luca Tovar  : 1950  MRN: [id number]    Date of Service: 10/29/2020    Discharge Recommendations:  Home with Home health OT, S Level 3, Home with assist PRN  OT Equipment Recommendations  ADL Assistive Devices: Transfer Tub Bench    Assessment   Performance deficits / Impairments: Decreased functional mobility ; Decreased ADL status; Decreased safe awareness;Decreased high-level IADLs;Decreased endurance;Decreased fine motor control  Assessment: Pt is below baseline level of occupational function. Pt would benefit from intensive OT services to address above deficits. pt is progressing toward ADL goals and is able to complete tasks w/ SBA/SUP. Pt demonstrates increased ability to complete functional transfers/mobility, pt still limited by fatigue however noted to improve this date. FMC/manual dexterity deficits progressing. Treatment Diagnosis: Decreased functional mobility, ADL/IADL status, endurance, safety awareness, and fine motor control associated w/acute L CVA  Prognosis: Good  OT Education: OT Role;Plan of Care;IADL Safety  Patient Education: pt verbalized understanding however would benefit from continued reinforcement to ensure independent understanding  REQUIRES OT FOLLOW UP: Yes  Activity Tolerance  Activity Tolerance: Patient Tolerated treatment well  Safety Devices  Safety Devices in place: Yes  Type of devices: Call light within reach; Chair alarm in place; Left in chair  Restraints  Initially in place: No         Patient Diagnosis(es): CVA     has a past medical history of Actinic keratosis, Arthritis, Asthma, Atrial fibrillation ([address] CAD (coronary artery disease), Depression, Diabetes mellitus ([address] Disc displacement, lumbar, Diverticulosis of colon, Hard of hearing, Hyperlipidemia, Hypertension, Ischemic heart disease, Past heart attack, Poor vision, Right leg weakness, and Scoliosis.    has a past surgical history that includes Coronary artery bypass graft;  section; Appendectomy; ovarian cyst removal; Hand surgery; Cardiac surgery (); skin biopsy; vascular surgery; Tonsillectomy; Lumbar disc surgery (8-); Lumbar spine surgery (10-11-12); and back surgery. Restrictions  Restrictions/Precautions  Restrictions/Precautions: Fall Risk, Modified Diet(fall risk, carb control diet, no straws)  Required Braces or Orthoses?: No  Position Activity Restriction  Other position/activity restrictions: Rei Ventura is a 65 y.o. female presents to the emergency department today for evaluation for right-sided facial droop, right arm weakness. The patient states that she woke up around 730 this morning, around 730/8 AM this morning she noticed that her face was drooping when she went to the restroom and she states that she noticed some weakness to her right arm. EMS was called. The patient is a history of A. fib, she is on aspirin and Plavix. She is not on any blood thinners. The patient believes that she was awake, and then her symptoms started. TPA given 10/20. Today, s/p tPA exam is clinically improved with an NIHSS of 5. She has mild expressive aphasia and dysarthria, but is able to follow commands. Her right sided weakness is resolved. She also has some mild sensory loss on the right side.   Subjective   General  Chart Reviewed: Yes  Patient assessed for rehabilitation services?: Yes  Additional Pertinent Hx: Afib, CAD s/p CABG, DM2  Response to previous treatment: Patient with no complaints from previous session  Family / Caregiver Present: No  Diagnosis: Acute CVA (left)  Subjective  Subjective: pt in chair on arrival - denies pain - agreeable to session  General Comment  Comments: pt w/ expressive aphasia      Orientation  Orientation  Overall Orientation Status: Within Normal Limits  Objective    ADL  Grooming: Setup;Stand by assistance(in stance at sink to brush teeth, wash hands)  Toileting: Setup;Supervision  Additional Comments: pt reports she already changed clothing/cleaned up this am - ambulated to/from bathroom without device CGA - completed grooming in stance at sink - toileted from standard toilet, bowels loose, RN aware      Cognition  Cognition Comment: significant expressive aphasia noted, attempted identification of simple food items verbally without success, 100% accuracy when given 2 and 3 choices with cue in written form            Second Session:  Pt seated in recliner at entry, agreeable to session and expressing she had a \"rough day\" and feels \"just worse\". Pt denied pain. Pt ambulated to/from bathroom for toileting- SUP/SBA for lc hygiene and clothing management- while seated on commode pt reported nausea however no emesis, hand hygiene in stance at [facility] w/ SBA. RW at [facility] to/from bathroom and recliner>EOB, SBA for sit<>stands. Pt had 3 episodes of nausea w/o active emesis during session. [address] and /pinch strengthening using light/medium resistance theraputty- palmar grasps, pincer grasp, tip-to-tip rotations, twisting/tearing, and log rolling (arthritic changes at baseline to bilat hands)- pt continues to demonstrate overall decreased coordination/manual dexterity in R hand. Pt assisted to bed at end of session, sit>supine w/ SBA. Pt left supine in bed, bed alarm on, call light and needs in reach.       Plan   Plan  Times per week: 60 min per day/5-7 days/wk  Times per day: Daily  Plan weeks: 1-2 weeks  Current Treatment Recommendations: Strengthening, ROM, Balance Training, Functional Mobility Training, Endurance Training, Safety Education & Training, Self-Care / ADL, Patient/Caregiver Education & Training, Equipment Evaluation, Education, & procurement       Goals  Short term goals  Time Frame for Short term goals: 1-2 weeks  Short term goal 1: Mod I for functional mobility for ADL tasks w/RW  Short term goal 2: Mod I for functional transfers to ADL surfaces w/RW  Short term goal 3: Mod I UB bathing/dressing  Short term goal 4: Mod I LB bathing dressing  Short term goal 5: Mod I toileting  Long term goals  Time Frame for Long term goals : LTG=STG       Therapy Time   Individual Concurrent Group Co-treatment   Time In 1035         Time Out 1100         Minutes 25         Timed Code Treatment Minutes: 25 Minutes      Cortez Allen OT   Cortez Khan OTR/L [id number], 10/29/2020, 12:27 PM     Second Session Therapy Time:   Individual Concurrent Group Co-treatment   Time In 6915         Time Out 1459         Minutes 39           Timed Code Treatment Minutes:  39 + 25    Total Treatment Minutes:  64 min total     Penny Oglesby OTR/L #[id number] (documentation and tx for second session)

## 2020-10-30 LAB
ANION GAP SERPL CALCULATED.3IONS-SCNC: 11 MMOL/L (ref 3–16)
BUN BLDV-MCNC: 8 MG/DL (ref 7–20)
CALCIUM SERPL-MCNC: 10.7 MG/DL (ref 8.3–10.6)
CHLORIDE BLD-SCNC: 100 MMOL/L (ref 99–110)
CO2: 25 MMOL/L (ref 21–32)
CREAT SERPL-MCNC: 0.7 MG/DL (ref 0.6–1.2)
ESTIMATED AVERAGE GLUCOSE: 194.4 MG/DL
GFR AFRICAN AMERICAN: >60
GFR NON-AFRICAN AMERICAN: >60
GLUCOSE BLD-MCNC: 143 MG/DL (ref 70–99)
HBA1C MFR BLD: 8.4 %
HCT VFR BLD CALC: 37.7 % (ref 36–48)
HEMOGLOBIN: 12.5 G/DL (ref 12–16)
MCH RBC QN AUTO: 31.3 PG (ref 26–34)
MCHC RBC AUTO-ENTMCNC: 33.1 G/DL (ref 31–36)
MCV RBC AUTO: 94.6 FL (ref 80–100)
PDW BLD-RTO: 13.3 % (ref 12.4–15.4)
PLATELET # BLD: 354 K/UL (ref 135–450)
PMV BLD AUTO: 7.9 FL (ref 5–10.5)
POTASSIUM SERPL-SCNC: 4.3 MMOL/L (ref 3.5–5.1)
RBC # BLD: 3.99 M/UL (ref 4–5.2)
SODIUM BLD-SCNC: 136 MMOL/L (ref 136–145)
WBC # BLD: 7.6 K/UL (ref 4–11)

## 2020-10-30 PROCEDURE — 85027 COMPLETE CBC AUTOMATED: CPT

## 2020-10-30 PROCEDURE — 97110 THERAPEUTIC EXERCISES: CPT

## 2020-10-30 PROCEDURE — 97530 THERAPEUTIC ACTIVITIES: CPT

## 2020-10-30 PROCEDURE — 6370000000 HC RX 637 (ALT 250 FOR IP): Performed by: PHYSICAL MEDICINE & REHABILITATION

## 2020-10-30 PROCEDURE — 94761 N-INVAS EAR/PLS OXIMETRY MLT: CPT

## 2020-10-30 PROCEDURE — 36415 COLL VENOUS BLD VENIPUNCTURE: CPT

## 2020-10-30 PROCEDURE — 97112 NEUROMUSCULAR REEDUCATION: CPT

## 2020-10-30 PROCEDURE — 94640 AIRWAY INHALATION TREATMENT: CPT

## 2020-10-30 PROCEDURE — 97116 GAIT TRAINING THERAPY: CPT

## 2020-10-30 PROCEDURE — 80048 BASIC METABOLIC PNL TOTAL CA: CPT

## 2020-10-30 PROCEDURE — 97535 SELF CARE MNGMENT TRAINING: CPT

## 2020-10-30 PROCEDURE — 83036 HEMOGLOBIN GLYCOSYLATED A1C: CPT

## 2020-10-30 PROCEDURE — 92507 TX SP LANG VOICE COMM INDIV: CPT

## 2020-10-30 PROCEDURE — 1280000000 HC REHAB R&B

## 2020-10-30 RX ORDER — LIDOCAINE 4 G/G
1 PATCH TOPICAL NIGHTLY
Status: DISCONTINUED | OUTPATIENT
Start: 2020-10-30 | End: 2020-11-02 | Stop reason: HOSPADM

## 2020-10-30 RX ADMIN — Medication 2 PUFF: at 06:01

## 2020-10-30 RX ADMIN — DESMOPRESSIN ACETATE 40 MG: 0.2 TABLET ORAL at 21:19

## 2020-10-30 RX ADMIN — ALOGLIPTIN 25 MG: 25 TABLET, FILM COATED ORAL at 07:51

## 2020-10-30 RX ADMIN — Medication 2 PUFF: at 20:37

## 2020-10-30 RX ADMIN — SERTRALINE HYDROCHLORIDE 100 MG: 50 TABLET ORAL at 07:52

## 2020-10-30 RX ADMIN — METOPROLOL SUCCINATE 50 MG: 50 TABLET, EXTENDED RELEASE ORAL at 07:52

## 2020-10-30 RX ADMIN — GLIMEPIRIDE 4 MG: 2 TABLET ORAL at 06:05

## 2020-10-30 RX ADMIN — METFORMIN HYDROCHLORIDE 2000 MG: 500 TABLET, EXTENDED RELEASE ORAL at 07:51

## 2020-10-30 ASSESSMENT — PAIN SCALES - GENERAL
PAINLEVEL_OUTOF10: 0

## 2020-10-30 NOTE — PROGRESS NOTES
removal; Hand surgery; Cardiac surgery (1984); skin biopsy; vascular surgery; Tonsillectomy; Lumbar disc surgery (8-); Lumbar spine surgery (10-11-12); and back surgery. Restrictions  Restrictions/Precautions  Restrictions/Precautions: Fall Risk, Modified Diet(fall risk, carb control diet, no straws)  Required Braces or Orthoses?: No  Position Activity Restriction  Other position/activity restrictions: Estrada Chavarria is a 65 y.o. female presents to the emergency department today for evaluation for right-sided facial droop, right arm weakness. The patient states that she woke up around 730 this morning, around 730/8 AM this morning she noticed that her face was drooping when she went to the restroom and she states that she noticed some weakness to her right arm. EMS was called. The patient is a history of A. fib, she is on aspirin and Plavix. She is not on any blood thinners. The patient believes that she was awake, and then her symptoms started. TPA given 10/20. Today, s/p tPA exam is clinically improved with an NIHSS of 5. She has mild expressive aphasia and dysarthria, but is able to follow commands. Her right sided weakness is resolved. She also has some mild sensory loss on the right side. Subjective   General  Chart Reviewed: Yes  Additional Pertinent Hx: DM, asthma, depression, CAD, CABG  Family / Caregiver Present: No  Subjective  Subjective: Pt denies pain. General Comment  Comments: Pt seated in recliner upon arrival.  Agreeable to PT session. Objective   Transfers  Sit to Stand: Supervision  Stand to sit: Supervision  Stand Pivot Transfers: Supervision(completed without device.)  Ambulation  Ambulation?: Yes  Ambulation 1  Surface: level tile  Device: No Device  Assistance: Stand by assistance  Quality of Gait: mild lateral trunk sway with mild SARWAT variation.   self corrects mild LOB with lateral step  Distance: 160 ft + 140 ft  Stairs/Curb  Stairs?: Yes  Stairs  # Steps : 12  Stairs Height: 6\"  Rails: Bilateral  Assistance: Stand by assistance  Comment: Reciprocal pattern with (B) HR     Balance  Posture: Good  Sitting - Static: Good  Sitting - Dynamic: Good;-  Standing - Static: Fair  Standing - Dynamic: Fair;-     Comment: 1st session:  See above functional mobility. In addition patient completes lateral walking without UE support 10 ft x 2 ea, backwards walking without UE support 10 ft x 2 ea. Sit <=> stand transfers from elevated chair without UE support x 10. Dynadisc step overs 1 x 5 ea. 2nd session: Pt seated in recliner upon arrival, denies pain, agreeable to PT session. Pt upset that she continues to require staff assist for mobility, educated on progress but ongoing balance impairments resulting in ongoing fall risk. Sit <=> stand transfers completed throughout session at supervision level. Toilet transfer completed at supervision level. Pt ambulates 175 ft + 150 ft without device at SBA with same mechanics as first session. Seated stepper L3 x 8 min to improve strength and cardiovascular endurance. Tandem static balance trainin sec x 2 ea requiring min/mod (A) for balance correction. Single leg stance: 10 sec x 2 ea requiring mod/max (A) for balance correction. Upon return to room, pt remains up in recliner with chair alarm and call light within reach. No new complaints following session. Goals  Short term goals  Time Frame for Short term goals: 7-14 days  Short term goal 1: Patient will perform bed mobility MOD I. - goal met 10/29/2020  Short term goal 2: Patient will perform sit-stand MOD I w/ LRAD. Short term goal 3: Patient will perform bed-chair transfer MOD I w/ LRAD. Short term goal 4: Patient will perform 150' of ambulation MOD I w/ LRAD. Short term goal 5: Patient will perform car transfer MOD I w/ LRAD.   Patient Goals   Patient goals : Pt will return to home at baseline function    Plan    Plan  Times per week: 60 minutes/day, 5

## 2020-10-30 NOTE — PROGRESS NOTES
Occupational Therapy  Facility/Department: [facility] ACUTE REHAB UNIT  Daily Treatment Note    NAME: Cuate Colvin  : 1950  MRN: [id number]    Date of Service: 10/30/2020    Discharge Recommendations:  Home with Home health OT, S Level 3, Home with assist PRN  OT Equipment Recommendations  Equipment Needed: Yes  Mobility Devices: ADL Assistive Devices  ADL Assistive Devices: Transfer Tub Bench  Other: continue to assess pending pt progress    Assessment   Performance deficits / Impairments: Decreased functional mobility ; Decreased ADL status; Decreased safe awareness;Decreased high-level IADLs;Decreased endurance;Decreased fine motor control  Assessment: Pt is below baseline level of occupational function. Pt would benefit from intensive OT services to address above deficits. pt is progressing toward ADL goals and is able to complete tasks w/ SBA/SUP. Pt demonstrates increased ability to complete functional transfers/mobility, pt still limited by fatigue however noted to improve this date. FMC/manual dexterity deficits progressing. Treatment Diagnosis: Decreased functional mobility, ADL/IADL status, endurance, safety awareness, and fine motor control associated w/acute L CVA  Prognosis: Good  OT Education: OT Role;Plan of Care;Energy Conservation  Patient Education: pt verbalized understanding however would benefit from continued reinforcement to ensure independent understanding  Barriers to Learning: aphasia  REQUIRES OT FOLLOW UP: Yes  Activity Tolerance  Activity Tolerance: Patient Tolerated treatment well  Activity Tolerance: frequent rest breaks required throughout session  Safety Devices  Safety Devices in place: Yes  Type of devices: Bed alarm in place; Left in bed;Patient at risk for falls; All fall risk precautions in place;Call light within reach;Nurse notified  Restraints  Initially in place: No         Patient Diagnosis(es): There were no encounter diagnoses.       has a past medical history of Actinic keratosis, Arthritis, Asthma, Atrial fibrillation ([address] CAD (coronary artery disease), Depression, Diabetes mellitus ([address] Disc displacement, lumbar, Diverticulosis of colon, Hard of hearing, Hyperlipidemia, Hypertension, Ischemic heart disease, Past heart attack, Poor vision, Right leg weakness, and Scoliosis. has a past surgical history that includes Coronary artery bypass graft;  section; Appendectomy; ovarian cyst removal; Hand surgery; Cardiac surgery (); skin biopsy; vascular surgery; Tonsillectomy; Lumbar disc surgery (8-); Lumbar spine surgery (10-11-12); and back surgery. Restrictions  Restrictions/Precautions  Restrictions/Precautions: Fall Risk, Modified Diet(fall risk, carb control diet, no straws)  Required Braces or Orthoses?: No  Position Activity Restriction  Other position/activity restrictions: Shakira Edmonds is a 65 y.o. female presents to the emergency department today for evaluation for right-sided facial droop, right arm weakness. The patient states that she woke up around 730 this morning, around 730/8 AM this morning she noticed that her face was drooping when she went to the restroom and she states that she noticed some weakness to her right arm. EMS was called. The patient is a history of A. fib, she is on aspirin and Plavix. She is not on any blood thinners. The patient believes that she was awake, and then her symptoms started. TPA given 10/20. Today, s/p tPA exam is clinically improved with an NIHSS of 5. She has mild expressive aphasia and dysarthria, but is able to follow commands. Her right sided weakness is resolved. She also has some mild sensory loss on the right side.     Subjective   General  Chart Reviewed: Yes  Patient assessed for rehabilitation services?: Yes  Additional Pertinent Hx: Afib, CAD s/p CABG, DM2  Response to previous treatment: Patient with no complaints from previous session  Family / Caregiver Present: No  Diagnosis: Acute CVA (left)  Subjective  Subjective: Pt in recliner chair upon arrival, agreeable to OT treatment session  General Comment  Comments: pt w/ expressive aphasia  Vital Signs  Patient Currently in Pain: Denies     Orientation  Orientation  Overall Orientation Status: Within Normal Limits    Objective    ADL  Grooming: Setup;Supervision(completed hair combing while seated in recliner chair this date)  UE Bathing: Setup;Supervision  LE Bathing: Setup;Supervision(completed lc care while seated on shower chair with R & L lateral lean)  UE Dressing: Setup;Supervision  LE Dressing: Setup;Supervision  Toileting: Setup;Supervision  Additional Comments: bathing completed on shower chair, LB/UB dressing and grooming completed while seated on recliner     Balance  Sitting Balance: Supervision  Standing Balance: Stand by assistance  Standing Balance  Time: 10 minutes total  Activity: functional mobility, functional transfers, ADL completion  Comment: no LOB noted, use of RW  Functional Mobility  Functional - Mobility Device: Rolling Walker  Activity: To/from bathroom; Other(10' in hospital room for item retrieval)  Assist Level: Stand by assistance  Functional Mobility Comments: Pt ambulated to<>from bathroom this date with RW + 10' in room to complete item retrieval with SBA  Toilet Transfers  Toilet - Technique: Ambulating  Equipment Used: Standard toilet  Toilet Transfer: Stand by assistance  Toilet Transfers Comments: w/RW; grab bar on L side  Shower Transfers  Shower - Transfer From: [address] - Transfer Type: To and From  Shower - Transfer To:  Shower seat with back  Shower - Technique: Ambulating  Shower Transfers: Stand by assistance  Bed mobility  Sit to Supine: Independent  Scooting: Independent  Comment: bed mobility completed on flat bed without use of HR this date  Transfers  Sit to stand: Supervision  Stand to sit: Supervision  Transfer Comments: consistent safe hand placement/RW safety Cognition  Overall Cognitive Status: WFL  Arousal/Alertness: Appropriate responses to stimuli(Increased time to respond due to aphasia)  Following Commands: Follows one step commands consistently  Attention Span: Appears intact  Memory: Appears intact  Safety Judgement: Good awareness of safety precautions  Problem Solving: Decreased awareness of errors;Assistance required to correct errors made;Assistance required to generate solutions;Assistance required to identify errors made;Assistance required to implement solutions  Insights: Decreased awareness of deficits  Initiation: Requires cues for some  Sequencing: Requires cues for some  Cognition Comment: significant expressive aphasia noted, completed word finding activity to name each day of the week without success, pt stating months vs. days of the week.   Perception  Overall Perceptual Status: [facility]           Plan   Plan  Times per week: 60 min per day/5-7 days/wk  Times per day: Daily  Plan weeks: 1-2 weeks  Current Treatment Recommendations: Strengthening, ROM, Balance Training, Functional Mobility Training, Endurance Training, Safety Education & Training, Self-Care / ADL, Patient/Caregiver Education & Training, Equipment Evaluation, Education, & procurement    G-Code     OutComes Score                                                  AM-PAC Score             Goals  Short term goals  Time Frame for Short term goals: 1-2 weeks  Short term goal 1: Mod I for functional mobility for ADL tasks w/RW- not met, ongoing  Short term goal 2: Mod I for functional transfers to ADL surfaces w/RW- not met, ongoing  Short term goal 3: Mod I UB bathing/dressing- not met, ongoing  Short term goal 4: Mod I LB bathing dressing- not met, ongoing  Short term goal 5: Mod I toileting- not met, ongoing  Long term goals  Time Frame for Long term goals : LTG=STG       Therapy Time   Individual Concurrent Group Co-treatment   Time In 0624         Time Out 1345         Minutes 60 Timed Code Treatment Minutes: [address], [address] [place], [place] [id number]

## 2020-10-30 NOTE — PROGRESS NOTES
ACUTE REHAB UNIT  SPEECH/LANGUAGE PATHOLOGY      [x] Daily  [] Weekly Care Conference Note  [] Discharge    [address]     AVJ:1[birth year]  DDV:[id number]  Room #: FZP-1802/9282-16   Rehab Dx/Hx: Acute ischemic stroke [facility]) [I63.9]  Date of Admit: 10/23/2020      Precautions: falls and aspirations  Home situation: Pt lives at home alone. Was independent prior to admission   ST Dx: Aphasia (expressive and receptive) with apraxia of speech; Oropharyngeal dysphagia (resolved)     Daily Treatment Info:   Date: 10/30/2020   Tx session 1 Tx session 2   Pain None reported  None reported    Subjective     Pt up in chair. Reported wanting to go home sooner due to concerns for COVID. Pt up in chair. Appeared more frustrated with errors. Provided support and encouragement. Objective:  Goals     Pt will tolerate recommended diet with no overt s/s of aspiration. GOAL MET    GOAL MET      Patient will increase object/picture identification to > 39% accuracy given min verbal cues. Goal not targeted this session. Goal not targeted this session. Patient will improve recognition of body parts to 80% accuracy given min verbal cues. Goal not targeted this session. Goal not targeted this session. Patient will verbalize automatic speech tasks with 80% accuracy given min verbal cues   Automatics:   - months of the year 75% (9/12); pt referred to written list of the months used during previous session to improve accuracy, in addition to mod cues from SLP (tactile, visual)   - counting (did not complete, perseverative on months of the year)  Automatics:   - months of the year: 75% on first attempt, 100% on second attempt   - days of the week: required max cues for initiation, perseverative on months of the year     Provided pt with list of automatics for practice over the weekend with melodic intonation exercises.      Pt indicated frustrated with the repetition of the task and not always being aware of saying the wrong things. Emphasized importance of drill with presence of apraxia. Pt will name object/ pictures with 60% accuracy given min cues. Confrontation naming:   - 20% (1/5)   - mod-max cues required for 60%     Writing names of pictures:   - 60% accuracy    Goal not targeted this session. Pt will complete basic expressive language tasks (sentence completion, responsive naming, etc) to 80% accuracy with min cues   Structured description   - pt with moderate word finding and apraxic errors   - adequate description of current situation, plans for discharge assistance    Sentence completion:   - 64% accuracy   - required max cues for corrections    Other areas targeted:  Yes/no questions via reading comprehension:   - 81% accuracy      Education:   Provided education re rationale for treatment tasks and plan of care. Provided education re receptive vs expressive aphasia and apraxia. Pt demonstrated understanding      Safety Devices: [x] Call light within reach  [x] Chair alarm activated  [] Bed alarm activated  [] Other: [x] Call light within reach  [x] Chair alarm activated  [] Bed alarm activated  [] Other:      Assessment:   Speech Therapy Diagnosis  Aphasia Diagnosis: Severe expressive aphasia and moderate receptive aphasia    Current Diet Order: DIET CARB CONTROL; No Drinking Straw   Recommended Form of Meds: Meds in puree  Compensatory Swallowing Strategies:  Alternate solids and liquids, Eat/Feed slowly, No straws, Upright as possible for all oral intake, Small bites/sips     Plan:     Frequency:  5days/week   60 minutes/day  Discharge Recommendations:   Barriers: aphasia and apraxia of speech    Discharge Recommendations:  [] Home independently  [x] Home with assistance []  24 hour supervision  [] SNF [] Other:  Continued SLP Treatment:  [x] Yes [] No [] TBD based on progress while on ARU [] Vital Stim indicated [] Other:   Estimated discharge date: 11/6/2020      Type of Total

## 2020-10-30 NOTE — DISCHARGE INSTR - COC
Continuity of Care Form    Patient Name: Yoli Mancilla   :  [id number]  MRN:  [id number]    Admit date:  10/23/2020  Discharge date:  20    Code Status Order: Full Code   Advance Directives:   [facility] Documentation       Date/Time Healthcare Directive Type of Healthcare Directive Copy in [facility] [address] Agent's Name Healthcare Agent's Phone Number    10/23/20 2016  No, patient does not have an advance directive for healthcare treatment -- -- -- -- --    10/23/20 2013  No, patient does not have an advance directive for healthcare treatment -- -- -- -- --            Admitting Physician:  Hawk Monet MD  PCP: Diego Benites    Discharging Nurse: RAMAN [facility] Unit/Room#: EYG-8504/1522-08  Discharging Unit Phone Number: [phone]    Emergency Contact:   Extended Emergency Contact Information  Primary Emergency Contact: Vania Busby  Address: DAUGHTER  Home Phone: [phone]  Relation: Child    Past Surgical History:  Past Surgical History:   Procedure Laterality Date    APPENDECTOMY      BACK SURGERY     [address] GRA[address]      HAND SURGERY      LUMPS/BONE SPURS   [address]  8-    Microlumbar discectomy L4-5 right    LUMBAR SPINE SURGERY  10-11-12    MICROLUMBAR RE-EXPLORATION L4-5 RIGHT, DECOMPRESSION OF    OVARIAN CYST REMOVAL      SKIN BIOPSY      PRE CANCERS FROZEN OFF    TONSILLECTOMY      VASCULAR SURGERY         Immunization History:   Immunization History   Administered Date(s) Administered    Tdap (Boostrix, Adacel) 2016       Active Problems:  Patient Active Problem List   Diagnosis Code    Lumbar radiculopathy M54.16    Acute CVA (cerebrovascular accident) ([facility] [address] I63.9    Received intravenous tissue plasminogen activator (tPA) in emergency department Z92.82    HTN (hypertension), benign I10    Nontraumatic cortical hemorrhage of left cerebral hemisphere (HCC) I61.1    Dyslipidemia E78.5    PAF (paroxysmal atrial fibrillation) (HCC) I48.0    Acute ischemic stroke (HCC) I63.9       Isolation/Infection:   Isolation            No Isolation          Patient Infection Status       Infection Onset Added Last Indicated Last Indicated By Review Planned Expiration Resolved Resolved By    None active    Resolved    COVID-19 Rule Out 10/28/20 10/27/20 10/28/20 COVID-19 (Ordered)   10/28/20 Rule-Out Test Resulted    COVID-19 Rule Out 10/26/20 10/26/20 10/26/20 COVID-19 (Ordered)   10/26/20 Rule-Out Test Resulted            Nurse Assessment:  Last Vital Signs: BP (!) 152/76   Pulse 78   Temp 98 °F (36.7 °C) (Oral)   Resp 18   Ht 5' 3\" (1.6 m)   Wt 153 lb 8 oz (69.6 kg)   SpO2 96%   BMI 27.19 kg/m²     Last documented pain score (0-10 scale): Pain Level: 0  Last Weight:   Wt Readings from Last 1 Encounters:   10/24/20 153 lb 8 oz (69.6 kg)     Mental Status:  oriented and alert    IV Access:  - None    Nursing Mobility/ADLs:  Walking   Assisted  Transfer  Assisted  Bathing  Assisted  Dressing  Assisted  Toileting  Assisted  Feeding  Independent  Med Admin  Assisted  Med Delivery   whole    Wound Care Documentation and Therapy:        Elimination:  Continence:   · Bowel: Yes  · Bladder: Yes  Urinary Catheter: None   Colostomy/Ileostomy/Ileal Conduit: No       Date of Last BM: 11/1/20    Intake/Output Summary (Last 24 hours) at 10/30/2020 1229  Last data filed at 10/30/2020 0830  Gross per 24 hour   Intake 480 ml   Output --   Net 480 ml     I/O last 3 completed shifts: In: 360 [P.O.:360]  Out: -     Safety Concerns: At Risk for Falls    Impairments/Disabilities:      Speech, expressive aphasia    Nutrition Therapy:  Current Nutrition Therapy:   - Oral Diet:  General    Routes of Feeding: Oral  Liquids:  Thin Liquids  Daily Fluid Restriction: no  Last Modified Barium Swallow with Video (Video Swallowing Test): not done    Treatments at the Time of Hospital Discharge:   Respiratory Treatments:     Oxygen Therapy:  is not on home oxygen therapy.   Ventilator:    - No ventilator support    Rehab Therapies: Physical Therapy, Occupational Therapy, Speech/Language Therapy and RN  Weight Bearing Status/Restrictions: No weight bearing restirctions  Other Medical Equipment (for information only, NOT a DME order):  walker and bath bench  Other Treatments:   [facility]: LEVEL 3 [address] to establish plan of care for patient over 60 day period   [address] Initial home SN evaluation visit to occur within 24-48 hours for:   medication management   VS and clinical assessment   S&S chronic disease exacerbation education + when to contact MD / NP   care coordination   Medication Reconciliation during 1st SN visit     PT/OT/Speech    Evaluations in home within 24-48 hours of discharge to include DME and home safety   [facility] Frontload therapy 5 days, then 3x a week    OT to evaluate if patient has [address] needs for personal care       evaluation within 24-48 hours to evaluate resources & insurance for potential AL, [place], LTC, and Medicaid options       Palliative Care referral within 5 days of hospital discharge  PCP Visit scheduled within 3 - 7 days of hospital discharge      Telehealth-Homecare Vitals(If patient is agreeable and meets guidelines)      Patient's personal belongings (please select all that are sent with patient):  clothing, shoes, equipment    RN SIGNATURE:  Electronically signed by Chiquita Avila RN on 11/2/20 at 12:56 PM EST    CASE MANAGEMENT/SOCIAL WORK SECTION    Inpatient Status Date: 10/23/2020    Readmission Risk Assessment Score:  Readmission Risk              Risk of Unplanned Readmission:        11           Discharging to Facility/ Agency   · Name: [facility]  Phone: [phone]         Fax: [phone]        /

## 2020-10-30 NOTE — PROGRESS NOTES
Juan Hummel  10/30/2020  [id number]    Chief Complaint: Acute ischemic stroke [facility])    Subjective:    No overnight events. Frustrated with food in this facility. Participating well in therapy. Glucoses well controlled. ROS: No CP, SOB, dyspnea    Objective:  Patient Vitals for the past 24 hrs:   BP Temp Temp src Pulse Resp SpO2 Height   10/30/20 0746 (!) 152/76 98 °F (36.7 °C) Oral 78 18 96 % --   10/30/20 0601 -- -- -- -- -- 97 % --   10/29/20 2115 130/77 98.4 °F (36.9 °C) Oral 83 16 98 % --   10/29/20 2031 -- -- -- -- -- 97 % --   10/29/20 1144 -- -- -- -- -- -- 5' 3\" (1.6 m)     Gen: No distress, pleasant. Resting in bedside chair  HEENT: Normocephalic, atraumatic  CV: Regular rate and rhythm. No MRG   Resp: No respiratory distress. CTAB   Abd: Soft, nontender nondistended  Ext: No edema. Neuro: Alert, aphasic - improving, appropriately interactive. Laboratory data: Available via EMR. Therapy progress:  PT  Position Activity Restriction  Other position/activity restrictions: Juan Hummel is a 65 y.o. female presents to the emergency department today for evaluation for right-sided facial droop, right arm weakness. The patient states that she woke up around 730 this morning, around 730/8 AM this morning she noticed that her face was drooping when she went to the restroom and she states that she noticed some weakness to her right arm. EMS was called. The patient is a history of A. fib, she is on aspirin and Plavix. She is not on any blood thinners. The patient believes that she was awake, and then her symptoms started. TPA given 10/20. Today, s/p tPA exam is clinically improved with an NIHSS of 5. She has mild expressive aphasia and dysarthria, but is able to follow commands. Her right sided weakness is resolved. She also has some mild sensory loss on the right side.   Objective     Sit to Stand: Stand by assistance  Stand to sit: Stand by assistance  Bed to Chair: Minimal assistance  Device: Rolling Walker  Assistance: Stand by assistance  Distance: 160 ft  OT  PT Equipment Recommendations  Equipment Needed: No  Other: Patient has RW at home. Toilet - Technique: Ambulating  Equipment Used: Standard toilet  Toilet Transfers Comments: w/RW; grab bar on L side  Assessment        SLP  Current Diet : Regular(Upgraded to regular diet previous date)  Current Liquid Diet : Thin  Diet Solids Recommendation: Regular  Liquid Consistency Recommendation: Thin    Body mass index is 27.19 kg/m². Assessment:  Patient Active Problem List   Diagnosis    Lumbar radiculopathy    Acute CVA (cerebrovascular accident) ([facility] [address]    Received intravenous tissue plasminogen activator (tPA) in emergency department    HTN (hypertension), benign    Nontraumatic cortical hemorrhage of left cerebral hemisphere ([facility] [address]    Dyslipidemia    PAF (paroxysmal atrial fibrillation) (HCC)    Acute ischemic stroke ([facility] [address]       Plan:   Acute ischemic infarct: s/p tPA. ASA, statin. PT/OT/SLP     Aphasia: SLP     ICA occlusion and stenosis: FU with neuro regarding DAPT.     DM: amaryl 4, metformin 2000 daily, nesina 25, Refused SSI. Monitor on routine blood work     HTN: toprol 50       HLD: lipitor 40     Depression: zoloft 100 per home regimen     Afib: holding AC at this time due to fall risk     Bowels: Per protocol  Bladder: Per protocol   Sleep: Trazodone provided prn. Pain: tylenol, tramadol   DVT PPx: ambulating >250'    Uvaldo Drake MD 10/30/2020, 8:06 AM    * This document was created using dictation software. While all precautions were taken to ensure accuracy, errors may have occurred. Please disregard any typographical errors.

## 2020-10-31 PROCEDURE — 94640 AIRWAY INHALATION TREATMENT: CPT

## 2020-10-31 PROCEDURE — 6370000000 HC RX 637 (ALT 250 FOR IP): Performed by: PHYSICAL MEDICINE & REHABILITATION

## 2020-10-31 PROCEDURE — 92507 TX SP LANG VOICE COMM INDIV: CPT

## 2020-10-31 PROCEDURE — 1280000000 HC REHAB R&B

## 2020-10-31 RX ORDER — CLOPIDOGREL BISULFATE 75 MG/1
75 TABLET ORAL DAILY
Status: ON HOLD | COMMUNITY
End: 2020-11-02 | Stop reason: HOSPADM

## 2020-10-31 RX ORDER — ASPIRIN 325 MG
325 TABLET ORAL DAILY
Status: ON HOLD | COMMUNITY
End: 2020-11-02 | Stop reason: HOSPADM

## 2020-10-31 RX ADMIN — ALOGLIPTIN 25 MG: 25 TABLET, FILM COATED ORAL at 09:34

## 2020-10-31 RX ADMIN — DESMOPRESSIN ACETATE 40 MG: 0.2 TABLET ORAL at 21:44

## 2020-10-31 RX ADMIN — METFORMIN HYDROCHLORIDE 2000 MG: 500 TABLET, EXTENDED RELEASE ORAL at 09:34

## 2020-10-31 RX ADMIN — Medication 2 PUFF: at 19:24

## 2020-10-31 RX ADMIN — GLIMEPIRIDE 4 MG: 2 TABLET ORAL at 06:44

## 2020-10-31 RX ADMIN — METOPROLOL SUCCINATE 50 MG: 50 TABLET, EXTENDED RELEASE ORAL at 09:34

## 2020-10-31 RX ADMIN — SERTRALINE HYDROCHLORIDE 100 MG: 50 TABLET ORAL at 09:34

## 2020-10-31 RX ADMIN — Medication 2 PUFF: at 09:57

## 2020-10-31 ASSESSMENT — PAIN SCALES - GENERAL
PAINLEVEL_OUTOF10: 0

## 2020-10-31 NOTE — PLAN OF CARE
family brought in food, phone  & shoes. Reviewed d/c date, patient wants to leave yesterday     Problem: Mobility- Impaired:  Goal: Mobility will improve  Description: Mobility will improve  Outcome: Ongoing     Problem: Falls - Risk of:  Goal: Will remain free from falls  Description: Will remain free from falls  Outcome: Ongoing  Goal: Absence of physical injury  Description: Absence of physical injury  Outcome: Ongoing     Problem: COMMUNICATION IMPAIRMENT  Goal: Ability to express needs and understand communication  Outcome: Ongoing     Problem: Pain:  Description: Pain management should include both nonpharmacologic and pharmacologic interventions.   Goal: Pain level will decrease  Description: Pain level will decrease  Outcome: Ongoing  Goal: Control of acute pain  Description: Control of acute pain  Outcome: Ongoing  Goal: Control of chronic pain  Description: Control of chronic pain  Outcome: Ongoing     Problem: Nutrition  Goal: Optimal nutrition therapy  Outcome: Ongoing

## 2020-10-31 NOTE — PLAN OF CARE
Problem: Falls - Risk of:  Goal: Will remain free from falls  Description: Will remain free from falls  Outcome: Ongoing     Problem: COMMUNICATION IMPAIRMENT  Goal: Ability to express needs and understand communication  Outcome: Ongoing     Problem: Pain:  Description: Pain management should include both nonpharmacologic and pharmacologic interventions.   Goal: Pain level will decrease  Description: Pain level will decrease  Outcome: Met This Shift

## 2020-10-31 NOTE — PROGRESS NOTES
at times when she presented with breakdowns she used circumlocution to get message out, 5/9 times   Education:   Provided education re- neuroplasticity, prognosis, language vs. Cognitive impairment based upon patients questions   Safety Devices: [x] Call light within reach  [x] Chair alarm activated  [] Bed alarm activated  [] Other:     Assessment:   Speech Therapy Diagnosis  Aphasia Diagnosis: Severe expressive aphasia and moderate receptive aphasia    Current Diet Order: DIET CARB CONTROL; No Drinking Straw   Recommended Form of Meds: Meds in puree  Compensatory Swallowing Strategies:  Alternate solids and liquids, Eat/Feed slowly, No straws, Upright as possible for all oral intake, Small bites/sips     Plan:     Frequency:  5days/week   60 minutes/day  Discharge Recommendations:   Barriers: aphasia and apraxia of speech    Discharge Recommendations:  [] Home independently  [x] Home with assistance []  24 hour supervision  [] SNF [] Other:  Continued SLP Treatment:  [x] Yes [] No [] TBD based on progress while on ARU [] Vital Stim indicated [] Other:   Estimated discharge date: 11/6/2020      Type of Total Treatment Minutes   Session 1     Time In 9560   Time Out 1015   Timed Code Minutes     Individual Treatment Minutes  33   Co-Treatment Minutes     Group Treatment Minutes     Concurrent Treatment Minutes       TOTAL DAILY MINUTES:  33    Electronically Signed by     Anjana Mcdonald, [address] [place] [address] Willie Lala  Speech Language Pathologist [id number]  10/31/2020

## 2020-11-01 PROCEDURE — 94640 AIRWAY INHALATION TREATMENT: CPT

## 2020-11-01 PROCEDURE — 6370000000 HC RX 637 (ALT 250 FOR IP): Performed by: PHYSICAL MEDICINE & REHABILITATION

## 2020-11-01 PROCEDURE — 94761 N-INVAS EAR/PLS OXIMETRY MLT: CPT

## 2020-11-01 PROCEDURE — 1280000000 HC REHAB R&B

## 2020-11-01 RX ADMIN — Medication 2 PUFF: at 09:24

## 2020-11-01 RX ADMIN — DESMOPRESSIN ACETATE 40 MG: 0.2 TABLET ORAL at 21:32

## 2020-11-01 RX ADMIN — METOPROLOL SUCCINATE 50 MG: 50 TABLET, EXTENDED RELEASE ORAL at 08:33

## 2020-11-01 RX ADMIN — SERTRALINE HYDROCHLORIDE 100 MG: 50 TABLET ORAL at 08:32

## 2020-11-01 RX ADMIN — Medication 2 PUFF: at 21:09

## 2020-11-01 RX ADMIN — ACETAMINOPHEN 650 MG: 325 TABLET ORAL at 21:32

## 2020-11-01 RX ADMIN — GLIMEPIRIDE 4 MG: 2 TABLET ORAL at 05:19

## 2020-11-01 RX ADMIN — ALOGLIPTIN 25 MG: 25 TABLET, FILM COATED ORAL at 08:32

## 2020-11-01 RX ADMIN — METFORMIN HYDROCHLORIDE 2000 MG: 500 TABLET, EXTENDED RELEASE ORAL at 08:33

## 2020-11-01 ASSESSMENT — PAIN SCALES - GENERAL
PAINLEVEL_OUTOF10: 0
PAINLEVEL_OUTOF10: 0

## 2020-11-01 NOTE — PROGRESS NOTES
The pt received a lunch tray with what she ordered. The pt was happy about it and ate more than 50%.

## 2020-11-01 NOTE — PROGRESS NOTES
The pt complained of breakfast not delivered on time stating \"it happens all the time. \"  The pt wants to see her daughter. Provided a cup of coffee and a cereal and called datary to deliver her breakfast.  The pt was tearful talking to her daughter on a phone. The daughter called the staff requesting if the pt can be discharge to home earlier date then planned date. Stated she already made plan to stay with the pt 24 hours. Provided emotional support.

## 2020-11-02 VITALS
OXYGEN SATURATION: 97 % | DIASTOLIC BLOOD PRESSURE: 73 MMHG | TEMPERATURE: 98 F | HEIGHT: 63 IN | BODY MASS INDEX: 27.2 KG/M2 | RESPIRATION RATE: 16 BRPM | SYSTOLIC BLOOD PRESSURE: 149 MMHG | WEIGHT: 153.5 LBS | HEART RATE: 80 BPM

## 2020-11-02 LAB
ANION GAP SERPL CALCULATED.3IONS-SCNC: 9 MMOL/L (ref 3–16)
BUN BLDV-MCNC: 10 MG/DL (ref 7–20)
CALCIUM SERPL-MCNC: 11.3 MG/DL (ref 8.3–10.6)
CHLORIDE BLD-SCNC: 99 MMOL/L (ref 99–110)
CO2: 28 MMOL/L (ref 21–32)
CREAT SERPL-MCNC: 0.6 MG/DL (ref 0.6–1.2)
GFR AFRICAN AMERICAN: >60
GFR NON-AFRICAN AMERICAN: >60
GLUCOSE BLD-MCNC: 136 MG/DL (ref 70–99)
GLUCOSE BLD-MCNC: 141 MG/DL (ref 70–99)
GLUCOSE BLD-MCNC: 164 MG/DL (ref 70–99)
HCT VFR BLD CALC: 38.4 % (ref 36–48)
HEMOGLOBIN: 12.8 G/DL (ref 12–16)
MCH RBC QN AUTO: 31.1 PG (ref 26–34)
MCHC RBC AUTO-ENTMCNC: 33.4 G/DL (ref 31–36)
MCV RBC AUTO: 93.1 FL (ref 80–100)
PDW BLD-RTO: 13.4 % (ref 12.4–15.4)
PERFORMED ON: ABNORMAL
PERFORMED ON: ABNORMAL
PLATELET # BLD: 372 K/UL (ref 135–450)
PMV BLD AUTO: 8.4 FL (ref 5–10.5)
POTASSIUM SERPL-SCNC: 5.2 MMOL/L (ref 3.5–5.1)
RBC # BLD: 4.12 M/UL (ref 4–5.2)
SODIUM BLD-SCNC: 136 MMOL/L (ref 136–145)
WBC # BLD: 7.3 K/UL (ref 4–11)

## 2020-11-02 PROCEDURE — 92507 TX SP LANG VOICE COMM INDIV: CPT

## 2020-11-02 PROCEDURE — 80048 BASIC METABOLIC PNL TOTAL CA: CPT

## 2020-11-02 PROCEDURE — 6370000000 HC RX 637 (ALT 250 FOR IP): Performed by: PHYSICAL MEDICINE & REHABILITATION

## 2020-11-02 PROCEDURE — 36415 COLL VENOUS BLD VENIPUNCTURE: CPT

## 2020-11-02 PROCEDURE — 94640 AIRWAY INHALATION TREATMENT: CPT

## 2020-11-02 PROCEDURE — 97110 THERAPEUTIC EXERCISES: CPT

## 2020-11-02 PROCEDURE — 97530 THERAPEUTIC ACTIVITIES: CPT

## 2020-11-02 PROCEDURE — 97535 SELF CARE MNGMENT TRAINING: CPT

## 2020-11-02 PROCEDURE — 94761 N-INVAS EAR/PLS OXIMETRY MLT: CPT

## 2020-11-02 PROCEDURE — 85027 COMPLETE CBC AUTOMATED: CPT

## 2020-11-02 PROCEDURE — 97116 GAIT TRAINING THERAPY: CPT

## 2020-11-02 RX ORDER — ATORVASTATIN CALCIUM 40 MG/1
40 TABLET, FILM COATED ORAL NIGHTLY
Qty: 30 TABLET | Refills: 3 | Status: SHIPPED | OUTPATIENT
Start: 2020-11-02 | End: 2021-01-29 | Stop reason: ALTCHOICE

## 2020-11-02 RX ORDER — ASPIRIN 81 MG/1
81 TABLET, CHEWABLE ORAL DAILY
Qty: 30 TABLET | Refills: 3 | Status: SHIPPED | OUTPATIENT
Start: 2020-11-02

## 2020-11-02 RX ADMIN — Medication 2 PUFF: at 05:54

## 2020-11-02 RX ADMIN — METOPROLOL SUCCINATE 50 MG: 50 TABLET, EXTENDED RELEASE ORAL at 08:23

## 2020-11-02 RX ADMIN — SERTRALINE HYDROCHLORIDE 100 MG: 50 TABLET ORAL at 08:23

## 2020-11-02 RX ADMIN — ALOGLIPTIN 25 MG: 25 TABLET, FILM COATED ORAL at 08:21

## 2020-11-02 RX ADMIN — GLIMEPIRIDE 4 MG: 2 TABLET ORAL at 06:07

## 2020-11-02 RX ADMIN — METFORMIN HYDROCHLORIDE 2000 MG: 500 TABLET, EXTENDED RELEASE ORAL at 08:22

## 2020-11-02 ASSESSMENT — PAIN SCALES - GENERAL: PAINLEVEL_OUTOF10: 0

## 2020-11-02 NOTE — PROGRESS NOTES
Physical Therapy  Facility/Department: [facility]  Daily Treatment Note / Discharge Summary  NAME: Stanley Hogan  :   MRN: [id number]    Date of Service: 2020    Discharge Recommendations:  S Level 3, Home with Home health PT, Home with assist PRN   PT Equipment Recommendations  Equipment Needed: Yes  Mobility Devices: Mariana Emmy: Rolling  Other: Patient has RW at home. Assessment   Body structures, Functions, Activity limitations: Decreased functional mobility ; Decreased strength;Decreased endurance;Decreased balance;Decreased sensation;Decreased ADL status; Decreased posture;Decreased safe awareness;Decreased cognition  Assessment: Patient made excellent progress in all goal areas and at this time has met all household mobility goals. Patient has progressed to modified independent level with use of RW for household mobility tasks. Patient has made great progress during mobility tasks without use of device, but continues to present with mild lateral balance disturbances resulting in recommendation for RW use upon discharge. Pt to transition to home with 24 hr family supervision and transition to home health therapy services. Treatment Diagnosis: Decreased functional mobility with transfers and ambulation, decreased balance/strength/endurance  Prognosis: Good  PT Education: PT Role;Plan of Care;Goals;Transfer Training;General Safety; Functional Mobility Training;Gait Training;Disease Specific Education  Patient Education: Discharge recommendations reviewed including continued use of RW at this time. Pt verbalized understanding. Barriers to Learning: expressive aphasia, cognition  Activity Tolerance  Activity Tolerance: Patient Tolerated treatment well  Activity Tolerance: Mild SOB with prolonged standing activities which quickly resolves with rest breaks.      Patient Diagnosis(es): CVA     has a past medical history of Actinic keratosis, Arthritis, Asthma, Atrial Comment  Comments: Pt seated in recliner upon arrival.  Agreeable to PT session. Patient scheduled for d/c to this afternoon following completion of therapy sessions. Pain Screening  Patient Currently in Pain: Denies  Vital Signs  Patient Currently in Pain: Denies       Orientation  Orientation  Overall Orientation Status: Within Functional Limits     Objective   Bed mobility  Bridging: Independent  Rolling to Left: Independent  Rolling to Right: Independent  Supine to Sit: Independent  Sit to Supine: Independent  Scooting: Independent  Comment: standard bed without use of HR  Transfers  Sit to Stand: Independent  Stand to sit: Independent  Stand Pivot Transfers: Independent(completed within session including with RW and without device. Patient independent with both trials.)  Car Transfer: Unable to assess(enviornmental limitations due to restricted access within facility)  Ambulation  Ambulation?: Yes  Ambulation 1  Surface: level tile  Device: No Device  Assistance: Supervision  Quality of Gait: mild lateral trunk sway with occasional path deviation and lateral LOB. self corrects mild LOB with lateral step strategy  Distance: 300 ft  Ambulation 2  Surface - 2: level tile  Device 2: Rolling Walker  Assistance 2: Modified Independent  Quality of Gait 2: no evidence of path deviation or postural sway. consistent SARWAT. no evidence of LOB. Distance: 300 ft + 150 ft  Comments: Recommending use of RW in home enviornment at this time until cleared by home health therapy. Ambulation 3  Surface - 3: carpet  Device 3: Rolling Walker  Assistance 3: Modified Independent  Quality of Gait 3: Same as level surface with RW  Distance: 20 ft including turning.   Stairs/Curb  Stairs?: Yes  Stairs  # Steps : 12  Stairs Height: 6\"  Rails: Bilateral  Assistance: Modified independent   Comment: Reciprocal pattern with (B) HR     Balance  Posture: Good  Sitting - Static: Good  Sitting - Dynamic: Good;-  Standing - Static: Fair;+  Standing - Dynamic: Fair  Comments: Pt completes object retrieval from floor with use of RW + reacher at modified independent level. Other exercises  Other exercises?: Yes  Other exercises 3: seated stepper: L3 x 8 min with use of all extremities. Other activities: Patient completes dynamic standing balance activity: forward ambulation/turning with swiss ball catch/toss ~ 100 ft. Patient maintains balance at CGA/SBA, mild SOB which quickly resolves with seated rest break. Goals  Short term goals  Time Frame for Short term goals: 7-14 days  Short term goal 1: Patient will perform bed mobility MOD I. - goal met 10/29/2020  Short term goal 2: Patient will perform sit-stand MOD I w/ LRAD. - goal met 11/2/2020  Short term goal 3: Patient will perform bed-chair transfer MOD I w/ LRAD. - goal met 11/2/2020  Short term goal 4: Patient will perform 150' of ambulation MOD I w/ LRAD. - goal met 11/2/2020  Short term goal 5: Patient will perform car transfer MOD I w/ LRAD. - unable to test due to enviornmental limitations. Anticipate modified independent based on other functional mobility performances  Patient Goals   Patient goals : Pt will return to home at baseline function    Plan    Plan  Times per week: 60 minutes/day, 5 days/week  Times per day: Daily  Plan weeks: 1-2 weeks  Current Treatment Recommendations: Strengthening, Balance Training, Functional Mobility Training, Transfer Training, Gait Training, Safety Education & Training, Neuromuscular Re-education, Endurance Training, Patient/Caregiver Education & Training, Equipment Evaluation, Education, & procurement, ADL/Self-care Training, Stair training  Safety Devices  Type of devices:  All fall risk precautions in place, Gait belt, Call light within reach, Chair alarm in place, Left in chair  Restraints  Initially in place: No     Therapy Time   Individual Concurrent Group Co-treatment   Time In 0927         Time Out 1020         Minutes 53 Treatment time: 53 minutes    Bony Wright PT, DPT - [id number]

## 2020-11-02 NOTE — PROGRESS NOTES
Occupational Therapy  Facility/Department: [facility] ACUTE REHAB UNIT  Daily Treatment Note  NAME: Benoit Lo  : 1950  MRN: [id number]    Date of Service: 2020    Discharge Recommendations:  Home with Home health OT, S Level 3, Home with assist PRN  OT Equipment Recommendations  Equipment Needed: Yes  ADL Assistive Devices: Transfer Tub Bench    Assessment   Performance deficits / Impairments: Decreased functional mobility ; Decreased ADL status; Decreased safe awareness;Decreased high-level IADLs;Decreased endurance;Decreased fine motor control  Assessment: Pt has made functional progress during inpatient stay. Pt now completes functional mobility, transfers, and ADL completion w/ supervision. Pt would benefit from continued therapy during inpatient stay in order to maximize safety and independence upon returning home  Treatment Diagnosis: Decreased functional mobility, ADL/IADL status, endurance, safety awareness, and fine motor control associated w/acute L CVA  OT Education: OT Role;Plan of Care;Energy Conservation  Patient Education: pt verbalized understanding however would benefit from continued reinforcement to ensure independent understanding  Barriers to Learning: aphasia  REQUIRES OT FOLLOW UP: Yes  Activity Tolerance  Activity Tolerance: Patient Tolerated treatment well  Safety Devices  Safety Devices in place: Yes  Type of devices: Patient at risk for falls; All fall risk precautions in place;Call light within reach;Nurse notified; Left in chair;Chair alarm in place         Patient Diagnosis(es): CVA   has a past medical history of Actinic keratosis, Arthritis, Asthma, Atrial fibrillation ([address] CAD (coronary artery disease), Depression, Diabetes mellitus ([address] Disc displacement, lumbar, Diverticulosis of colon, Hard of hearing, Hyperlipidemia, Hypertension, Ischemic heart disease, Past heart attack, Poor vision, Right leg weakness, and Scoliosis.    has a past surgical history that includes Coronary artery bypass graft;  section; Appendectomy; ovarian cyst removal; Hand surgery; Cardiac surgery (); skin biopsy; vascular surgery; Tonsillectomy; Lumbar disc surgery (8-); Lumbar spine surgery (10-11-12); and back surgery. Restrictions  Restrictions/Precautions  Restrictions/Precautions: Fall Risk, Modified Diet  Required Braces or Orthoses?: No  Position Activity Restriction  Other position/activity restrictions: Deirdre Gilmore is a 65 y.o. female presents to the emergency department today for evaluation for right-sided facial droop, right arm weakness. The patient states that she woke up around 730 this morning, around 730/8 AM this morning she noticed that her face was drooping when she went to the restroom and she states that she noticed some weakness to her right arm. EMS was called. The patient is a history of A. fib, she is on aspirin and Plavix. She is not on any blood thinners. The patient believes that she was awake, and then her symptoms started. TPA given 10/20. Today, s/p tPA exam is clinically improved with an NIHSS of 5. She has mild expressive aphasia and dysarthria, but is able to follow commands. Her right sided weakness is resolved. She also has some mild sensory loss on the right side.     Subjective   General  Chart Reviewed: Yes  Patient assessed for rehabilitation services?: Yes  Additional Pertinent Hx: Afib, CAD s/p CABG, DM2  Response to previous treatment: Patient with no complaints from previous session  Family / Caregiver Present: No  Diagnosis: Acute CVA (left)  Subjective  Subjective: Pt in recliner chair upon arrival, agreeable to OT treatment session  General Comment  Comments: pt w/ expressive aphasia  Vital Signs  Patient Currently in Pain: No     Objective    ADL  Feeding: Independent  Grooming: Supervision(oral care in stance at sink)  UE Bathing: Setup;Supervision  LE Bathing: Setup;Supervision  UE Dressing: Setup;Supervision  LE Dressing: Setup;Supervision  Toileting: Setup;Supervision  Additional Comments: Pt requesting to complete wipe bath this date. Pt ambulated to commode, completed transfer and voided urine. Pt doffed UB/LB clothing while seated on commode, completed bathing w/ warmed wiped. Pt donned pant/shirt. Pt completed grooming tasks in stance at sink     Balance  Sitting Balance: Independent  Standing Balance: Supervision  Standing Balance  Time: ~20 min total  Activity: functional mobility in room, functional transfers, ADL completion  Functional Mobility  Functional - Mobility Device: Rolling Walker  Activity: Other  Assist Level: Supervision  Functional Mobility Comments: Pt completed functional mobility in room this date. Pt ambulated to closet, removed items to pack up in order to take home. Pt demonstrated ability to transport items while using rw, occasional ambulating short distances w/o rw. Pt demoed good standing balance throughout, even when reaching outside of SARWAT. Toilet Transfers  Toilet - Technique: Ambulating  Equipment Used: Standard toilet  Toilet Transfer: Supervision     Transfers  Sit to stand: Supervision  Stand to sit: Supervision      Cognition  Overall Cognitive Status: WFL  Arousal/Alertness: Appropriate responses to stimuli  Following Commands:  Follows one step commands consistently  Attention Span: Appears intact  Memory: Appears intact  Safety Judgement: Good awareness of safety precautions  Problem Solving: Decreased awareness of errors;Assistance required to correct errors made;Assistance required to generate solutions;Assistance required to identify errors made;Assistance required to implement solutions  Insights: Decreased awareness of deficits  Initiation: Requires cues for some  Sequencing: Requires cues for some      Plan   Plan  Times per week: Discharge home w/ 24 hour supervision/assist & HH OT S3  Times per day: Daily  Plan weeks: 1-2 weeks  Current Treatment Recommendations: Strengthening, ROM, Balance Training, Functional Mobility Training, Endurance Training, Safety Education & Training, Self-Care / ADL, Patient/Caregiver Education & Training, Equipment Evaluation, Education, & procurement    Goals  Short term goals  Time Frame for Short term goals: 1-2 weeks  Short term goal 1: Mod I for functional mobility for ADL tasks w/RW- not met, ongoing  Short term goal 2: Mod I for functional transfers to ADL surfaces w/RW- not met, ongoing  Short term goal 3: Mod I UB bathing/dressing- not met, ongoing  Short term goal 4: Mod I LB bathing dressing- not met, ongoing  Short term goal 5: Mod I toileting- not met, ongoing  Long term goals  Time Frame for Long term goals : LTG=STG       Therapy Time   Individual Concurrent Group Co-treatment   Time In 1315         Time Out 1353         Minutes 38              Timed Code Treatment Minutes: 38  Total Treatment Minutes:  38 minutes      Emersontio Knight, OT   Emerson Eugene OTR/L, [place] #[id number]

## 2020-11-02 NOTE — PROGRESS NOTES
ACUTE REHAB UNIT  SPEECH/LANGUAGE PATHOLOGY      [x] Daily  [] Weekly Care Conference Note  [x] Discharge    Patient:Loren Hernandes     UUS:[birth year]  DDV:[id number]  Room #: TZC-2234/8724-90   Rehab Dx/Hx: Acute ischemic stroke [facility]) [I63.9]  Date of Admit: 10/23/2020      Precautions: falls and aspirations  Home situation: Pt lives at home alone. Was independent prior to admission   ST Dx: Aphasia (expressive and receptive) with apraxia of speech; Oropharyngeal dysphagia (resolved)     Daily Treatment Info:   Date: 11/2/2020   Tx session 1 Tx session 2   Pain None reported  None indicated    Subjective     Pt up in chair. Reported wanting to go home today due to having an emotional weekend feeling stuck in hospital. Provided support and encouragement. Structured tasks were limited due to extent of time required for communication of needs/feelings. Pt remains hard working during and outside of speech therapy sessions. Discharge: Plan for d/c to home today with assistance from daughter. Recommend 24/7 supervision and to continue with aggressive SLP treatment for apraxia and aphasia. Pt continues to progress, improved accuracy with automatic, functional conversation/description tasks with use of multi modal communication methods. Continues to demonstrate mild receptive language errors during structured tasks. Apraxic and paraphasic errors are evident, requires mod-max cues for corrections. Pt progressed to meet dysphagia goals, tolerating least restrictive (regular) diet. Limited goals met during rehab stay due to early d/c however progressing appropriately and actively engaging in tasks. Objective:  Goals     Pt will tolerate recommended diet with no overt s/s of aspiration. GOAL MET   Meal present during session, pt tolerated without overt s/s of aspiration. GOAL MET      Patient will increase object/picture identification to > 47% accuracy given min verbal cues.    Goal not targeted ability to utilize reading and writing methods of communication. Education:   Provided education re rationale for treatment tasks and plan of care. Provided education re use of low tech communication board for spelling and communication breakdowns. Safety Devices: [x] Call light within reach  [x] Chair alarm activated  [] Bed alarm activated  [] Other: [x] Call light within reach  [x] Chair alarm activated  [] Bed alarm activated  [] Other:      Assessment:   Speech Therapy Diagnosis  Aphasia Diagnosis: Severe expressive aphasia and moderate receptive aphasia    Current Diet Order: DIET CARB CONTROL; No Drinking Straw   Recommended Form of Meds: Meds in puree  Compensatory Swallowing Strategies: Alternate solids and liquids, Eat/Feed slowly, No straws, Upright as possible for all oral intake, Small bites/sips     Plan:     Frequency:  5days/week   60 minutes/day  Discharge Recommendations:   Barriers: aphasia and apraxia of speech    Discharge Recommendations:  [] Home independently  [x] Home with assistance []  24 hour supervision  [] SNF [] Other:  Continued SLP Treatment:  [x] Yes [] No [] TBD based on progress while on ARU [] Vital Stim indicated [] Other:   Estimated discharge date: 11/2/2020     Type of Total Treatment Minutes   Session 1   Session 2   Time In 0830 1120   Time Out 0900 1153   Timed Code Minutes      Individual Treatment Minutes  30 33   Co-Treatment Minutes      Group Treatment Minutes      Concurrent Treatment Minutes        TOTAL DAILY MINUTES:  63    Electronically Signed by     Lety Briseno M.A.  CCC-SLP [place] [id number]  Speech-Language Pathologist [phone]  11/2/2020 2:15 PM

## 2020-11-02 NOTE — PROGRESS NOTES
[id number] home care referral. Spoke with dtr Iain Jade re: home care plan of care/services. Agreeable. Demographic's verified. Aware of discharge with home care. Home care orders faxed.

## 2020-11-02 NOTE — PROGRESS NOTES
Patient discharged to home in stable condition with family via private car. RN transported patient with all of her belongings down to the lobby. Discharge instructions and prescriptions reviewed with patient. Her discharge paperwork was reviewed with her including her medication schedule. Spoke with her daughter over the phone prior to discharge. Patient and family verbalized understanding.  Electronically signed by Lea Nelson RN on 11/2/2020 at 4:52 PM

## 2020-11-02 NOTE — DISCHARGE SUMMARY
Physical Medicine & Rehabilitation  Discharge Summary     Patient Identification:  Yvon Warren  :   Admit date: 10/23/2020  Discharge date: 2020  Attending provider: Corey Olivas MD        Primary care provider: Yves Prather     Discharge Diagnoses:   Patient Active Problem List   Diagnosis    Lumbar radiculopathy    [facility]rebro[facility]    Received intravenous tissue plasminogen activator (tPA) in emergency department    HTN (hypertension), benign    Nontraumatic cortical hemorrhage of left cerebral hemisphere ([facility]    Dyslipidemia    PAF (paroxysmal atrial fibrillation) ([facility]    Acute ischemic stroke [facility])       Discharge Functional Status:    Physical therapy:  Bed Mobility: Scooting: Independent  Transfers: Sit to Stand: Supervision  Stand to sit: Supervision  Bed to Chair: Minimal assistance  Comment: 1st session:  See above functional mobility. In addition patient completes lateral walking without UE support 10 ft x 2 ea, backwards walking without UE support 10 ft x 2 ea. Sit <=> stand transfers from elevated chair without UE support x 10. Dynadisc step overs 1 x 5 ea. , Ambulation 1  Surface: level tile  Device: No Device  Assistance: Stand by assistance  Quality of Gait: mild lateral trunk sway with mild SARWAT variation. self corrects mild LOB with lateral step  Gait Deviations: Slow Pooja, Increased SARWAT, Decreased step length, Decreased step height  Distance: 160 ft + 140 ft  Comments: Pt distractable by surrounding enviornment throughout ambulation, Stairs  # Steps : 12  Stairs Height: 6\"  Rails: Bilateral  Assistance: Stand by assistance  Comment: Reciprocal pattern with (B) HR  Mobility:  , PT Equipment Recommendations  Equipment Needed: No  Other: Patient has RW at home., Assessment: Pt continues to progress in all areas including improved dynamic balance allowing continued progression without use of assistive device.   Pt continues to require safety cueing throughout task and presents with mild (R) LE weakness. Pt would benefit from continued skilled therapy services to regain baseline functional independence. Occupational therapy:  ,  , Assessment: Pt is below baseline level of occupational function. Pt would benefit from intensive OT services to address above deficits. pt is progressing toward ADL goals and is able to complete tasks w/ SBA/SUP. Pt demonstrates increased ability to complete functional transfers/mobility, pt still limited by fatigue however noted to improve this date. FMC/manual dexterity deficits progressing. Speech therapy:       Inpatient Rehabilitation Course:   Rei Ventura is a 65 y.o. female admitted to inpatient rehabilitation on 10/23/2020 s/p Acute ischemic stroke ([facility] The patient participated in an aggressive multidisciplinary inpatient rehabilitation program involving 3 hours of therapy per day, at least 5 days per week. She progressed well in therapy was able be discharged home with home health care. Her medical rehab course was significant for below:    Acute ischemic infarct: s/p tPA. ASA, statin. PT/OT/SLP     Aphasia: SLP     ICA occlusion and stenosis: Follow up with neuro regarding DAPT.     DM: amaryl 4, metformin 2000 daily, nesina 25, per home regimen. Refused SSI. Glucoses well controlled.     HTN: toprol 50       HLD: lipitor 40     Depression: zoloft 100 per home regimen     Afib: holding AC at this time due to fall risk    Discharge Exam:  Gen: No distress, pleasant. Resting in bed  HEENT: Normocephalic, atraumatic  CV: Regular rate and rhythm. No MRG   Resp: No respiratory distress. CTAB   Abd: Soft, nontender nondistended  Ext: No edema. Neuro: Alert, aphasic, appropriately interactive. Strength is 5/5 diffusely    Rei Ventura was evaluated today and a DME order was entered for a wheeled walker because she requires this to successfully complete daily living tasks of personal cares. A wheeled walker is necessary due to the patient's unsteady gait, upper body weakness, and inability to  an ambulation device; and she can ambulate only by pushing a walker instead of a lesser assistive device such as a cane, crutch, or standard walker. The need for this equipment was discussed with the patient and she understands and is in agreement. Significant Diagnostics:   Lab Results   Component Value Date    CREATININE 0.7 10/30/2020    BUN 8 10/30/2020     10/30/2020    K 4.3 10/30/2020     10/30/2020    CO2 25 10/30/2020       Lab Results   Component Value Date    WBC 7.6 10/30/2020    HGB 12.5 10/30/2020    HCT 37.7 10/30/2020    MCV 94.6 10/30/2020     10/30/2020       Disposition:  Home in stable condition. With home care. Level 3    Follow-up:  See after visit summary from hospitalization    Discharge Medications:     Medication List      CHANGE how you take these medications    aspirin 81 MG chewable tablet  Commonly known as:  Aspirin Childrens  Take 1 tablet by mouth daily  What changed:    · how much to take  · Another medication with the same name was removed. Continue taking this medication, and follow the directions you see here.         CONTINUE taking these medications    albuterol (2.5 MG/3ML) 0.083% nebulizer solution  Commonly known as:  PROVENTIL     atorvastatin 40 MG tablet  Commonly known as:  LIPITOR  Take 1 tablet by mouth nightly     glimepiride 4 MG tablet  Commonly known as:  AMARYL     metFORMIN 500 MG extended release tablet  Commonly known as:  GLUCOPHAGE-XR     metoprolol succinate 50 MG extended release tablet  Commonly known as:  TOPROL XL     mometasone 220 MCG/INH inhaler  Commonly known as:  ASMANEX     sAXagliptin 5 MG Tabs tablet  Commonly known as:  ONGLYZA     sertraline 100 MG tablet  Commonly known as:  ZOLOFT     Symbicort 160-4.5 MCG/ACT Aero  Generic drug:  budesonide-formoterol        STOP taking these medications    clopidogrel 75 MG tablet  Commonly known as:  PLAVIX     Jardiance 10 MG tablet  Generic drug:  empagliflozin           Where to Get Your Medications      You can get these medications from any pharmacy    Bring a paper prescription for each of these medications  · aspirin 81 MG chewable tablet  · atorvastatin 40 MG tablet         I spent over 35 minutes on this discharge encounter between counseling, coordination of care, and medication reconciliation. To comply with [facility] bylaw R.II.4.1:  Discharge order placed in advance to facilitate patient's discharge needs. Nicholas Kussmaul, MD    * This document was created using dictation software. While all precautions were taken to ensure accuracy, errors may have occurred. Please disregard any typographical errors.

## 2020-11-02 NOTE — CARE COORDINATION
Prince/Samuel received a referral to this patient for a rolling walker. Rolling walker has been delivered to the patients room. Thank you for the referral.  Electronically signed by Jose Castaneda on 11/2/2020 at 2:16 PM  Cell ph# [phone]    NOTE: After 5:00 pm, Weekends, Holidays: Call [facility] [employer] at [phone] to coordinate delivery of home medical equipment.

## 2020-11-02 NOTE — PLAN OF CARE
Problem: Mobility - Impaired:  Goal: Mobility will improve  Description: Mobility will improve  Outcome: Ongoing     Problem: Falls - Risk of:  Goal: Will remain free from falls  Description: Will remain free from falls  Outcome: Ongoing     Problem: Pain:  Goal: Pain level will decrease  Description: Pain level will decrease  Outcome: Ongoing     Problem: Pain:  Goal: Control of acute pain  Description: Control of acute pain  Outcome: Ongoing     Problem: Skin Integrity:  Goal: Will show no infection signs and symptoms  Description: Will show no infection signs and symptoms  Outcome: Ongoing

## 2020-11-02 NOTE — PROGRESS NOTES
CLINICAL PHARMACY NOTE: MEDS TO [address] [facility] Patient?: No  Total # of Prescriptions Filled: 2   The following medications were delivered to the patient:  · Atorvastatin 40mg  · Aspirin 81mg  Total # of Interventions Completed: 0  Time Spent (min): 15    Additional Documentation:  Delivered to Nurse Lou Ash CPhT

## 2020-11-02 NOTE — PROGRESS NOTES
RN called daughter and let her know of discharge. New prescriptions sent down to outpatient pharmacy.  Electronically signed by Flip Hannah RN on 11/2/2020 at 9:23 AM

## 2021-01-29 ENCOUNTER — HOSPITAL ENCOUNTER (INPATIENT)
Age: 71
LOS: 5 days | Discharge: HOME | DRG: 065 | End: 2021-02-04
Attending: STUDENT IN AN ORGANIZED HEALTH CARE EDUCATION/TRAINING PROGRAM | Admitting: HOSPITALIST
Payer: MEDICARE

## 2021-01-29 DIAGNOSIS — I63.81 THALAMIC STROKE (HCC): Primary | ICD-10-CM

## 2021-01-29 LAB
GLUCOSE BLD-MCNC: 270 MG/DL (ref 70–99)
PERFORMED ON: ABNORMAL

## 2021-01-29 PROCEDURE — 80053 COMPREHEN METABOLIC PANEL: CPT

## 2021-01-29 PROCEDURE — 85025 COMPLETE CBC W/AUTO DIFF WBC: CPT

## 2021-01-29 PROCEDURE — 36415 COLL VENOUS BLD VENIPUNCTURE: CPT

## 2021-01-29 PROCEDURE — 85610 PROTHROMBIN TIME: CPT

## 2021-01-29 PROCEDURE — 99284 EMERGENCY DEPT VISIT MOD MDM: CPT

## 2021-01-29 PROCEDURE — 84484 ASSAY OF TROPONIN QUANT: CPT

## 2021-01-29 PROCEDURE — 93005 ELECTROCARDIOGRAM TRACING: CPT | Performed by: STUDENT IN AN ORGANIZED HEALTH CARE EDUCATION/TRAINING PROGRAM

## 2021-01-30 ENCOUNTER — APPOINTMENT (OUTPATIENT)
Dept: GENERAL RADIOLOGY | Age: 71
DRG: 065 | End: 2021-01-30
Payer: MEDICARE

## 2021-01-30 ENCOUNTER — APPOINTMENT (OUTPATIENT)
Dept: MRI IMAGING | Age: 71
DRG: 065 | End: 2021-01-30
Payer: MEDICARE

## 2021-01-30 ENCOUNTER — APPOINTMENT (OUTPATIENT)
Dept: CT IMAGING | Age: 71
DRG: 065 | End: 2021-01-30
Payer: MEDICARE

## 2021-01-30 LAB
A/G RATIO: 1.4 (ref 1.1–2.2)
ALBUMIN SERPL-MCNC: 4.3 G/DL (ref 3.4–5)
ALP BLD-CCNC: 94 U/L (ref 40–129)
ALT SERPL-CCNC: 10 U/L (ref 10–40)
ANION GAP SERPL CALCULATED.3IONS-SCNC: 11 MMOL/L (ref 3–16)
AST SERPL-CCNC: 15 U/L (ref 15–37)
BASOPHILS ABSOLUTE: 0 K/UL (ref 0–0.2)
BASOPHILS RELATIVE PERCENT: 0.5 %
BILIRUB SERPL-MCNC: <0.2 MG/DL (ref 0–1)
BUN BLDV-MCNC: 14 MG/DL (ref 7–20)
CALCIUM SERPL-MCNC: 10.5 MG/DL (ref 8.3–10.6)
CHLORIDE BLD-SCNC: 97 MMOL/L (ref 99–110)
CO2: 25 MMOL/L (ref 21–32)
CREAT SERPL-MCNC: 0.8 MG/DL (ref 0.6–1.2)
EKG ATRIAL RATE: 441 BPM
EKG DIAGNOSIS: NORMAL
EKG Q-T INTERVAL: 334 MS
EKG QRS DURATION: 128 MS
EKG QTC CALCULATION (BAZETT): 424 MS
EKG R AXIS: -56 DEGREES
EKG T AXIS: 15 DEGREES
EKG VENTRICULAR RATE: 97 BPM
EOSINOPHILS ABSOLUTE: 0.1 K/UL (ref 0–0.6)
EOSINOPHILS RELATIVE PERCENT: 0.9 %
ESTIMATED AVERAGE GLUCOSE: 200.1 MG/DL
GFR AFRICAN AMERICAN: >60
GFR NON-AFRICAN AMERICAN: >60
GLOBULIN: 3 G/DL
GLUCOSE BLD-MCNC: 101 MG/DL (ref 70–99)
GLUCOSE BLD-MCNC: 111 MG/DL (ref 70–99)
GLUCOSE BLD-MCNC: 132 MG/DL (ref 70–99)
GLUCOSE BLD-MCNC: 294 MG/DL (ref 70–99)
HBA1C MFR BLD: 8.6 %
HCT VFR BLD CALC: 39.5 % (ref 36–48)
HEMOGLOBIN: 12.7 G/DL (ref 12–16)
INR BLD: 1 (ref 0.86–1.14)
LV EF: 53 %
LVEF MODALITY: NORMAL
LYMPHOCYTES ABSOLUTE: 1.2 K/UL (ref 1–5.1)
LYMPHOCYTES RELATIVE PERCENT: 13.2 %
MCH RBC QN AUTO: 30.3 PG (ref 26–34)
MCHC RBC AUTO-ENTMCNC: 32.2 G/DL (ref 31–36)
MCV RBC AUTO: 94.1 FL (ref 80–100)
MONOCYTES ABSOLUTE: 0.6 K/UL (ref 0–1.3)
MONOCYTES RELATIVE PERCENT: 6.7 %
NEUTROPHILS ABSOLUTE: 7 K/UL (ref 1.7–7.7)
NEUTROPHILS RELATIVE PERCENT: 78.7 %
PDW BLD-RTO: 13.6 % (ref 12.4–15.4)
PERFORMED ON: ABNORMAL
PLATELET # BLD: 304 K/UL (ref 135–450)
PMV BLD AUTO: 8.3 FL (ref 5–10.5)
POTASSIUM REFLEX MAGNESIUM: 4 MMOL/L (ref 3.5–5.1)
PROTHROMBIN TIME: 11.6 SEC (ref 10–13.2)
RBC # BLD: 4.2 M/UL (ref 4–5.2)
SODIUM BLD-SCNC: 133 MMOL/L (ref 136–145)
TOTAL PROTEIN: 7.3 G/DL (ref 6.4–8.2)
TROPONIN: <0.01 NG/ML
WBC # BLD: 8.9 K/UL (ref 4–11)

## 2021-01-30 PROCEDURE — 93880 EXTRACRANIAL BILAT STUDY: CPT

## 2021-01-30 PROCEDURE — 6360000002 HC RX W HCPCS: Performed by: HOSPITALIST

## 2021-01-30 PROCEDURE — 70551 MRI BRAIN STEM W/O DYE: CPT

## 2021-01-30 PROCEDURE — 6370000000 HC RX 637 (ALT 250 FOR IP): Performed by: HOSPITALIST

## 2021-01-30 PROCEDURE — 71045 X-RAY EXAM CHEST 1 VIEW: CPT

## 2021-01-30 PROCEDURE — 36415 COLL VENOUS BLD VENIPUNCTURE: CPT

## 2021-01-30 PROCEDURE — 70496 CT ANGIOGRAPHY HEAD: CPT

## 2021-01-30 PROCEDURE — 84484 ASSAY OF TROPONIN QUANT: CPT

## 2021-01-30 PROCEDURE — 93306 TTE W/DOPPLER COMPLETE: CPT

## 2021-01-30 PROCEDURE — 2000000000 HC ICU R&B

## 2021-01-30 PROCEDURE — 6370000000 HC RX 637 (ALT 250 FOR IP)

## 2021-01-30 PROCEDURE — 92526 ORAL FUNCTION THERAPY: CPT

## 2021-01-30 PROCEDURE — 70450 CT HEAD/BRAIN W/O DYE: CPT

## 2021-01-30 PROCEDURE — 99223 1ST HOSP IP/OBS HIGH 75: CPT | Performed by: PSYCHIATRY & NEUROLOGY

## 2021-01-30 PROCEDURE — 92610 EVALUATE SWALLOWING FUNCTION: CPT

## 2021-01-30 PROCEDURE — 93010 ELECTROCARDIOGRAM REPORT: CPT | Performed by: INTERNAL MEDICINE

## 2021-01-30 PROCEDURE — 94640 AIRWAY INHALATION TREATMENT: CPT

## 2021-01-30 PROCEDURE — 83036 HEMOGLOBIN GLYCOSYLATED A1C: CPT

## 2021-01-30 PROCEDURE — 6360000004 HC RX CONTRAST MEDICATION: Performed by: STUDENT IN AN ORGANIZED HEALTH CARE EDUCATION/TRAINING PROGRAM

## 2021-01-30 PROCEDURE — 2580000003 HC RX 258: Performed by: HOSPITALIST

## 2021-01-30 PROCEDURE — 94761 N-INVAS EAR/PLS OXIMETRY MLT: CPT

## 2021-01-30 RX ORDER — LABETALOL HYDROCHLORIDE 5 MG/ML
10 INJECTION, SOLUTION INTRAVENOUS EVERY 10 MIN PRN
Status: DISCONTINUED | OUTPATIENT
Start: 2021-01-30 | End: 2021-02-04 | Stop reason: HOSPADM

## 2021-01-30 RX ORDER — SODIUM CHLORIDE 0.9 % (FLUSH) 0.9 %
10 SYRINGE (ML) INJECTION EVERY 12 HOURS SCHEDULED
Status: DISCONTINUED | OUTPATIENT
Start: 2021-01-30 | End: 2021-02-04 | Stop reason: HOSPADM

## 2021-01-30 RX ORDER — DEXTROSE MONOHYDRATE 25 G/50ML
12.5 INJECTION, SOLUTION INTRAVENOUS PRN
Status: DISCONTINUED | OUTPATIENT
Start: 2021-01-30 | End: 2021-02-04 | Stop reason: HOSPADM

## 2021-01-30 RX ORDER — ACETAMINOPHEN 325 MG/1
650 TABLET ORAL EVERY 4 HOURS PRN
Status: DISCONTINUED | OUTPATIENT
Start: 2021-01-30 | End: 2021-02-04 | Stop reason: HOSPADM

## 2021-01-30 RX ORDER — BUDESONIDE AND FORMOTEROL FUMARATE DIHYDRATE 160; 4.5 UG/1; UG/1
2 AEROSOL RESPIRATORY (INHALATION) 2 TIMES DAILY
Status: DISCONTINUED | OUTPATIENT
Start: 2021-01-30 | End: 2021-02-04 | Stop reason: HOSPADM

## 2021-01-30 RX ORDER — INSULIN LISPRO 100 [IU]/ML
0-12 INJECTION, SOLUTION INTRAVENOUS; SUBCUTANEOUS EVERY 4 HOURS
Status: DISCONTINUED | OUTPATIENT
Start: 2021-01-30 | End: 2021-01-30

## 2021-01-30 RX ORDER — ALBUTEROL SULFATE 90 UG/1
2 AEROSOL, METERED RESPIRATORY (INHALATION) EVERY 6 HOURS PRN
Status: ON HOLD | COMMUNITY
End: 2021-02-11 | Stop reason: HOSPADM

## 2021-01-30 RX ORDER — ONDANSETRON 2 MG/ML
4 INJECTION INTRAMUSCULAR; INTRAVENOUS EVERY 6 HOURS PRN
Status: DISCONTINUED | OUTPATIENT
Start: 2021-01-30 | End: 2021-02-04 | Stop reason: HOSPADM

## 2021-01-30 RX ORDER — PROMETHAZINE HYDROCHLORIDE 25 MG/1
12.5 TABLET ORAL EVERY 6 HOURS PRN
Status: DISCONTINUED | OUTPATIENT
Start: 2021-01-30 | End: 2021-02-04 | Stop reason: HOSPADM

## 2021-01-30 RX ORDER — INSULIN LISPRO 100 [IU]/ML
0-12 INJECTION, SOLUTION INTRAVENOUS; SUBCUTANEOUS
Status: DISCONTINUED | OUTPATIENT
Start: 2021-01-30 | End: 2021-02-04 | Stop reason: HOSPADM

## 2021-01-30 RX ORDER — INSULIN LISPRO 100 [IU]/ML
0-6 INJECTION, SOLUTION INTRAVENOUS; SUBCUTANEOUS NIGHTLY
Status: DISCONTINUED | OUTPATIENT
Start: 2021-01-30 | End: 2021-02-04 | Stop reason: HOSPADM

## 2021-01-30 RX ORDER — DEXTROSE MONOHYDRATE 50 MG/ML
100 INJECTION, SOLUTION INTRAVENOUS PRN
Status: DISCONTINUED | OUTPATIENT
Start: 2021-01-30 | End: 2021-02-04 | Stop reason: HOSPADM

## 2021-01-30 RX ORDER — NICOTINE POLACRILEX 4 MG
15 LOZENGE BUCCAL PRN
Status: DISCONTINUED | OUTPATIENT
Start: 2021-01-30 | End: 2021-02-04 | Stop reason: HOSPADM

## 2021-01-30 RX ORDER — SODIUM CHLORIDE 9 MG/ML
INJECTION, SOLUTION INTRAVENOUS CONTINUOUS
Status: DISCONTINUED | OUTPATIENT
Start: 2021-01-30 | End: 2021-01-30

## 2021-01-30 RX ORDER — METOPROLOL SUCCINATE 50 MG/1
50 TABLET, EXTENDED RELEASE ORAL DAILY
Status: DISCONTINUED | OUTPATIENT
Start: 2021-01-30 | End: 2021-02-04 | Stop reason: HOSPADM

## 2021-01-30 RX ORDER — ASPIRIN 81 MG/1
TABLET, CHEWABLE ORAL
Status: COMPLETED
Start: 2021-01-30 | End: 2021-01-30

## 2021-01-30 RX ORDER — ASPIRIN 81 MG/1
81 TABLET, CHEWABLE ORAL DAILY
Status: DISCONTINUED | OUTPATIENT
Start: 2021-01-30 | End: 2021-01-30 | Stop reason: SDUPTHER

## 2021-01-30 RX ORDER — DEXTROSE MONOHYDRATE 25 G/50ML
12.5 INJECTION, SOLUTION INTRAVENOUS PRN
Status: DISCONTINUED | OUTPATIENT
Start: 2021-01-30 | End: 2021-01-30 | Stop reason: SDUPTHER

## 2021-01-30 RX ORDER — ASPIRIN 300 MG/1
300 SUPPOSITORY RECTAL DAILY
Status: DISCONTINUED | OUTPATIENT
Start: 2021-01-30 | End: 2021-02-04 | Stop reason: HOSPADM

## 2021-01-30 RX ORDER — NICOTINE POLACRILEX 4 MG
15 LOZENGE BUCCAL PRN
Status: DISCONTINUED | OUTPATIENT
Start: 2021-01-30 | End: 2021-01-30 | Stop reason: SDUPTHER

## 2021-01-30 RX ORDER — DEXTROSE MONOHYDRATE 50 MG/ML
100 INJECTION, SOLUTION INTRAVENOUS PRN
Status: DISCONTINUED | OUTPATIENT
Start: 2021-01-30 | End: 2021-01-30 | Stop reason: SDUPTHER

## 2021-01-30 RX ORDER — SODIUM CHLORIDE 0.9 % (FLUSH) 0.9 %
10 SYRINGE (ML) INJECTION PRN
Status: DISCONTINUED | OUTPATIENT
Start: 2021-01-30 | End: 2021-02-04 | Stop reason: HOSPADM

## 2021-01-30 RX ORDER — MAGNESIUM GLUCONATE 27 MG(500)
500 TABLET ORAL DAILY
COMMUNITY

## 2021-01-30 RX ORDER — ACETAMINOPHEN 650 MG/1
650 SUPPOSITORY RECTAL EVERY 4 HOURS PRN
Status: DISCONTINUED | OUTPATIENT
Start: 2021-01-30 | End: 2021-02-04 | Stop reason: HOSPADM

## 2021-01-30 RX ORDER — POLYETHYLENE GLYCOL 3350 17 G/17G
17 POWDER, FOR SOLUTION ORAL DAILY PRN
Status: DISCONTINUED | OUTPATIENT
Start: 2021-01-30 | End: 2021-02-04 | Stop reason: HOSPADM

## 2021-01-30 RX ORDER — ASPIRIN 81 MG/1
81 TABLET ORAL DAILY
Status: DISCONTINUED | OUTPATIENT
Start: 2021-01-30 | End: 2021-02-04 | Stop reason: HOSPADM

## 2021-01-30 RX ADMIN — Medication 2 PUFF: at 19:26

## 2021-01-30 RX ADMIN — IOPAMIDOL 75 ML: 755 INJECTION, SOLUTION INTRAVENOUS at 00:21

## 2021-01-30 RX ADMIN — Medication 2 PUFF: at 08:44

## 2021-01-30 RX ADMIN — SERTRALINE 100 MG: 50 TABLET, FILM COATED ORAL at 08:36

## 2021-01-30 RX ADMIN — ASPIRIN 81 MG: 81 TABLET ORAL at 04:04

## 2021-01-30 RX ADMIN — Medication 10 ML: at 08:37

## 2021-01-30 RX ADMIN — ACETAMINOPHEN 650 MG: 325 TABLET ORAL at 15:50

## 2021-01-30 RX ADMIN — METOPROLOL SUCCINATE 50 MG: 50 TABLET, EXTENDED RELEASE ORAL at 08:37

## 2021-01-30 RX ADMIN — ENOXAPARIN SODIUM 40 MG: 40 INJECTION SUBCUTANEOUS at 08:37

## 2021-01-30 RX ADMIN — ASPIRIN 81 MG: 81 TABLET, COATED ORAL at 08:36

## 2021-01-30 ASSESSMENT — PAIN SCALES - GENERAL
PAINLEVEL_OUTOF10: 0
PAINLEVEL_OUTOF10: 3
PAINLEVEL_OUTOF10: 0

## 2021-01-30 ASSESSMENT — PAIN DESCRIPTION - FREQUENCY: FREQUENCY: INTERMITTENT

## 2021-01-30 ASSESSMENT — PAIN DESCRIPTION - DESCRIPTORS: DESCRIPTORS: HEADACHE

## 2021-01-30 NOTE — PROGRESS NOTES
Shift assessment completed, NIH score 6 for aphagia and vision loss, see flow sheets. Vitals stable, medications administered per MAR. Will have carotid dopplers and MRI today. Fall precautions in place, hourly rounding, call light and belongings in reach, bed in lowest position, wheels locked in place, side rails up x 2, walkways free of clutter, bed alarm on.

## 2021-01-30 NOTE — PROGRESS NOTES
CLINICAL BEDSIDE SWALLOWING EVALUATION  Speech Therapy Department    Patient Name:  Adrian Dee  :  1950  Pain level: Pt did not report pain   Medical Diagnosis:   Acute CVA (cerebrovascular accident) ([facility] [address] [I63.9]    HPI: Per chart: Adrian Dee is a 65 y.o. female who presented with   · LWKT @ 1300   · Loss of peripheral Vision @ 1300 . Patient reports that she was playing with her dog at home and then she noted sudden loss of vision and everything became Blurry all at once and she called her Daughter to be with her . Daughter called EMS . Denies any CP or SOB . No F/C .   · H/o CVA in 10/2020 + residual aphasia since then . She did reports left sided weakness 2.2 CVA @ That time but it is all resolved now   Is on ASA QD @ home Only     Chest x-ray:  No radiographic evidence of acute pulmonary disease. CT of Head:  1. Acute infarct in the right posterior cerebral artery territory.  No acute   hemorrhage or midline shift. 2.  Other findings as described. Pt has been seen by speech therapy on ARU in 10/2020 following CVA. At that time Pt was seen for expressive and receptive aphasia. Pt presents with expressive aphasia this date, Pt reported she feels like her language skills are near baseline. Will complete full speech-language evaluation as able.     Treatment Diagnosis:  Minimal Oropharyngeal Dysphagia Impressions: Pt was seen sitting upright in bed, she reported no difficulty with swallowing. Pt with significant visual deficits with Pt demonstrating difficulty locating therapist initially. Oral motor examination was largely within functional limits. Various textures were provided to assess swallow function. Pt presents with minimal oropharyngeal dysphagia characterized by mildly delayed swallow initiation and mildly reduced laryngeal elevation upon manual palpation. Pt tolerated thin liquids via cup with no overt clinical s/s of aspiration/penetration. Adequate mastication and oral clearing was noted with regular solid textures. At this time, recommend continuation of regular texture diet with thin liquids with use of general safe swallowing strategies. Dietary Recommendations: Regular texture diet with thin liquids, meds as tolerated     Strategies: 90 degree positioning with all p.o. intake; small bites/sips    Treatment/Goals: Speech therapy for dysphagia tx 3-5 times per week. ST.) Pt will tolerate recommended diet without s/s of aspiration     Oral motor Exam:  Dentition: adequate   Labial/Facial: within functional limits   Lingual: within functional limits   Voice: within functional limits     Oral Phase:   Grossly within functional limits     Pharyngeal Phase:  Delayed swallow initiation  Decreased laryngeal elevation via palpation     Patient/Family Education:Education, results and recommendations given to the Pt and nurse, who verbalized understanding    Timed Code Treatment: 0 minutes    Total Treatment Time: 24 minutes     Discharge Recommendations: Speech Therapy for Speech/Dysphagia treatment at discharge. If patient discharges prior to next session this note will serve as a discharge summary.      Pita Bowens M.A., [address] [place] [address]  Speech-Language Pathologist

## 2021-01-30 NOTE — PROGRESS NOTES
Pt admitted to ICU early AM per my colleague; d/w ICU RN; Neuro to see; continue current plan of care

## 2021-01-30 NOTE — CONSULTS
In patient Neurology consult        [facility] Neurology      Dock MD Jalen Villaerfield  1950    Date of Service: 1/30/2021    Referring Physician: [address], DO      Reason for the consult and CC: Acute peripheral visual loss and possible stroke. HPI:   The patient is a 65y.o.  years old female with history of hypertension A. fib, hyperlipidemia and prior stroke who was admitted to the hospital last night with acute visual disturbance. Symptoms started few hours prior to admission. According to report, she was playing with her dog at home when she slowly lost the vision from the periphery bilaterally and was unable to function. Degree was severe and persistent. No falling or injury. She does have residual aphasia from prior stroke from last year. No fever or chills. No other triggers or other associated symptoms, weakness or numbness or chest pain. No severe headache. Patient came into the ED for evaluation since her symptoms did not improve. Initial work-up revealed mild elevated high blood pressure. She had a CT of the head which showed possible acute right ischemic PCA stroke. CTA showed more proximal right ICA occlusion. She was admitted. Patient had MRI of the brain today which I did review. MRI showed acute stroke affecting right PCA distribution in the right thalamus and occipital region. MRI also showed evidence of remote left hemispheric CVA. Patient denies any new symptoms today. Long discussion with her daughter. She was admitted in October of last year with  left MCA stroke. She received TPA and developed mild hemorrhagic conversion after TPA. She was discharged home on aspirin. She does have history of A. fib and she used to be on Coumadin. She lives by herself. Other review of system was unremarkable.     Family History   Problem Relation Age of Onset    Bipolar Disorder Mother     Mental Illness Mother         SUICIDE ATTEMPTS  Diabetes Mother     High Blood Pressure Mother     Heart Disease Father     Heart Attack Father     Heart Surgery Father     Early Death Father     Cancer Father     High Blood Pressure Father     Heart Disease Sister     Heart Surgery Sister     High Blood Pressure Sister     Diabetes Sister      Past Surgical History:   Procedure Laterality Date    APPENDECTOMY      BACK SURGERY      CARDIAC SURGERY  1984     SECTION      ONE    CORONARY ARTERY BYPASS GRAFT      HAND SURGERY      LUMPS/BONE SPURS    LUMBAR One Arch Last SURGERY  8-    Microlumbar discectomy L4-5 right    LUMBAR SPINE SURGERY  10-11-12    MICROLUMBAR RE-EXPLORATION L4-5 RIGHT, DECOMPRESSION OF    OVARIAN CYST REMOVAL      SKIN BIOPSY      PRE CANCERS FROZEN OFF    TONSILLECTOMY      VASCULAR SURGERY          Past Medical History:   Diagnosis Date    Actinic keratosis     Arthritis     Asthma     UNSPECIFIED    Atrial fibrillation (HCC)     CAD (coronary artery disease)     Cerebral artery occlusion with cerebral infarction ([facility] 10/2020    residual expressive aphasia    Depression     worse with pain    Diabetes mellitus ([facility] [facility]     TYPE II    Disc displacement, lumbar     Diverticulosis of colon     Hard of hearing     left    Hyperlipidemia     Hypertension     Ischemic heart disease     Past heart attack     29 YRS AGO    Poor vision     legally blind    Right leg weakness     Scoliosis      Social History     Tobacco Use    Smoking status: Former Smoker     Packs/day: 1.00     Years: 40.00     Pack years: 40.00    Smokeless tobacco: Never Used   Substance Use Topics    Alcohol use: No     Comment: RARELY NOW    Drug use: No     Allergies   Allergen Reactions    Morphine Other (See Comments) and Nausea And Vomiting     BLOOD PRESSURE BOTTOMED OUT  Feel funny      Codeine Nausea Only, Other (See Comments) and Palpitations     SWEATS  generalized unwell feeling when takes  Feel funny  Statins Itching     Current Facility-Administered Medications   Medication Dose Route Frequency Provider Last Rate Last Admin    budesonide-formoterol (SYMBICORT) 160-4.5 MCG/ACT inhaler 2 puff  2 puff Inhalation BID Jacinta Awan MD   2 puff at 01/30/21 0844    metoprolol succinate (TOPROL XL) extended release tablet 50 mg  50 mg Oral Daily Fahad Luz MD   50 mg at 01/30/21 0837    sertraline (ZOLOFT) tablet 100 mg  100 mg Oral Daily Fahad Luz MD   100 mg at 01/30/21 0836    sodium chloride flush 0.9 % injection 10 mL  10 mL Intravenous 2 times per day Jacinta Awan MD   10 mL at 01/30/21 0837    sodium chloride flush 0.9 % injection 10 mL  10 mL Intravenous PRN Jacinta Awan MD        promethazine (PHENERGAN) tablet 12.5 mg  12.5 mg Oral Q6H PRN Jacinta Awan MD        Or    ondansetron (ZOFRAN) injection 4 mg  4 mg Intravenous Q6H PRN Fahad Luz MD        polyethylene glycol (GLYCOLAX) packet 17 g  17 g Oral Daily PRN Jacinta Awan MD        enoxaparin (LOVENOX) injection 40 mg  40 mg Subcutaneous Daily Fahad Luz MD   40 mg at 01/30/21 0837    aspirin EC tablet 81 mg  81 mg Oral Daily Fahad Luz MD   81 mg at 01/30/21 8150    Or    aspirin suppository 300 mg  300 mg Rectal Daily Fahad Luz MD        perflutren lipid microspheres (DEFINITY) injection 1.65 mg  1.5 mL Intravenous ONCE PRN Jacinta Awan MD        acetaminophen (TYLENOL) tablet 650 mg  650 mg Oral Q4H PRN Jacinta Awan MD        Or    acetaminophen (TYLENOL) suppository 650 mg  650 mg Rectal Q4H PRN Jacinta Awan MD        labetalol (NORMODYNE;TRANDATE) injection 10 mg  10 mg Intravenous Q10 Min PRN Fahad Luz MD        insulin glargine (LANTUS;BASAGLAR) injection pen 10 Units  0.15 Units/kg Subcutaneous Nightly Fahad Luz MD        glucose (GLUTOSE) 40 % oral gel 15 g  15 g Oral PRN Jacinta Awan MD  dextrose 50 % IV solution  12.5 g Intravenous PRN Luiza Henderson MD        glucagon (rDNA) injection 1 mg  1 mg Intramuscular PRN Luiaz Henderson MD        dextrose 5 % solution  100 mL/hr Intravenous PRN Luiza Henderson MD        insulin lispro (1 Unit Dial) 0-12 Units  0-12 Units Subcutaneous TID WC Fahad Luz MD        insulin lispro (1 Unit Dial) 0-6 Units  0-6 Units Subcutaneous Nightly Fahad Luz MD           ROS : A 10-14 system review of constitutional, cardiovascular, respiratory, eyes, musculoskeletal, endocrine, GI, ENT, skin, hematological, genitourinary, psychiatric and neurologic systems was obtained and updated today and is unremarkable except as mentioned in my HPI      Exam:     Constitutional:   Vitals:    01/30/21 0745 01/30/21 0800 01/30/21 0815 01/30/21 1245   BP:   (!) 159/84 (!) 145/70   Pulse: 72 82 78 77   Resp: 19 16 23 20   Temp:   97.6 °F (36.4 °C) 97.8 °F (36.6 °C)   TempSrc:   Temporal Temporal   SpO2: 96% (!) 89% 95% 96%   Weight:       Height:           General appearance:  Normal development and appear in no acute distress. Eye: No icterus. Fundus: Funduscopic examination cannot be performed due to COVID19 restrictions and precautions. Neck: supple  Cardiovascular: No lower leg edema with good pulsation. Mental Status:   Oriented to person, place, problem, and time. Memory: Aware of recent and remote event. Good immediate recall. Intact remote memory  Normal attention span and concentration. Language: Nonfluent aphasia. Chronic  Good fund of Knowledge. Aware of current events and vocabulary   Cranial Nerves:   II: Visual fields: Dense left homonymous hemianopsia. Pupils: equal, round, reactive to light  III,IV,VI: Extra Ocular Movements are intact.  No nystagmus  V: Facial sensation is intact   VII: Facial strength and movements: intact and symmetric  VIII: Hearing: Intact  IX: Palate elevation is symmetric  XI: Shoulder shrug is intact XII: Tongue movements are normal  Musculoskeletal: 5/5 left-sided weakness 4/5 on the right, chronic  Tone: Normal tone. Reflexes: 2+ in the upper extremity and 2+ in the lower extremity   Planters: flexor bilaterally. Coordination: no pronator drift, no dysmetria with FNF in upper extremities. Normal REM. Sensation: normal to all modalities in both arms and legs. Gait/Posture: Not tested due to weakness and visual field deficit    Data:  LABS:   Lab Results   Component Value Date     01/29/2021    K 4.0 01/29/2021    CL 97 01/29/2021    CO2 25 01/29/2021    BUN 14 01/29/2021    CREATININE 0.8 01/29/2021    GFRAA >60 01/29/2021    GFRAA >60 11/11/2012    LABGLOM >60 01/29/2021    GLUCOSE 294 01/29/2021    CALCIUM 10.5 01/29/2021     Lab Results   Component Value Date    WBC 8.9 01/29/2021    RBC 4.20 01/29/2021    HGB 12.7 01/29/2021    HCT 39.5 01/29/2021    MCV 94.1 01/29/2021    RDW 13.6 01/29/2021     01/29/2021     Lab Results   Component Value Date    INR 1.00 01/29/2021    PROTIME 11.6 01/29/2021       Neuroimaging were independently reviewed by me and discussed results with the patient and her daughter. Reviewed notes from different physicians  Reviewed lab and blood testing    Impression:  Acute right ischemic PCA stroke with dense left visual field deficit. Could be thromboembolic versus cardioembolic  Recent left MCA stroke with residual aphasia and right-sided weakness. She received TPA back in October and had mild hemorrhagic conversion after TPA. PAF on aspirin. She used to be on Coumadin. Hypertension, not controlled  Hyperlipidemia  Diabetes, not controlled. A1c 8.6    Recommendation:  Long discussion with the patient today and her daughter regarding MRI results, risk of stroke recurrence, pros and cons and benefit and risks of antiplatelet versus anticoagulation.   We will continue with aspirin for now We will have another discussion with the patient and her daughter on Monday regarding the use of anticoagulation given recent hemorrhagic conversion, risk of fall or injury and risk of home safety as she lives by herself. PT and OT  Speech evaluation  Telemetry  DVT and GI prophylaxis  Statin  Blood sugar control and monitor  Insulin sliding scale  Continue current blood pressure medication avoid blood pressure below 12080  MDM: High complexity is high risk of recurrence, hemorrhagic conversion and falling. Discussed with ICU nurse. Thank you for referring such patient. If you have any questions regarding my consult note, please don't hesitate to call me. Nestor Wild MD  [phone]    This dictation was generated by voice recognition computer software.  Although all attempts are made to edit the dictation for accuracy, there may be errors in the  transcription that are not intended

## 2021-01-30 NOTE — PROGRESS NOTES
Received to 3901 from ER per stretcher, ambulated to bed but had difficulty because of visual deficit. /82, other VSS, afib/flutter on the monitor. Assessment as charted, NIHSS score is 7. Voided per bedpan, denies other needs @present.

## 2021-01-30 NOTE — H&P
· ED Chart reviewed. Medications reviewed w/c were give in ED . Imaging done in ED reviewed and results noted. See Epic for details on medications and orders . · Stroke team called by ED Provider and case d/w them by ED Provider   ·       · Imaging done in ED as below. And is/are  s/o acute CVA       · Pertinent Abnormal Labs and their interpretation/active and acute issues, as mentioned below in Assessment and Plan. Currently, on my evaluation, patient is :   · Since arrival, post above Rx, patient is AO X 3   · Is able to answer questions properly   · Above acute issues as per HPI confirmed with patient/Family. Aside from what is stated above denies any other symptoms or modifying factors. · Is Breathing comfortably on current O2 needs and no distress noted . · No Obvious/visible distress noted @ time of evaluation. · Denies any chest pain   · No SOB     Patient denies any acute complaints to me, needing interventions emergently @ Time of my evaluation. Patient is not in acute hemodynamically unstable distress from respiratory or cardiac standpoint @ time of my evaluation. REVIEW OF SYSTEMS:          10 complete review of symptoms performed. Pertinent positives and negatives listed above in HPI. Past Medical History:         has a past medical history of Actinic keratosis, Arthritis, Asthma, Atrial fibrillation ([address] CAD (coronary artery disease), Cerebral artery occlusion with cerebral infarction ([address] Depression, Diabetes mellitus ([address] Disc displacement, lumbar, Diverticulosis of colon, Hard of hearing, Hyperlipidemia, Hypertension, Ischemic heart disease, Past heart attack, Poor vision, Right leg weakness, and Scoliosis.        Past Surgical History:  Heart Disease Father     Heart Attack Father     Heart Surgery Father     Early Death Father     Cancer Father     High Blood Pressure Father     Heart Disease Sister     Heart Surgery Sister     High Blood Pressure Sister     Diabetes Sister            Physical Exam:  BP (!) 184/90   Pulse 103   Temp 98.3 °F (36.8 °C) (Oral)   Resp 24   Ht 5' 4\" (1.626 m)   Wt 146 lb (66.2 kg)   SpO2 96%   BMI 25.06 kg/m²     General appearance: Appears Comfortable. Well nourished   Eyes:  Sclera clear, pupils equal  ENT:  Moist mucus membranes, Trachea midline. Cardiovascular: ir Regular rhythm, normal S1, S2. No edema in lower extremities   Respiratory:   Noted Clear to auscultation bilaterally,  No wheeze, good inspiratory effort   Gastrointestinal:  Abdomen soft,  non-tender,  not distended  Musculoskeletal:  No cyanosis in digits, neck supple  Neurology:  Cranial nerves grossly intact. Alert and oriented in time, place and person. Aphasic speech w/ expressive aphasia Noted . Also Left sided peripheral loss of Vision noted . No motor deficits noted   Psychiatry:  Appropriate affect. Not agitated  Skin:  Warm, dry, normal turgor, no rash       Labs:     Recent Labs     01/29/21  2343   WBC 8.9   HGB 12.7   HCT 39.5        Recent Labs     01/29/21  2343   *   K 4.0   CL 97*   CO2 25   BUN 14   CREATININE 0.8   CALCIUM 10.5     Recent Labs     01/29/21  2343   AST 15   ALT 10   BILITOT <0.2   ALKPHOS 94     Recent Labs     01/29/21  2343   INR 1.00     Recent Labs     01/29/21  2343   TROPONINI <0.01         Urinalysis:    No results found for: Georgette Marcelo, BACTERIA, RBCUA, BLOODU, [address], [address]      Radiology:     CXR:   I have reviewed the CXR  No acute findings or is unremarkable for any acute pathology needing acute interventions, on review.       EKG/Telemonitor :    I have reviewed the EKG  AFIB   Rate controlled +     IMAGING :     XR CHEST PORTABLE   Final Result No radiographic evidence of acute pulmonary disease. CT HEAD WO CONTRAST   Final Result   1. Acute infarct in the right posterior cerebral artery territory. No acute   hemorrhage or midline shift. 2.  Other findings as described. Critical results were called by Dr. Be Pacheco DO to [facility] on   1/30/2021 at 00:36. CTA HEAD NECK W CONTRAST    (Results Pending)         [address] Problems    Diagnosis Date Noted    Acute ischemic stroke ([facility] [I63.9] 10/23/2020    HTN (hypertension), benign [I10]     Dyslipidemia [E78.5]     PAF (paroxysmal atrial fibrillation) (HCC) [I48.0]     Acute CVA (cerebrovascular accident) ([facility] [I63.9] 10/20/2020       PLAN/ORDERS FOR THIS ADMISSION/HOSPITALIZATION       · Acute CVA POA . Admission CT head abnormal as above . Acute right PCA CVA . CTA also done in ED and noted abnormal . Per stroke team not a candidate for TPA or Intervention [ Intervention too risky per ED Provider report ] . => Neurology checks while in patient. Planned echo in AM. Fasting lipid panel in AM. Planned MRI in AM. => Allowing BP to auto regulate till neurology allows more strict control of BP. Permissive HTN for now . Prn medications for BP  > 220/110 [ No TPA Given ]   ==> Medication plan for now will be ASA QD [ Home medication ] and  Statins to be started , if LDL > 70 on FLP in AM. Planned neurology c/s in AM. => Further recommendations/Evaluation/Mgt per Neurology   ·   · PAF . Is not on OACs @ Home . Resumed home ASA QD . ? Candidate for OACs @ discharge [ defer to Cardiology and neurology recommendations in AM ] . Resumed Home BB for rate control needs . => Further recommendations/Evaluation/Mgt per CARDS   ·   · Mixed Hyperlipidemia   ·   · Essential Hypertension .  Permissive HTN for 1-2 days until oked by neurology for aggressive control   ·

## 2021-01-30 NOTE — PROGRESS NOTES
As much of admission completed as possible with patient, see navigator. Will need to call daughter to fill in gaps.

## 2021-01-30 NOTE — ED PROVIDER NOTES
Primary Care Physician: Yolie Singh   Attending Physician: Miguel Ángel Fisher, DO     History   Chief Complaint   Patient presents with    Loss of Vision     pt brought in by [facility] from [address] [place] [address]  where patient was brought there from home for BP check; loss of peripheral vision since 1300 today; hx CVA in 2020 with + residual expressive asphasia; had a headache on arrival to [address] [place] [address]  but not now        HPI   Iris Caracmo is a 65 y.o. female history of diabetes, hypertension, hyperlipidemia, coronary artery disease, A. fib not on any anticoagulation, stroke 2020 with no residual deficit apart from the fact that she had some aphasia from the stroke presenting this evening brought by EMS with complaint of visual change sudden. State that she was sitting watching TV when all of a sudden she was unable to see her dog. Sxs started at 1 PM and since then she has been having issues focusing. Denies any headaches, chest pain, shortness of breath or other symptoms of severe systemic infection.     Past Medical History:   Diagnosis Date    Actinic keratosis     Arthritis     Asthma     UNSPECIFIED    Atrial fibrillation (HCC)     CAD (coronary artery disease)     Cerebral artery occlusion with cerebral infarction ([address] 10/2020    residual expressive aphasia    Depression     worse with pain    Diabetes mellitus ([address]     TYPE II    Disc displacement, lumbar     Diverticulosis of colon     Hard of hearing     left    Hyperlipidemia     Hypertension     Ischemic heart disease     Past heart attack     29 YRS AGO    Poor vision     legally blind    Right leg weakness     Scoliosis         Past Surgical History:   Procedure Laterality Date    APPENDECTOMY      BACK SURGERY      CARDIAC SURGERY  1984     SECTION      ONE    CORONARY ARTERY BYPASS GRAFT      HAND SURGERY      LUMPS/BONE SPURS    LUMBAR One Arch Last SURGERY  8-    Microlumbar discectomy L4-5 right  LUMBAR SPINE SURGERY  10-11-12    MICROLUMBAR RE-EXPLORATION L4-5 RIGHT, DECOMPRESSION OF    OVARIAN CYST REMOVAL      SKIN BIOPSY      PRE CANCERS FROZEN OFF    TONSILLECTOMY      VASCULAR SURGERY          Family History   Problem Relation Age of Onset    Bipolar Disorder Mother     Mental Illness Mother         SUICIDE ATTEMPTS    Diabetes Mother     High Blood Pressure Mother     Heart Disease Father     Heart Attack Father     Heart Surgery Father     Early Death Father     Cancer Father     High Blood Pressure Father     Heart Disease Sister     Heart Surgery Sister     High Blood Pressure Sister     Diabetes Sister         Social History     Socioeconomic History    Marital status:       Spouse name: None    Number of children: None    Years of education: None    Highest education level: None   Occupational History    None   Social Needs    Financial resource strain: None    Food insecurity     Worry: None     Inability: None    Transportation needs     Medical: None     Non-medical: None   Tobacco Use    Smoking status: Former Smoker     Packs/day: 1.00     Years: 40.00     Pack years: 40.00    Smokeless tobacco: Never Used   Substance and Sexual Activity    Alcohol use: No     Comment: RARELY NOW    Drug use: No    Sexual activity: None   Lifestyle    Physical activity     Days per week: None     Minutes per session: None    Stress: None   Relationships    Social connections     Talks on phone: None     Gets together: None     Attends [employer] service: None     Active member of club or organization: None     Attends meetings of clubs or organizations: None     Relationship status: None    Intimate partner violence     Fear of current or ex partner: None     Emotionally abused: None     Physically abused: None     Forced sexual activity: None   Other Topics Concern    None   Social History Narrative    None        Review of Systems 10 total systems reviewed and found to be negative unless otherwise noted in HPI     Physical Exam   BP (!) 166/85   Pulse 79   Temp 98.6 °F (37 °C) (Temporal)   Resp 19   Ht 5' 4\" (1.626 m)   Wt 158 lb 4.6 oz (71.8 kg)   SpO2 98%   BMI 27.17 kg/m²      CONSTITUTIONAL: elderly appearing, in no acute distress   HEAD: atraumatic, normocephalic   EYES: PERRL, No injection, discharge or scleral icterus. ENT: Moist mucous membranes. NECK: Normal ROM, NO LAD   CARDIOVASCULAR: Regular rate and rhythm. No murmurs or gallop. PULMONARY/CHEST: Airway patent. No retractions. Breath sounds clear with good air entry bilaterally. ABDOMEN: Soft, Non-distended and non-tender, without guarding or rebound. SKIN: Acyanotic, warm, dry   MUSCULOSKELETAL: No swelling, tenderness or deformity   NEUROLOGICAL: Awake and oriented x 3. Pulses intact. Grossly nonfocal   Nursing note and vitals reviewed. NIH Stroke Scale     Time: 12:09 AM   Person Administering Scale: Nsehniitodd Norris MD     Level of consciousness: [0]   0 = Alert;   1 = Not alert;   2 = Not alert;   3 = Responds only with reflex motor or autonomic effects or totally unresponsive, flaccid, and flexic. LOC questions: [0]   0 = Answers both questions correctly. 1 = Answers one question correctly. 2 = Answers neither question correctly. LOC commands: [0]   0 = Performs both tasks correctly. 1 = Performs one task correctly. 2 = Performs neither task correctly.      Best Gaze: [ 0 ]   0 = Normal   1 = Partial gaze palsy   2 = Forced deviation     Visual: [0]   0 = No visual loss   1 = Partial hemianopia   2 = Complete hemianopia   3 = Bilateral hemianopia     Facial Palsy: [0]   0 = Normal   1 = Minor paralysis   2 = Partial paralysis   3 = Complete paralysis     Motor left arm: [0]   0 = No drift;   1 = Drift 2 = Some effort against gravity;   3 = No effort against gravity;   4 = No movement. UN = Amputation     Motor right arm: [0]   0 = No drift   1 = Drift   2 = Some effort against gravity   3 = No effort against gravity   4 = No movement   UN = Amputation     Motor left leg: [0]   0 = No drift   1.= Drift   2 = Some effort against gravity   3 = No effort against gravity   4 = No movement. UN = Amputation     Motor right leg: [0]   0 = No drift   1 = Drift   2 = Some effort against gravity   3 = No effort against gravity   4 = No movement   UN = Amputation     Limb Ataxia: [0]   0 = Absent. 1 = Present in one limb. 2 = Present in two limbs   UN = Amputation     Sensory: [0]   0 = Absent   1.= Present in one limb   2 = Present in two limbs   UN = Amputation     Best Language: [2 from old stroke]   0 = No aphasia   1 = Mild-to-moderate aphasia   2 = Severe aphasia   3 = Mute, global aphasia     Dysarthria: [0]   1 = Mild-to-moderate dysarthria   2 = Severe dysarthria   UN = Intubated     Extinction and Inattention: [0]   0 = No abnormality.    1 = Visual, tactile, auditory, spatial, or personal inattention   2 = Profound krystyna-inattention or extinction to more than one modality     TOTAL: [2] however, does abnormal visual exam.      ED Course & Medical Decision Making   Medications   budesonide-formoterol (SYMBICORT) 160-4.5 MCG/ACT inhaler 2 puff (2 puffs Inhalation Given 1/30/21 1926)   metoprolol succinate (TOPROL XL) extended release tablet 50 mg (50 mg Oral Given 1/30/21 0837)   sertraline (ZOLOFT) tablet 100 mg (100 mg Oral Given 1/30/21 0836)   sodium chloride flush 0.9 % injection 10 mL (10 mLs Intravenous Given 1/30/21 0837)   sodium chloride flush 0.9 % injection 10 mL (has no administration in time range)   promethazine (PHENERGAN) tablet 12.5 mg (has no administration in time range)     Or   ondansetron (ZOFRAN) injection 4 mg (has no administration in time range) polyethylene glycol (GLYCOLAX) packet 17 g (has no administration in time range)   enoxaparin (LOVENOX) injection 40 mg (40 mg Subcutaneous Given 1/30/21 0837)   aspirin EC tablet 81 mg (81 mg Oral Given 1/30/21 0836)     Or   aspirin suppository 300 mg ( Rectal See Alternative 1/30/21 0836)   perflutren lipid microspheres (DEFINITY) injection 1.65 mg (has no administration in time range)   acetaminophen (TYLENOL) tablet 650 mg (650 mg Oral Given 1/30/21 1550)     Or   acetaminophen (TYLENOL) suppository 650 mg ( Rectal See Alternative 1/30/21 1550)   labetalol (NORMODYNE;TRANDATE) injection 10 mg (has no administration in time range)   insulin glargine (LANTUS;BASAGLAR) injection pen 10 Units (has no administration in time range)   glucose (GLUTOSE) 40 % oral gel 15 g (has no administration in time range)   dextrose 50 % IV solution (has no administration in time range)   glucagon (rDNA) injection 1 mg (has no administration in time range)   dextrose 5 % solution (has no administration in time range)   insulin lispro (1 Unit Dial) 0-12 Units (0 Units Subcutaneous Not Given 1/30/21 1831)   insulin lispro (1 Unit Dial) 0-6 Units (has no administration in time range)   iopamidol (ISOVUE-370) 76 % injection 75 mL (75 mLs Intravenous Given 1/30/21 0021)   aspirin 81 MG chewable tablet (  Given 1/30/21 0404)      Labs Reviewed   COMPREHENSIVE METABOLIC PANEL W/ REFLEX TO MG FOR LOW K - Abnormal; Notable for the following components:       Result Value    Sodium 133 (*)     Chloride 97 (*)     Glucose 294 (*)     All other components within normal limits    Narrative:     Performed at:  [facility] [address], [facility], [address]   Phone [phone]   POCT GLUCOSE - Abnormal; Notable for the following components:    POC Glucose 270 (*)     All other components within normal limits    Narrative:     Performed at:  [facility] [address], [address] [email or web address]   Phone [phone]   POCT GLUCOSE - Abnormal; Notable for the following components:    POC Glucose 101 (*)     All other components within normal limits    Narrative:     Performed at:  [facility]  [address]  [place], [address] [email or web address]   Phone [phone]   POCT GLUCOSE - Abnormal; Notable for the following components:    POC Glucose 111 (*)     All other components within normal limits    Narrative:     Performed at:  [facility]  [address], [address] [email or web address]   Phone [phone]   POCT GLUCOSE - Abnormal; Notable for the following components:    POC Glucose 132 (*)     All other components within normal limits    Narrative:     Performed at:  [facility]  [address], [address] [email or web address]   Phone [phone]   CBC WITH AUTO DIFFERENTIAL    Narrative:     Performed at:  [facility] [address], [address] [place]   Phone [phone]   TROPONIN    Narrative:     Performed at:  [facility] [address], [address] [place]   Phone [phone]   PROTIME-INR    Narrative:     Performed at:  [facility]  [address], [address] [place]   Phone [phone]   TROPONIN    Narrative:     Performed at:  [facility] [address], [address] [place]   Phone [phone]   TROPONIN    Narrative:     Performed at:  [facility] [address], [address] [place]   Phone [phone]   HEMOGLOBIN A1C    Narrative:     Performed at:  [place] [address]   Phone [phone]   HEMOGLOBIN A1C   CBC   HEMOGLOBIN A1C   LIPID PANEL   POCT GLUCOSE   POCT GLUCOSE   POCT GLUCOSE   POCT GLUCOSE   POCT GLUCOSE POCT GLUCOSE   POCT GLUCOSE   POCT GLUCOSE   POCT GLUCOSE   POCT GLUCOSE   POCT GLUCOSE   POCT GLUCOSE   POCT GLUCOSE      MRI brain without contrast   Final Result   1. Early subacute infarcts involving the right posteromedial thalamus and   right occipital lobe, in the right posterior cerebral artery vascular   distribution. 2. Cerebral parenchymal volume loss with mild chronic microvascular white   matter ischemic disease noted both supra and infratentorially. 3. Chronic infarcts in the left temporal occipital region, and left parietal   lobe. Small chronic lacunar infarct involving the left cerebellar hemisphere. VL DUP CAROTID BILATERAL   Final Result      XR CHEST PORTABLE   Final Result   No radiographic evidence of acute pulmonary disease. CTA HEAD NECK W CONTRAST   Final Result   Occlusion of the proximal right cervical ICA, about 1 cm distal to the   bifurcation and extending into the intracranial compartment with   reconstitution at the level of the distal clinoid, most likely by way of   collateral supply from the anterior and posterior communicating arteries. Occlusion of the distal P4 segment of the right PCA. Again noted is a small to moderate size area of hypodensity with loss of   gray-white differentiation involving the medial right occipital lobe   suggestive of a acute/subacute infarct. Small areas of encephalomalacia are noted in the posterior left frontal and   left temporal occipital region indicative of old insults. CT HEAD WO CONTRAST   Final Result   1. Acute infarct in the right posterior cerebral artery territory. No acute   hemorrhage or midline shift. 2.  Other findings as described. Critical results were called by Dr. Foreign Jacques DO to [address] [place] [address] on   1/30/2021 at 00:36.              EKG INTERPRETATION: EKG by my preliminary interpretation shows sinus rhythm with rate of 97, normal axis, normal intervals, with no ST changes indicative of ischemia at this time. PROCEDURES:   Procedures    ASSESSMENT AND PLAN:  Sherwin Sullivan is a 65 y.o. female history of diabetes, hypertension, hyperlipidemia, coronary artery disease, A. fib not on any anticoagulation, stroke October 2020 with no residual deficit apart from the fact that she had some aphasia from the stroke presenting this evening brought by EMS with complaint of visual change sudden. State that she was sitting watching TV when all of a sudden she was unable to see her dog. Sxs started at 1 PM and since then she has been having issues focusing. On exam patient has some nonspecific visual abnormalities and some abnormal finger-to-nose coordination. However the rest of the exam unremarkable. Given her presentation and history of stroke I did activate the stroke alert. CT of the head, CTA head/neck was obtained and showed right PCA stroke which is acute. Stroke team was consulted and recommended no intervention given the fact the patient is out of the window with high risk for endovascular treatment.,  At this time, I am recommending admission for further evaluation for stroke. ClINICAL IMPRESSION:  1. Thalamic stroke [facility])          DISPOSITION    Hospitalist admit   -Findings and recommendations explained to patient. She expressed understanding and agreed with the plan.   Rosalba Woo MD (electronically signed)  1/31/2021  _________________________________________________________________________________________  Amount and/or Complexity of Data Reviewed:  Clinical lab tests: ordered and reviewed   Tests in the radiology section of CPT®: ordered and reviewed   Tests in the medicine section of CPT®: ordered and reviewed   Decide to obtain previous medical records or to obtain history from someone other than the patient: yes Obtain history from someone other than the patient: yes  Review and summarize past medical records:yes  I looked up the patient in our electronic medical record:yes  Discuss the patient with other providers:yes  Independent visualization of images, tracings, or specimens:yes  Risk of Complications, Morbidity, and/or Mortality:Moderate  Presenting problems: moderate Diagnostic procedures: moderate yes Management options: moderate     Critical Care Attestation   Total critical care time: 35 minutes minimum   Critical care time does not include separately billable procedures and treating other patients. Critical care was necessary to treat or prevent imminent or life-threatening deterioration of the following conditions: Acute visual changes from thalamic stroke. Stroke protocol activated, patient not a candidate for TPA given the fact that she was out of the window. case discussed with consultants.       _________________________________________________________________________________________  This record is transcribed utilizing voice recognition technology. There are inherent limitations in this technology. In addition, there may be limitations in editing of this report. If there are any discrepancies, please contact me directly.         Gisela Martines MD  01/31/21 0009

## 2021-01-31 LAB
CHOLESTEROL, TOTAL: 190 MG/DL (ref 0–199)
ESTIMATED AVERAGE GLUCOSE: 203 MG/DL
ESTIMATED AVERAGE GLUCOSE: 203 MG/DL
GLUCOSE BLD-MCNC: 132 MG/DL (ref 70–99)
GLUCOSE BLD-MCNC: 156 MG/DL (ref 70–99)
GLUCOSE BLD-MCNC: 176 MG/DL (ref 70–99)
GLUCOSE BLD-MCNC: 201 MG/DL (ref 70–99)
GLUCOSE BLD-MCNC: 242 MG/DL (ref 70–99)
HBA1C MFR BLD: 8.7 %
HBA1C MFR BLD: 8.7 %
HCT VFR BLD CALC: 37.5 % (ref 36–48)
HDLC SERPL-MCNC: 42 MG/DL (ref 40–60)
HEMOGLOBIN: 12.5 G/DL (ref 12–16)
LDL CHOLESTEROL CALCULATED: 109 MG/DL
MCH RBC QN AUTO: 30.8 PG (ref 26–34)
MCHC RBC AUTO-ENTMCNC: 33.3 G/DL (ref 31–36)
MCV RBC AUTO: 92.6 FL (ref 80–100)
PDW BLD-RTO: 13.6 % (ref 12.4–15.4)
PERFORMED ON: ABNORMAL
PLATELET # BLD: 271 K/UL (ref 135–450)
PMV BLD AUTO: 8.1 FL (ref 5–10.5)
RBC # BLD: 4.05 M/UL (ref 4–5.2)
TRIGL SERPL-MCNC: 197 MG/DL (ref 0–150)
VLDLC SERPL CALC-MCNC: 39 MG/DL
WBC # BLD: 6.6 K/UL (ref 4–11)

## 2021-01-31 PROCEDURE — 6370000000 HC RX 637 (ALT 250 FOR IP): Performed by: HOSPITALIST

## 2021-01-31 PROCEDURE — 97162 PT EVAL MOD COMPLEX 30 MIN: CPT

## 2021-01-31 PROCEDURE — 97535 SELF CARE MNGMENT TRAINING: CPT

## 2021-01-31 PROCEDURE — 99222 1ST HOSP IP/OBS MODERATE 55: CPT | Performed by: INTERNAL MEDICINE

## 2021-01-31 PROCEDURE — 99233 SBSQ HOSP IP/OBS HIGH 50: CPT | Performed by: PSYCHIATRY & NEUROLOGY

## 2021-01-31 PROCEDURE — 85027 COMPLETE CBC AUTOMATED: CPT

## 2021-01-31 PROCEDURE — 94640 AIRWAY INHALATION TREATMENT: CPT

## 2021-01-31 PROCEDURE — 2000000000 HC ICU R&B

## 2021-01-31 PROCEDURE — 80061 LIPID PANEL: CPT

## 2021-01-31 PROCEDURE — 97530 THERAPEUTIC ACTIVITIES: CPT

## 2021-01-31 PROCEDURE — 36415 COLL VENOUS BLD VENIPUNCTURE: CPT

## 2021-01-31 PROCEDURE — 83036 HEMOGLOBIN GLYCOSYLATED A1C: CPT

## 2021-01-31 PROCEDURE — 97166 OT EVAL MOD COMPLEX 45 MIN: CPT

## 2021-01-31 PROCEDURE — 94761 N-INVAS EAR/PLS OXIMETRY MLT: CPT

## 2021-01-31 PROCEDURE — 6360000002 HC RX W HCPCS: Performed by: HOSPITALIST

## 2021-01-31 PROCEDURE — 2060000000 HC ICU INTERMEDIATE R&B

## 2021-01-31 PROCEDURE — 2580000003 HC RX 258: Performed by: HOSPITALIST

## 2021-01-31 PROCEDURE — 97116 GAIT TRAINING THERAPY: CPT

## 2021-01-31 RX ORDER — EZETIMIBE 10 MG/1
10 TABLET ORAL DAILY
COMMUNITY

## 2021-01-31 RX ADMIN — INSULIN LISPRO 4 UNITS: 100 INJECTION, SOLUTION INTRAVENOUS; SUBCUTANEOUS at 11:46

## 2021-01-31 RX ADMIN — INSULIN LISPRO 2 UNITS: 100 INJECTION, SOLUTION INTRAVENOUS; SUBCUTANEOUS at 09:04

## 2021-01-31 RX ADMIN — SERTRALINE 100 MG: 50 TABLET, FILM COATED ORAL at 08:53

## 2021-01-31 RX ADMIN — ASPIRIN 81 MG: 81 TABLET, COATED ORAL at 08:52

## 2021-01-31 RX ADMIN — Medication 2 PUFF: at 19:37

## 2021-01-31 RX ADMIN — ENOXAPARIN SODIUM 40 MG: 40 INJECTION SUBCUTANEOUS at 08:53

## 2021-01-31 RX ADMIN — Medication 2 PUFF: at 09:41

## 2021-01-31 RX ADMIN — Medication 10 ML: at 09:02

## 2021-01-31 RX ADMIN — METOPROLOL SUCCINATE 50 MG: 50 TABLET, EXTENDED RELEASE ORAL at 08:52

## 2021-01-31 RX ADMIN — INSULIN GLARGINE 10 UNITS: 100 INJECTION, SOLUTION SUBCUTANEOUS at 01:34

## 2021-01-31 RX ADMIN — Medication 10 ML: at 01:22

## 2021-01-31 RX ADMIN — ACETAMINOPHEN 650 MG: 325 TABLET ORAL at 11:52

## 2021-01-31 RX ADMIN — INSULIN LISPRO 4 UNITS: 100 INJECTION, SOLUTION INTRAVENOUS; SUBCUTANEOUS at 18:21

## 2021-01-31 RX ADMIN — INSULIN LISPRO 1 UNITS: 100 INJECTION, SOLUTION INTRAVENOUS; SUBCUTANEOUS at 22:10

## 2021-01-31 RX ADMIN — INSULIN GLARGINE 10 UNITS: 100 INJECTION, SOLUTION SUBCUTANEOUS at 22:11

## 2021-01-31 ASSESSMENT — PAIN DESCRIPTION - PAIN TYPE: TYPE: ACUTE PAIN

## 2021-01-31 ASSESSMENT — PAIN DESCRIPTION - DESCRIPTORS: DESCRIPTORS: HEADACHE

## 2021-01-31 NOTE — PROGRESS NOTES
Physical Therapy    Facility/Department: [facility] ICU  Initial Assessment    NAME: Ebonie Agosto  : [id number]  MRN: [id number]    Date of Service: 2021    Discharge Recommendations:    Ebonie Agosto scored a 19/24 on the AM-PAC short mobility form. At this time, no further PT is recommended upon discharge due to anticipated return to baseline function. Recommend patient returns to prior setting with prior services. PT Equipment Recommendations  Equipment Needed: No    Assessment   Body structures, Functions, Activity limitations: Decreased functional mobility ; Decreased strength;Decreased endurance;Decreased safe awareness  Assessment: Patient presents close to baseline function. Utilizing RW to assist with visual deficits for safety. OT to continue to address visual deficits and compensatory measures. No further anticipated PT needs upon discharge. Treatment Diagnosis: decreased functional mobility, impaired gait  Prognosis: Good  Decision Making: Medium Complexity  Clinical Presentation: evolving  PT Education: Goals;PT Role;Plan of Care  Patient Education: d/c recommendations - verbalized understanding  Barriers to Learning: visual, language  REQUIRES PT FOLLOW UP: Yes  Activity Tolerance  Activity Tolerance: Patient limited by fatigue       Patient Diagnosis(es): The encounter diagnosis was Thalamic stroke ([address] has a past medical history of Actinic keratosis, Arthritis, Asthma, Atrial fibrillation ([address] CAD (coronary artery disease), Cerebral artery occlusion with cerebral infarction ([address] Depression, Diabetes mellitus ([address] Disc displacement, lumbar, Diverticulosis of colon, Hard of hearing, Hyperlipidemia, Hypertension, Ischemic heart disease, Past heart attack, Poor vision, Right leg weakness, and Scoliosis. has a past surgical history that includes Coronary artery bypass graft;  section; Appendectomy; ovarian cyst removal; Hand surgery; Cardiac surgery (); skin biopsy; vascular surgery; Tonsillectomy; Lumbar disc surgery (8-); Lumbar spine surgery (10-11-12); and back surgery. Restrictions  Restrictions/Precautions  Restrictions/Precautions: Fall Risk(high fall risk)  Required Braces or Orthoses?: No  Position Activity Restriction  Other position/activity restrictions: The patient is a 65y.o.  years old female with history of hypertension A. fib, hyperlipidemia and prior stroke who was admitted to the hospital last night with acute visual disturbance. Symptoms started few hours prior to admission. According to report, she was playing with her dog at home when she slowly lost the vision from the periphery bilaterally and was unable to function. Degree was severe and persistent. No falling or injury. She does have residual aphasia from prior stroke from last year. No fever or chills. No other triggers or other associated symptoms, weakness or numbness or chest pain. No severe headache. Patient came into the ED for evaluation since her symptoms did not improve. Initial work-up revealed mild elevated high blood pressure. She had a CT of the head which showed possible acute right ischemic PCA stroke. CTA showed more proximal right ICA occlusion. She was admitted.   Vision/Hearing  Vision: Impaired  Vision Exceptions: Visual field cut(L side visual deficits)  Hearing: Within functional limits     Subjective  General  Chart Reviewed: Yes  Patient assessed for rehabilitation services?: Yes  Family / Caregiver Present: No  Diagnosis: CVA  Follows Commands: Within Functional Limits  General Comment  Comments: supine in bed upon arrival  Subjective Subjective: Denied pain, reported she feels her vision is improving. Agreeable to therapy and eager to get OOB. Some expressive aphasia noted - residual from previous CVA. Pain Screening  Patient Currently in Pain: Denies          Orientation  Orientation  Overall Orientation Status: Within Functional Limits  Social/Functional History  Social/Functional History  Lives With: Alone  Type of Home: House  Home Layout: One level  Home Access: Level entry  Bathroom Shower/Tub: Tub/Shower unit, Shower chair with back  [employer]: Standard  Bathroom Equipment: Grab bars in shower, Grab bars around toilet, Hand-held shower  Home Equipment: Rolling walker, Cane, Reacher  ADL Assistance: Independent  Homemaking Responsibilities: Yes  Ambulation Assistance: Independent(states she no longer uses walker)  Transfer Assistance: Independent  Active :  Yes  Additional Comments: denies recent falls    Objective          AROM RLE (degrees)  RLE AROM: WFL  AROM LLE (degrees)  LLE AROM : WFL  Strength RLE  Strength RLE: WFL(grossly 4/5)  Strength LLE  Strength LLE: WFL(grossly 4/5)        Bed mobility  Supine to Sit: Stand by assistance  Sit to Supine: Stand by assistance  Scooting: Stand by assistance  Transfers  Sit to Stand: Stand by assistance  Stand to sit: Stand by assistance  Ambulation  Ambulation?: Yes  Ambulation 1  Surface: level tile  Device: Rolling Walker  Assistance: Stand by assistance  Quality of Gait: steady gait, decreased jason, able to avoid obstacles with visually scanning room  Distance: 25' + 15'  Comments: declined further ambulation due to fatigue     Balance  Sitting - Static: Good  Sitting - Dynamic: Good  Standing - Static: Fair(static standing at sink 3-5 minutes engaged in UE task with SBA for balance)  Standing - Dynamic: [address]  Times per week: 5-7  Times per day: Daily Current Treatment Recommendations: Strengthening, Functional Mobility Training, Transfer Training, Balance Training, Endurance Training, Gait Training, Safety Education & Training, Patient/Caregiver Education & Training  Safety Devices  Type of devices: All fall risk precautions in place, Bed alarm in place, Call light within reach, Left in bed, Nurse notified, Patient at risk for falls  Restraints  Initially in place: No      AM-PAC Score  AM-PAC Inpatient Mobility Raw Score : 19 (01/31/21 1212)  AM-PAC Inpatient T-Scale Score : 45.44 (01/31/21 1212)  Mobility Inpatient CMS 0-100% Score: 41.77 (01/31/21 1212)  Mobility Inpatient CMS G-Code Modifier : CK (01/31/21 1212)          Goals  Short term goals  Time Frame for Short term goals:  To be met prior to discharge  Short term goal 1: Bed mobility with supervision  Short term goal 2: Sit to/from stand with supervision  Short term goal 3: Ambulate 150' with/without RW and supervision  Patient Goals   Patient goals : to go home       Therapy Time   Individual Concurrent Group Co-treatment   Time In 1024         Time Out 1108         Minutes 44         Timed Code Treatment Minutes: [address], PT    Elis Cherry Ok, PT, DPT [id number]

## 2021-01-31 NOTE — PROGRESS NOTES
Occupational Therapy   Occupational Therapy Initial Assessment  Date: 2021   Patient Name: Natalia Brown  MRN: [id number]     : 1950    Date of Service: 2021    Discharge Recommendations: Natalia Brown scored a 19/24 on the AM-PAC ADL Inpatient form. Current research shows that an AM-PAC score of 18 or greater is typically associated with a discharge to the patient's home setting. Based on the patient's AM-PAC score, and their current ADL deficits, it is recommended that the patient have 2-3 sessions per week of Occupational Therapy at d/c to increase the patient's independence. At this time, this patient demonstrates the endurance and safety to discharge home with OP services and a follow up treatment frequency of 2-3x/wk. Please see assessment section for further patient specific details. If patient discharges prior to next session this note will serve as a discharge summary. Please see below for the latest assessment towards goals. OT Equipment Recommendations  Equipment Needed: No  Other: Pt owns recommended DME. Assessment   Performance deficits / Impairments: Decreased functional mobility ; Decreased balance;Decreased vision/visual deficit  Assessment: Pt is below functional baseline and would benefit from continued skilled OT treatment to improve outcomes  Treatment Diagnosis: Pt with above functional deficits due to recent acute CVA. Prognosis: Good  Decision Making: Low Complexity  OT Education: OT Role;Plan of Care;ADL Adaptive Strategies; Low Vision Education  Patient Education: Pt verbalized understanding  Barriers to Learning: n/a  REQUIRES OT FOLLOW UP: Yes  Activity Tolerance  Activity Tolerance: Patient Tolerated treatment well  Safety Devices  Safety Devices in place: Yes  Type of devices: Left in bed;Call light within reach; Bed alarm in place           Patient Diagnosis(es): The encounter diagnosis was Thalamic stroke ([facility] [address] has a past medical history of Actinic keratosis, Arthritis, Asthma, Atrial fibrillation ([address] CAD (coronary artery disease), Cerebral artery occlusion with cerebral infarction ([address] Depression, Diabetes mellitus ([address] Disc displacement, lumbar, Diverticulosis of colon, Hard of hearing, Hyperlipidemia, Hypertension, Ischemic heart disease, Past heart attack, Poor vision, Right leg weakness, and Scoliosis. has a past surgical history that includes Coronary artery bypass graft;  section; Appendectomy; ovarian cyst removal; Hand surgery; Cardiac surgery (); skin biopsy; vascular surgery; Tonsillectomy; Lumbar disc surgery (8-); Lumbar spine surgery (10-11-12); and back surgery. Treatment Diagnosis: Pt with above functional deficits due to recent acute CVA. Restrictions  Restrictions/Precautions  Restrictions/Precautions: Fall Risk(high fall risk)  Required Braces or Orthoses?: No  Position Activity Restriction  Other position/activity restrictions: The patient is a 65y.o.  years old female with history of hypertension A. fib, hyperlipidemia and prior stroke who was admitted to the hospital last night with acute visual disturbance. Symptoms started few hours prior to admission. According to report, she was playing with her dog at home when she slowly lost the vision from the periphery bilaterally and was unable to function. Degree was severe and persistent. No falling or injury. She does have residual aphasia from prior stroke from last year. No fever or chills. No other triggers or other associated symptoms, weakness or numbness or chest pain. No severe headache. Patient came into the ED for evaluation since her symptoms did not improve. Initial work-up revealed mild elevated high blood pressure. She had a CT of the head which showed possible acute right ischemic PCA stroke. CTA showed more proximal right ICA occlusion. She was admitted.     Subjective   General Chart Reviewed: Yes  Patient assessed for rehabilitation services?: Yes  Family / Caregiver Present: No  Diagnosis: Acute CVA  Subjective  Subjective: Pt pleseant and agreeable to eval. Pt with expressive aphasia from previous stroke. Patient Currently in Pain: Denies  Pain Assessment  Pain Assessment: 0-10  Pain Level: 5  Pain Type: Acute pain  Pain Location: Head  Pain Descriptors: Headache  Pain Frequency: Intermittent  Vital Signs  Temp: 98 °F (36.7 °C)  Temp Source: Temporal  Pulse: 93  Heart Rate Source: Monitor  Resp: 16  BP: (!) 169/91  BP Location: Right upper arm  MAP (mmHg): 108  Patient Position: Semi fowlers  Level of Consciousness: Alert (0)  MEWS Score: 1  Patient Currently in Pain: Denies  Oxygen Therapy  SpO2: 92 %  Pulse Oximeter Device Mode: Intermittent  Pulse Oximeter Device Location: Finger  O2 Device: None (Room air)  Social/Functional History  Social/Functional History  Lives With: Alone  Type of Home: House  Home Layout: One level  Home Access: Level entry  Bathroom Shower/Tub: Tub/Shower unit, Shower chair with back  Bathroom Toilet: Standard  Bathroom Equipment: Grab bars in shower, Grab bars around toilet, Hand-held shower  Home Equipment: Rolling walker, Cane, Reacher  ADL Assistance: Independent  Homemaking Responsibilities: Yes  Ambulation Assistance: Independent(states she no longer uses walker)  Transfer Assistance: Independent  Active : Yes  Additional Comments: denies recent falls       Objective        Orientation  Overall Orientation Status: Within Functional Limits  Observation/Palpation  Posture: Good  Balance  Sitting Balance: Supervision  Standing Balance: Stand by assistance  Standing Balance  Time: 8 min  Activity: functional transfers and ADLs in stance. Comment: cues for continued scanning.   Functional Mobility  Functional - Mobility Device: Rolling Walker  Assist Level: Stand by assistance  Toilet Transfers  Toilet - Technique: Ambulating Toilet Transfer: Stand by assistance  ADL  Feeding: Independent  Grooming: Stand by assistance  UE Bathing: Supervision  LE Bathing: Stand by assistance  UE Dressing: Supervision  LE Dressing: Stand by assistance  Toileting: Stand by assistance  Tone RUE  RUE Tone: Normotonic  Tone LUE  LUE Tone: Normotonic  Coordination  Movements Are Fluid And Coordinated: Yes        Transfers  Stand Step Transfers: Stand by assistance  Sit to stand: Stand by assistance  Stand to sit: Stand by assistance  Vision - Basic Assessment  Patient Visual Report: Blurring of vision when changing focal distance  Visual Field Cut: Left  Cognition  Overall Cognitive Status: WFL  Cognition Comment: Difficult with expressive conversation. Perception  Overall Perceptual Status: WFL     Sensation  Overall Sensation Status: Impaired        LUE AROM (degrees)  LUE AROM : WFL  Left Hand AROM (degrees)  Left Hand AROM: WFL  RUE AROM (degrees)  RUE AROM : WFL  Right Hand AROM (degrees)  Right Hand AROM: WFL  LUE Strength  Gross LUE Strength: WFL  L Hand General: 5/5  RUE Strength  Gross RUE Strength: WFL  R Hand General: 5/5                   Plan   Plan  Times per week: 2-3  Times per day: Daily  Current Treatment Recommendations: Strengthening, ROM, Safety Education & Training, Self-Care / ADL            AM-[facility] Inpatient Daily Activity Raw Score: 19 (01/31/21 1224)  AM-PAC Inpatient ADL T-Scale Score : 40.22 (01/31/21 1224)  ADL Inpatient CMS 0-100% Score: 42.8 (01/31/21 1224)  ADL Inpatient CMS G-Code Modifier : CK (01/31/21 1224)    Goals  Short term goals  Time Frame for Short term goals: discharge  Short term goal 1: Pt will complete grooming with Mod I  Short term goal 2: Pt will complete toliet transfer with Mod I  Short term goal 3: Pt will be able to navigate enviormental hazards without cues for RW use or visual scanning.   Short term goal 4: Pt will complete UB/LB dressing with Mod I.  Long term goals Time Frame for Long term goals : STGs= LTGs  Patient Goals   Patient goals : be independent       Therapy Time   Individual Concurrent Group Co-treatment   Time In 1024         Time Out 1108         Minutes 44              Timed Code Treatment Minutes:  29 Minutes    Total Treatment Minutes:  40 min       Carlos Castro, OT

## 2021-01-31 NOTE — PROGRESS NOTES
Rodolfo Kitty  Neurology Follow-up  [facility]    Date of Service: 1/31/2021    Subjective:   CC: Follow up today regarding: Acute right PCA ischemic stroke and visual deficit. Events noted. Chart and lab reviewed. No new symptoms today. The same mixed aphasia which is chronic. The same dense left visual field deficit. She is on aspirin. Blood test reviewed. A1c 8.7 and . She denies any headache or chest pain or dysphagia. Other review of system was unremarkable. ROS : A 10-12 system review obtained and updated today and is unremarkable except as mentioned  in my interval history. family history includes Bipolar Disorder in her mother; Cancer in her father; Diabetes in her mother and sister; Early Death in her father; Heart Attack in her father; Heart Disease in her father and sister; Heart Surgery in her father and sister; High Blood Pressure in her father, mother, and sister; Mental Illness in her mother.     Past Medical History:   Diagnosis Date    Actinic keratosis     Arthritis     Asthma     UNSPECIFIED    Atrial fibrillation (HCC)     CAD (coronary artery disease)     Cerebral artery occlusion with cerebral infarction ([address] 10/2020    residual expressive aphasia    Depression     worse with pain    Diabetes mellitus ([address]     TYPE II    Disc displacement, lumbar     Diverticulosis of colon     Hard of hearing     left    Hyperlipidemia     Hypertension     Ischemic heart disease     Past heart attack     28 YRS AGO    Poor vision     legally blind    Right leg weakness     Scoliosis      Current Facility-Administered Medications   Medication Dose Route Frequency Provider Last Rate Last Admin    budesonide-formoterol (SYMBICORT) 160-4.5 MCG/ACT inhaler 2 puff  2 puff Inhalation BID Luiza Henderson MD   2 puff at 01/31/21 3194  metoprolol succinate (TOPROL XL) extended release tablet 50 mg  50 mg Oral Daily Fahad Luz MD   50 mg at 01/31/21 0852    sertraline (ZOLOFT) tablet 100 mg  100 mg Oral Daily Fahad Luz MD   100 mg at 01/31/21 0853    sodium chloride flush 0.9 % injection 10 mL  10 mL Intravenous 2 times per day Munir John MD   10 mL at 01/31/21 0902    sodium chloride flush 0.9 % injection 10 mL  10 mL Intravenous PRN Munir John MD        promethazine (PHENERGAN) tablet 12.5 mg  12.5 mg Oral Q6H PRN Munir John MD        Or    ondansetron (ZOFRAN) injection 4 mg  4 mg Intravenous Q6H PRN Fahad Luz MD        polyethylene glycol (GLYCOLAX) packet 17 g  17 g Oral Daily PRN Munir John MD        enoxaparin (LOVENOX) injection 40 mg  40 mg Subcutaneous Daily Fahad Luz MD   40 mg at 01/31/21 0853    aspirin EC tablet 81 mg  81 mg Oral Daily Fahad Luz MD   81 mg at 01/31/21 0092    Or    aspirin suppository 300 mg  300 mg Rectal Daily Fahad Luz MD        perflutren lipid microspheres (DEFINITY) injection 1.65 mg  1.5 mL Intravenous ONCE PRN Munir John MD        acetaminophen (TYLENOL) tablet 650 mg  650 mg Oral Q4H PRN Munir John MD   650 mg at 01/31/21 1152    Or    acetaminophen (TYLENOL) suppository 650 mg  650 mg Rectal Q4H PRN Munir John MD        labetalol (NORMODYNE;TRANDATE) injection 10 mg  10 mg Intravenous Q10 Min PRN Munir John MD        insulin glargine (LANTUS;BASAGLAR) injection pen 10 Units  0.15 Units/kg Subcutaneous Nightly Fahad Luz MD   10 Units at 01/31/21 0134    glucose (GLUTOSE) 40 % oral gel 15 g  15 g Oral PRN Fahad Luz MD        dextrose 50 % IV solution  12.5 g Intravenous PRN Fahad Luz MD        glucagon (rDNA) injection 1 mg  1 mg Intramuscular PRN Munir John MD  dextrose 5 % solution  100 mL/hr Intravenous PRN Kevin Fraire MD        insulin lispro (1 Unit Dial) 0-12 Units  0-12 Units Subcutaneous TID WC Fahad Luz MD   4 Units at 01/31/21 1146    insulin lispro (1 Unit Dial) 0-6 Units  0-6 Units Subcutaneous Nightly Fahad Luz MD         Allergies   Allergen Reactions    Morphine Other (See Comments) and Nausea And Vomiting     BLOOD PRESSURE BOTTOMED OUT  Feel funny      Codeine Nausea Only, Other (See Comments) and Palpitations     SWEATS  generalized unwell feeling when takes  Feel funny      Statins Itching      reports that she has quit smoking. She has a 40.00 pack-year smoking history. She has never used smokeless tobacco. She reports that she does not drink alcohol or use drugs. Objective:  Exam:   Constitutional:   Vitals:    01/31/21 0800 01/31/21 0815 01/31/21 0830 01/31/21 1144   BP: (!) 165/67   (!) 169/91   Pulse: 102 93 79 93   Resp: 18 17 15 16   Temp: 98.1 °F (36.7 °C)   98 °F (36.7 °C)   TempSrc: Temporal   Temporal   SpO2: 95% 92% 95% 92%   Weight:       Height:         General appearance:  Normal development and appear in no acute distress. Eye: No icterus. Neck: supple  Cardiovascular:  No lower leg edema with good pulsation. Mental Status: The same. She has mixed aphasia but able to follow directions. Oriented to person, place, problem, and time. Memory: Good immediate recall. Intact remote memory  Normal attention span and concentration. Language: The same mixed aphasia with paraphasic error  Good fund of Knowledge. Cranial Nerves:   II: Visual fields: The same dense left, #anopsia   pupils: equal, round, reactive to light  III,IV,VI: Extra Ocular Movements are intact.  No nystagmus  V: Facial sensation is intact  VII: Facial strength and movements: intact and symmetric  IX: Palate elevation is symmetric  XI: Shoulder shrug is intact  XII: Tongue movements are normal Musculoskeletal: Chronic right-sided weakness 4/5  Tone: Normal tone. Reflexes: Symmetric 2+ in both arms and legs. Coordination: Right drift and poor REM on the right. Sensation: normal to all modalities in both arms and legs. Gait/Posture: Not tested today        Data:  LABS:   Lab Results   Component Value Date     01/29/2021    K 4.0 01/29/2021    CL 97 01/29/2021    CO2 25 01/29/2021    BUN 14 01/29/2021    CREATININE 0.8 01/29/2021    GFRAA >60 01/29/2021    GFRAA >60 11/11/2012    LABGLOM >60 01/29/2021    GLUCOSE 294 01/29/2021    CALCIUM 10.5 01/29/2021     Lab Results   Component Value Date    WBC 6.6 01/31/2021    RBC 4.05 01/31/2021    HGB 12.5 01/31/2021    HCT 37.5 01/31/2021    MCV 92.6 01/31/2021    RDW 13.6 01/31/2021     01/31/2021     Lab Results   Component Value Date    INR 1.00 01/29/2021    PROTIME 11.6 01/29/2021       Neuroimaging was independently reviewed by me and discussed results with the patient  I reviewed blood testing and other test results and discussed results with the patient      Impression: She is about the same. Acute right ischemic PCA stroke with  dense left visual field deficit. Could be thromboembolic versus cardioembolic  Recent left MCA stroke with residual aphasia and right-sided weakness. She received TPA back in October and had mild hemorrhagic conversion after TPA. PAF on aspirin. Hypertension, not controlled  Hyperlipidemia, not controlled  Diabetes, not controlled.   A1c 8.7         Recommendation  Continue aspirin for now  Statin  PT and OT  Insulin sliding scale  Blood sugar control  Telemetry  DVT and GI prophylaxis  SSRI  Continue home blood pressure medications and monitor blood pressure closely  Speech evaluation

## 2021-01-31 NOTE — PROGRESS NOTES
Shift assessment completed, patient is awake alert and oriented in the bed, up to commode, NIH score 6, still with visual loss on the left side although patient states she thinks that it is improving. Vitals stable, meds given per MAR. Fall precautions in place, hourly rounding, call light and belongings in reach, bed in lowest position, wheels locked in place, side rails up x 2, walkways free of clutter, bed alarm on.

## 2021-01-31 NOTE — PROGRESS NOTES
Shift assessment completed, see doc flowsheets. Pt currently has no complaints at this time. NIH completed, pt currently 6. Pt does have difficulty seeing, states can't see anything on left side. Pt also has significant expressive aphasia, making it difficult to know what pt can comprehend. Call light within reach, will continue to monitor.   Sandra Norton

## 2021-02-01 PROBLEM — I63.81 THALAMIC STROKE (HCC): Status: ACTIVE | Noted: 2020-10-23

## 2021-02-01 LAB
GLUCOSE BLD-MCNC: 134 MG/DL (ref 70–99)
GLUCOSE BLD-MCNC: 154 MG/DL (ref 70–99)
GLUCOSE BLD-MCNC: 193 MG/DL (ref 70–99)
GLUCOSE BLD-MCNC: 218 MG/DL (ref 70–99)
PERFORMED ON: ABNORMAL

## 2021-02-01 PROCEDURE — 2580000003 HC RX 258: Performed by: HOSPITALIST

## 2021-02-01 PROCEDURE — 97116 GAIT TRAINING THERAPY: CPT

## 2021-02-01 PROCEDURE — 99223 1ST HOSP IP/OBS HIGH 75: CPT | Performed by: INTERNAL MEDICINE

## 2021-02-01 PROCEDURE — 6360000002 HC RX W HCPCS: Performed by: HOSPITALIST

## 2021-02-01 PROCEDURE — 94761 N-INVAS EAR/PLS OXIMETRY MLT: CPT

## 2021-02-01 PROCEDURE — 2060000000 HC ICU INTERMEDIATE R&B

## 2021-02-01 PROCEDURE — 99232 SBSQ HOSP IP/OBS MODERATE 35: CPT | Performed by: PSYCHIATRY & NEUROLOGY

## 2021-02-01 PROCEDURE — 6370000000 HC RX 637 (ALT 250 FOR IP): Performed by: HOSPITALIST

## 2021-02-01 PROCEDURE — 83695 ASSAY OF LIPOPROTEIN(A): CPT

## 2021-02-01 PROCEDURE — 94640 AIRWAY INHALATION TREATMENT: CPT

## 2021-02-01 RX ADMIN — ACETAMINOPHEN 650 MG: 325 TABLET ORAL at 11:19

## 2021-02-01 RX ADMIN — ENOXAPARIN SODIUM 40 MG: 40 INJECTION SUBCUTANEOUS at 08:09

## 2021-02-01 RX ADMIN — SERTRALINE 100 MG: 50 TABLET, FILM COATED ORAL at 08:05

## 2021-02-01 RX ADMIN — INSULIN LISPRO 2 UNITS: 100 INJECTION, SOLUTION INTRAVENOUS; SUBCUTANEOUS at 12:22

## 2021-02-01 RX ADMIN — METOPROLOL SUCCINATE 50 MG: 50 TABLET, EXTENDED RELEASE ORAL at 08:07

## 2021-02-01 RX ADMIN — Medication 2 PUFF: at 19:36

## 2021-02-01 RX ADMIN — Medication 10 ML: at 20:54

## 2021-02-01 RX ADMIN — Medication 10 ML: at 08:14

## 2021-02-01 RX ADMIN — INSULIN GLARGINE 10 UNITS: 100 INJECTION, SOLUTION SUBCUTANEOUS at 20:53

## 2021-02-01 RX ADMIN — INSULIN LISPRO 4 UNITS: 100 INJECTION, SOLUTION INTRAVENOUS; SUBCUTANEOUS at 17:39

## 2021-02-01 RX ADMIN — ASPIRIN 81 MG: 81 TABLET, COATED ORAL at 08:08

## 2021-02-01 RX ADMIN — INSULIN LISPRO 2 UNITS: 100 INJECTION, SOLUTION INTRAVENOUS; SUBCUTANEOUS at 08:14

## 2021-02-01 RX ADMIN — Medication 2 PUFF: at 08:19

## 2021-02-01 ASSESSMENT — PAIN SCALES - GENERAL: PAINLEVEL_OUTOF10: 8

## 2021-02-01 NOTE — CONSULTS
[address]   Electrophysiology Consultation   Date: 2021  Reason for Consultation: Edvin Esquivel CVA  Consult Requesting Physician: Juan Hernandes MD     Chief Complaint   Patient presents with    Loss of Vision     pt brought in by [facility] from [address] [place] [address]  where patient was brought there from home for BP check; loss of peripheral vision since 1300 today; hx CVA in 2020 with + residual expressive asphasia; had a headache on arrival to [address] [place] [address]  but not now     HPI: Iris Carcamo is a 65 y.o. female with history of CAD, CABG, hypertension, hyperlipidemia with recent left MCA stroke with residual aphasia and right-sided weakness who received TPA at the time and had mild hemorrhagic conversion was admitted to the hospital due to another acute right ischemic PCA stroke with dense left visual field deficit. Patient has diagnosis of atrial fibrillation. She has not taken DOAC's due to cost and she stopped taking Coumadin after she noticed that she developed some bruising on her skin.       Past Medical History:   Diagnosis Date    Actinic keratosis     Arthritis     Asthma     UNSPECIFIED    Atrial fibrillation (HCC)     CAD (coronary artery disease)     Cerebral artery occlusion with cerebral infarction ([address] 10/2020    residual expressive aphasia    Depression     worse with pain    Diabetes mellitus ([address]     TYPE II    Disc displacement, lumbar     Diverticulosis of colon     Hard of hearing     left    Hyperlipidemia     Hypertension     Ischemic heart disease     Past heart attack     29 YRS AGO    Poor vision     legally blind    Right leg weakness     Scoliosis         Past Surgical History:   Procedure Laterality Date    APPENDECTOMY      BACK SURGERY      CARDIAC SURGERY  1984     SECTION      ONE    CORONARY ARTERY BYPASS GRAFT      HAND SURGERY      LUMPS/BONE SPURS    LUMBAR [address] Last SURGERY  8-    Microlumbar discectomy L4-5 right  LUMBAR SPINE SURGERY  10-11-12    MICROLUMBAR RE-EXPLORATION L4-5 RIGHT, DECOMPRESSION OF    OVARIAN CYST REMOVAL      SKIN BIOPSY      PRE CANCERS FROZEN OFF    TONSILLECTOMY      VASCULAR SURGERY         Allergies   Allergen Reactions    Morphine Other (See Comments) and Nausea And Vomiting     BLOOD PRESSURE BOTTOMED OUT  Feel funny      Codeine Nausea Only, Other (See Comments) and Palpitations     SWEATS  generalized unwell feeling when takes  Feel funny      Statins Itching       Social History:  Reviewed. reports that she has quit smoking. She has a 40.00 pack-year smoking history. She has never used smokeless tobacco. She reports that she does not drink alcohol or use drugs. Family History:  Reviewed. family history includes Bipolar Disorder in her mother; Cancer in her father; Diabetes in her mother and sister; Early Death in her father; Heart Attack in her father; Heart Disease in her father and sister; Heart Surgery in her father and sister; High Blood Pressure in her father, mother, and sister; Mental Illness in her mother. Review of System:  All other systems reviewed and are negative except for that noted above. Pertinent negatives are:     · General: negative for fever, chills   · Ophthalmic ROS: negative for - eye pain or loss of vision  · ENT ROS: negative for - headaches, sore throat   · Respiratory: negative for - cough, sputum  · Cardiovascular: Reviewed in HPI  · Gastrointestinal: negative for - abdominal pain, diarrhea, N/V  · Hematology: negative for - bleeding, blood clots, bruising or jaundice  · Genito-Urinary:  negative for - Dysuria or incontinence  · Musculoskeletal: negative for - Joint swelling, muscle pain  · Neurological: negative for - confusion, dizziness, headaches   · Psychiatric: No anxiety, no depression.   · Dermatological: negative for - rash    Physical Examination:  Vitals:    02/01/21 0502   BP: (!) 151/84   Pulse: 81   Resp: 18   Temp: 97.2 °F (36.2 °C) SpO2: 95%      In: 240 [P.O.:240]  Out: 300    Wt Readings from Last 3 Encounters:   21 156 lb 1.4 oz (70.8 kg)   10/24/20 153 lb 8 oz (69.6 kg)   10/23/20 158 lb 11.7 oz (72 kg)     Temp  Av.9 °F (36.6 °C)  Min: 97.2 °F (36.2 °C)  Max: 98.2 °F (36.8 °C)  Pulse  Av  Min: 79  Max: 102  BP  Min: 121/57  Max: 169/91  SpO2  Av.5 %  Min: 92 %  Max: 96 %    Intake/Output Summary (Last 24 hours) at 2021 0758  Last data filed at 2021 1840  Gross per 24 hour   Intake 360 ml   Output 300 ml   Net 60 ml       · Telemetry atrial fibrillation  · Constitutional: Oriented. No distress. · Head: Normocephalic and atraumatic. · Mouth/Throat: Oropharynx is clear and moist.   · Eyes: Conjunctivae normal. EOM are normal.   · Neck: Neck supple. No rigidity. No JVD present. · Cardiovascular: Normal rate, irregular rhythm, S1&S2. · Pulmonary/Chest: Bilateral respiratory sounds. No wheezes, No rhonchi. · Abdominal: Soft. Bowel sounds present. No distension, No tenderness. · Musculoskeletal: No tenderness. No edema    · Lymphadenopathy: Has no cervical adenopathy. · Neurological: Alert and oriented. Cranial nerve exam including visual field as per neurology, right-sided weakness skin: Skin is warm and dry. No rash noted. · Psychiatric: Has a normal behavior     Labs, diagnostic and imaging results reviewed. Reviewed.    Recent Labs     21  2343   *   K 4.0   CL 97*   CO2 25   BUN 14   CREATININE 0.8     Recent Labs     21  2343 21  0433   WBC 8.9 6.6   HGB 12.7 12.5   HCT 39.5 37.5   MCV 94.1 92.6    271     Lab Results   Component Value Date    TROPONINI <0.01 2021     No results found for: BNP  Lab Results   Component Value Date    PROTIME 11.6 2021    PROTIME 11.2 10/20/2020    PROTIME 12.0 2019    INR 1.00 2021    INR 0.97 10/20/2020    INR 1.05 2019     Lab Results   Component Value Date    CHOL 190 2021 HDL 42 01/31/2021    TRIG 197 01/31/2021       ECG: Atrial fibrillationRight bundle branch blockVoltage criteria for left ventricular hypertrophyAbnormal   Echo:Conclusions      Summary   Atrial fibrillation is noted. Left ventricular systolic function is normal with ejection fraction   estimated at 50-55 %. Grade II diastolic dysfunction with elevated filling pressure. The left atrium is mildly dilated. A bubble study was performed and fails to show evidence of shunting. Aortic valve appears sclerotic but opens adequately. Mitral valve leaflets appear mildly thickened. Mild calcification of the posterior leaflet of the mitral valve. Mild to moderate mitral regurgitation   Moderate tricuspid regurgitation. Systolic pulmonary artery pressure (SPAP) is elevated and estimated at 44   mmHg (right atrial pressure 3 mmHg). This is suggestive of mild pulmonary hypertension. IVC is normal in size (< 2.1 cm) and collapses > 50% with respiration   consistent with normal RA pressure (3 mmHg). 2016  Summary:  Overall left ventricular ejection fraction is estimated to be 45-50%. There is mild global hypokinesis of the left ventricle. The left atrium is moderately dilated. There is mild mitral annular calcification. There is mild mitral  regurgitation. Mild - moderate tricuspid regurgitation is present. Right ventricular  systolic pressure is mildly elevated at 35-45 mmHg. Cath: 2016  Findings:  Hemodynamics:   Ao: 133/60 mmHg  LV: 133/14 mmHg    Left Ventricle:    EF= 50-55 %  Wall motion: Normal  Mitral regurgitation: None seen    Coronary Angiogram:  Right Dominant system  Left Main Trunk (LMT): Normal sized, mid 20%, distal 50% at the   bifurcation of the LAD and LCx  Left Anterior Descending (LAD): Type III vessel, normal sized,   approximately occluded immediately after a diagonal 1   Diagonal 1: Ostial severe stenosis that is extending from the   left main Left Circumflex (LCX): Large sized, eccentric lesion seen in the   distal left main and ostium of the LCx, approximately 50-70%   OM1: Large caliber, diffusely ectatic with mild disease seen in   the midportion  Right Coronary (RCA): Large sized, heavily calcified, the   midportion has a 99% lesion that may represent a completely   occluded vessel with bridging collaterals, the distal portion of   the vessel is mildly diffusely diseased. R PDA: Medium-sized, moderately diffusely diseased   R PLB: Medium-sized, moderately diffusely diseased  IMPRESSION:  1. 2 vessel CAD as described above, the LAD territory is supplied   by a patent MASSIEL graft and the RCA territory is ischemic  2. Normal overall LV systolic function  3. Normal systemic pressure  4. Normal LVEDP  5. The rhythm is noted to be atrial fibrillation, controlled rate  6. Fluoroscopy reveals heavily calcified great vessels, of note,   this led to significant difficulty in manipulating the catheters   from the left upper extremity  7.  Successful hemostasis of the left radial artery with TR band  Scheduled Meds:   budesonide-formoterol  2 puff Inhalation BID    metoprolol succinate  50 mg Oral Daily    sertraline  100 mg Oral Daily    sodium chloride flush  10 mL Intravenous 2 times per day    enoxaparin  40 mg Subcutaneous Daily    aspirin  81 mg Oral Daily    Or    aspirin  300 mg Rectal Daily    insulin glargine  0.15 Units/kg Subcutaneous Nightly    insulin lispro  0-12 Units Subcutaneous TID WC    insulin lispro  0-6 Units Subcutaneous Nightly     Continuous Infusions:   dextrose       PRN Meds:.sodium chloride flush, promethazine **OR** ondansetron, polyethylene glycol, perflutren lipid microspheres, acetaminophen **OR** acetaminophen, labetalol, glucose, dextrose, glucagon (rDNA), dextrose     Patient Active Problem List    Diagnosis Date Noted    DM (diabetes mellitus), type 2, uncontrolled with complications ([facility] -Hyponatremia    Adequate intake    -Recurrent a stroke    This could be multifactorial including and most likely due to A. fib. Resume anticoagulation with possible and referral for watchman. Thank you for allowing me to participate in the care of Loren Mcclure     NOTE: This report was transcribed using voice recognition software. Every effort was made to ensure accuracy, however, inadvertent computerized transcription errors may be present.

## 2021-02-01 NOTE — PROGRESS NOTES
Physical Therapy  Facility/Department: [facility] ICU  Daily Treatment Note  NAME: Ben De Santiago  :   MRN: [id number]    Date of Service: 2021    Discharge Recommendations: Ben De Santiago scored a 17/24 on the AM-PAC short mobility form. Current research shows that an AM-PAC score of 17 or less is typically not associated with a discharge to the patient's home setting. Based on the patient's AM-PAC score and their current functional mobility deficits, it is recommended that the patient have 5-7 sessions per week of Physical Therapy at d/c to increase the patient's independence. At this time, this patient demonstrates the endurance, and/or tolerance for 3 hours of therapy each day, with a treatment frequency of 5-7x/wk. Please see assessment section for further patient specific details. Pt lives alone at home functioning independently without AD. Pt currently requires Min A for mobility without use of AD in the setting of recurrent CVAs. Pt does not have the option to have 24/7 supervision/assist upon d/c home. Pt would benefit from the above recommendations to assist with balance, gait, and strength deficits to allow for safe return to the home environment. If patient discharges prior to next session this note will serve as a discharge summary. Please see below for the latest assessment towards goals. PT Equipment Recommendations  Equipment Needed: No    Assessment   Body structures, Functions, Activity limitations: Decreased functional mobility ; Decreased strength;Decreased endurance;Decreased safe awareness;Decreased balance Assessment: Pt presents with impaired functional strength and endurance and decreased standing balance without use of AD. Pt able to increase ambulation with RW this date however requires SBA and when attempted to ambulate without AD required Min A for transfers and ambulation with very limited distance. Pt with PLOF of independence without AD and living alone at home with dogs without option to have 24/7 supervision/assist at home. Pt would benefit from continued skilled PT services to address deficits and facilitate return to PLOF. Treatment Diagnosis: decreased functional mobility, impaired gait  Prognosis: Good  Decision Making: Medium Complexity  Clinical Presentation: evolving  PT Education: Goals;PT Role;Plan of Care;Transfer Training;Family Education;Orientation;General Safety;Gait Training;Functional Mobility Training  Patient Education: d/c recommendations - verbalized understanding  Barriers to Learning: visual, language  REQUIRES PT FOLLOW UP: Yes  Activity Tolerance  Activity Tolerance: Patient limited by fatigue;Patient limited by endurance     Patient Diagnosis(es): The encounter diagnosis was Thalamic stroke ([address] has a past medical history of Actinic keratosis, Arthritis, Asthma, Atrial fibrillation ([address] CAD (coronary artery disease), Cerebral artery occlusion with cerebral infarction ([address] Depression, Diabetes mellitus ([address] Disc displacement, lumbar, Diverticulosis of colon, Hard of hearing, Hyperlipidemia, Hypertension, Ischemic heart disease, Past heart attack, Poor vision, Right leg weakness, and Scoliosis. has a past surgical history that includes Coronary artery bypass graft;  section; Appendectomy; ovarian cyst removal; Hand surgery; Cardiac surgery (); skin biopsy; vascular surgery; Tonsillectomy; Lumbar disc surgery (8-); Lumbar spine surgery (10-11-12); and back surgery.     Restrictions  Restrictions/Precautions Restrictions/Precautions: Fall Risk(high fall risk)  Required Braces or Orthoses?: No  Position Activity Restriction  Other position/activity restrictions: The patient is a 65y.o.  years old female with history of hypertension A. fib, hyperlipidemia and prior stroke who was admitted to the hospital last night with acute visual disturbance. Symptoms started few hours prior to admission. According to report, she was playing with her dog at home when she slowly lost the vision from the periphery bilaterally and was unable to function. Degree was severe and persistent. No falling or injury. She does have residual aphasia from prior stroke from last year. No fever or chills. No other triggers or other associated symptoms, weakness or numbness or chest pain. No severe headache. Patient came into the ED for evaluation since her symptoms did not improve. Initial work-up revealed mild elevated high blood pressure. She had a CT of the head which showed possible acute right ischemic PCA stroke. CTA showed more proximal right ICA occlusion. She was admitted. Subjective   General  Chart Reviewed: Yes  Response To Previous Treatment: Patient with no complaints from previous session. Family / Caregiver Present: Yes(daughter present throughout session)  Subjective  Subjective: Pt reporting no pain at this time, states her vision is improving but is still difficult to see out of L eye. Pt agreeable to PT session. Word finding difficulty noted and pt and daughter voicing concern over pt going home alone. General Comment  Comments: Pt supine in bed upon arrival.  Pain Screening  Patient Currently in Pain: Denies  Vital Signs  Patient Currently in Pain: Denies       Orientation  Orientation  Overall Orientation Status: Within Functional Limits     Cognition   Cognition  Overall Cognitive Status: WFL  Cognition Comment: Difficult with expressive conversation.      Objective   Bed mobility Supine to Sit: Stand by assistance  Sit to Supine: Stand by assistance  Scooting: Stand by assistance  Transfers  Sit to Stand: Stand by assistance;Minimal Assistance(SBA from EOB without AD; Min A from EOB and from chair without AD)  Stand to sit: Stand by assistance;Contact guard assistance(SBA at EOB with AD; CGA at chair and at EOB without AD)  Comment: Transfers trial with and without AD as pt is normally independent with transfers without AD at home. Ambulation  Ambulation?: Yes  More Ambulation?: Yes  Ambulation 1  Surface: level tile  Device: Rolling Walker  Assistance: Stand by assistance;Contact guard assistance(SBA>>CGA)  Quality of Gait: decreased jason, slight sway noted, visual scanning of room and hallway noted  Distance: 40'  Comments: Pt required increased time to perform. No LOB. Ambulation 2  Surface - 2: level tile  Device 2: No device  Assistance 2: Minimal assistance  Quality of Gait 2: wider Kay, decreased jason, increased medial-lateral sway noted with pt reaching for environment support for balance  Distance: 5'+5'  Comments: Pt required significantly increased time to perform and required increased assist for balance. No LOB however pt reporting feeling unsteady. Stairs/Curb  Stairs?: No     Balance  Posture: Fair  Sitting - Static: Good  Sitting - Dynamic: Good  Standing - Static: Fair(SBA with RW for UE support; Min A standing without UE support)  Standing - Dynamic: Fair(SBA/CGA for transfers and ambulation with RW; CGA/Min A for transfers and ambulation without AD)         G-Code     OutComes Score       AM-PAC Score  AM-PAC Inpatient Mobility Raw Score : 17 (02/01/21 1718)  AM-PAC Inpatient T-Scale Score : 42.13 (02/01/21 1718)  Mobility Inpatient CMS 0-100% Score: 50.57 (02/01/21 1718)  Mobility Inpatient CMS G-Code Modifier : CK (02/01/21 1718)          Goals  Short term goals  Time Frame for Short term goals:  To be met prior to discharge Short term goal 1: Bed mobility with supervision  Short term goal 2: Sit to/from stand with supervision  Short term goal 3: Ambulate 150' with/without RW and supervision  Patient Goals   Patient goals : to go home  NO GOALS MET THIS [address]  Times per week: 5-7  Times per day: Daily  Current Treatment Recommendations: Strengthening, Functional Mobility Training, Transfer Training, Balance Training, Endurance Training, Gait Training, Safety Education & Training, Patient/Caregiver Education & Training, Neuromuscular Re-education, Home Exercise Program, Positioning, Modalities, Equipment Evaluation, Education, & procurement  Safety Devices  Type of devices:  All fall risk precautions in place, Bed alarm in place, Call light within reach, Left in bed, Nurse notified, Patient at risk for falls, Gait belt  Restraints  Initially in place: No     Therapy Time   Individual Concurrent Group Co-treatment   Time In 1508         Time Out 1534         Minutes 26              Timed Code Treatment Minutes:  26  Total Treatment Minutes:  [address], [address] [facility]

## 2021-02-01 NOTE — CONSULTS
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 patient who presented to the hospital with complaints of vision changes, aphasia, and headache on 21. Cardiology was consulted for afib. She reports that her symptoms were sudden and severe. She states there was associated weakness. The effect was permanent. Nothing made it better or worse. She states that she was not taking anticoagulation for her known atrial fibrillation for a variety of reasons. She could not afford a noac as she is on a fixed income. Warfarin gave her bruising and it was difficult to monitor her. She states that she will think about the options. Past Medical History:   has a past medical history of Actinic keratosis, Arthritis, Asthma, Atrial fibrillation ([address] CAD (coronary artery disease), Cerebral artery occlusion with cerebral infarction ([address] Depression, Diabetes mellitus ([address] Disc displacement, lumbar, Diverticulosis of colon, Hard of hearing, Hyperlipidemia, Hypertension, Ischemic heart disease, Past heart attack, Poor vision, Right leg weakness, and Scoliosis. Surgical History:   has a past surgical history that includes Coronary artery bypass graft;  section; Appendectomy; ovarian cyst removal; Hand surgery; Cardiac surgery (); skin biopsy; vascular surgery; Tonsillectomy; Lumbar disc surgery (8-); Lumbar spine surgery (10-11-12); and back surgery.      Social History: reports that she has quit smoking. She has a 40.00 pack-year smoking history. She has never used smokeless tobacco. She reports that she does not drink alcohol or use drugs. Family History:  +multiple family members with heart attacks    Home Medications:  Were reviewed and are listed in nursing record. and/or listed below  Prior to Admission medications    Medication Sig Start Date End Date Taking? Authorizing Provider   ezetimibe (ZETIA) 10 MG tablet Take 10 mg by mouth daily   Yes Historical Provider, MD   magnesium gluconate (MAGONATE) 500 MG tablet Take 500 mg by mouth daily   Yes Historical Provider, MD   albuterol sulfate HFA (VENTOLIN HFA) 108 (90 Base) MCG/ACT inhaler Inhale 2 puffs into the lungs every 6 hours as needed for Wheezing   Yes Historical Provider, MD   aspirin (ASPIRIN CHILDRENS) 81 MG chewable tablet Take 1 tablet by mouth daily 11/2/20  Yes Armando Liu MD   sertraline (ZOLOFT) 100 MG tablet Take 100 mg by mouth daily   Yes Historical Provider, MD   glimepiride (AMARYL) 4 MG tablet Take 4 mg by mouth 2 times daily    Yes Historical Provider, MD   metformin (GLUCOPHAGE-XR) 500 MG XR tablet Take 1,000 mg by mouth 2 times daily    Yes Historical Provider, MD   metoprolol (TOPROL-XL) 50 MG XL tablet Take 50 mg by mouth daily.      Yes Historical Provider, MD   albuterol (PROVENTIL) (2.5 MG/3ML) 0.083% nebulizer solution Take 2.5 mg by nebulization every 6 hours as needed for Wheezing    Historical Provider, MD        Current Medications:  Current Facility-Administered Medications   Medication Dose Route Frequency Provider Last Rate Last Admin    budesonide-formoterol (SYMBICORT) 160-4.5 MCG/ACT inhaler 2 puff  2 puff Inhalation BID Fahad Luz MD   2 puff at 01/31/21 193    metoprolol succinate (TOPROL XL) extended release tablet 50 mg  50 mg Oral Daily Fahad Luz MD   50 mg at 01/31/21 6797  sertraline (ZOLOFT) tablet 100 mg  100 mg Oral Daily Fahad Luz MD   100 mg at 01/31/21 0853    sodium chloride flush 0.9 % injection 10 mL  10 mL Intravenous 2 times per day Merry Lezama MD   10 mL at 01/31/21 0902    sodium chloride flush 0.9 % injection 10 mL  10 mL Intravenous PRN Merry Lezama MD        promethazine (PHENERGAN) tablet 12.5 mg  12.5 mg Oral Q6H PRN Merry Lezama MD        Or    ondansetron (ZOFRAN) injection 4 mg  4 mg Intravenous Q6H PRN Fahad Luz MD        polyethylene glycol (GLYCOLAX) packet 17 g  17 g Oral Daily PRN Merry Lezama MD        enoxaparin (LOVENOX) injection 40 mg  40 mg Subcutaneous Daily Fahad Luz MD   40 mg at 01/31/21 0853    aspirin EC tablet 81 mg  81 mg Oral Daily Fahad Luz MD   81 mg at 01/31/21 0517    Or    aspirin suppository 300 mg  300 mg Rectal Daily Fahad Luz MD        perflutren lipid microspheres (DEFINITY) injection 1.65 mg  1.5 mL Intravenous ONCE PRN Merry Lezama MD        acetaminophen (TYLENOL) tablet 650 mg  650 mg Oral Q4H PRN Merry Lezama MD   650 mg at 01/31/21 1152    Or    acetaminophen (TYLENOL) suppository 650 mg  650 mg Rectal Q4H PRN Merry Lezama MD        labetalol (NORMODYNE;TRANDATE) injection 10 mg  10 mg Intravenous Q10 Min PRN Merry Lezama MD        insulin glargine (LANTUS;BASAGLAR) injection pen 10 Units  0.15 Units/kg Subcutaneous Nightly Fahad Luz MD   10 Units at 01/31/21 0134    glucose (GLUTOSE) 40 % oral gel 15 g  15 g Oral PRN Fahad Luz MD        dextrose 50 % IV solution  12.5 g Intravenous PRN Merry Lezama MD        glucagon (rDNA) injection 1 mg  1 mg Intramuscular PRN Fahad Luz MD        dextrose 5 % solution  100 mL/hr Intravenous PRN Fahad Luz MD        insulin lispro (1 Unit Dial) 0-12 Units  0-12 Units Subcutaneous TID WC Merry Lezama MD   4 Units at 01/31/21 5880  insulin lispro (1 Unit Dial) 0-6 Units  0-6 Units Subcutaneous Nightly Fahad Luz MD            Allergies:  Morphine, Codeine, and Statins     Review of Systems:     · Constitutional: there has been no unanticipated weight loss. There's been no change in energy level, sleep pattern, or activity level. · Eyes: +vision loss in left eye  · ENT: No Headaches, hearing loss or vertigo. No mouth sores or sore throat. · Cardiovascular: Reviewed in HPI  · Respiratory: No cough or wheezing, no sputum production. No hematemesis. · Gastrointestinal: No abdominal pain, appetite loss, blood in stools. No change in bowel or bladder habits. · Genitourinary: No dysuria, trouble voiding, or hematuria. · Musculoskeletal:  No gait disturbance, weakness or joint complaints. · Integumentary: No rash or pruritis. · Neurological: +weakness, +aphasia  · Psychiatric: No anxiety, no depression. · Endocrine: No malaise, fatigue or temperature intolerance. No excessive thirst, fluid intake, or urination. No tremor. · Hematologic/Lymphatic: No abnormal bruising or bleeding, blood clots or swollen lymph nodes. · Allergic/Immunologic: No nasal congestion or hives.   ·     Physical Examination:    Vitals:    01/31/21 1940   BP:    Pulse:    Resp: 20   Temp:    SpO2: 95%    Weight: 157 lb 3 oz (71.3 kg)         General Appearance:  Alert, cooperative, no distress, appears stated age   Head:  Normocephalic, without obvious abnormality, atraumatic   Eyes:  PERRL, conjunctiva/corneas clear       Nose: Nares normal, no drainage or sinus tenderness   Throat: Lips, mucosa, and tongue normal   Neck: Supple, symmetrical, trachea midline, no adenopathy, thyroid: not enlarged, symmetric, no tenderness/mass/nodules, no carotid bruit or JVD       Lungs:   Clear to auscultation bilaterally, respirations unlabored   Chest Wall:  No tenderness or deformity   Heart:  Irregularly irregular, 1/6 murmur Abdomen:   Soft, non-tender, bowel sounds active all four quadrants,  no masses, no organomegaly           Extremities: Extremities normal, atraumatic, no cyanosis or edema   Pulses: 2+ and symmetric   Skin: Skin color, texture, turgor normal, no rashes or lesions   Pysch: Normal mood and affect   Neurologic: weakness         Labs  CBC:   Lab Results   Component Value Date    WBC 6.6 01/31/2021    RBC 4.05 01/31/2021    HGB 12.5 01/31/2021    HCT 37.5 01/31/2021    MCV 92.6 01/31/2021    RDW 13.6 01/31/2021     01/31/2021     CMP:    Lab Results   Component Value Date     01/29/2021    K 4.0 01/29/2021    CL 97 01/29/2021    CO2 25 01/29/2021    BUN 14 01/29/2021    CREATININE 0.8 01/29/2021    GFRAA >60 01/29/2021    GFRAA >60 11/11/2012    AGRATIO 1.4 01/29/2021    LABGLOM >60 01/29/2021    GLUCOSE 294 01/29/2021    PROT 7.3 01/29/2021    CALCIUM 10.5 01/29/2021    BILITOT <0.2 01/29/2021    ALKPHOS 94 01/29/2021    AST 15 01/29/2021    ALT 10 01/29/2021     PT/INR:  No results found for: PTINR  Lab Results   Component Value Date    TROPONINI <0.01 01/30/2021       EKG:  I have reviewed EKG with the following interpretation:  Impression:  Atrial fibrillation    Echo: Atrial fibrillation is noted. Left ventricular systolic function is normal with ejection fraction   estimated at 50-55 %. Grade II diastolic dysfunction with elevated filling pressure. The left atrium is mildly dilated. A bubble study was performed and fails to show evidence of shunting. Aortic valve appears sclerotic but opens adequately. Mitral valve leaflets appear mildly thickened. Mild calcification of the posterior leaflet of the mitral valve. Mild to moderate mitral regurgitation   Moderate tricuspid regurgitation. Systolic pulmonary artery pressure (SPAP) is elevated and estimated at 44   mmHg (right atrial pressure 3 mmHg). This is suggestive of mild pulmonary hypertension. IVC is normal in size (< 2.1 cm) and collapses > 50% with respiration   consistent with normal RA pressure (3 mmHg). Stress: none    Cath:Right Dominant system  Left Main Trunk (LMT): Normal sized, mid 20%, distal 50% at the   bifurcation of the LAD and LCx  Left Anterior Descending (LAD): Type III vessel, normal sized,   approximately occluded immediately after a diagonal 1   Diagonal 1: Ostial severe stenosis that is extending from the   left main  Left Circumflex (LCX): Large sized, eccentric lesion seen in the   distal left main and ostium of the LCx, approximately 50-70%   OM1: Large caliber, diffusely ectatic with mild disease seen in   the midportion  Right Coronary (RCA): Large sized, heavily calcified, the   midportion has a 99% lesion that may represent a completely   occluded vessel with bridging collaterals, the distal portion of   the vessel is mildly diffusely diseased.     R PDA: Medium-sized, moderately diffusely diseased   R PLB: Medium-sized, moderately diffusely diseased      Old notes reviewed  Telemetry reviewd  Ekg personally reviewed  Chest xray personally reviewed  Echo personally interpreted  Medications and labs reviewed  moderate complexity/medical decision making due to extensive data review, extensive history review, independent review of data  moderate risk due to acute illness, evaluation of drug-drug interactions, medication management and diagnostic interventions      Assessment  Patient Active Problem List   Diagnosis    Lumbar radiculopathy    Acute CVA (cerebrovascular accident) ([address]    Received intravenous tissue plasminogen activator (tPA) in emergency department    HTN (hypertension), benign    Nontraumatic cortical hemorrhage of left cerebral hemisphere ([address]    Dyslipidemia    PAF (paroxysmal atrial fibrillation) ([address]    Acute ischemic stroke ([address]    DM (diabetes mellitus), type 2, uncontrolled with complications ([address]    Remote history of stroke         Plan: I had the opportunity to review the clinical symptoms and [place] [address] [address]. Assessment/Plan:  Active Problems:  1. Recurrent stroke  - new problem  - likely from atrial fibrillation given multiple distributions, question whether she also has Lpa given family history  Plan:  - recommend anticoagulation, will defer timing to neurology. Request pharmacy assistance with potential cost saving maneuvers  - needs statin. [id number] if truly intolerant. 2. Persistent Atrial fibrillation  -stable  - not on doac for cost reason, not on warfarin for ecchymosis  - [id number]  Plan:  - do not recommend cardioversion at this time, but she may benefit from CONOR given recurrent stroke   - continue metoprolol   - EP to see    3. Coronary artery disease s/p cabg and pci of rca  -stable  Plan:   - continue beta blocker  - restart clopidogrel when able    I will address the patient's cardiac risk factors and adjusted pharmacologic treatment as needed. In addition, I have reinforced the need for patient directed risk factor modification. Tobacco use was discussed with the patient and educated on the negative effects. I have asked the patient to not utilize these agents. Thank you for allowing to us to participate in the care or [address] [address]. Further evaluation will be based upon the patient's clinical course and testing results. All questions and concerns were addressed to the patient/family. Alternatives to my treatment were discussed. The note was completed using EMR. Every effort was made to ensure accuracy; however, inadvertent computerized transcription errors may be present. All questions and concerns were addressed to the patient/family. Alternatives to my treatment were discussed. The note was completed using EMR. Every effort was made to ensure accuracy; however, inadvertent computerized transcription errors may be present.   Valery Brown MD 1/31/2021 9:47 PM

## 2021-02-01 NOTE — DISCHARGE INSTR - COC
Continuity of Care Form    Patient Name: Zbigniew Mccarty   :  [birth year]  MRN:  [id number]    Admit date:  2021  Discharge date:  ***    Code Status Order: DNR-CCA   Advance Directives:   [facility] Documentation     Date/Time Healthcare Directive Type of Healthcare Directive Copy in [facility] [address] Agent's Name Healthcare Agent's Phone Number    [address]  [phone]    21 2221  Yes, patient has an advance directive for healthcare treatment  Living will  No, copy requested from family  Adult [address]          Admitting Physician:  Israel Gupta MD  PCP: Ruthy Garrett    Discharging Nurse: [facility] Unit/Room#: BPF-0767/4343-70  Discharging Unit Phone Number: ***    Emergency Contact:   Extended Emergency Contact Information  Primary Emergency Contact: Vania Busby  Address: DAUGHTER  Home Phone: [phone]  Relation: Child    Past Surgical History:  Past Surgical History:   Procedure Laterality Date    APPENDECTOMY      BACK SURGERY      CARDIAC [address] CORONARY ARTERY BYPASS GRAFT      HAND SURGERY      LUMPS/BONE SPURS    LUMBAR [address]  8-    Microlumbar discectomy L4-5 right    LUMBAR SPINE SURGERY  10-11-12    MICROLUMBAR RE-EXPLORATION L4-5 RIGHT, DECOMPRESSION OF    OVARIAN CYST REMOVAL      SKIN BIOPSY      PRE CANCERS FROZEN OFF    TONSILLECTOMY      VASCULAR SURGERY         Immunization History:   Immunization History   Administered Date(s) Administered    Tdap (Boostrix, Adacel) 2016       Active Problems:  Patient Active Problem List   Diagnosis Code    Lumbar radiculopathy M54.16    Acute CVA (cerebrovascular accident) ([facility] I63.9    Received intravenous tissue plasminogen activator (tPA) in emergency department Z92.82    HTN (hypertension), benign I10  Nontraumatic cortical hemorrhage of left cerebral hemisphere (HCC) I61.1    Dyslipidemia E78.5    PAF (paroxysmal atrial fibrillation) (HCC) I48.0    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    DM (diabetes mellitus), type 2, uncontrolled with complications (HCC) Q98.1, E11.65    Remote history of stroke Z86.73    Persistent atrial fibrillation (HCC) I48.19       Isolation/Infection:   Isolation          No Isolation        Patient Infection Status     Infection Onset Added Last Indicated Last Indicated By Review Planned Expiration Resolved Resolved By    None active    Resolved    COVID-19 Rule Out 10/28/20 10/27/20 10/28/20 COVID-19 (Ordered)   10/28/20 Rule-Out Test Resulted    COVID-19 Rule Out 10/26/20 10/26/20 10/26/20 COVID-19 (Ordered)   10/26/20 Rule-Out Test Resulted          Nurse Assessment:  Last Vital Signs: BP (!) 169/74   Pulse 82   Temp 97.8 °F (36.6 °C) (Temporal)   Resp 19   Ht 5' 4\" (1.626 m)   Wt 156 lb 1.4 oz (70.8 kg)   SpO2 95%   BMI 26.79 kg/m²     Last documented pain score (0-10 scale): Pain Level: 0  Last Weight:   Wt Readings from Last 1 Encounters:   02/01/21 156 lb 1.4 oz (70.8 kg)     Mental Status:  {IP PT MENTAL STATUS:77318}    IV Access:  { KAZ IV ACCESS:694258665}    Nursing Mobility/ADLs:  Walking   {P DME ZJZB:270613447}  Transfer  {[facility] DME YFZB:109215895}  Bathing  {[facility] DME XGHA:331114653}  Dressing  {[facility] DME ZIGU:656112483}  Toileting  {[facility] DME CFYR:336227018}  Feeding  {[facility] DME VARQ:851919951}  Med Admin  {[facility] DME NQBQ:274879905}  Med Delivery   { KAZ MED Delivery:401917696}    Wound Care Documentation and Therapy:        Elimination:  Continence:   · Bowel: {YES / IQ:87604}  · Bladder: {YES / LA:98232}  Urinary Catheter: {Urinary Catheter:664191404}   Colostomy/Ileostomy/Ileal Conduit: {YES / GARCIA:11749}       Date of Last BM: ***    Intake/Output Summary (Last 24 hours) at 2/1/2021 0947  Last data filed at 1/31/2021 1840  Gross per 24 hour   Intake 240 ml Output 300 ml   Net -60 ml     I/O last 3 completed shifts:   In: 360 [P.O.:360]  Out: 300 [Urine:300]    Safety Concerns:     [address] M[address]URDY Safety Concerns:516691952}    Impairments/Disabilities:      [address] MCCURDY Impairments/Disabilities:311178405}    Nutrition Therapy:  Current Nutrition Therapy:   [address] KAZ Diet List:129132934}    Routes of Feeding: {CHP DME Other Feedings:482375325}  Liquids: {Slp liquid thickness:45152}  Daily Fluid Restriction: {CHP DME Yes amt example:560408162}  Last Modified Barium Swallow with Video (Video Swallowing Test): {Done Not Done AGBM:239723457}    Treatments at the Time of Hospital Discharge:   Respiratory Treatments: ***  Oxygen Therapy:  {Therapy; copd oxygen:83674}  Ventilator:    { CC Vent SUWW:354337848}    Rehab Therapies: SN,PT,OT,ST  Weight Bearing Status/Restrictions: {[facility] Weight Bearin}  Other Medical Equipment (for information only, NOT a DME order):  {EQUIPMENT:487007078}  Other Treatments: ***    Patient's personal belongings (please select all that are sent with patient):  {[facility] DME Belongings:666993755}    RN SIGNATURE:  {Esignature:990122819}    CASE MANAGEMENT/SOCIAL WORK SECTION    Inpatient Status Date: 2021    Readmission Risk Assessment Score:  Readmission Risk              Risk of Unplanned Readmission:        13       PCP appointment with Dr Travis Sy 2021 at 10:30 AM    Cardiology appointment with Dr. Jorge Tineo Friday 3/5/2021 at [phone]    Discharging to Facility/ Agency   Name: [facility] Downs will call for Appointment  Phone: [phone]  Fax: [phone]    Dialysis Facility (if applicable)   · Name:  · Address:  · Dialysis Schedule:  · Phone:  · Fax:    / signature: {Esignature:219672008}    PHYSICIAN SECTION    Prognosis: {Prognosis:3792180774}    Condition at Discharge: [address] Patient Condition:787575468}    Rehab Potential (if transferring to Rehab): {Prognosis:5045028983} Recommended Labs or Other Treatments After Discharge: ***    Physician Certification: I certify the above information and transfer of Justen Sifuentes  is necessary for the continuing treatment of the diagnosis listed and that she requires {Admit to Appropriate Level of Care:92208} for {GREATER/LESS:197989589} 30 days.      Update Admission H&P: {CHP DME Changes in MWDLY:685806987}    PHYSICIAN SIGNATURE:  {Esignature:832129891}

## 2021-02-01 NOTE — PROGRESS NOTES
Zenia Lone  Neurology Follow-up  [facility] Neurology    Date of Service: 2/1/2021    Subjective:   CC: Follow up today regarding: Acute right PCA ischemic stroke and visual deficit. Events noted. Chart and lab reviewed. No new symptoms today. The same remote aphasia and left visual field deficit. She denies any new weakness or headache or chest pain. Other review of system was unremarkable. Patient agreed to inpatient rehab. ROS : A 10-12 system review obtained and updated today and is unremarkable except as mentioned  in my interval history. family history includes Bipolar Disorder in her mother; Cancer in her father; Diabetes in her mother and sister; Early Death in her father; Heart Attack in her father; Heart Disease in her father and sister; Heart Surgery in her father and sister; High Blood Pressure in her father, mother, and sister; Mental Illness in her mother.     Past Medical History:   Diagnosis Date    Actinic keratosis     Arthritis     Asthma     UNSPECIFIED    Atrial fibrillation (HCC)     CAD (coronary artery disease)     Cerebral artery occlusion with cerebral infarction ([address] 10/2020    residual expressive aphasia    Depression     worse with pain    Diabetes mellitus ([address]     TYPE II    Disc displacement, lumbar     Diverticulosis of colon     Hard of hearing     left    Hyperlipidemia     Hypertension     Ischemic heart disease     Past heart attack     28 YRS AGO    Poor vision     legally blind    Right leg weakness     Scoliosis      Current Facility-Administered Medications   Medication Dose Route Frequency Provider Last Rate Last Admin    budesonide-formoterol (SYMBICORT) 160-4.5 MCG/ACT inhaler 2 puff  2 puff Inhalation BID Fahad Luz MD   2 puff at 02/01/21 0856    metoprolol succinate (TOPROL XL) extended release tablet 50 mg  50 mg Oral Daily Fahad Luz MD   50 mg at 02/01/21 3652  sertraline (ZOLOFT) tablet 100 mg  100 mg Oral Daily Fahad Luz MD   100 mg at 02/01/21 0805    sodium chloride flush 0.9 % injection 10 mL  10 mL Intravenous 2 times per day Krista Olszewski, MD   10 mL at 02/01/21 0814    sodium chloride flush 0.9 % injection 10 mL  10 mL Intravenous PRN Krista Olszewski, MD        promethazine (PHENERGAN) tablet 12.5 mg  12.5 mg Oral Q6H PRN Krista Olszewski, MD        Or    ondansetron (ZOFRAN) injection 4 mg  4 mg Intravenous Q6H PRN Fahad Luz MD        polyethylene glycol (GLYCOLAX) packet 17 g  17 g Oral Daily PRN Krista Olszewski, MD        enoxaparin (LOVENOX) injection 40 mg  40 mg Subcutaneous Daily Fahad Luz MD   40 mg at 02/01/21 0809    aspirin EC tablet 81 mg  81 mg Oral Daily Fahad Luz MD   81 mg at 02/01/21 8237    Or    aspirin suppository 300 mg  300 mg Rectal Daily Fahad Luz MD        perflutren lipid microspheres (DEFINITY) injection 1.65 mg  1.5 mL Intravenous ONCE PRN Krista Olszewski, MD        acetaminophen (TYLENOL) tablet 650 mg  650 mg Oral Q4H PRN Krista Olszewski, MD   650 mg at 01/31/21 1152    Or    acetaminophen (TYLENOL) suppository 650 mg  650 mg Rectal Q4H PRN Krista Olszewski, MD        labetalol (NORMODYNE;TRANDATE) injection 10 mg  10 mg Intravenous Q10 Min PRN Krista Olszewski, MD        insulin glargine (LANTUS;BASAGLAR) injection pen 10 Units  0.15 Units/kg Subcutaneous Nightly Fahad Luz MD   10 Units at 01/31/21 2211    glucose (GLUTOSE) 40 % oral gel 15 g  15 g Oral PRN Fahad Luz MD        dextrose 50 % IV solution  12.5 g Intravenous PRN Krista Olszewski, MD        glucagon (rDNA) injection 1 mg  1 mg Intramuscular PRN Fahad Luz MD        dextrose 5 % solution  100 mL/hr Intravenous PRN Fahad Luz MD        insulin lispro (1 Unit Dial) 0-12 Units  0-12 Units Subcutaneous TID  Krista Olszewski, MD   2 Units at 02/01/21 0942  insulin lispro (1 Unit Dial) 0-6 Units  0-6 Units Subcutaneous Nightly Fahad Luz MD   1 Units at 01/31/21 2210     Allergies   Allergen Reactions    Morphine Other (See Comments) and Nausea And Vomiting     BLOOD PRESSURE BOTTOMED OUT  Feel funny      Codeine Nausea Only, Other (See Comments) and Palpitations     SWEATS  generalized unwell feeling when takes  Feel funny      Statins Itching      reports that she has quit smoking. She has a 40.00 pack-year smoking history. She has never used smokeless tobacco. She reports that she does not drink alcohol or use drugs. Objective:  Exam:   Constitutional:   Vitals:    02/01/21 0502 02/01/21 0805 02/01/21 0812 02/01/21 0830   BP: (!) 151/84 (!) 169/74  (!) 169/74   Pulse: 81 75  82   Resp: 18  18 19   Temp: 97.2 °F (36.2 °C)   97.8 °F (36.6 °C)   TempSrc: Temporal   Temporal   SpO2: 95%      Weight: 156 lb 1.4 oz (70.8 kg)      Height:         General appearance:  Normal development and appear in no acute distress. Eye: No icterus. Neck: supple  Cardiovascular:  No lower leg edema with good pulsation. Mental Status: The same. She has mixed aphasia but able to follow directions. Oriented to person, place, problem, and time. Memory: Good immediate recall. Intact remote memory  Normal attention span and concentration. Language: The same mixed aphasia with paraphasic error  Good fund of Knowledge. Cranial Nerves:   II: Visual fields: The same dense left, #anopsia   pupils: equal, round, reactive to light  III,IV,VI: Extra Ocular Movements are intact. No nystagmus  V: Facial sensation is intact  VII: Facial strength and movements: intact and symmetric  XII: Tongue movements are normal  Musculoskeletal: Chronic right-sided weakness 4/5  Tone: Normal tone. Coordination: Right drift and poor REM on the right. Sensation: normal to all modalities in both arms and legs. No changes today.     Data:  LABS:   Lab Results Component Value Date     01/29/2021    K 4.0 01/29/2021    CL 97 01/29/2021    CO2 25 01/29/2021    BUN 14 01/29/2021    CREATININE 0.8 01/29/2021    GFRAA >60 01/29/2021    GFRAA >60 11/11/2012    LABGLOM >60 01/29/2021    GLUCOSE 294 01/29/2021    CALCIUM 10.5 01/29/2021     Lab Results   Component Value Date    WBC 6.6 01/31/2021    RBC 4.05 01/31/2021    HGB 12.5 01/31/2021    HCT 37.5 01/31/2021    MCV 92.6 01/31/2021    RDW 13.6 01/31/2021     01/31/2021     Lab Results   Component Value Date    INR 1.00 01/29/2021    PROTIME 11.6 01/29/2021       Neuroimaging was independently reviewed by me and discussed results with the patient  I reviewed blood testing and other test results and discussed results with the patient      Impression: No changes today. The same. Acute right ischemic PCA stroke with  dense left visual field deficit. Could be thromboembolic versus cardioembolic  Recent left MCA stroke with residual aphasia and right-sided weakness. She received TPA back in October and had mild hemorrhagic conversion after TPA. PAF on aspirin. Hypertension, not controlled  Hyperlipidemia, not controlled  Diabetes, not controlled. A1c 8.7         Recommendation    Continue current supportive care  Continue aspirin and statin  Agree with inpatient rehab  Telemetry  Insulin sliding scale  Blood sugar control  Continue current blood pressure medications  Giving risk of hemorrhagic conversion, anticoagulation could be considered within 10-14 days from stroke onset. This will be considered after she will be admitted to rehab to evaluate risk of falling since she lives by herself. Stroke prevention was addressed again with the patient today  We will follow as needed for now  Please call for questions.           Juan R Gallardo MD   [phone] This dictation was generated by voice recognition computer software. Although all attempts are made to edit the dictation for accuracy, there may be errors in the transcription that are not intended.

## 2021-02-01 NOTE — CARE COORDINATION
Discharge Planning Assessment  Readmission risk score 13%  RN discharge planner met with patient/ (and family member) to discuss reason for admission, current living situation, and potential needs at the time of discharge  CM spoke with dora's daughter Lino Chaves    Demographics/Insurance verified Yes address and insurance verified    Current type of dwelling: single level home with small step to enter    Patient from ECF/SW confirmed with: n/a    Living arrangements: patient lives alone, she has supportive family in the area    Level of function/Support: previously required minimal assistance, patient had a CVA in October which she was recovering from and had a repeat CVA on admission. This new CVA has left her with visual and speech deficits    PCP: Maya Caldera    Last Visit to PCP: November 2020    DME:cane, walker, grab bar in shower, shower bench, life alert    Active with any community resources/agencies/skilled home care: [facility] , daughter expressed an interest in [facility] for meals on wheels    Medication compliance issues: uses mail order pharmacy, or the [facility] on [address] issues that could impact healthcare: daughter states she completed a Medicaid application due to the cost of medications        Tentative discharge plan: home with [place], AM-PAC 19 for PT/OT    *Discussed and provided facilities of choice if transition to a skilled nursing facility is required at the time of discharge      *Discussed with patient and/or family that on the day of discharge home tentative time of discharge will be between 10 AM and noon.     Transportation at the time of discharge: daughter will transport home and provide further transportation needs    ANT Navas, CCM, RN  [facility]  [phone]

## 2021-02-01 NOTE — PROGRESS NOTES
[address] [facility] [phone] was active with this pt prior to admit. Discharge planner notified. Will follow.

## 2021-02-01 NOTE — PROGRESS NOTES
[facility]    2/1/2021 12:40 PM        Name: Trellis Sever . Admitted: 1/29/2021  Primary Care Provider: Umang Subramanian (Tel: [phone])                        Subjective:  . No acute events overnight. Resting well. Pain control. Diet ok. Labs reviewed  Denies any chest pain sob.      Reviewed interval ancillary notes    Current Medications      budesonide-formoterol (SYMBICORT) 160-4.5 MCG/ACT inhaler 2 puff, BID      metoprolol succinate (TOPROL XL) extended release tablet 50 mg, Daily      sertraline (ZOLOFT) tablet 100 mg, Daily      sodium chloride flush 0.9 % injection 10 mL, 2 times per day      sodium chloride flush 0.9 % injection 10 mL, PRN      promethazine (PHENERGAN) tablet 12.5 mg, Q6H PRN    Or      ondansetron (ZOFRAN) injection 4 mg, Q6H PRN      polyethylene glycol (GLYCOLAX) packet 17 g, Daily PRN      enoxaparin (LOVENOX) injection 40 mg, Daily      aspirin EC tablet 81 mg, Daily    Or      aspirin suppository 300 mg, Daily      perflutren lipid microspheres (DEFINITY) injection 1.65 mg, ONCE PRN      acetaminophen (TYLENOL) tablet 650 mg, Q4H PRN    Or      acetaminophen (TYLENOL) suppository 650 mg, Q4H PRN      labetalol (NORMODYNE;TRANDATE) injection 10 mg, Q10 Min PRN      insulin glargine (LANTUS;BASAGLAR) injection pen 10 Units, Nightly      glucose (GLUTOSE) 40 % oral gel 15 g, PRN      dextrose 50 % IV solution, PRN      glucagon (rDNA) injection 1 mg, PRN      dextrose 5 % solution, PRN      insulin lispro (1 Unit Dial) 0-12 Units, TID WC      insulin lispro (1 Unit Dial) 0-6 Units, Nightly        Objective:  /71   Pulse 76   Temp 97.6 °F (36.4 °C) (Temporal)   Resp 18   Ht 5' 4\" (1.626 m)   Wt 156 lb 1.4 oz (70.8 kg)   SpO2 95%   BMI 26.79 kg/m² Intake/Output Summary (Last 24 hours) at 2/1/2021 1240  Last data filed at 1/31/2021 1840  Gross per 24 hour   Intake 240 ml   Output 300 ml   Net -60 ml      Wt Readings from Last 3 Encounters:   02/01/21 156 lb 1.4 oz (70.8 kg)   10/24/20 153 lb 8 oz (69.6 kg)   10/23/20 158 lb 11.7 oz (72 kg)       General appearance:  Appears comfortable  Eyes: Sclera clear. Pupils equal.  ENT: Moist oral mucosa. Trachea midline, no adenopathy. Cardiovascular: Regular rhythm, normal S1, S2. No murmur. No edema in lower extremities  Respiratory: Not using accessory muscles. Good inspiratory effort. Clear to auscultation bilaterally, no wheeze or crackles. GI: Abdomen soft, no tenderness, not distended, normal bowel sounds  Musculoskeletal: No cyanosis in digits, neck supple  Neurology: CN 2-12 grossly intact. No speech or motor deficits  -Left visual defect still with some aphasia at times. Still with some weakness  Psych: Normal affect.  Alert and oriented in time, place and person  Skin: Warm, dry, normal turgor    Labs and Tests:  CBC:   Recent Labs     01/29/21  2343 01/31/21  0433   WBC 8.9 6.6   HGB 12.7 12.5    271     BMP:    Recent Labs     01/29/21  2343   *   K 4.0   CL 97*   CO2 25   BUN 14   CREATININE 0.8   GLUCOSE 294*     Hepatic:   Recent Labs     01/29/21  2343   AST 15   ALT 10   BILITOT <0.2   ALKPHOS 80       Discussed care with family and patient             Spent 30  minutes with patient and family at bedside and on unit reviewing medical records and labs, spent greater than 50% time counseling patient and family on diagnosis and plan   Problem List  Active Problems:    Acute CVA (cerebrovascular accident) ([facility]    HTN (hypertension), benign    Dyslipidemia    PAF (paroxysmal atrial fibrillation) ([facility]    Thalamic stroke ([facility]    DM (diabetes mellitus), type 2, uncontrolled with complications ([facility]    Remote history of stroke    Persistent atrial fibrillation ([facility]  Resolved Problems: * No resolved hospital problems. *       Assessment & Plan:   1. Acute stroke  -Status post TPA. -Symptoms are stable currently. Still having some left-sided deficit.  -Appreciate neurology recommendation  -Continue blood pressure control  -Aspirin. Statin. -Appreciate neurology recommendation and goal to probably discussing anticoagulation given multiple stroke  -PT OT evaluation.  -Had lengthy discussion with patient about risk of fall at home. PT OT to be seen today seems very interested in going to rehab I have consulted rehab doctor for further evaluation    Hypertension  Paroxysmal A.  Fib  Diabetes      Diet: DIET CARB CONTROL; Cardiac  Code:DNR-CCA  DVT PPX lovenox       Benita Sorenson MD   2/1/2021 12:40 PM

## 2021-02-01 NOTE — PROGRESS NOTES
Shift assessment completed, see doc flowsheets. Assisted pt to bathroom and back to bed. NIH completed, still scoring 6. Call light within reach, will continue to monitor.   Dee Ingram

## 2021-02-02 ENCOUNTER — TELEPHONE (OUTPATIENT)
Dept: CARDIOLOGY CLINIC | Age: 71
End: 2021-02-02

## 2021-02-02 LAB
GLUCOSE BLD-MCNC: 165 MG/DL (ref 70–99)
GLUCOSE BLD-MCNC: 177 MG/DL (ref 70–99)
GLUCOSE BLD-MCNC: 184 MG/DL (ref 70–99)
GLUCOSE BLD-MCNC: 195 MG/DL (ref 70–99)
LIPOPROTEIN (A): 85 MG/DL
PERFORMED ON: ABNORMAL

## 2021-02-02 PROCEDURE — 2580000003 HC RX 258: Performed by: HOSPITALIST

## 2021-02-02 PROCEDURE — 97116 GAIT TRAINING THERAPY: CPT

## 2021-02-02 PROCEDURE — 99232 SBSQ HOSP IP/OBS MODERATE 35: CPT | Performed by: PSYCHIATRY & NEUROLOGY

## 2021-02-02 PROCEDURE — 94761 N-INVAS EAR/PLS OXIMETRY MLT: CPT

## 2021-02-02 PROCEDURE — 6360000002 HC RX W HCPCS: Performed by: HOSPITALIST

## 2021-02-02 PROCEDURE — 99233 SBSQ HOSP IP/OBS HIGH 50: CPT | Performed by: NURSE PRACTITIONER

## 2021-02-02 PROCEDURE — 97112 NEUROMUSCULAR REEDUCATION: CPT

## 2021-02-02 PROCEDURE — 2060000000 HC ICU INTERMEDIATE R&B

## 2021-02-02 PROCEDURE — 6370000000 HC RX 637 (ALT 250 FOR IP): Performed by: HOSPITALIST

## 2021-02-02 PROCEDURE — 97530 THERAPEUTIC ACTIVITIES: CPT

## 2021-02-02 PROCEDURE — 94760 N-INVAS EAR/PLS OXIMETRY 1: CPT

## 2021-02-02 PROCEDURE — 94640 AIRWAY INHALATION TREATMENT: CPT

## 2021-02-02 PROCEDURE — 97535 SELF CARE MNGMENT TRAINING: CPT

## 2021-02-02 PROCEDURE — 92526 ORAL FUNCTION THERAPY: CPT

## 2021-02-02 PROCEDURE — 2700000000 HC OXYGEN THERAPY PER DAY

## 2021-02-02 PROCEDURE — 92523 SPEECH SOUND LANG COMPREHEN: CPT

## 2021-02-02 PROCEDURE — 97129 THER IVNTJ 1ST 15 MIN: CPT

## 2021-02-02 RX ORDER — ACETAMINOPHEN 160 MG
TABLET,DISINTEGRATING ORAL
Status: DISPENSED
Start: 2021-02-02 | End: 2021-02-02

## 2021-02-02 RX ADMIN — INSULIN LISPRO 2 UNITS: 100 INJECTION, SOLUTION INTRAVENOUS; SUBCUTANEOUS at 10:56

## 2021-02-02 RX ADMIN — SERTRALINE 100 MG: 50 TABLET, FILM COATED ORAL at 10:31

## 2021-02-02 RX ADMIN — INSULIN GLARGINE 10 UNITS: 100 INJECTION, SOLUTION SUBCUTANEOUS at 20:06

## 2021-02-02 RX ADMIN — INSULIN LISPRO 1 UNITS: 100 INJECTION, SOLUTION INTRAVENOUS; SUBCUTANEOUS at 20:06

## 2021-02-02 RX ADMIN — ENOXAPARIN SODIUM 40 MG: 40 INJECTION SUBCUTANEOUS at 10:31

## 2021-02-02 RX ADMIN — Medication 2 PUFF: at 09:33

## 2021-02-02 RX ADMIN — Medication 2 PUFF: at 18:17

## 2021-02-02 RX ADMIN — INSULIN LISPRO 2 UNITS: 100 INJECTION, SOLUTION INTRAVENOUS; SUBCUTANEOUS at 17:05

## 2021-02-02 RX ADMIN — Medication 10 ML: at 10:56

## 2021-02-02 RX ADMIN — INSULIN LISPRO 2 UNITS: 100 INJECTION, SOLUTION INTRAVENOUS; SUBCUTANEOUS at 12:26

## 2021-02-02 RX ADMIN — ACETAMINOPHEN 650 MG: 325 TABLET ORAL at 10:29

## 2021-02-02 RX ADMIN — METOPROLOL SUCCINATE 50 MG: 50 TABLET, EXTENDED RELEASE ORAL at 10:31

## 2021-02-02 RX ADMIN — ASPIRIN 81 MG: 81 TABLET, COATED ORAL at 10:31

## 2021-02-02 ASSESSMENT — PAIN SCALES - GENERAL
PAINLEVEL_OUTOF10: 0
PAINLEVEL_OUTOF10: 1
PAINLEVEL_OUTOF10: 0
PAINLEVEL_OUTOF10: 0

## 2021-02-02 ASSESSMENT — PAIN DESCRIPTION - LOCATION: LOCATION: HEAD

## 2021-02-02 NOTE — CONSULTS
Patient: Rodolfo Olivier  [id number]  Date: 2021      Chief Complaint: Left sided weakness    History of Present Illness/Hospital Course:  75-year-old female with a history of recent left MCA CVA, A. Fib, CAD, DM, HTN, HLD, and depression who was admitted on  with aphasia and changes in vision. CT head revealed an acute infarct in the right PCA territory. CTA revealed occlusion of the proximal right ICA and occlusion of the distal P4 segment on the right. MRI revealed early subacute infarcts in the right thalamus, right occipital lobe, and right posterior cerebral artery territory. . A1c 8.7. Patient reports that she stopped taking her previous anticoagulation due to cost and was placed on Coumadin but stopped this due to bruising. She was evaluated by therapy and suggested continuing an inpatient setting prior to returning home. Prior Level of Function:  Min A    Current Level of Function:  Independent     has a past medical history of Actinic keratosis, Arthritis, Asthma, Atrial fibrillation ([address] CAD (coronary artery disease), Cerebral artery occlusion with cerebral infarction ([address] Depression, Diabetes mellitus ([address] Disc displacement, lumbar, Diverticulosis of colon, Hard of hearing, Hyperlipidemia, Hypertension, Ischemic heart disease, Past heart attack, Poor vision, Right leg weakness, and Scoliosis. has a past surgical history that includes Coronary artery bypass graft;  section; Appendectomy; ovarian cyst removal; Hand surgery; Cardiac surgery (); skin biopsy; vascular surgery; Tonsillectomy; Lumbar disc surgery (8-); Lumbar spine surgery (10-11-12); and back surgery. reports that she has quit smoking. She has a 40.00 pack-year smoking history. She has never used smokeless tobacco. She reports that she does not drink alcohol or use drugs. family history includes Bipolar Disorder in her mother; Cancer in her father; Diabetes in her mother and sister; Early Death in her father; Heart Attack in her father; Heart Disease in her father and sister; Heart Surgery in her father and sister; High Blood Pressure in her father, mother, and sister; Mental Illness in her mother. REVIEW OF SYSTEMS:   CONSTITUTIONAL: negative for fevers, chills, diaphoresis, appetite change, fatigue, night sweats and unexpected weight change. HEENT: negative for hearing loss, tinnitus, ear drainage, sinus pressure, nasal congestion, epistaxis and snoring. RESPIRATORY: Negative for hemoptysis, cough, sputum production. CARDIOVASCULAR: negative for chest pain, palpitations, exertional chest pressure/discomfort, edema, syncope. GASTROINTESTINAL: negative for nausea, vomiting, diarrhea, constipation, blood in stool and abdominal pain. GENITOURINARY: negative for frequency, dysuria, urinary incontinence, decreased urine volume, and hematuria. HEMATOLOGIC/LYMPHATIC: negative for easy bruising, bleeding and lymphadenopathy. ALLERGIC/IMMUNOLOGIC: negative for recurrent infections, angioedema, anaphylaxis and drug reactions. ENDOCRINE: negative for weight changes and diabetic symptoms including polyuria, polydipsia and polyphagia. MUSCULOSKELETAL: negative for pain, joint swelling, decreased range of motion and muscle weakness. NEUROLOGICAL: negative for headaches, slurred speech. Positive unilateral weakness and vision deficits   PSYCHIATRIC/BEHAVIORAL: negative for hallucinations, behavioral problems, confusion and agitation. All pertinent positives are noted in the HPI.     Physical Examination:  Vitals:   Patient Vitals for the past 24 hrs:   BP Temp Temp src Pulse Resp SpO2 Weight   02/02/21 1224      95 %    02/02/21 1217 (!) 158/85 98.5 °F (36.9 °C) Temporal 80 18     02/02/21 1023 121/68 96.7 °F (35.9 °C) Temporal 86 20   02/02/21 0933     20 95 %    02/02/21 0600 (!) 140/59     97 %    02/02/21 0500 (!) 142/73     97 %    02/02/21 0410 (!) 145/70     100 % 156 lb 4.9 oz (70.9 kg)   02/02/21 0400  98 °F (36.7 °C) Temporal  20 100 %    02/02/21 0300 (!) 158/96     100 %    02/02/21 0200      98 %    02/02/21 0100 (!) 149/77     (!) 80 %    02/02/21 0000 (!) 150/79 97.8 °F (36.6 °C) Temporal 80 18 100 %    02/01/21 2300 (!) 173/76         02/01/21 2200 136/60     (!) 73 %    02/01/21 2100 (!) 141/77         02/01/21 2000 (!) 159/66 97.4 °F (36.3 °C) Temporal 81 20     02/01/21 1936      93 %    02/01/21 1700 (!) 182/75   81 20 98 %    02/01/21 1600 (!) 165/81 97.8 °F (36.6 °C) Axillary 74 18 100 %    02/01/21 1540 (!) 173/69 97.6 °F (36.4 °C) Temporal 73 18 98 %    02/01/21 1400    84      02/01/21 1345 (!) 148/82 98.2 °F (36.8 °C) Temporal 79 17       Psych: Stable mood, normal judgement, normal affect. Const: No distress  Eyes: Conjunctiva noninjected, no icterus noted; pupils equal, round. HENT: Atraumatic, normocephalic; Oral mucosa moist  Neck: Trachea midline, neck supple. No thyromegaly noted. CV: No audible murmurs  Resp: No increased WOB, no audible wheezing   GI: Nondistended   Neuro: Alert, oriented, appropriate. Mild left facial droop, LUE/LLE 4/5 diffusely RUE/RLE 5/5. Left visual field cut  Skin: No visible abnormalities  MSK: No joint abnormalities noted. Ext: No significant edema appreciated. No varicosities.     Lab Results   Component Value Date    WBC 6.6 01/31/2021    HGB 12.5 01/31/2021    HCT 37.5 01/31/2021    MCV 92.6 01/31/2021     01/31/2021     Lab Results   Component Value Date    INR 1.00 01/29/2021    INR 0.97 10/20/2020    INR 1.05 06/08/2019    PROTIME 11.6 01/29/2021    PROTIME 11.2 10/20/2020    PROTIME 12.0 06/08/2019     Lab Results   Component Value Date    CREATININE 0.8 01/29/2021    BUN 14 01/29/2021 Reason for Exam: Slurred speech, vision loss since 1/29/2021.history of  A. Fib, stroke October 2020. previous mri 10/20/20. Acuity: Acute   Type of Exam: Ongoing       FINDINGS:   INTRACRANIAL STRUCTURES/VENTRICLES: There are areas of restricted diffusion   with associated areas of increased T2 signal involving the right occipital   lobe, compatible with the right posterior cerebral artery infarct.  There are   also areas of involvement involving the posteromedial right thalamus.  No   other areas of restricted diffusion.  The cerebral and cerebellar parenchyma   demonstrate volume loss. Sammy Luis Enrique are old infarcts noted in the left temporal   occipital region with associated areas of cortical laminar necrosis. Sammy Luis Enrique   is also an old left parietal lobe infarct.       There are no abnormal extra-axial fluid collections.  The ventricles are   proportional to the cerebral sulci.       There are scattered areas of increased T2 signal noted supratentorially,   compatible with mild chronic microvascular white matter ischemic disease. There are also areas of increased T2 signal noted centrally in the jenna.       There is a small chronic lacunar infarct noted in the left cerebellar   hemisphere.       ORBITS: Lens implants from prior cataract surgery are noted.  The orbits are   otherwise unremarkable.       SINUSES: There is scattered trace paranasal sinus disease. Sammy Luis Enrique is a left   mastoid effusion.  The right mastoid air cells are clear.       BONES/SOFT TISSUES: The bone marrow signal intensity and craniocervical   junction are normal in appearance.  The pituitary gland is unremarkable.           Impression   1. Early subacute infarcts involving the right posteromedial thalamus and   right occipital lobe, in the right posterior cerebral artery vascular   distribution. 2. Cerebral parenchymal volume loss with mild chronic microvascular white   matter ischemic disease noted both supra and infratentorially.

## 2021-02-02 NOTE — TELEPHONE ENCOUNTER
Called and discussed with daughter and she is IP at this time.   Will see if case management can set up follow up with her previous cardiologist.     RAEANN Stephens-CNP

## 2021-02-02 NOTE — PROGRESS NOTES
Speech Language/Pathology   SPEECH LANGUAGE AND CLINICAL BEDSIDE SWALLOWING EVALUATION   Speech Therapy Department     Patient Name:  Nilsa Villegas  :  1950  Pain level:  Medical Diagnosis:  [facility] [I63.9]    Pt seen bedside for dysphagia treatment follow up to clinical swallow evaluation completed 21 (see report). This date Pt continues to demonstrate speech language and cognitive deficits which appear declined from her prior baseline function. Therefore, speech language evaluation also completed this date (see SLP Evaluation, below)    DYSPHAGIA TREATMENT NOTE:  Current Diet Level: Regular texture diet with thin liquids, meds as tolerated   Tolerance of Current Diet Level: PT/OT report noted coughing with thin liquid at breakfast this am    Assessment of Texture Tolerance:  -Impressions: Pt alert and verbally responsive. Pt demonstrates improved oral motor strength, ROM and symmetry via completion of OME. With po trials, Pt demonstrates reduced bolus control with clinical symptoms of premature bolus loss to pharynx and mild delayed swallow initiation and reduced laryngeal excursion noted with all textures. Pt also noted with impulsivity and increased volume size when self feeding  liquids via cup. Increased volume size and impulsivity increases aspiration risk if precautions are not followed and may have contributed to reported coughing with thins earlier this date.     Diet and Treatment Recommendations:  Regular texture diet with thin liquids, meds as tolerated     (Dysphagia Goals addressed, if appropriate)   1.) Pt will tolerate recommended diet without s/s of aspiration (ongoing 2021)      SPEECH LANGUAGE EVALUATION:  Speech Language Treatment Diagnosis: Speech Language Deficits; Cognitive Linguistic Deficits Speech Language Impressions: Pt alert and verbally responsive but presents with expressive aphasia with intermittent paraphasic errors throughout. Moderately impaired auditory processing/comprehension noted in conversation and within structured tasks with reduced insight into errors. Moderate cognitive linguistic deficits noted throughout with reduced insight and self correction of errors, impacting functional task completion. Oral Motor exam/Motor Speech:  · Mild R facial weakness  · Impaired volitional repetition of CV and CVC productions, consistent with apraxia; phonemic distortions noted throughout  · Mild dysarthria with reduced rate noted in connected speech     Verbal Expression:  · Mild/moderate expressive aphasia with paraphasic errors and perseveration noted with open ended self expression  · Responsive namin%; responds well to semantic cues  · Divergent namin% with reduced thought organization and word finding deficits noted     Auditory Comprehension:  · Impaired auditory processing and comprehension in conversation with reduced awareness of \"misperceptions\" (ie perceives month for year etc)  · 1-unit commands and Basic Y/N questions: WFL  · High level and complex commands and questions: 60% with impaired self monitoring and misperceptions impacting overall comprehension of instructions     Cognitive-Linguistic:   · Orientation: x2, due in part to impaired receptive and expressive language; f=2 improves to orientationx4  · Moderately Impaired immediate and short term recall  · Impaired executive function for task completion with impaired self monitoring, self awareness of errors and self correction of errors    Plan: Speech therapy 3-5 times a weeks for speech-language treatment, and dysphagia treatment     Discharge Recommendations:  Pt will benefit from continued skilled Speech Therapy for Speech and Dysphagia services, prior to returning home.     Speech Language Short-term goals: 1.Pt will improve verbal initiation and repetition via graded tasks to 80%   2. Pt will improve functional verbal expression via graded tasks to 80%   3. Pt will improve auditory processing/comprehension of commands and questions to 80%, via graded tasks   4. Pt will improve orientation and short term recall via graded tasks to 80%  5. Ongoing assessment of cognitive linguistic function and receptive and expressive skills improve    Patient/Family Education:Education, results and recommendations given to the Pt and nurse, who verbalized understanding    Timed Code Treatment: 15 min    Total Treatment Time: 40 min     If patient discharges prior to next session this note will serve as a discharge summary.      Rona Pacheco ASMICHAEL-ZSO#[id number]   Speech-Language Pathologist

## 2021-02-02 NOTE — PROGRESS NOTES
Hospitalist  Progress Note       Zbigniew Mccarty is a 65 y.o. female   1950     SUBJECTIVE:   Patient  Seen  And  Examined  Says  She  Feels  Better   No  Chest  Pain    Pt  Evaluation  Noted    OBJECTIVE:    Review of Systems:  General appearance: alert, appears stated age and cooperative  Skin: Skin color, texture, normal. No rashes or lesions  HEENT: No nose bleed, headache, vision problems  CV: C/O chest pain, tightness, pressure,   Respiratory: C/o no SOB, ROMANO, Orthopnea, PND  GI: No abdominal pain, black stool, bloating  Limbs: No c/o edema, pain, swelling, intermittent claudication, joint pains  Neuro: No dizziness, lightheadedness, syncope, gait problems, memory problems  Psych: grossly normal. No SI/depression. Vitals:   Blood pressure (!) 140/59, pulse 80, temperature 98 °F (36.7 °C), temperature source Temporal, resp. rate 20, height 5' 4\" (1.626 m), weight 156 lb 4.9 oz (70.9 kg), SpO2 95 %.     HEENT: AT, NC, PERRLA  Neck: No JVD  Heart: S1 S2 audible, no murmur   Lungs: CTA   Abdomen: Nontender   Limbs: No edema   CNS: no focal deficit      Past Medical History:   Diagnosis Date    Actinic keratosis     Arthritis     Asthma     UNSPECIFIED    Atrial fibrillation (HCC)     CAD (coronary artery disease)     Cerebral artery occlusion with cerebral infarction ([facility] 10/2020    residual expressive aphasia    Depression     worse with pain    Diabetes mellitus (HCC)     TYPE II    Disc displacement, lumbar     Diverticulosis of colon     Hard of hearing     left    Hyperlipidemia     Hypertension     Ischemic heart disease     Past heart attack     28 YRS AGO    Poor vision     legally blind    Right leg weakness     Scoliosis         Patient Active Problem List   Diagnosis    Lumbar radiculopathy    Acute CVA (cerebrovascular accident) ([address]    Received intravenous tissue plasminogen activator (tPA) in emergency department    Benign essential HTN  Nontraumatic cortical hemorrhage of left cerebral hemisphere (HCC)    Dyslipidemia    PAF (paroxysmal atrial fibrillation) (HCC)    Thalamic stroke (HCC)    DM (diabetes mellitus), type 2, uncontrolled with complications ([facility]    Remote history of stroke    Persistent atrial fibrillation (HCC)    Hyponatremia    Coronary artery disease involving native coronary artery of native heart without angina pectoris        Allergies   Allergen Reactions    Morphine Other (See Comments) and Nausea And Vomiting     BLOOD PRESSURE BOTTOMED OUT  Feel funny      Codeine Nausea Only, Other (See Comments) and Palpitations     SWEATS  generalized unwell feeling when takes  Feel funny      Statins Itching        Current Inpatient Medications:    Current Facility-Administered Medications   Medication Dose Route Frequency Provider Last Rate Last Admin    hydrogen peroxide 3 % external solution             budesonide-formoterol (SYMBICORT) 160-4.5 MCG/ACT inhaler 2 puff  2 puff Inhalation BID Jaimee Pacheco MD   2 puff at 02/02/21 0933    metoprolol succinate (TOPROL XL) extended release tablet 50 mg  50 mg Oral Daily Fahad Luz MD   50 mg at 02/01/21 0807    sertraline (ZOLOFT) tablet 100 mg  100 mg Oral Daily Fahad Luz MD   100 mg at 02/01/21 0805    sodium chloride flush 0.9 % injection 10 mL  10 mL Intravenous 2 times per day Jaimee Pacheco MD   10 mL at 02/01/21 2054    sodium chloride flush 0.9 % injection 10 mL  10 mL Intravenous PRN Fahad Luz MD        promethazine (PHENERGAN) tablet 12.5 mg  12.5 mg Oral Q6H PRN Fahad Luz MD        Or    ondansetron (ZOFRAN) injection 4 mg  4 mg Intravenous Q6H PRN Fahad Luz MD        polyethylene glycol (GLYCOLAX) packet 17 g  17 g Oral Daily PRN Fahad Luz MD        enoxaparin (LOVENOX) injection 40 mg  40 mg Subcutaneous Daily Fahad Luz MD   40 mg at 02/01/21 3755  aspirin EC tablet 81 mg  81 mg Oral Daily Juan Lopez MD   81 mg at 02/01/21 0263    Or    aspirin suppository 300 mg  300 mg Rectal Daily Juan Lopez MD        perflutren lipid microspheres (DEFINITY) injection 1.65 mg  1.5 mL Intravenous ONCE PRN Juan Lopez MD        acetaminophen (TYLENOL) tablet 650 mg  650 mg Oral Q4H PRN Juan Lopez MD   650 mg at 02/01/21 1119    Or    acetaminophen (TYLENOL) suppository 650 mg  650 mg Rectal Q4H PRN Juan Lopez MD        labetalol (NORMODYNE;TRANDATE) injection 10 mg  10 mg Intravenous Q10 Min PRN Juan Lopez MD        insulin glargine (LANTUS;BASAGLAR) injection pen 10 Units  0.15 Units/kg Subcutaneous Nightly Fahad Luz MD   10 Units at 02/01/21 2053    glucose (GLUTOSE) 40 % oral gel 15 g  15 g Oral PRN Fahad Luz MD        dextrose 50 % IV solution  12.5 g Intravenous PRN Juan Lopez MD        glucagon (rDNA) injection 1 mg  1 mg Intramuscular PRN Fahad Luz MD        dextrose 5 % solution  100 mL/hr Intravenous PRN Fahad Luz MD        insulin lispro (1 Unit Dial) 0-12 Units  0-12 Units Subcutaneous TID WC Fahad Luz MD   4 Units at 02/01/21 1739    insulin lispro (1 Unit Dial) 0-6 Units  0-6 Units Subcutaneous Nightly Juan Lopez MD   1 Units at 01/31/21 2210           Labs:  CBC with Differential:    Lab Results   Component Value Date    WBC 6.6 01/31/2021    RBC 4.05 01/31/2021    HGB 12.5 01/31/2021    HCT 37.5 01/31/2021     01/31/2021    MCV 92.6 01/31/2021    MCH 30.8 01/31/2021    MCHC 33.3 01/31/2021    RDW 13.6 01/31/2021    SEGSPCT 72.8 11/11/2012    LYMPHOPCT 13.2 01/29/2021    MONOPCT 6.7 01/29/2021    BASOPCT 0.5 01/29/2021    MONOSABS 0.6 01/29/2021    LYMPHSABS 1.2 01/29/2021    EOSABS 0.1 01/29/2021    BASOSABS 0.0 01/29/2021    DIFFTYPE Auto 11/11/2012     CMP:    Lab Results   Component Value Date     01/29/2021 K 4.0 01/29/2021    CL 97 01/29/2021    CO2 25 01/29/2021    BUN 14 01/29/2021    CREATININE 0.8 01/29/2021    GFRAA >60 01/29/2021    GFRAA >60 11/11/2012    AGRATIO 1.4 01/29/2021    LABGLOM >60 01/29/2021    GLUCOSE 294 01/29/2021    PROT 7.3 01/29/2021    LABALBU 4.3 01/29/2021    CALCIUM 10.5 01/29/2021    BILITOT <0.2 01/29/2021    ALKPHOS 94 01/29/2021    AST 15 01/29/2021    ALT 10 01/29/2021     Hepatic Function Panel:    Lab Results   Component Value Date    ALKPHOS 94 01/29/2021    ALT 10 01/29/2021    AST 15 01/29/2021    PROT 7.3 01/29/2021    BILITOT <0.2 01/29/2021    LABALBU 4.3 01/29/2021     Magnesium:  No results found for: MG  PT/INR:    Lab Results   Component Value Date    PROTIME 11.6 01/29/2021    INR 1.00 01/29/2021     Last 3 Troponin:    Lab Results   Component Value Date    TROPONINI <0.01 01/30/2021    TROPONINI <0.01 01/30/2021    TROPONINI <0.01 01/29/2021     U/A:  No results found for: NITRITE, COLORU, PHUR, LABCAST, WBCUA, RBCUA, MUCUS, TRICHOMONAS, YEAST, BACTERIA, CLARITYU, SPECGRAV, LEUKOCYTESUR, UROBILINOGEN, BILIRUBINUR, BLOODU, GLUCOSEU, AMORPHOUS  ABG:  No results found for: PHART, DJR1EXU, PO2ART, KNH1QOV, BEART, THGBART, LNG7VPF, W2ETGELV  FLP:    Lab Results   Component Value Date    TRIG 197 01/31/2021    HDL 42 01/31/2021    LDLCALC 109 01/31/2021    LABVLDL 39 01/31/2021     TSH:  No results found for: TSH   DATA:   ECG: Sinus Rhythm       ASSESSMENT:   1    Acute  CVA   Cardiology  And    Neurology input  Noted    For  Inpatient  Rehab  2  Hypertension  Better   3  PAF   On  anticogulation  PLAN    for  Inpatient  rehab

## 2021-02-02 NOTE — CARE COORDINATION
CM discussed pending PMR consult and possible acute inpatient rehab with patient's daughter. She is in agreement with this plan. Patient will require a pre-cert with her insurance company. IMM reviewed with daughter.     ANT Loyola, CCM, RN  [facility]  [phone]

## 2021-02-02 NOTE — PROGRESS NOTES
Iris Carcamo  Neurology Follow-up  [facility] Neurology    Date of Service: 2/2/2021    Subjective:   CC: Follow up today regarding: Acute right PCA ischemic stroke and visual deficit. Events noted. Chart and lab reviewed. The patient is about the same. Awaiting inpatient rehab approval.  Continues to have visual field deficit on the left which is significant including dysarthria and ataxia. No headache or chest pain. Other review of system was unremarkable. ROS : A 10-12 system review obtained and updated today and is unremarkable except as mentioned  in my interval history. family history includes Bipolar Disorder in her mother; Cancer in her father; Diabetes in her mother and sister; Early Death in her father; Heart Attack in her father; Heart Disease in her father and sister; Heart Surgery in her father and sister; High Blood Pressure in her father, mother, and sister; Mental Illness in her mother.     Past Medical History:   Diagnosis Date    Actinic keratosis     Arthritis     Asthma     UNSPECIFIED    Atrial fibrillation (HCC)     CAD (coronary artery disease)     Cerebral artery occlusion with cerebral infarction ([address] 10/2020    residual expressive aphasia    Depression     worse with pain    Diabetes mellitus ([address]     TYPE II    Disc displacement, lumbar     Diverticulosis of colon     Hard of hearing     left    Hyperlipidemia     Hypertension     Ischemic heart disease     Past heart attack     28 YRS AGO    Poor vision     legally blind    Right leg weakness     Scoliosis      Current Facility-Administered Medications   Medication Dose Route Frequency Provider Last Rate Last Admin    hydrogen peroxide 3 % external solution             budesonide-formoterol (SYMBICORT) 160-4.5 MCG/ACT inhaler 2 puff  2 puff Inhalation BID Zay Garcia MD   2 puff at 02/02/21 3621  metoprolol succinate (TOPROL XL) extended release tablet 50 mg  50 mg Oral Daily Fahad Luz MD   50 mg at 02/02/21 1031    sertraline (ZOLOFT) tablet 100 mg  100 mg Oral Daily Fahad Luz MD   100 mg at 02/02/21 1031    sodium chloride flush 0.9 % injection 10 mL  10 mL Intravenous 2 times per day Venecia David MD   10 mL at 02/01/21 2054    sodium chloride flush 0.9 % injection 10 mL  10 mL Intravenous PRN Venecia David MD        promethazine (PHENERGAN) tablet 12.5 mg  12.5 mg Oral Q6H PRN Venecia David MD        Or    ondansetron (ZOFRAN) injection 4 mg  4 mg Intravenous Q6H PRN Fahad Luz MD        polyethylene glycol (GLYCOLAX) packet 17 g  17 g Oral Daily PRN Venecia David MD        enoxaparin (LOVENOX) injection 40 mg  40 mg Subcutaneous Daily Fahad Luz MD   40 mg at 02/02/21 1031    aspirin EC tablet 81 mg  81 mg Oral Daily Fahad Luz MD   81 mg at 02/02/21 1031    Or    aspirin suppository 300 mg  300 mg Rectal Daily Fahad Luz MD        perflutren lipid microspheres (DEFINITY) injection 1.65 mg  1.5 mL Intravenous ONCE PRN Venecia David MD        acetaminophen (TYLENOL) tablet 650 mg  650 mg Oral Q4H PRN Venecia David MD   650 mg at 02/02/21 1029    Or    acetaminophen (TYLENOL) suppository 650 mg  650 mg Rectal Q4H PRN Venecia David MD        labetalol (NORMODYNE;TRANDATE) injection 10 mg  10 mg Intravenous Q10 Min PRN Venecia David MD        insulin glargine (LANTUS;BASAGLAR) injection pen 10 Units  0.15 Units/kg Subcutaneous Nightly Fahad Luz MD   10 Units at 02/01/21 2053    glucose (GLUTOSE) 40 % oral gel 15 g  15 g Oral PRN Fahad Luz MD        dextrose 50 % IV solution  12.5 g Intravenous PRN Fahad Luz MD        glucagon (rDNA) injection 1 mg  1 mg Intramuscular PRN Venecia David MD Sensation: normal to all modalities in both arms and legs. Unsteady gait today. No changes today. Data:  LABS:   Lab Results   Component Value Date     01/29/2021    K 4.0 01/29/2021    CL 97 01/29/2021    CO2 25 01/29/2021    BUN 14 01/29/2021    CREATININE 0.8 01/29/2021    GFRAA >60 01/29/2021    GFRAA >60 11/11/2012    LABGLOM >60 01/29/2021    GLUCOSE 294 01/29/2021    CALCIUM 10.5 01/29/2021     Lab Results   Component Value Date    WBC 6.6 01/31/2021    RBC 4.05 01/31/2021    HGB 12.5 01/31/2021    HCT 37.5 01/31/2021    MCV 92.6 01/31/2021    RDW 13.6 01/31/2021     01/31/2021     Lab Results   Component Value Date    INR 1.00 01/29/2021    PROTIME 11.6 01/29/2021         I reviewed blood testing and other test results  Reviewed notes from different physicians. Impression: The patient is about the same. Acute right ischemic PCA stroke with  dense left visual field deficit. Could be thromboembolic versus cardioembolic  Recent left MCA stroke with residual aphasia and right-sided weakness. She received TPA back in October and had mild hemorrhagic conversion after TPA. PAF on aspirin. Hypertension, not controlled  Hyperlipidemia, not controlled  Diabetes, not controlled. A1c 8.7         Recommendation      Continue current supportive care  Agree with inpatient rehab  Aspirin for now  Statin  Insulin sliding scale  Blood sugar control  DVT and GI prophylaxis  Continue current blood pressure medications  SSRI  Please call  me within 5 to 7 days to discuss long-term anticoagulation and risk of falling and injury  Nothing to add from neurology at this point  We will sign off. Corby Hannon MD   [phone]      This dictation was generated by voice recognition computer software. Although all attempts are made to edit the dictation for accuracy, there may be errors in the transcription that are not intended.

## 2021-02-02 NOTE — TELEPHONE ENCOUNTER
Daughter state our doctors are not in the pt's network. Daughter is asking to speak to [facility] and would like her to call tomorrow morning 2/3/21.

## 2021-02-02 NOTE — CARE COORDINATION
Prior authorization request for ARU submitted to insurance. Please call with any questions.      Thank you,   MARY   [phone]

## 2021-02-02 NOTE — PROGRESS NOTES
[facility]   Electrophysiology Progress Note   Date: 2/2/2021  Admit Date: 1/29/2021     Reason for follow up: CVA, atrial fibrillation    Chief Complaint:   Chief Complaint   Patient presents with    Loss of Vision     pt brought in by [facility] from [address] [place] [address]  where patient was brought there from home for BP check; loss of peripheral vision since 1300 today; hx CVA in October 2020 with + residual expressive asphasia; had a headache on arrival to [address] [place] [address]  but not now     History of Present Illness: History obtained from patient and medical record. Iris Carcamo is a 65 y.o. female with a past medical history of HTN, HLD, DM, CAD s/p CABG, and recent MCA stroke s/p TPA and had mild hemorraghic conversion. Presented with loss of vision and admitted for another right ischemic PCA stroke with dense left visual field deficit. She has hx of atrial fibrillation and has not been taking DOACS due to cost.  She was put on warfarin as alternative, but stopped due to bruising on her skin. Interval Hx: Today, she is being seen for follow up. She has significant dysarthria and is struggling to answer questions. Remains in afib on monitor. Discussed POC with daughter and she would like to follow up with this practice and plans to discharge to ARU or other rehab. No new complaints today. No major events overnight. Denies having chest pain, palpitations, shortness of breath or dizziness. Patient seen and examined. Clinical notes reviewed. Telemetry reviewed.     Problem List:   Patient Active Problem List    Diagnosis Date Noted    Persistent atrial fibrillation ([facility] [address]     Hyponatremia     Coronary artery disease involving native coronary artery of native heart without angina pectoris     DM (diabetes mellitus), type 2, uncontrolled with complications ([address]     Remote history of stroke     Thalamic stroke ([facility] [address] 10/23/2020  Received intravenous tissue plasminogen activator (tPA) in emergency department    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)     Dyslipidemia     PAF (paroxysmal atrial fibrillation) (HCC)     Acute CVA (cerebrovascular accident) ([facility] [address] 10/20/2020    Lumbar radiculopathy 10/11/2012      Assessment and Plan:  Atrial Firbillation   - Hx of afib back to 2016   - In afib/CVR on monitor    - She has not been complaint with AC in the past for various reasons, but is now agreeable to [facility] [place] with warfarin   - Will have her follow up as OP for consideration of Watchman implantation   - She can be started on warfarin in 2 weeks per neurology and if she remains stable on [facility] [place] then Watchman procedure can be considered  CAD   - S/p CABG   - No reports of angina of signs of ACS   - Continue medical therapy  HTN   - Acceptably controlled. Continue current medications  Recurrent CVA   - Multifactorial including atrial fibrillation    - AC and then will refer for Watchman procedure    - Will Follow up in office in 4 weeks  - Start warfarin in 2 weeks per neurology, will arrange for her to follow with [facility] [place] clinic prior to discharge  - Will follow as IP peripherally    All pertinent information and plan of care discussed with the EP physician. Multiple medical conditions with risk of decompensation. Allergies: Allergies   Allergen Reactions    Morphine Other (See Comments) and Nausea And Vomiting     BLOOD PRESSURE BOTTOMED OUT  Feel funny      Codeine Nausea Only, Other (See Comments) and Palpitations     SWEATS  generalized unwell feeling when takes  Feel funny      Statins Itching       Home Meds:  Prior to Visit Medications    Medication Sig Taking?  Authorizing Provider   ezetimibe (ZETIA) 10 MG tablet Take 10 mg by mouth daily Yes Historical Provider, MD   magnesium gluconate (MAGONATE) 500 MG tablet Take 500 mg by mouth daily Yes Historical Provider, MD albuterol sulfate HFA (VENTOLIN HFA) 108 (90 Base) MCG/ACT inhaler Inhale 2 puffs into the lungs every 6 hours as needed for Wheezing Yes Historical Provider, MD   aspirin (ASPIRIN CHILDRENS) 81 MG chewable tablet Take 1 tablet by mouth daily Yes Derrick Russ, MD   sertraline (ZOLOFT) 100 MG tablet Take 100 mg by mouth daily Yes Historical Provider, MD   glimepiride (AMARYL) 4 MG tablet Take 4 mg by mouth 2 times daily  Yes Historical Provider, MD   metformin (GLUCOPHAGE-XR) 500 MG XR tablet Take 1,000 mg by mouth 2 times daily  Yes Historical Provider, MD   metoprolol (TOPROL-XL) 50 MG XL tablet Take 50 mg by mouth daily.    Yes Historical Provider, MD   albuterol (PROVENTIL) (2.5 MG/3ML) 0.083% nebulizer solution Take 2.5 mg by nebulization every 6 hours as needed for Wheezing  Historical Provider, MD      Scheduled Meds:   hydrogen peroxide        budesonide-formoterol  2 puff Inhalation BID    metoprolol succinate  50 mg Oral Daily    sertraline  100 mg Oral Daily    sodium chloride flush  10 mL Intravenous 2 times per day    enoxaparin  40 mg Subcutaneous Daily    aspirin  81 mg Oral Daily    Or    aspirin  300 mg Rectal Daily    insulin glargine  0.15 Units/kg Subcutaneous Nightly    insulin lispro  0-12 Units Subcutaneous TID WC    insulin lispro  0-6 Units Subcutaneous Nightly     Continuous Infusions:   dextrose       PRN Meds:sodium chloride flush, promethazine **OR** ondansetron, polyethylene glycol, perflutren lipid microspheres, acetaminophen **OR** acetaminophen, labetalol, glucose, dextrose, glucagon (rDNA), dextrose   Past Medical History:  Past Medical History:   Diagnosis Date    Actinic keratosis     Arthritis     Asthma     UNSPECIFIED    [facility]rial fibrillation [facility] [address]     CAD (coronary artery disease)     Cerebral artery occlusion with cerebral infarction ([facility] [address] 10/2020    residual expressive aphasia    Depression     worse with pain    Diabetes mellitus ([facility] [address]     TYPE II  Disc displacement, lumbar     Diverticulosis of colon     Hard of hearing     left    Hyperlipidemia     Hypertension     Ischemic heart disease     Past heart attack     29 YRS AGO    Poor vision     legally blind    Right leg weakness     Scoliosis       Past Surgical History:    has a past surgical history that includes Coronary artery bypass graft;  section; Appendectomy; ovarian cyst removal; Hand surgery; Cardiac surgery (); skin biopsy; vascular surgery; Tonsillectomy; Lumbar disc surgery (8-); Lumbar spine surgery (10-11-12); and back surgery. Social History:  Reviewed. reports that she has quit smoking. She has a 40.00 pack-year smoking history. She has never used smokeless tobacco. She reports that she does not drink alcohol or use drugs. Family History:  Reviewed. family history includes Bipolar Disorder in her mother; Cancer in her father; Diabetes in her mother and sister; Early Death in her father; Heart Attack in her father; Heart Disease in her father and sister; Heart Surgery in her father and sister; High Blood Pressure in her father, mother, and sister; Mental Illness in her mother. Denies family history of sudden cardiac death, arrhythmia, premature CAD    Review of Systems:  Limited to HPI due to significant dysarthria    Physical Examination:  Vitals:    21 1217   BP: (!) 158/85   Pulse: 80   Resp: 18   Temp: 98.5 °F (36.9 °C)   SpO2:       No intake/output data recorded.    Wt Readings from Last 3 Encounters:   21 156 lb 4.9 oz (70.9 kg)   10/24/20 153 lb 8 oz (69.6 kg)   10/23/20 158 lb 11.7 oz (72 kg)     No intake or output data in the 24 hours ending 21 1224  Telemetry: Personally Reviewed Atrial fibrillation   · Constitutional: Cooperative and in no apparent distress, and appears well nourished  · Skin: Warm and pink; no cyanosis, bruising, or clubbing · HEENT: Symmetric and normocephalic. Conjunctiva pink with clear sclera. Mucus membranes pink and moist.   · Cardiovascular: irregular and rhythm. S1 & S2, positive for murmurs. Peripheral pulses 2+, capillary refill < 3 seconds. negative elevation of JVP. No edema  · Respiratory: Respirations symmetric and unlabored. Lungs clear to auscultation bilaterally, no wheezing, crackles, or rhonchi  · Gastrointestinal: Abdomen soft and round. Bowel sounds normoactive in all quadrants. · Musculoskeletal: No focal weakness. · Neurologic/Psych: Awake and orientated to person, place and time. Calm affect, appropriate mood    Pertinent labs, diagnostic, device, and imaging results reviewed as a part of this visit    Labs:    BMP: No results for input(s): NA, K, CL, CO2, PHOS, BUN, CREATININE, MG in the last 72 hours. Invalid input(s): CA  Estimated Creatinine Clearance: 61 mL/min (based on SCr of 0.8 mg/dL). CBC:   Recent Labs     21  0433   WBC 6.6   HGB 12.5   HCT 37.5   MCV 92.6        Thyroid: No results found for: TSH, L0JKPBK, C6XQZGC, THYROIDAB  Lipids:   Lab Results   Component Value Date    CHOL 190 2021    HDL 42 2021    TRIG 197 2021     LFTS:   Lab Results   Component Value Date    ALT 10 2021    AST 15 2021    ALKPHOS 94 2021    PROT 7.3 2021    AGRATIO 1.4 2021    BILITOT <0.2 2021     Cardiac Enzymes:   Lab Results   Component Value Date    TROPONINI <0.01 2021    TROPONINI <0.01 2021    TROPONINI <0.01 2021     Coags:   Lab Results   Component Value Date    PROTIME 11.6 2021    INR 1.00 2021     EC2021: afib/CVR    ECHO:  2021  Summary   Atrial fibrillation is noted. Left ventricular systolic function is normal with ejection fraction   estimated at 50-55 %. Grade II diastolic dysfunction with elevated filling pressure. The left atrium is mildly dilated. A bubble study was performed and fails to show evidence of shunting. Aortic valve appears sclerotic but opens adequately. Mitral valve leaflets appear mildly thickened. Mild calcification of the posterior leaflet of the mitral valve. Mild to moderate mitral regurgitation   Moderate tricuspid regurgitation. Systolic pulmonary artery pressure (SPAP) is elevated and estimated at 44   mmHg (right atrial pressure 3 mmHg). This is suggestive of mild pulmonary hypertension. IVC is normal in size (< 2.1 cm) and collapses > 50% with respiration   consistent with normal RA pressure (3 mmHg). All questions and concerns were addressed to the patient. Alternatives to my treatment were discussed. I have discussed the above stated plan with patient and family and the nurse. The patient and family verbalized understanding and agreed with the plan. Thank you for allowing to us to participate in the care of [address] [address]. JAZLYN Dunn  [address]   Office: [phone]    I have spent 35 minutes of face to face time with the patient with more than 50% spent counseling and coordinating care for [address] [address].

## 2021-02-02 NOTE — PROGRESS NOTES
Physical Therapy  Facility/Department: [facility] ICU  Daily Treatment Note  NAME: Ben De Santiago  : [id number]  MRN: [id number]    Date of Service: 2021    Discharge Recommendations:    Ben De Santiago scored a 17/24 on the AM-PAC short mobility form. Current research shows that an AM-PAC score of 17 or less is typically not associated with a discharge to the patient's home setting. Based on the patient's AM-PAC score and their current functional mobility deficits, it is recommended that the patient have 5-7 sessions per week of Physical Therapy at d/c to increase the patient's independence. At this time, this patient demonstrates the endurance, and/or tolerance for 3 hours of therapy each day, with a treatment frequency of 5-7x/wk. Please see assessment section for further patient specific details. If patient discharges prior to next session this note will serve as a discharge summary. Please see below for the latest assessment towards goals. Pt lives alone at home functioning independently without AD. Pt currently requires Min A for mobility without use of AD in the setting of recurrent CVAs. Pt does not have the option to have 24/7 supervision/assist upon d/c home. Pt would benefit from the above recommendations to assist with balance, gait, and strength deficits to allow for safe return to the home environment. PT Equipment Recommendations  Equipment Needed: No    Assessment   Body structures, Functions, Activity limitations: Decreased functional mobility ; Decreased strength;Decreased endurance;Decreased safe awareness;Decreased balance Assessment: Pt presents with impaired functional strength and endurance and decreased standing balance without use of AD. Pt  Min A for ambulation without RW and displays difficulty scanning at times as pt was unable to avoid objects demonstrating decreased safety awareness. Ambulation with AD required CGA, however pt continued to have difficulty avoiding objects in the room. Pt performed transfers CGA/Min A with cueing required for B hand placement for safety. Despite mild balance improvement, the patient requires continued therapy prior to discharge home. Pt with PLOF of independence without AD and living alone at home with dogs without option to have 24/7 supervision/assist at home. Pt would benefit from continued skilled PT services to address deficits and facilitate return to PLOF. Treatment Diagnosis: decreased functional mobility, impaired gait  Prognosis: Good  Decision Making: Medium Complexity  Clinical Presentation: evolving  PT Education: Goals;PT Role;Plan of Care;Transfer Training;Family Education;Orientation;General Safety;Gait Training;Functional Mobility Training; Low Vision Education  Patient Education: d/c recommendations - verbalized understanding  Barriers to Learning: visual, language  REQUIRES PT FOLLOW UP: Yes  Activity Tolerance  Activity Tolerance: Patient limited by fatigue;Patient limited by endurance; Other  Activity Tolerance: limited due to decreased or blurred vision; increased time required for all tasks due to enviromental distractions and decreased safety awareness     Patient Diagnosis(es): The encounter diagnosis was Thalamic stroke ([facility] [address] Response To Previous Treatment: Patient with no complaints from previous session. Family / Caregiver Present: No  Subjective  Subjective: Pt reporting no pain at this time, states weakness on  side. Pt agreeable to PT session. Word finding difficulty noted throughout tx. Request use of restroom and to have breakfast as she states \"being shakey. \" Reports blurred vision. General Comment  Comments: Pt supine in bed upon arrival.          Orientation  Orientation  Overall Orientation Status: Within Functional Limits  Cognition      Objective   Bed mobility  Supine to Sit: Stand by assistance  Scooting: Stand by assistance  Transfers  Sit to Stand: Contact guard assistance(4x)  Stand to sit: Contact guard assistance;Minimal Assistance(CGA 3x, Min A to commode)  Comment: cueing for B hand placement  Ambulation  Ambulation?: Yes  More Ambulation?: Yes  Ambulation 1  Surface: level tile  Device: Rolling Walker  Assistance: Contact guard assistance  Quality of Gait: decreased jason, slight sway noted, visual scanning of room and hallway noted  Gait Deviations: Decreased step height;Decreased step length  Distance: 16' + 28'  Comments: Pt required increased time to perform. No LOB. 29' contained obstacle course leading to difficulty maneuvering around objects and scanning L despite knowing of L turn. Ambulation 2  Surface - 2: level tile  Device 2: No device  Assistance 2: Minimal assistance  Quality of Gait 2: wider Kay, decreased jason, increased medial-lateral sway noted with pt reaching for environment support for balance  Gait Deviations: Slow Jason;Staggers  Distance: 29' (obstacle course)  Comments: Pt required significantly increased time to perform and required increased assist for balance. No LOB however pt reporting feeling unsteady.      Balance  Posture: Fair  Sitting - Static: Good  Sitting - Dynamic: Good  Standing - Static: Fair  Standing - Dynamic: Fair Comments: Obstacle course - picking object off floor Min A (increased time, wide SARWAT), attempted to furniture walk. Reaching with BUE, pt unaable to touch actual number on board due to blurred vision. ~3 minutes of standing pertubations from all directionas required Min A. CGA for trunk balance during seated lc-care, toileting, and clothing management. Cueing to scan L to progress throughout tx and course. Comment: Increased time required due to expressive aphasia, increased cueing for safety and to stay on task due to enviromental distaction. AM-PAC Score  AM-PAC Inpatient Mobility Raw Score : 17 (02/02/21 1007)  AM-PAC Inpatient T-Scale Score : 42.13 (02/02/21 1007)  Mobility Inpatient CMS 0-100% Score: 50.57 (02/02/21 1007)  Mobility Inpatient CMS G-Code Modifier : CK (02/02/21 1007)          Goals  Short term goals  Time Frame for Short term goals: To be met prior to discharge  Short term goal 1: Bed mobility with supervision  Short term goal 2: Sit to/from stand with supervision  Short term goal 3: Ambulate 150' with/without RW and supervision  Patient Goals   Patient goals : to go home  NO GOALS MET THIS [address]  Times per week: 5-7  Times per day: Daily  Current Treatment Recommendations: Strengthening, Functional Mobility Training, Transfer Training, Balance Training, Endurance Training, Gait Training, Safety Education & Training, Patient/Caregiver Education & Training, Neuromuscular Re-education, Home Exercise Program, Positioning, Modalities, Equipment Evaluation, Education, & procurement  Safety Devices  Type of devices:  All fall risk precautions in place, Call light within reach, Nurse notified, Patient at risk for falls, Gait belt, Left in chair, Chair alarm in place  Restraints  Initially in place: No     Therapy Time   Individual Concurrent Group Co-treatment   Time In       0814   Time Out       0925   Minutes       71   Timed Code Treatment Minutes: 71 Minutes [place]iana [place], [place] [address]      I agree with the note above. Goals addressed by PT this session.    [address], [place] [id number]

## 2021-02-02 NOTE — PROGRESS NOTES
Occupational Therapy  Facility/Department: [facility] ICU  Daily Treatment Note  NAME: Natalia Brown  : [id number]  MRN: [id number]    Date of Service: 2021    Discharge Recommendations:      Natalia Brown scored a 15/24 on the AM-PAC ADL Inpatient form. Current research shows that an AM-PAC score of 17 or less is typically not associated with a discharge to the patient's home setting. Based on the patient's AM-PAC score and their current ADL deficits, it is recommended that the patient have 5-7 sessions per week of Occupational Therapy at d/c to increase the patient's independence. At this time, this patient demonstrates the endurance, and/or tolerance for 3 hours of therapy each day, with a treatment frequency of 5-7x/wk. Please see assessment section for further patient specific details. If patient discharges prior to next session this note will serve as a discharge summary. Please see below for the latest assessment towards goals. OT Equipment Recommendations  Equipment Needed: No  Other: defer to next level of care    Assessment   Performance deficits / Impairments: Decreased functional mobility ; Decreased balance;Decreased vision/visual deficit; Decreased ADL status; Decreased safe awareness  Assessment: Pt presents with decreased safety awareness and ability to perform ADL/IADL tasks independently. Pt requiring frequent redirection and cuing to maintain attention to task. Pt is below functional baseline d/t acute CVA and would benefit from continued skilled OT treatment to improve outcomes and return to PLOF. Treatment Diagnosis: Pt with above functional deficits due to recent acute CVA. Prognosis: Good  Decision Making: Low Complexity  OT Education: OT Role;Plan of Care;ADL Adaptive Strategies; Low Vision Education;IADL Safety;Orientation;Precautions;Transfer Training  Patient Education: Pt verbalized understanding however would benefit from continued reinforcement for carryover REQUIRES OT FOLLOW UP: Yes  Activity Tolerance  Activity Tolerance: Patient Tolerated treatment well  Safety Devices  Safety Devices in place: Yes  Type of devices: All fall risk precautions in place;Call light within reach; Chair alarm in place;Gait belt;Patient at risk for falls; Left in chair;Nurse notified         Patient Diagnosis(es): The encounter diagnosis was Thalamic stroke ([address] has a past medical history of Actinic keratosis, Arthritis, Asthma, Atrial fibrillation ([address] CAD (coronary artery disease), Cerebral artery occlusion with cerebral infarction ([address] Depression, Diabetes mellitus ([address] Disc displacement, lumbar, Diverticulosis of colon, Hard of hearing, Hyperlipidemia, Hypertension, Ischemic heart disease, Past heart attack, Poor vision, Right leg weakness, and Scoliosis. has a past surgical history that includes Coronary artery bypass graft;  section; Appendectomy; ovarian cyst removal; Hand surgery; Cardiac surgery (); skin biopsy; vascular surgery; Tonsillectomy; Lumbar disc surgery (8-); Lumbar spine surgery (10-11-12); and back surgery.     Restrictions  Restrictions/Precautions  Restrictions/Precautions: Fall Risk(high fall risk)  Required Braces or Orthoses?: No  Position Activity Restriction Other position/activity restrictions: The patient is a 65y.o.  years old female with history of hypertension A. fib, hyperlipidemia and prior stroke who was admitted to the hospital last night with acute visual disturbance. Symptoms started few hours prior to admission. According to report, she was playing with her dog at home when she slowly lost the vision from the periphery bilaterally and was unable to function. Degree was severe and persistent. No falling or injury. She does have residual aphasia from prior stroke from last year. No fever or chills. No other triggers or other associated symptoms, weakness or numbness or chest pain. No severe headache. Patient came into the ED for evaluation since her symptoms did not improve. Initial work-up revealed mild elevated high blood pressure. She had a CT of the head which showed possible acute right ischemic PCA stroke. CTA showed more proximal right ICA occlusion. She was admitted. Subjective   General  Chart Reviewed: Yes  Patient assessed for rehabilitation services?: Yes  Response to previous treatment: Patient with no complaints from previous session  Family / Caregiver Present: No  Diagnosis: Acute CVA  Subjective  Subjective: Pt supine in bed upon arrival and agreeable to therapy session. Pt presents with expressive aphasia. Vital Signs  Patient Currently in Pain: Denies   Orientation  Orientation  Overall Orientation Status: Within Functional Limits(increased time d/t expressive aphasia, required cuing)  Objective    ADL  Feeding: Setup; Beverage management;Verbal cueing  Grooming: Setup;Contact guard assistance;Verbal cueing(in stance at sink to peform hand hygiene, pt combed hair seated in recliner chair)  LE Dressing: Setup; Moderate assistance(A to fully doff LB clothing prior to toileting, pt demo'd ability to thread B LEs into depends and pants seated with increased time-CGA to perform LB clothing management over hips once in stance) Short term goal 2: Pt will complete toliet transfer with Mod I-min A 2/2  Short term goal 3: Pt will be able to navigate enviormental hazards without cues for RW use or visual scanning.-min A, continue goal 2/2  Short term goal 4: Pt will complete UB/LB dressing with Mod I.-UB not addressed, LB MOD A 2/2  Long term goals  Time Frame for Long term goals : STGs= LTGs  Patient Goals   Patient goals : be independent       Therapy Time   Individual Concurrent Group Co-treatment   Time In       0814   Time Out       0925   Minutes       71      Timed Code Treatment Minutes: 71 Minutes  Total Treatment Minutes:  [address]

## 2021-02-03 LAB
GLUCOSE BLD-MCNC: 156 MG/DL (ref 70–99)
GLUCOSE BLD-MCNC: 203 MG/DL (ref 70–99)
GLUCOSE BLD-MCNC: 210 MG/DL (ref 70–99)
GLUCOSE BLD-MCNC: 262 MG/DL (ref 70–99)
PERFORMED ON: ABNORMAL

## 2021-02-03 PROCEDURE — 2580000003 HC RX 258: Performed by: HOSPITALIST

## 2021-02-03 PROCEDURE — 6360000002 HC RX W HCPCS: Performed by: HOSPITALIST

## 2021-02-03 PROCEDURE — 97129 THER IVNTJ 1ST 15 MIN: CPT

## 2021-02-03 PROCEDURE — 94640 AIRWAY INHALATION TREATMENT: CPT

## 2021-02-03 PROCEDURE — 94761 N-INVAS EAR/PLS OXIMETRY MLT: CPT

## 2021-02-03 PROCEDURE — 92526 ORAL FUNCTION THERAPY: CPT

## 2021-02-03 PROCEDURE — 6370000000 HC RX 637 (ALT 250 FOR IP): Performed by: HOSPITALIST

## 2021-02-03 PROCEDURE — 92507 TX SP LANG VOICE COMM INDIV: CPT

## 2021-02-03 PROCEDURE — 2700000000 HC OXYGEN THERAPY PER DAY

## 2021-02-03 PROCEDURE — 99233 SBSQ HOSP IP/OBS HIGH 50: CPT | Performed by: NURSE PRACTITIONER

## 2021-02-03 PROCEDURE — 2060000000 HC ICU INTERMEDIATE R&B

## 2021-02-03 RX ORDER — BUDESONIDE AND FORMOTEROL FUMARATE DIHYDRATE 160; 4.5 UG/1; UG/1
2 AEROSOL RESPIRATORY (INHALATION) 2 TIMES DAILY
Qty: 1 INHALER | Refills: 3 | Status: SHIPPED | OUTPATIENT
Start: 2021-02-03

## 2021-02-03 RX ADMIN — ENOXAPARIN SODIUM 40 MG: 40 INJECTION SUBCUTANEOUS at 09:39

## 2021-02-03 RX ADMIN — METOPROLOL SUCCINATE 50 MG: 50 TABLET, EXTENDED RELEASE ORAL at 09:39

## 2021-02-03 RX ADMIN — INSULIN LISPRO 3 UNITS: 100 INJECTION, SOLUTION INTRAVENOUS; SUBCUTANEOUS at 20:22

## 2021-02-03 RX ADMIN — INSULIN LISPRO 4 UNITS: 100 INJECTION, SOLUTION INTRAVENOUS; SUBCUTANEOUS at 13:07

## 2021-02-03 RX ADMIN — ASPIRIN 81 MG: 81 TABLET, COATED ORAL at 09:39

## 2021-02-03 RX ADMIN — Medication 10 ML: at 09:40

## 2021-02-03 RX ADMIN — ACETAMINOPHEN 650 MG: 325 TABLET ORAL at 09:39

## 2021-02-03 RX ADMIN — Medication 2 PUFF: at 07:46

## 2021-02-03 RX ADMIN — INSULIN LISPRO 4 UNITS: 100 INJECTION, SOLUTION INTRAVENOUS; SUBCUTANEOUS at 18:39

## 2021-02-03 RX ADMIN — Medication 2 PUFF: at 20:44

## 2021-02-03 RX ADMIN — ACETAMINOPHEN 650 MG: 325 TABLET ORAL at 22:33

## 2021-02-03 RX ADMIN — INSULIN GLARGINE 10 UNITS: 100 INJECTION, SOLUTION SUBCUTANEOUS at 20:23

## 2021-02-03 RX ADMIN — SERTRALINE 100 MG: 50 TABLET, FILM COATED ORAL at 09:39

## 2021-02-03 RX ADMIN — INSULIN LISPRO 2 UNITS: 100 INJECTION, SOLUTION INTRAVENOUS; SUBCUTANEOUS at 10:04

## 2021-02-03 ASSESSMENT — PAIN SCALES - GENERAL
PAINLEVEL_OUTOF10: 0
PAINLEVEL_OUTOF10: 8
PAINLEVEL_OUTOF10: 0
PAINLEVEL_OUTOF10: 0

## 2021-02-03 ASSESSMENT — PAIN DESCRIPTION - LOCATION: LOCATION: HEAD

## 2021-02-03 NOTE — CARE COORDINATION
Patient is approved to go to [facility]. Agency has no beds until tomorrow. Patient's daughter updated. CM scheduled follow up appointments with PCP Ruthy Garrett Wednesday 2/24/2021 at 1030 AM. and  With Cardiology Dr. Jorge Falk for Friday, March 5, 2921 at [phone]. Daughter updated on appointment times.     ANT Reyna, CCM, RN  [facility]  [phone]

## 2021-02-03 NOTE — PROGRESS NOTES
Patient accepted to ARU, possible dc tomorrow due to no bed available.  Patient very motivated during shift, walked to bathroom during shift and better appetite today

## 2021-02-03 NOTE — PROGRESS NOTES
Hospitalist  Progress Note       Gloria Quinonez is a 65 y.o. female   1950     SUBJECTIVE:   Patient  Seen  And  Examined  Says  She  Feels  Better   No  Chest  Pain    Pt  Evaluation  Noted    2/3  Patient  Seen  And  Examined      Still has  Difficulty  With speech     Physical  Medicine  Evaluation  Noted    OBJECTIVE:    Review of Systems:  General appearance: alert, appears stated age and cooperative  Skin: Skin color, texture, normal. No rashes or lesions  HEENT: No nose bleed, headache, vision problems  CV: C/O chest pain, tightness, pressure,   Respiratory: C/o no SOB, ROMANO, Orthopnea, PND  GI: No abdominal pain, black stool, bloating  Limbs: No c/o edema, pain, swelling, intermittent claudication, joint pains  Neuro: No dizziness, lightheadedness, syncope, gait problems, memory problems  Psych: grossly normal. No SI/depression. Vitals:   Blood pressure 126/70, pulse 76, temperature 97 °F (36.1 °C), temperature source Temporal, resp. rate 18, height 5' 4\" (1.626 m), weight 156 lb 1.6 oz (70.8 kg), SpO2 98 %.     HEENT: AT, NC, PERRLA  Neck: No JVD  Heart: S1 S2 audible, no murmur   Lungs: CTA   Abdomen: Nontender   Limbs: No edema   CNS: no focal deficit      Past Medical History:   Diagnosis Date    Actinic keratosis     Arthritis     Asthma     UNSPECIFIED    Atrial fibrillation (HCC)     CAD (coronary artery disease)     Cerebral artery occlusion with cerebral infarction ([address] 10/2020    residual expressive aphasia    Depression     worse with pain    Diabetes mellitus (HCC)     TYPE II    Disc displacement, lumbar     Diverticulosis of colon     Hard of hearing     left    Hyperlipidemia     Hypertension     Ischemic heart disease     Past heart attack     28 YRS AGO    Poor vision     legally blind    Right leg weakness     Scoliosis         Patient Active Problem List   Diagnosis    Lumbar radiculopathy    Acute CVA (cerebrovascular accident) ([address]  Received intravenous tissue plasminogen activator (tPA) in emergency department    Benign essential HTN    Nontraumatic cortical hemorrhage of left cerebral hemisphere (HCC)    Dyslipidemia    PAF (paroxysmal atrial fibrillation) (HCC)    Thalamic stroke (HCC)    DM (diabetes mellitus), type 2, uncontrolled with complications (HCC)    Remote history of stroke    Persistent atrial fibrillation (HCC)    Hyponatremia    Coronary artery disease involving native coronary artery of native heart without angina pectoris        Allergies   Allergen Reactions    Morphine Other (See Comments) and Nausea And Vomiting     BLOOD PRESSURE BOTTOMED OUT  Feel funny      Codeine Nausea Only, Other (See Comments) and Palpitations     SWEATS  generalized unwell feeling when takes  Feel funny      Statins Itching        Current Inpatient Medications:    Current Facility-Administered Medications   Medication Dose Route Frequency Provider Last Rate Last Admin    budesonide-formoterol (SYMBICORT) 160-4.5 MCG/ACT inhaler 2 puff  2 puff Inhalation BID Venecia Foley MD   2 puff at 02/03/21 0746    metoprolol succinate (TOPROL XL) extended release tablet 50 mg  50 mg Oral Daily Fahad Luz MD   50 mg at 02/02/21 1031    sertraline (ZOLOFT) tablet 100 mg  100 mg Oral Daily Fahad Luz MD   100 mg at 02/02/21 1031    sodium chloride flush 0.9 % injection 10 mL  10 mL Intravenous 2 times per day Venecia Foley MD   10 mL at 02/02/21 1056    sodium chloride flush 0.9 % injection 10 mL  10 mL Intravenous PRN Fahad Luz MD        promethazine (PHENERGAN) tablet 12.5 mg  12.5 mg Oral Q6H PRN Fahad Luz MD        Or    ondansetron (ZOFRAN) injection 4 mg  4 mg Intravenous Q6H PRN Fahad Luz MD        polyethylene glycol (GLYCOLAX) packet 17 g  17 g Oral Daily PRN Venecia Foley MD  enoxaparin (LOVENOX) injection 40 mg  40 mg Subcutaneous Daily Fahad Luz MD   40 mg at 02/02/21 1031    aspirin EC tablet 81 mg  81 mg Oral Daily Fahad Luz MD   81 mg at 02/02/21 1031    Or    aspirin suppository 300 mg  300 mg Rectal Daily Fahad Luz MD        perflutren lipid microspheres (DEFINITY) injection 1.65 mg  1.5 mL Intravenous ONCE PRN Merry Lezama MD        acetaminophen (TYLENOL) tablet 650 mg  650 mg Oral Q4H PRN Merry Lezama MD   650 mg at 02/02/21 1029    Or    acetaminophen (TYLENOL) suppository 650 mg  650 mg Rectal Q4H PRN Merry Lezama MD        labetalol (NORMODYNE;TRANDATE) injection 10 mg  10 mg Intravenous Q10 Min PRN Merry Lezama MD        insulin glargine (LANTUS;BASAGLAR) injection pen 10 Units  0.15 Units/kg Subcutaneous Nightly Fahad Luz MD   10 Units at 02/02/21 2006    glucose (GLUTOSE) 40 % oral gel 15 g  15 g Oral PRN Fahad Luz MD        dextrose 50 % IV solution  12.5 g Intravenous PRN Merry Lezama MD        glucagon (rDNA) injection 1 mg  1 mg Intramuscular PRN Fahad Luz MD        dextrose 5 % solution  100 mL/hr Intravenous PRN Fahad Luz MD        insulin lispro (1 Unit Dial) 0-12 Units  0-12 Units Subcutaneous TID WC Fahad Luz MD   2 Units at 02/02/21 1705    insulin lispro (1 Unit Dial) 0-6 Units  0-6 Units Subcutaneous Nightly Merry Lezama MD   1 Units at 02/02/21 2006           Labs:  CBC with Differential:    Lab Results   Component Value Date    WBC 6.6 01/31/2021    RBC 4.05 01/31/2021    HGB 12.5 01/31/2021    HCT 37.5 01/31/2021     01/31/2021    MCV 92.6 01/31/2021    MCH 30.8 01/31/2021    MCHC 33.3 01/31/2021    RDW 13.6 01/31/2021    SEGSPCT 72.8 11/11/2012    LYMPHOPCT 13.2 01/29/2021    MONOPCT 6.7 01/29/2021    BASOPCT 0.5 01/29/2021    MONOSABS 0.6 01/29/2021    LYMPHSABS 1.2 01/29/2021    EOSABS 0.1 01/29/2021 BASOSABS 0.0 01/29/2021    DIFFTYPE Auto 11/11/2012     CMP:    Lab Results   Component Value Date     01/29/2021    K 4.0 01/29/2021    CL 97 01/29/2021    CO2 25 01/29/2021    BUN 14 01/29/2021    CREATININE 0.8 01/29/2021    GFRAA >60 01/29/2021    GFRAA >60 11/11/2012    AGRATIO 1.4 01/29/2021    LABGLOM >60 01/29/2021    GLUCOSE 294 01/29/2021    PROT 7.3 01/29/2021    LABALBU 4.3 01/29/2021    CALCIUM 10.5 01/29/2021    BILITOT <0.2 01/29/2021    ALKPHOS 94 01/29/2021    AST 15 01/29/2021    ALT 10 01/29/2021     Hepatic Function Panel:    Lab Results   Component Value Date    ALKPHOS 94 01/29/2021    ALT 10 01/29/2021    AST 15 01/29/2021    PROT 7.3 01/29/2021    BILITOT <0.2 01/29/2021    LABALBU 4.3 01/29/2021     Magnesium:  No results found for: MG  PT/INR:    Lab Results   Component Value Date    PROTIME 11.6 01/29/2021    INR 1.00 01/29/2021     Last 3 Troponin:    Lab Results   Component Value Date    TROPONINI <0.01 01/30/2021    TROPONINI <0.01 01/30/2021    TROPONINI <0.01 01/29/2021     U/A:  No results found for: NITRITE, COLORU, PHUR, LABCAST, WBCUA, RBCUA, MUCUS, TRICHOMONAS, YEAST, BACTERIA, CLARITYU, SPECGRAV, LEUKOCYTESUR, UROBILINOGEN, BILIRUBINUR, BLOODU, GLUCOSEU, AMORPHOUS  ABG:  No results found for: PHART, RMP3TWL, PO2ART, BCL9ASN, BEART, THGBART, RFA8PFE, D8AOGNMH  FLP:    Lab Results   Component Value Date    TRIG 197 01/31/2021    HDL 42 01/31/2021    LDLCALC 109 01/31/2021    LABVLDL 39 01/31/2021     TSH:  No results found for: TSH   DATA:   ECG: Sinus Rhythm       ASSESSMENT:   1    Acute  CVA   Cardiology  And    Neurology input  Noted    Awaiting  ARU  Pre  Cert      2  Hypertension  Better   Controlled     3  PAF   On  anticogulation  PLAN    for  Inpatient  Rehab  ARU  precert

## 2021-02-03 NOTE — PROGRESS NOTES
Shift assessment done, see flow sheet. Medication administrated per [facility], Fall precautions in place, call light in reach. Patient report headache, tyle nol PRN given.  Will continue to monitor

## 2021-02-03 NOTE — PROGRESS NOTES
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)     Dyslipidemia     PAF (paroxysmal atrial fibrillation) (HCC)     Acute CVA (cerebrovascular accident) ([address] 10/20/2020    Lumbar radiculopathy 10/11/2012      Assessment and Plan:  Atrial Firbillation   - Hx of afib back to 2016   - Remains in afib/CVR   - She has not been complaint with AC in the past for various reasons, but is now agreeable to [facility] with warfarin   - Will have her follow up as OP for consideration of Watchman implantation   - She can be started on warfarin in 2 weeks per neurology and if she remains stable on [facility] then Watchman procedure can be considered. This has been coordinated with Dr. Allred Session office, waiting for a call back to confirm details. Also discussed with case management  CAD   - S/p CABG   - No reports of angina or signs of ACS   - Continue medical therapy  HTN   - Acceptably controlled  Continue current medications  Recurrent CVA   - Multifactorial including atrial fibrillation    - AC and then will refer for Watchman procedure    - Start warfarin in 2 weeks, will coordinate with PCP and their [facility] clinic  - She would like to follow up with [facility] cardiology and will need follow up in 6-8 weeks or earliest available  - No further EP recommendations, I have discussed with case management and nursing, will sign off, please call with questions    All pertinent information and plan of care discussed with the EP physician. Multiple medical conditions with risk of decompensation. Allergies: Allergies   Allergen Reactions    Morphine Other (See Comments) and Nausea And Vomiting     BLOOD PRESSURE BOTTOMED OUT  Feel funny      Codeine Nausea Only, Other (See Comments) and Palpitations     SWEATS  generalized unwell feeling when takes  Feel funny      Statins Itching     Home Meds:  Prior to Visit Medications    Medication Sig Taking?  Authorizing Provider ezetimibe (ZETIA) 10 MG tablet Take 10 mg by mouth daily Yes Historical Provider, MD   magnesium gluconate (MAGONATE) 500 MG tablet Take 500 mg by mouth daily Yes Historical Provider, MD   albuterol sulfate HFA (VENTOLIN HFA) 108 (90 Base) MCG/ACT inhaler Inhale 2 puffs into the lungs every 6 hours as needed for Wheezing Yes Historical Provider, MD   aspirin (ASPIRIN CHILDRENS) 81 MG chewable tablet Take 1 tablet by mouth daily Yes [place] MD   sertraline (ZOLOFT) 100 MG tablet Take 100 mg by mouth daily Yes Historical Provider, MD   glimepiride (AMARYL) 4 MG tablet Take 4 mg by mouth 2 times daily  Yes Historical Provider, MD   metformin (GLUCOPHAGE-XR) 500 MG XR tablet Take 1,000 mg by mouth 2 times daily  Yes Historical Provider, MD   metoprolol (TOPROL-XL) 50 MG XL tablet Take 50 mg by mouth daily.    Yes Historical Provider, MD   albuterol (PROVENTIL) (2.5 MG/3ML) 0.083% nebulizer solution Take 2.5 mg by nebulization every 6 hours as needed for Wheezing  Historical Provider, MD      Scheduled Meds:   budesonide-formoterol  2 puff Inhalation BID    metoprolol succinate  50 mg Oral Daily    sertraline  100 mg Oral Daily    sodium chloride flush  10 mL Intravenous 2 times per day    enoxaparin  40 mg Subcutaneous Daily    aspirin  81 mg Oral Daily    Or    aspirin  300 mg Rectal Daily    insulin glargine  0.15 Units/kg Subcutaneous Nightly    insulin lispro  0-12 Units Subcutaneous TID WC    insulin lispro  0-6 Units Subcutaneous Nightly     Continuous Infusions:   dextrose       PRN Meds:sodium chloride flush, promethazine **OR** ondansetron, polyethylene glycol, perflutren lipid microspheres, acetaminophen **OR** acetaminophen, labetalol, glucose, dextrose, glucagon (rDNA), dextrose   Past Medical History:  Past Medical History:   Diagnosis Date    Actinic keratosis     Arthritis     Asthma     UNSPECIFIED    Atrial fibrillation (HCC)     CAD (coronary artery disease)  Cerebral artery occlusion with cerebral infarction [address] [address] 10/2020    residual expressive aphasia    Depression     worse with pain    Diabetes mellitus ([facility] [address]     TYPE II    Disc displacement, lumbar     Diverticulosis of colon     Hard of hearing     left    Hyperlipidemia     Hypertension     Ischemic heart disease     Past heart attack     29 YRS AGO    Poor vision     legally blind    Right leg weakness     Scoliosis       Past Surgical History:    has a past surgical history that includes Coronary artery bypass graft;  section; Appendectomy; ovarian cyst removal; Hand surgery; Cardiac surgery (); skin biopsy; vascular surgery; Tonsillectomy; Lumbar disc surgery (8-); Lumbar spine surgery (10-11-12); and back surgery. Social History:  Reviewed. reports that she has quit smoking. She has a 40.00 pack-year smoking history. She has never used smokeless tobacco. She reports that she does not drink alcohol or use drugs. Family History:  Reviewed. family history includes Bipolar Disorder in her mother; Cancer in her father; Diabetes in her mother and sister; Early Death in her father; Heart Attack in her father; Heart Disease in her father and sister; Heart Surgery in her father and sister; High Blood Pressure in her father, mother, and sister; Mental Illness in her mother.    Denies family history of sudden cardiac death, arrhythmia, premature CAD    Review of Systems:  Limited to HPI due to significant dysarthria    Physical Examination:  Vitals:    21 0749   BP:    Pulse:    Resp:    Temp:    SpO2: 98%      In: 180 [P.O.:180]  Out: -    Wt Readings from Last 3 Encounters:   21 156 lb 1.6 oz (70.8 kg)   10/24/20 153 lb 8 oz (69.6 kg)   10/23/20 158 lb 11.7 oz (72 kg)       Intake/Output Summary (Last 24 hours) at 2/3/2021 0858  Last data filed at 2021 1900  Gross per 24 hour   Intake 480 ml   Output    Net 480 ml Telemetry: Personally Reviewed Atrial fibrillation   · Constitutional: Cooperative and in no apparent distress, and appears well nourished  · Skin: Warm and pink; no cyanosis, bruising, or clubbing  · HEENT: Symmetric and normocephalic. Conjunctiva pink with clear sclera. Mucus membranes pink and moist.   · Cardiovascular: irregular and rhythm. S1 & S2, positive for murmurs. Peripheral pulses 2+, capillary refill < 3 seconds. negative elevation of JVP. No edema  · Respiratory: Respirations symmetric and unlabored. Lungs clear to auscultation bilaterally, no wheezing, crackles, or rhonchi  · Gastrointestinal: Abdomen soft and round. Bowel sounds normoactive in all quadrants. · Musculoskeletal: No focal weakness. · Neurologic/Psych: Awake and orientated to person, place and time. Calm affect, appropriate mood    Pertinent labs, diagnostic, device, and imaging results reviewed as a part of this visit    Labs:    BMP: No results for input(s): NA, K, CL, CO2, PHOS, BUN, CREATININE, MG in the last 72 hours. Invalid input(s): CA  Estimated Creatinine Clearance: 61 mL/min (based on SCr of 0.8 mg/dL). CBC:   No results for input(s): WBC, HGB, HCT, MCV, PLT in the last 72 hours. Thyroid: No results found for: TSH, M0TYVVG, M2ITSOJ, THYROIDAB  Lipids:   Lab Results   Component Value Date    CHOL 190 2021    HDL 42 2021    TRIG 197 2021     LFTS:   Lab Results   Component Value Date    ALT 10 2021    AST 15 2021    ALKPHOS 94 2021    PROT 7.3 2021    AGRATIO 1.4 2021    BILITOT <0.2 2021     Cardiac Enzymes:   Lab Results   Component Value Date    TROPONINI <0.01 2021    TROPONINI <0.01 2021    TROPONINI <0.01 2021     Coags:   Lab Results   Component Value Date    PROTIME 11.6 2021    INR 1.00 2021     EC2021: afib/CVR    ECHO:  2021  Summary   Atrial fibrillation is noted. Left ventricular systolic function is normal with ejection fraction   estimated at 50-55 %. Grade II diastolic dysfunction with elevated filling pressure. The left atrium is mildly dilated. A bubble study was performed and fails to show evidence of shunting. Aortic valve appears sclerotic but opens adequately. Mitral valve leaflets appear mildly thickened. Mild calcification of the posterior leaflet of the mitral valve. Mild to moderate mitral regurgitation   Moderate tricuspid regurgitation. Systolic pulmonary artery pressure (SPAP) is elevated and estimated at 44   mmHg (right atrial pressure 3 mmHg). This is suggestive of mild pulmonary hypertension. IVC is normal in size (< 2.1 cm) and collapses > 50% with respiration   consistent with normal RA pressure (3 mmHg). All questions and concerns were addressed to the patient. Alternatives to my treatment were discussed. I have discussed the above stated plan with patient and family and the nurse. The patient and family verbalized understanding and agreed with the plan. Thank you for allowing to us to participate in the care of [address] [address]. Felicity Stevens APRN-CNP  [address]   Office: [phone]    I have spent 35 minutes of face to face time with the patient with more than 50% spent counseling and coordinating care for [address] [address].

## 2021-02-03 NOTE — PROGRESS NOTES
Assessment completed, see flowsheet for details. Respiratory: Lung sounds are clear bilaterally. GI: Bowel sounds are present in all quadrants. No N/V reported. : Within defined limits. Neuro: Pt A/O x4     NIH: 3 (See flowsheet for details)    Peripheral vascular: Pulses are palpable in all extremities. Skin: No evidence of new skin breakdown assessed during shift. Pain: Pt reported a 0 on a 0-10 scale. BP (!) 143/73   Pulse 76   Temp 97.4 °F (36.3 °C) (Temporal)   Resp 18   Ht 5' 4\" (1.626 m)   Wt 156 lb 4.9 oz (70.9 kg)   SpO2 99%   BMI 26.83 kg/m²      Call light within reach. Will continue to monitor.

## 2021-02-03 NOTE — DISCHARGE SUMMARY
Hospital Medicine Discharge Summary    Sacha Rosas  :    MRN:  [id number]    Admit date:  2021  Discharge date:  2/3/2021    Admitting Physician:  Venecia David MD  Primary Care Physician:  Tristin España      Discharge Diagnoses: Active Problems:    Acute CVA (cerebrovascular accident) ([address]    Benign essential HTN    Dyslipidemia    PAF (paroxysmal atrial fibrillation) (HCC)    Thalamic stroke (HCC)    DM (diabetes mellitus), type 2, uncontrolled with complications ([address]    Remote history of stroke    Persistent atrial fibrillation (HCC)    Hyponatremia    Coronary artery disease involving native coronary artery of native heart without angina pectoris  Resolved Problems:    * No resolved hospital problems. *      Hospital Course:   Sacha Rosas is a 65 y.o. female that was admitted and treated at [facility] for the following medical issues[de-identified]  41-year-old female with a history of recent left MCA CVA, A. Fib, CAD, DM, HTN, HLD, and depression who was admitted on  with aphasia and changes in vision. CT head revealed an acute infarct in the right PCA territory. CTA revealed occlusion of the proximal right ICA and occlusion of the distal P4 segment on the right. MRI revealed early subacute infarcts in the right thalamus, right occipital lobe, and right posterior cerebral artery territory. . A1c 8.7. Patient reports that she stopped taking her previous anticoagulation due to cost and was placed on Coumadin but stopped this due to bruising. She was evaluated by therapy and suggested continuing an inpatient setting prior to returning home.   Patient  Is  Stable  To  ARU  Rehab    Awaiting  Pre-cert      Active Problems:    Acute CVA (cerebrovascular accident) ([address]    Benign essential HTN    Dyslipidemia    PAF (paroxysmal atrial fibrillation) (HCC)    Thalamic stroke ([address]    DM (diabetes mellitus), type 2, uncontrolled with complications ([address] Remote history of stroke    Persistent atrial fibrillation (HCC)    Hyponatremia    Coronary artery disease involving native coronary artery of native heart without angina pectoris  Resolved Problems:    * No resolved hospital problems.  *      Patient was seen by the following consultants while admitted to [facility]:   Consults:  IP CONSULT TO HOSPITALIST  IP CONSULT TO NEUROLOGY  IP CONSULT TO SOCIAL WORK  IP CONSULT TO CARDIOLOGY  IP CONSULT TO PHYSICAL MEDICINE REHAB    Significant Diagnostic Studies:    Echo Complete    Result Date: 1/30/2021 Transthoracic Echocardiography Report (TTE)  Demographics   Patient Name      Felicitas Foreman   Date of Study     01/30/2021          Gender              Female   Patient Number    [id number]          Date of Birth       1950   Visit Number      [id number]           Age                 65 year(s)   Accession Number  [id number]          Room Number         8571   Corporate ID      [id number]            Chris Dia RVT   Ordering          Wong Mesa, Chris Harris DO  Physician         MD                  Physician  Procedure Type of Study   TTE procedure:ECHOCARDIOGRAM COMPLETE WITH BUBBLE STUDY. Procedure Date Date: 01/30/2021 Start: 12:05 PM Study Location: Portable Technical Quality: Adequate visualization Indications:CVA. Patient Status: Routine Contrast Medium: Bubble Study. Height: 64 inches Weight: 158 pounds BSA: 1.77 m2 BMI: 27.12 kg/m2 HR: 88 bpm BP: 159/84 mmHg  Conclusions   Summary  Atrial fibrillation is noted. Left ventricular systolic function is normal with ejection fraction  estimated at 50-55 %. Grade II diastolic dysfunction with elevated filling pressure. The left atrium is mildly dilated. A bubble study was performed and fails to show evidence of shunting. Aortic valve appears sclerotic but opens adequately. Mitral valve leaflets appear mildly thickened. Mild calcification of the posterior leaflet of the mitral valve. Mild to moderate mitral regurgitation  Moderate tricuspid regurgitation. Systolic pulmonary artery pressure (SPAP) is elevated and estimated at 44  mmHg (right atrial pressure 3 mmHg). This is suggestive of mild pulmonary hypertension. IVC is normal in size (< 2.1 cm) and collapses > 50% with respiration  consistent with normal RA pressure (3 mmHg).    Signature   ------------------------------------------------------------------  Electronically signed by Adrianne Patel DO (Interpreting  physician) on 01/30/2021 at 04:45 PM  ------------------------------------------------------------------   Findings   Left Ventricle  Left ventricular systolic function is normal with ejection fraction  estimated at 50-55 %. Cannot exclude LV apical thrombus as contrast was not  administered. Normal left ventricular wall thickness. Left ventricle size is normal.  Grade II diastolic dysfunction with elevated filling pressure. Mitral Valve  Mitral valve leaflets appear mildly thickened. Mild calcification of the posterior leaflet of the mitral valve. Mild to moderate mitral regurgitation. Left Atrium  The left atrium is mildly dilated. A bubble study was performed and fails to show evidence of shunting. Aortic Valve  Not well-visualized. Aortic valve appears sclerotic but opens adequately. No evidence of aortic valve regurgitation. Aorta  The aortic root is normal in size. Right Ventricle  The right ventricular size is normal.  S'' prime velocity is measured at 6.5 cm/s. TAPSE 15mm. Tricuspid Valve  Tricuspid valve is structurally normal.  Moderate tricuspid regurgitation. Systolic pulmonary artery pressure (SPAP) is elevated and estimated at 44  mmHg (right atrial pressure 3 mmHg). This is suggestive of mild pulmonary hypertension. Mild 40-49 Mod 50-59  Severe >60. Right Atrium  The right atrium is normal in size. Pulmonic Valve  The pulmonic valve is normal in structure. Mild pulmonic regurgitation present. Pericardial Effusion  No pericardial effusion noted. Pleural Effusion  No pleural effusion noted. Miscellaneous  No obvious masses, thrombi or vegetations are noted. IVC is normal in size (< 2.1 cm) and collapses > 50% with respiration  consistent with normal RA pressure (3 mmHg).   M-Mode/2D Measurements (cm)   LV Diastolic Dimension: 9.26 cm LV Systolic Dimension: 8.83 cm  LV Septum Diastolic: 0.9 cm  LV PW Diastolic: 0.9 cm         AO Root Dimension: 2.9 cm                                  LA Dimension: 4.2 cm                                  LA Area: 19.5 cm2  LVOT: 1.6 cm                    LA volume/Index: 61.47 ml /35 ml/m2  Doppler Measurements   AV Peak Velocity: 159 cm/s     MV Peak E-Wave: 102 cm/s  AV Peak Gradient: 10.11 mmHg   MV Peak A-Wave: 33.9 cm/s  AV Mean Gradient: 6 mmHg       MV E/A Ratio: 3.01  LVOT Peak Velocity: 89.2 cm/s  AV Area (Continuity):1.01 cm2   TR Velocity:320 cm/s  TR Gradient:40.96 mmHg  Estimated RAP:3 mmHg  Estimated RVSP: 44 mmHg  E' Septal Velocity: 5.08 cm/s  E' Lateral Velocity: 6.32 cm/s  E/E' ratio: 19.9   Aortic Valve   Peak Velocity: 159 cm/s     Mean Velocity: 112 cm/s  Peak Gradient: 10.11 mmHg   Mean Gradient: 6 mmHg  Area (continuity): 1.01 cm2  AV VTI: 39.8 cm  Aorta   Aortic Root: 2.9 cm  Ascending Aorta: 2.8 cm  LVOT Diameter: 1.6 cm      Ct Head Wo Contrast    Result Date: 1/30/2021 EXAMINATION: CT OF THE HEAD WITHOUT CONTRAST  1/30/2021 12:16 am TECHNIQUE: CT of the head was performed without the administration of intravenous contrast. Dose modulation, iterative reconstruction, and/or weight based adjustment of the mA/kV was utilized to reduce the radiation dose to as low as reasonably achievable. COMPARISON: CT head 10/23/2020. HISTORY: ORDERING SYSTEM PROVIDED HISTORY: stroke sergey TECHNOLOGIST PROVIDED HISTORY: Has a \"code stroke\" or \"stroke alert\" been called? ->Yes Reason for exam:->stroke sergey Reason for Exam: Loss of Vision (pt brought in by [facility] from [address] [place] [address] where patient was brought there from home for BP check; loss of peripheral vision since 1300 today; hx CVA in October 2020 with + residual expressive asphasia; had a headache on arrival to [facility] but not now) Acuity: Acute Type of Exam: Initial FINDINGS: BRAIN/VENTRICLES: No acute hemorrhage. Periventricular and subcortical hypoattenuation is nonspecific and may be related to microvascular disease. Artifact partially obscures the jenna. Artifact partially obscures the posterior fossa. Encephalomalacia in the left parietal and temporal lobes noted. Loss of gray-white differentiation in the right parietal, occipital, and mesial temporal lobe noted. Encephalomalacia in the left basal ganglia noted. Cerebral volume loss and mild ventriculomegaly noted. There is no midline shift. Basal cisterns appear patent. ORBITS: Visualized orbits appear unremarkable on this unenhanced exam. SINUSES: Visualized paranasal sinuses appear clear. Trace partial opacification of the left mastoid air cells noted. Right mastoid air cells appear clear. SOFT TISSUES/SKULL:   No depressed calvarial fracture identified. 1. Acute infarct in the right posterior cerebral artery territory. No acute hemorrhage or midline shift. 2.  Other findings as described. Critical results were called by Dr. Matthew Mcqueen DO to [address] [place] [address] on 1/30/2021 at 00:36. Xr Chest Portable    Result Date: 1/30/2021  EXAMINATION: ONE XRAY VIEW OF THE CHEST 1/30/2021 12:30 am COMPARISON: Chest x-ray dated 10/20/2020 HISTORY: ORDERING SYSTEM PROVIDED HISTORY: stroke sergey TECHNOLOGIST PROVIDED HISTORY: Reason for exam:->stroke sergey FINDINGS: HEART/MEDIASTINUM: The cardiomediastinal silhouette is stable. Median sternotomy wires are again noted. PLEURA/LUNGS: There are no focal consolidations or pleural effusions. There is no appreciable pneumothorax. BONES/SOFT TISSUE: No acute abnormality. No radiographic evidence of acute pulmonary disease.      Vl Dup Carotid Bilateral    Result Date: 1/30/2021 Carotid Duplex Study  Demographics   Patient Name      Brennan Martell   Date of Study     01/30/2021          Gender             Female   Patient Number    [id number]          Date of Birth      1950   Visit Number      [id number]           Age                65 year(s)   Accession Number  [id number]          Room Number        9412   Corporate ID      [id number]            Sonographer        Celso Diaz,                                                           [address] [place] [address] Road, [facility], Interpreting       [facility]  Physician         MD                  Physician          Socorro Link MD,                                                           [facility]  Procedure Type of Study:   Cerebral:Carotid, VL CAROTID DUPLEX BILATERAL. Vascular Sonographer Report  Additional Indications:Acute CVA Impressions Right Impression The right internal carotid artery demonstrates total occlusion at the origin. The right vertebral artery demonstrates normal antegrade flow. The right subclavian artery is visualized and demonstrates multiphasic flow. Left Impression The left internal carotid artery appears to have a <50% diameter reducing stenosis based on velocity criteria. The left vertebral artery demonstrates normal antegrade flow. The left subclavian artery is visualized and demonstrates multiphasic flow. Conclusions   Summary   -The right internal carotid artery demonstrates total occlusion at the  origin.  -The left internal carotid artery appears to have a <50% diameter reducing  stenosis based on velocity criteria. Recommendations   -Recommend follow-up study in 12 months. Signature   ------------------------------------------------------------------  Electronically signed by Socorro Link MD, [facility] (Interpreting  physician) on 01/30/2021 at 10:49 AM  ------------------------------------------------------------------  Patient Status:Routine.  [address] [place] [address] Vascular Lab. Technical Quality:Adequate visualization. Plaque   - A plaque was found in the Left Prox ICA. The plaque characteristics are: medium echogenicity, moderate severity and heterogeneous texture. Velocities are measured in cm/s ; Diameters are measured in mm Carotid Right Measurements +---------------+----+---+-----+--+ ! Location       ! PSV ! EDV! Angle! RI! +---------------+----+---+-----+--+ ! Prox CCA       !41.7!0  ! 60   !1 ! +---------------+----+---+-----+--+ ! Mid CCA        !57.6!0  ! 60   !1 ! +---------------+----+---+-----+--+ ! Dist CCA       !46. 1!0  !61   !1 ! +---------------+----+---+-----+--+ ! Prox ECA       !347 !   !61   !  ! +---------------+----+---+-----+--+ ! Vertebral      !38.2!   !60   !  ! +---------------+----+---+-----+--+ ! Prox Subclavian! 104 !   !60   !  ! +---------------+----+---+-----+--+ Carotid Left Measurements +---------------+----+----+-----+----+ ! Location       ! PSV ! EDV ! Angle! RI  ! +---------------+----+----+-----+----+ ! Prox CCA       !82  !20. 3! 60   !0.75! +---------------+----+----+-----+----+ ! Mid CCA        ! 75  !20. 3! 60   !0.73! +---------------+----+----+-----+----+ ! Dist CCA       !70.8!16. 8!60   !0.76! +---------------+----+----+-----+----+ ! Prox ICA       !53. 6!14. 7!60   !0.73! +---------------+----+----+-----+----+ ! Mid ICA        !60.4!15. 7!60   !0.74! +---------------+----+----+-----+----+ ! Dist ICA       !54. 5!18. 7! 60   !0.66! +---------------+----+----+-----+----+ ! Prox ECA       !99.5!    !60   !    ! +---------------+----+----+-----+----+ ! Vertebral      !73.2!    !60   !    ! +---------------+----+----+-----+----+ ! Prox Subclavian! 118 !    !60   !    ! +---------------+----+----+-----+----+   - Additional Measurements:ICAPSV/CCAPSV 0.81. ICAEDV/CCAEDV 0.92.     Cta Head Neck W Contrast    Result Date: 1/30/2021 EXAMINATION: CTA OF THE HEAD AND NECK WITH CONTRAST 1/30/2021 12:17 am: TECHNIQUE: CTA of the head and neck was performed with the administration of intravenous contrast. Multiplanar reformatted images are provided for review. MIP images are provided for review. Stenosis of the internal carotid arteries measured using NASCET criteria. Dose modulation, iterative reconstruction, and/or weight based adjustment of the mA/kV was utilized to reduce the radiation dose to as low as reasonably achievable. COMPARISON: None. HISTORY: ORDERING SYSTEM PROVIDED HISTORY: stroke sergey TECHNOLOGIST PROVIDED HISTORY: Reason for exam:->stroke sergey Reason for Exam: Loss of Vision (pt brought in by [facility] from [facility] where patient was brought there from home for BP check; loss of peripheral vision since 1300 today; hx CVA in October 2020 with + residual expressive asphasia; had a headache on arrival to [facility] but not now) Acuity: Acute Type of Exam: Initial FINDINGS: CTA NECK: AORTIC ARCH/ARCH VESSELS: No dissection or arterial injury. No significant stenosis of the brachiocephalic or subclavian arteries. An aberrant right subclavian artery is noted, normal variant. CAROTID ARTERIES: There is abrupt occlusion of the proximal right cervical ICA, about 1 cm distal to the level of the bifurcation. Thick atherosclerotic calcified plaque is noted at the level of the left carotid bulb and extending into the origin of the left cervical ICA without hemodynamically significant stenosis by NASCET criteria. VERTEBRAL ARTERIES: No dissection, arterial injury, or significant stenosis. SOFT TISSUES: The lung apices are clear. No cervical or superior mediastinal lymphadenopathy. The larynx and pharynx are unremarkable. No acute abnormality of the salivary and thyroid glands. BONES: No acute osseous abnormality.  CTA HEAD: ANTERIOR CIRCULATION: There is continued occlusion of the right ICA into the intracranial compartment with reconstitution in the distal clinoid segment, most likely by way of collateral supply from the anterior and posterior communicating arteries. No significant stenosis of the intracranial internal carotid, anterior cerebral, or middle cerebral arteries. No aneurysm. POSTERIOR CIRCULATION: Occlusion of the distal P4 segment of the right PCA. No significant stenosis of the bilateral vertebral, basilar, or left posterior cerebral arteries. No aneurysm. There is persistent fetal origin of the left PCA, a normal variant. OTHER: No dural venous sinus thrombosis on this non-dedicated study. BRAIN: Again noted is a small to moderate size area of hypodensity with loss of gray-white differentiation involving the medial right occipital lobe suggestive of a acute/subacute infarct. Small areas of encephalomalacia are noted in the posterior left frontal and left temporal occipital region indicative of old insults. No mass effect or midline shift. No extra-axial fluid collection. Occlusion of the proximal right cervical ICA, about 1 cm distal to the bifurcation and extending into the intracranial compartment with reconstitution at the level of the distal clinoid, most likely by way of collateral supply from the anterior and posterior communicating arteries. Occlusion of the distal P4 segment of the right PCA. Again noted is a small to moderate size area of hypodensity with loss of gray-white differentiation involving the medial right occipital lobe suggestive of a acute/subacute infarct. Small areas of encephalomalacia are noted in the posterior left frontal and left temporal occipital region indicative of old insults.      Mri Brain Without Contrast    Result Date: 1/30/2021 EXAMINATION: MRI OF THE BRAIN WITHOUT CONTRAST  1/30/2021 10:53 am TECHNIQUE: Multiplanar multisequence MRI of the brain was performed without the administration of intravenous contrast. COMPARISON: 10/20/2020 HISTORY: ORDERING SYSTEM PROVIDED HISTORY: acute CVA TECHNOLOGIST PROVIDED HISTORY: Reason for exam:->acute CVA Reason for Exam: Slurred speech, vision loss since 1/29/2021.history of  A. Fib, stroke October 2020. previous mri 10/20/20. Acuity: Acute Type of Exam: Ongoing FINDINGS: INTRACRANIAL STRUCTURES/VENTRICLES: There are areas of restricted diffusion with associated areas of increased T2 signal involving the right occipital lobe, compatible with the right posterior cerebral artery infarct. There are also areas of involvement involving the posteromedial right thalamus. No other areas of restricted diffusion. The cerebral and cerebellar parenchyma demonstrate volume loss. There are old infarcts noted in the left temporal occipital region with associated areas of cortical laminar necrosis. There is also an old left parietal lobe infarct. There are no abnormal extra-axial fluid collections. The ventricles are proportional to the cerebral sulci. There are scattered areas of increased T2 signal noted supratentorially, compatible with mild chronic microvascular white matter ischemic disease. There are also areas of increased T2 signal noted centrally in the jenna. There is a small chronic lacunar infarct noted in the left cerebellar hemisphere. ORBITS: Lens implants from prior cataract surgery are noted. The orbits are otherwise unremarkable. SINUSES: There is scattered trace paranasal sinus disease. There is a left mastoid effusion. The right mastoid air cells are clear. BONES/SOFT TISSUES: The bone marrow signal intensity and craniocervical junction are normal in appearance. The pituitary gland is unremarkable. 1. Early subacute infarcts involving the right posteromedial thalamus and right occipital lobe, in the right posterior cerebral artery vascular distribution. 2. Cerebral parenchymal volume loss with mild chronic microvascular white matter ischemic disease noted both supra and infratentorially. 3. Chronic infarcts in the left temporal occipital region, and left parietal lobe. Small chronic lacunar infarct involving the left cerebellar hemisphere. Discharge Medications:       Fortino Morse   [place] Medication Instructions RUBÉN:[id number]    Printed on:02/03/21 1126   Medication Information                      albuterol (PROVENTIL) (2.5 MG/3ML) 0.083% nebulizer solution  Take 2.5 mg by nebulization every 6 hours as needed for Wheezing             albuterol sulfate HFA (VENTOLIN HFA) 108 (90 Base) MCG/ACT inhaler  Inhale 2 puffs into the lungs every 6 hours as needed for Wheezing             aspirin (ASPIRIN CHILDRENS) 81 MG chewable tablet  Take 1 tablet by mouth daily             budesonide-formoterol (SYMBICORT) 160-4.5 MCG/ACT AERO  Inhale 2 puffs into the lungs 2 times daily             ezetimibe (ZETIA) 10 MG tablet  Take 10 mg by mouth daily             glimepiride (AMARYL) 4 MG tablet  Take 4 mg by mouth 2 times daily              insulin glargine (LANTUS;BASAGLAR) 100 UNIT/ML injection pen  Inject 10 Units into the skin nightly             magnesium gluconate (MAGONATE) 500 MG tablet  Take 500 mg by mouth daily             metformin (GLUCOPHAGE-XR) 500 MG XR tablet  Take 1,000 mg by mouth 2 times daily              metoprolol (TOPROL-XL) 50 MG XL tablet  Take 50 mg by mouth daily. sertraline (ZOLOFT) 100 MG tablet  Take 100 mg by mouth daily                 Disposition:   Discharged to Home. Any [facility] needs that were indicated and/or required as been addressed and set up by Social Work. Condition at discharge: Pt was medically stable at the time of discharge. Activity: activity as tolerated    Total time taken for discharging this patient: 40 minutes. Greater than 70% of time was spent focused exclusively on this patient. Time was taken to review chart, discuss plans with consultants, reconciling medications, discussing plan answering questions with patient. Signed:  Lainey Martin  2/3/2021, 11:28 AM  ----------------------------------------------------------------------------------------------------------------------    Fahad Snowden,     Please return to ER or call 911 if you develop any significant signs or symptoms.     I may not have addressed all of your medical illnesses or the abnormal blood work or imaging therefore please ask your PCP, Patrick Vázquez ,  to obtain [facility] record to follow up on all of the abnormal labs, imaging and findings that I have and have not addressed during your hospitalization.      Discharging you from the hospital does not mean that your medical care ends here and now. You may still need additional work up, investigation, monitoring, and treatment to be handled from this point on by outside providers including your PCP, Patrick Vázquez , Specialists and other healthcare providers.      Please review your list of discharge medications prior to resuming medications you might still have at home, as the medications you need to be taking, dosages or how often you must take them may have changed. For medication questions, contact your retail pharmacy and your PCP, Patrick Vázquez .     ** I STRONGLY RECOMMEND that you follow up with Patrick Vázquez within 3 to 5 days for a post hospitalization evaluation. This specific office visit is covered by your insurance, and is not the same as your annual doctor visit/ check up. This office visit is important, as it may prevent need for repeat and/or future hospitalizations. **    Your medical team at [facility]) appreciates the opportunity to work with you to get well!     Sincerely, Eddie Waite

## 2021-02-03 NOTE — PROGRESS NOTES
Per Dr. Patricia Vickesr, Insurance authorized patient to go to ARU, possible tomorrow due to not beds today

## 2021-02-03 NOTE — PROGRESS NOTES
Shift assessment done, see flow sheet. Medication administrated per MAR. Fall precautions in place, call light in reach. Patient A/Ox4.

## 2021-02-04 ENCOUNTER — HOSPITAL ENCOUNTER (INPATIENT)
Age: 71
LOS: 8 days | Discharge: HOME HEALTH | DRG: 057 | End: 2021-02-12
Attending: PHYSICAL MEDICINE & REHABILITATION | Admitting: PHYSICAL MEDICINE & REHABILITATION
Payer: MEDICARE

## 2021-02-04 VITALS
TEMPERATURE: 97.6 F | DIASTOLIC BLOOD PRESSURE: 87 MMHG | RESPIRATION RATE: 20 BRPM | SYSTOLIC BLOOD PRESSURE: 159 MMHG | WEIGHT: 156.9 LBS | HEIGHT: 64 IN | BODY MASS INDEX: 26.79 KG/M2 | OXYGEN SATURATION: 98 % | HEART RATE: 71 BPM

## 2021-02-04 PROBLEM — I63.9 ACUTE ISCHEMIC STROKE (HCC): Status: ACTIVE | Noted: 2021-02-04

## 2021-02-04 LAB
GLUCOSE BLD-MCNC: 151 MG/DL (ref 70–99)
GLUCOSE BLD-MCNC: 175 MG/DL (ref 70–99)
GLUCOSE BLD-MCNC: 194 MG/DL (ref 70–99)
GLUCOSE BLD-MCNC: 197 MG/DL (ref 70–99)
GLUCOSE BLD-MCNC: 200 MG/DL (ref 70–99)
PERFORMED ON: ABNORMAL

## 2021-02-04 PROCEDURE — 94761 N-INVAS EAR/PLS OXIMETRY MLT: CPT

## 2021-02-04 PROCEDURE — 97116 GAIT TRAINING THERAPY: CPT

## 2021-02-04 PROCEDURE — 2580000003 HC RX 258: Performed by: HOSPITALIST

## 2021-02-04 PROCEDURE — 92507 TX SP LANG VOICE COMM INDIV: CPT

## 2021-02-04 PROCEDURE — 94640 AIRWAY INHALATION TREATMENT: CPT

## 2021-02-04 PROCEDURE — 92526 ORAL FUNCTION THERAPY: CPT

## 2021-02-04 PROCEDURE — 97129 THER IVNTJ 1ST 15 MIN: CPT

## 2021-02-04 PROCEDURE — 6360000002 HC RX W HCPCS: Performed by: HOSPITALIST

## 2021-02-04 PROCEDURE — 1280000000 HC REHAB R&B

## 2021-02-04 PROCEDURE — 97530 THERAPEUTIC ACTIVITIES: CPT

## 2021-02-04 PROCEDURE — 6370000000 HC RX 637 (ALT 250 FOR IP): Performed by: HOSPITALIST

## 2021-02-04 PROCEDURE — 97535 SELF CARE MNGMENT TRAINING: CPT

## 2021-02-04 RX ORDER — DEXTROSE MONOHYDRATE 50 MG/ML
100 INJECTION, SOLUTION INTRAVENOUS PRN
Status: CANCELLED | OUTPATIENT
Start: 2021-02-04

## 2021-02-04 RX ORDER — INSULIN LISPRO 100 [IU]/ML
0-6 INJECTION, SOLUTION INTRAVENOUS; SUBCUTANEOUS NIGHTLY
Status: DISCONTINUED | OUTPATIENT
Start: 2021-02-05 | End: 2021-02-12 | Stop reason: HOSPADM

## 2021-02-04 RX ORDER — ONDANSETRON 4 MG/1
4 TABLET, ORALLY DISINTEGRATING ORAL EVERY 8 HOURS PRN
Status: CANCELLED | OUTPATIENT
Start: 2021-02-04

## 2021-02-04 RX ORDER — DEXTROSE MONOHYDRATE 25 G/50ML
12.5 INJECTION, SOLUTION INTRAVENOUS PRN
Status: DISCONTINUED | OUTPATIENT
Start: 2021-02-04 | End: 2021-02-12 | Stop reason: HOSPADM

## 2021-02-04 RX ORDER — ASPIRIN 81 MG/1
81 TABLET, CHEWABLE ORAL DAILY
Status: DISCONTINUED | OUTPATIENT
Start: 2021-02-05 | End: 2021-02-12 | Stop reason: HOSPADM

## 2021-02-04 RX ORDER — DEXTROSE MONOHYDRATE 50 MG/ML
100 INJECTION, SOLUTION INTRAVENOUS PRN
Status: DISCONTINUED | OUTPATIENT
Start: 2021-02-04 | End: 2021-02-12 | Stop reason: HOSPADM

## 2021-02-04 RX ORDER — INSULIN LISPRO 100 [IU]/ML
0-12 INJECTION, SOLUTION INTRAVENOUS; SUBCUTANEOUS
Status: DISCONTINUED | OUTPATIENT
Start: 2021-02-05 | End: 2021-02-12 | Stop reason: HOSPADM

## 2021-02-04 RX ORDER — SODIUM CHLORIDE 0.9 % (FLUSH) 0.9 %
10 SYRINGE (ML) INJECTION EVERY 12 HOURS SCHEDULED
Status: DISCONTINUED | OUTPATIENT
Start: 2021-02-05 | End: 2021-02-08

## 2021-02-04 RX ORDER — ACETAMINOPHEN 325 MG/1
650 TABLET ORAL EVERY 4 HOURS PRN
Status: CANCELLED | OUTPATIENT
Start: 2021-02-04

## 2021-02-04 RX ORDER — INSULIN LISPRO 100 [IU]/ML
0-6 INJECTION, SOLUTION INTRAVENOUS; SUBCUTANEOUS NIGHTLY
Status: CANCELLED | OUTPATIENT
Start: 2021-02-04

## 2021-02-04 RX ORDER — POLYETHYLENE GLYCOL 3350 17 G/17G
17 POWDER, FOR SOLUTION ORAL DAILY PRN
Status: CANCELLED | OUTPATIENT
Start: 2021-02-04

## 2021-02-04 RX ORDER — HYDRALAZINE HYDROCHLORIDE 25 MG/1
50 TABLET, FILM COATED ORAL EVERY 8 HOURS PRN
Status: CANCELLED | OUTPATIENT
Start: 2021-02-04

## 2021-02-04 RX ORDER — NICOTINE POLACRILEX 4 MG
15 LOZENGE BUCCAL PRN
Status: DISCONTINUED | OUTPATIENT
Start: 2021-02-04 | End: 2021-02-12 | Stop reason: HOSPADM

## 2021-02-04 RX ORDER — ACETAMINOPHEN 325 MG/1
650 TABLET ORAL EVERY 4 HOURS PRN
Status: DISCONTINUED | OUTPATIENT
Start: 2021-02-04 | End: 2021-02-12 | Stop reason: HOSPADM

## 2021-02-04 RX ORDER — TRAZODONE HYDROCHLORIDE 50 MG/1
50 TABLET ORAL NIGHTLY PRN
Status: DISCONTINUED | OUTPATIENT
Start: 2021-02-04 | End: 2021-02-12 | Stop reason: HOSPADM

## 2021-02-04 RX ORDER — BUDESONIDE AND FORMOTEROL FUMARATE DIHYDRATE 160; 4.5 UG/1; UG/1
2 AEROSOL RESPIRATORY (INHALATION) 2 TIMES DAILY
Status: DISCONTINUED | OUTPATIENT
Start: 2021-02-05 | End: 2021-02-11

## 2021-02-04 RX ORDER — ASPIRIN 81 MG/1
81 TABLET, CHEWABLE ORAL DAILY
Status: CANCELLED | OUTPATIENT
Start: 2021-02-05

## 2021-02-04 RX ORDER — SODIUM CHLORIDE 0.9 % (FLUSH) 0.9 %
10 SYRINGE (ML) INJECTION PRN
Status: DISCONTINUED | OUTPATIENT
Start: 2021-02-04 | End: 2021-02-12 | Stop reason: HOSPADM

## 2021-02-04 RX ORDER — HYDRALAZINE HYDROCHLORIDE 25 MG/1
50 TABLET, FILM COATED ORAL EVERY 8 HOURS PRN
Status: DISCONTINUED | OUTPATIENT
Start: 2021-02-04 | End: 2021-02-12 | Stop reason: HOSPADM

## 2021-02-04 RX ORDER — BUDESONIDE AND FORMOTEROL FUMARATE DIHYDRATE 160; 4.5 UG/1; UG/1
2 AEROSOL RESPIRATORY (INHALATION) 2 TIMES DAILY
Status: CANCELLED | OUTPATIENT
Start: 2021-02-04

## 2021-02-04 RX ORDER — SODIUM CHLORIDE 0.9 % (FLUSH) 0.9 %
10 SYRINGE (ML) INJECTION EVERY 12 HOURS SCHEDULED
Status: CANCELLED | OUTPATIENT
Start: 2021-02-04

## 2021-02-04 RX ORDER — SODIUM CHLORIDE 0.9 % (FLUSH) 0.9 %
10 SYRINGE (ML) INJECTION PRN
Status: CANCELLED | OUTPATIENT
Start: 2021-02-04

## 2021-02-04 RX ORDER — DIPHENHYDRAMINE HCL 25 MG
25 TABLET ORAL EVERY 6 HOURS PRN
Status: DISCONTINUED | OUTPATIENT
Start: 2021-02-04 | End: 2021-02-12 | Stop reason: HOSPADM

## 2021-02-04 RX ORDER — NICOTINE POLACRILEX 4 MG
15 LOZENGE BUCCAL PRN
Status: CANCELLED | OUTPATIENT
Start: 2021-02-04

## 2021-02-04 RX ORDER — METOPROLOL SUCCINATE 50 MG/1
50 TABLET, EXTENDED RELEASE ORAL DAILY
Status: DISCONTINUED | OUTPATIENT
Start: 2021-02-05 | End: 2021-02-12 | Stop reason: HOSPADM

## 2021-02-04 RX ORDER — DIPHENHYDRAMINE HCL 25 MG
25 TABLET ORAL EVERY 6 HOURS PRN
Status: CANCELLED | OUTPATIENT
Start: 2021-02-04

## 2021-02-04 RX ORDER — ONDANSETRON 4 MG/1
4 TABLET, ORALLY DISINTEGRATING ORAL EVERY 8 HOURS PRN
Status: DISCONTINUED | OUTPATIENT
Start: 2021-02-04 | End: 2021-02-12 | Stop reason: HOSPADM

## 2021-02-04 RX ORDER — POLYETHYLENE GLYCOL 3350 17 G/17G
17 POWDER, FOR SOLUTION ORAL DAILY PRN
Status: DISCONTINUED | OUTPATIENT
Start: 2021-02-04 | End: 2021-02-12 | Stop reason: HOSPADM

## 2021-02-04 RX ORDER — INSULIN LISPRO 100 [IU]/ML
0-12 INJECTION, SOLUTION INTRAVENOUS; SUBCUTANEOUS
Status: CANCELLED | OUTPATIENT
Start: 2021-02-04

## 2021-02-04 RX ORDER — METOPROLOL SUCCINATE 50 MG/1
50 TABLET, EXTENDED RELEASE ORAL DAILY
Status: CANCELLED | OUTPATIENT
Start: 2021-02-05

## 2021-02-04 RX ORDER — DEXTROSE MONOHYDRATE 25 G/50ML
12.5 INJECTION, SOLUTION INTRAVENOUS PRN
Status: CANCELLED | OUTPATIENT
Start: 2021-02-04

## 2021-02-04 RX ORDER — TRAZODONE HYDROCHLORIDE 50 MG/1
50 TABLET ORAL NIGHTLY PRN
Status: CANCELLED | OUTPATIENT
Start: 2021-02-04

## 2021-02-04 RX ADMIN — SERTRALINE 100 MG: 50 TABLET, FILM COATED ORAL at 09:07

## 2021-02-04 RX ADMIN — INSULIN LISPRO 1 UNITS: 100 INJECTION, SOLUTION INTRAVENOUS; SUBCUTANEOUS at 20:25

## 2021-02-04 RX ADMIN — INSULIN LISPRO 2 UNITS: 100 INJECTION, SOLUTION INTRAVENOUS; SUBCUTANEOUS at 09:08

## 2021-02-04 RX ADMIN — Medication 2 PUFF: at 19:26

## 2021-02-04 RX ADMIN — INSULIN LISPRO 4 UNITS: 100 INJECTION, SOLUTION INTRAVENOUS; SUBCUTANEOUS at 18:14

## 2021-02-04 RX ADMIN — ASPIRIN 81 MG: 81 TABLET, COATED ORAL at 09:07

## 2021-02-04 RX ADMIN — Medication 2 PUFF: at 08:52

## 2021-02-04 RX ADMIN — Medication 10 ML: at 09:07

## 2021-02-04 RX ADMIN — INSULIN GLARGINE 10 UNITS: 100 INJECTION, SOLUTION SUBCUTANEOUS at 20:25

## 2021-02-04 RX ADMIN — ACETAMINOPHEN 650 MG: 325 TABLET ORAL at 15:22

## 2021-02-04 RX ADMIN — METOPROLOL SUCCINATE 50 MG: 50 TABLET, EXTENDED RELEASE ORAL at 09:07

## 2021-02-04 RX ADMIN — INSULIN LISPRO 2 UNITS: 100 INJECTION, SOLUTION INTRAVENOUS; SUBCUTANEOUS at 12:30

## 2021-02-04 RX ADMIN — ENOXAPARIN SODIUM 40 MG: 40 INJECTION SUBCUTANEOUS at 09:08

## 2021-02-04 ASSESSMENT — PAIN SCALES - GENERAL
PAINLEVEL_OUTOF10: 0
PAINLEVEL_OUTOF10: 7

## 2021-02-04 NOTE — PROGRESS NOTES
Occupational Therapy  Facility/Department: [facility]  Daily Treatment Note  NAME: Alia Tirado  :   MRN: [id number]    Date of Service: 2021    Discharge Recommendations:      Alia Tirado scored a 15/24 on the AM-PAC ADL Inpatient form. Current research shows that an AM-PAC score of 17 or less is typically not associated with a discharge to the patient's home setting. Based on the patient's AM-PAC score and their current ADL deficits, it is recommended that the patient have 5-7 sessions per week of Occupational Therapy at d/c to increase the patient's independence. At this time, this patient demonstrates the endurance, and/or tolerance for 3 hours of therapy each day, with a treatment frequency of 5-7x/wk. Please see assessment section for further patient specific details. If patient discharges prior to next session this note will serve as a discharge summary. Please see below for the latest assessment towards goals. OT Equipment Recommendations  Equipment Needed: No  Other: defer to next level of care    Assessment   Performance deficits / Impairments: Decreased functional mobility ; Decreased balance;Decreased vision/visual deficit; Decreased ADL status; Decreased safe awareness  Assessment: Pt is below functional baseline and would benefit from continued skilled OT treatment to improve outcomes  Treatment Diagnosis: Pt with above functional deficits due to recent acute CVA. Prognosis: Good  Decision Making: Low Complexity  OT Education: OT Role;Plan of Care;ADL Adaptive Strategies; Low Vision Education;IADL Safety;Orientation;Precautions;Transfer Training  Patient Education: Pt verbalized understanding however would benefit from continued reinforcement for carryover  REQUIRES OT FOLLOW UP: Yes  Activity Tolerance  Activity Tolerance: Patient Tolerated treatment well;Patient limited by fatigue  Safety Devices  Safety Devices in place:  Yes Type of devices: All fall risk precautions in place;Call light within reach; Chair alarm in place;Gait belt;Patient at risk for falls; Left in chair;Nurse notified         Patient Diagnosis(es): The encounter diagnosis was Thalamic stroke ([address] has a past medical history of Actinic keratosis, Arthritis, Asthma, Atrial fibrillation ([address] CAD (coronary artery disease), Cerebral artery occlusion with cerebral infarction ([address] Depression, Diabetes mellitus ([address] Disc displacement, lumbar, Diverticulosis of colon, Hard of hearing, Hyperlipidemia, Hypertension, Ischemic heart disease, Past heart attack, Poor vision, Right leg weakness, and Scoliosis. has a past surgical history that includes Coronary artery bypass graft;  section; Appendectomy; ovarian cyst removal; Hand surgery; Cardiac surgery (); skin biopsy; vascular surgery; Tonsillectomy; Lumbar disc surgery (8-); Lumbar spine surgery (10-11-12); and back surgery.     Restrictions  Restrictions/Precautions  Restrictions/Precautions: Fall Risk(High Fall Risk)  Required Braces or Orthoses?: No  Position Activity Restriction Other position/activity restrictions: The patient is a 65y.o.  years old female with history of hypertension A. fib, hyperlipidemia and prior stroke who was admitted to the hospital last night with acute visual disturbance. Symptoms started few hours prior to admission. According to report, she was playing with her dog at home when she slowly lost the vision from the periphery bilaterally and was unable to function. Degree was severe and persistent. No falling or injury. She does have residual aphasia from prior stroke from last year. No fever or chills. No other triggers or other associated symptoms, weakness or numbness or chest pain. No severe headache. Patient came into the ED for evaluation since her symptoms did not improve. Initial work-up revealed mild elevated high blood pressure. She had a CT of the head which showed possible acute right ischemic PCA stroke. CTA showed more proximal right ICA occlusion. She was admitted. Subjective   General  Chart Reviewed: Yes  Patient assessed for rehabilitation services?: Yes  Response to previous treatment: Patient with no complaints from previous session  Family / Caregiver Present: No  Diagnosis: Acute CVA  Subjective  Subjective: Pt in semi fowlers position upon arrival. Pleasant and agreeable to therapy session.  Declined pain at rest, reporting a slight headache, stated aspirin was administered prior to arrival. RN aware      Orientation  Orientation  Overall Orientation Status: Within Functional Limits(increased time d/t expressive aphasia, required cuing)  Objective    ADL  Grooming: Setup;Contact guard assistance;Verbal cueing(in stance at sink to perform oral care/hand hygiene, combing hair-increased time to complete d/t fatigue-standing rest breaks required in between tasks)  UE Bathing: Setup;Supervision  LE Bathing: Setup;Supervision  UE Dressing: Setup;Minimal assistance(gown change, A to clasp bra) LE Dressing: Setup; Moderate assistance; Increased time to complete;Verbal cueing(A to fully doff LB clothing prior to toileting, pt demo'd ability to thread B LEs into depends and pants seated with increased time-CGA to perform LB clothing management over hips once in stance)  Toileting: Setup;Minimal assistance; Increased time to complete        Balance  Sitting Balance: Stand by assistance  Standing Balance: Contact guard assistance  Standing Balance  Time: ~10 minutes  Activity: functional mobility to/from restroom, stance for ADL completion, functional transfers  Comment: w/ use of rw, no LOB-requiring frequent cuing for scanning of environment  Functional Mobility  Functional - Mobility Device: Rolling Walker  Activity: To/from bathroom; Retrieve items;Transport items  Assist Level: Contact guard assistance  Functional Mobility Comments: CGA w/ use of rw to/from restroom and navigating throughout room  Toilet Transfers  Toilet - Technique: Ambulating  Equipment Used: Standard toilet  Toilet Transfer: Minimal assistance  Toilet Transfers Comments: use of R grab bar, cuing for hand placement and sequence  Bed mobility  Supine to Sit: Supervision  Scooting: Supervision  Comment: HOB elevated  Transfers  Sit to stand: Contact guard assistance  Stand to sit: Contact guard assistance     Cognition  Overall Cognitive Status: Exceptions  Arousal/Alertness: Appropriate responses to stimuli  Following Commands: Follows one step commands with increased time; Follows one step commands with repetition  Attention Span: Attends with cues to redirect  Memory: Decreased short term memory  Safety Judgement: Decreased awareness of need for safety  Problem Solving: Decreased awareness of errors;Assistance required to identify errors made;Assistance required to generate solutions;Assistance required to implement solutions;Assistance required to correct errors made  Insights: Decreased awareness of deficits

## 2021-02-04 NOTE — PROGRESS NOTES
Speech Language Pathology  Speech  Language And Dysphagia Treatment Note    Name: Fahad Snowden  : 1950  Medical Diagnosis: Acute CVA (cerebrovascular accident) [facility]) [I63.9]  Treatment Diagnosis: Oropharyngeal dysphagia; Speech Language Deficits; Cognitive Linguistic Deficits  Pain: 0/10 reported by Pt    Patient's response to therapy:  Pt alert and positioned upright in chair. Although eye contact to speaker continues to improve, visual scanning for object naming and for functional task completion remains severely impaired     Dysphagia Treatment:  Current Diet Level: Regular texture diet with thin liquids, meds as tolerated   Tolerance of Current Diet Level: No symptoms of aspiration reported    Assessment of Texture Tolerance:  -Impressions: Pt reports good tolerance of all textures with no overt difficulty or symptoms of aspiration reported. Completion of OME is largely [facility] with improved volitional ability to follow OME with verbal prompts only. Mild R facial and lingual weakness continues to be noted. Adequate mastication, bolus control and A-P propulsion continues to be noted. Reduced pharyngeal sensation is suspected with inconsistent extent of laryngeal excursion noted over successive trials of similar textures. Dysphagia Goals, addressed this date:  1.) Pt will tolerate recommended diet without s/s of aspiration (ongoing 2021)     Diet and Treatment Recommendations:  Continue current regular diet with thin liquids/meds with water as tolerated    Speech Language Treatment:  Impressions: Paraphasic errors continue to be noted with open ended self expression and with structured verbal tasks. Improved topic maintenance noted this date but Pt continues to perseverate on topics resulting in tangential speech and need for redirection to current topic. Severely impaired visual scanning for all visual tasks noted this date.     Speech Language STG, addressed this date: 1.Pt will improve verbal initiation and repetition via graded tasks to 80% (ongoing 2021)    · 2 syllable repetition with tactile pacin%  · 3 syllable repetition with tactile pacin% with increased paraphasic errors noted  2. Pt will improve functional verbal expression via graded tasks to 80% (ongoing 2021)   · Confrontational Object namin% spontaneously; 75% with semantic prompts  · Automatics: Counting 1-5: 4/5; counting 5-1: 50% with multimodality prompting and redirection required  3. Pt will improve auditory processing/comprehension of commands and questions to 80%, via graded tasks (ongoing 2021)   · Complex Y/N questions: 80% with reduced repetition required  · Impaired processing: Misperceptions of similar concepts (ie counting 1-5 vs counting 5-1)  4. Pt will improve orientation and short term recall via graded tasks to 80% (ongoing 2021)   5. Ongoing assessment of cognitive linguistic function and receptive and expressive skills improve (ongoing 2021)   · Orientation: x4 with intermittent prompts required  · STM: Frequent repetition of same concept noted throughout treatment session with cues to remind Pt that it had already been discussed  · Impaired strategies for visual scanning of objects with consistent redirection required   · Mild/moderately reduced awareness and self correction of errors when multitasking. Patient/Family Education:Education given to the Pt and nurse, who verbalized understanding    Assessment: Patient progressing toward goals    Plan: Continue as per plan of care    Discharge Recommendations: Pt will benefit from continued skilled Speech Therapy for Speech and Dysphagia services, prior to returning home. Treatment time  Timed Code Treatment Minutes: 15 min  Total Treatment time:40    If patient discharges prior to next session this note will serve as a discharge summary.       Fredis Cruz [id number]    -

## 2021-02-04 NOTE — PROGRESS NOTES
Assessment completed, see flowsheet for details. Respiratory: Lung sounds are clear bilaterally. GI: Bowel sounds are present in all quadrants. No N/V reported. : Within defined limits. Neuro: Pt A/O x4     NIH: 3    Peripheral vascular: Pulses are palpable in all extremities. Skin: No evidence of new skin breakdown assessed during shift. Pain: Pt reported a 0 on a 0-10 scale. /77   Pulse 75   Temp 97.6 °F (36.4 °C) (Temporal)   Resp 18   Ht 5' 4\" (1.626 m)   Wt 156 lb 1.6 oz (70.8 kg)   SpO2 94%   BMI 26.79 kg/m²      Call light within reach. Will continue to monitor.

## 2021-02-04 NOTE — DISCHARGE SUMMARY
Hospital Medicine Discharge Summary    Rodolfo Olivier  :  [birth year]  MRN:  [id number]    Admit date:  2021  Discharge date:  2021    Admitting Physician:  Luiza Henderson MD  Primary Care Physician:  Vandana Melo      Discharge Diagnoses: Active Problems:    Acute CVA (cerebrovascular accident) ([facility]    Benign essential HTN    Dyslipidemia    PAF (paroxysmal atrial fibrillation) (HCC)    Thalamic stroke (HCC)    DM (diabetes mellitus), type 2, uncontrolled with complications ([facility]    Remote history of stroke    Persistent atrial fibrillation (HCC)    Hyponatremia    Coronary artery disease involving native coronary artery of native heart without angina pectoris  Resolved Problems:    * No resolved hospital problems. *      Hospital Course:   Rodolfo Olivier is a 65 y.o. female that was admitted and treated at [facility] for the following medical issues[de-identified]  58-year-old female with a history of recent left MCA CVA, A. Fib, CAD, DM, HTN, HLD, and depression who was admitted on  with aphasia and changes in vision. CT head revealed an acute infarct in the right PCA territory. CTA revealed occlusion of the proximal right ICA and occlusion of the distal P4 segment on the right. MRI revealed early subacute infarcts in the right thalamus, right occipital lobe, and right posterior cerebral artery territory. . A1c 8.7. Patient reports that she stopped taking her previous anticoagulation due to cost and was placed on Coumadin but stopped this due to bruising. She was evaluated by therapy and suggested continuing an inpatient setting prior to returning home.   Patient  Is  Stable  To  ARU  Rehab     Was discharged to ARU  Patient did not get discharged the day of order was placed due to pending placement and insurance approval    Active Problems:    Acute CVA (cerebrovascular accident) ([facility]    Benign essential HTN    Dyslipidemia PAF (paroxysmal atrial fibrillation) (HCC)    Thalamic stroke (HCC)    DM (diabetes mellitus), type 2, uncontrolled with complications ([facility]    Remote history of stroke    Persistent atrial fibrillation (HCC)    Hyponatremia    Coronary artery disease involving native coronary artery of native heart without angina pectoris  Resolved Problems:    * No resolved hospital problems.  *      Patient was seen by the following consultants while admitted to [facility]:   Consults:  IP CONSULT TO HOSPITALIST  IP CONSULT TO NEUROLOGY  IP CONSULT TO SOCIAL WORK  IP CONSULT TO CARDIOLOGY  IP CONSULT TO PHYSICAL MEDICINE REHAB    Significant Diagnostic Studies:    Echo Complete    Result Date: 1/30/2021 Transthoracic Echocardiography Report (TTE)  Demographics   Patient Name      Ro Chiu   Date of Study     01/30/2021          Gender              Female   Patient Number    [id number]          Date of Birth       1950   Visit Number      [id number]           Age                 65 year(s)   Accession Number  [id number]          Room Number         5075   Corporate ID      [id number]            Bladimir Mcgee RVVALENCIA   Ordering          Carole Aranda, Interpreting        Yoli Roberts DO  Physician         MD                  Physician  Procedure Type of Study   TTE procedure:ECHOCARDIOGRAM COMPLETE WITH BUBBLE STUDY. Procedure Date Date: 01/30/2021 Start: 12:05 PM Study Location: Portable Technical Quality: Adequate visualization Indications:CVA. Patient Status: Routine Contrast Medium: Bubble Study. Height: 64 inches Weight: 158 pounds BSA: 1.77 m2 BMI: 27.12 kg/m2 HR: 88 bpm BP: 159/84 mmHg  Conclusions   Summary  Atrial fibrillation is noted. Left ventricular systolic function is normal with ejection fraction  estimated at 50-55 %. Grade II diastolic dysfunction with elevated filling pressure. The left atrium is mildly dilated. A bubble study was performed and fails to show evidence of shunting. Aortic valve appears sclerotic but opens adequately. Mitral valve leaflets appear mildly thickened. Mild calcification of the posterior leaflet of the mitral valve. Mild to moderate mitral regurgitation  Moderate tricuspid regurgitation. Systolic pulmonary artery pressure (SPAP) is elevated and estimated at 44  mmHg (right atrial pressure 3 mmHg). This is suggestive of mild pulmonary hypertension. IVC is normal in size (< 2.1 cm) and collapses > 50% with respiration  consistent with normal RA pressure (3 mmHg).    Signature   ------------------------------------------------------------------  Electronically signed by Milan Street DO (Interpreting  physician) on 01/30/2021 at 04:45 PM  ------------------------------------------------------------------   Findings   Left Ventricle  Left ventricular systolic function is normal with ejection fraction  estimated at 50-55 %. Cannot exclude LV apical thrombus as contrast was not  administered. Normal left ventricular wall thickness. Left ventricle size is normal.  Grade II diastolic dysfunction with elevated filling pressure. Mitral Valve  Mitral valve leaflets appear mildly thickened. Mild calcification of the posterior leaflet of the mitral valve. Mild to moderate mitral regurgitation. Left Atrium  The left atrium is mildly dilated. A bubble study was performed and fails to show evidence of shunting. Aortic Valve  Not well-visualized. Aortic valve appears sclerotic but opens adequately. No evidence of aortic valve regurgitation. Aorta  The aortic root is normal in size. Right Ventricle  The right ventricular size is normal.  S'' prime velocity is measured at 6.5 cm/s. TAPSE 15mm. Tricuspid Valve  Tricuspid valve is structurally normal.  Moderate tricuspid regurgitation. Systolic pulmonary artery pressure (SPAP) is elevated and estimated at 44  mmHg (right atrial pressure 3 mmHg). This is suggestive of mild pulmonary hypertension. Mild 40-49 Mod 50-59  Severe >60. Right Atrium  The right atrium is normal in size. Pulmonic Valve  The pulmonic valve is normal in structure. Mild pulmonic regurgitation present. Pericardial Effusion  No pericardial effusion noted. Pleural Effusion  No pleural effusion noted. Miscellaneous  No obvious masses, thrombi or vegetations are noted. IVC is normal in size (< 2.1 cm) and collapses > 50% with respiration  consistent with normal RA pressure (3 mmHg).   M-Mode/2D Measurements (cm)   LV Diastolic Dimension: 5.73 cm LV Systolic Dimension: 2.75 cm  LV Septum Diastolic: 0.9 cm  LV PW Diastolic: 0.9 cm         AO Root Dimension: 2.9 cm                                  LA Dimension: 4.2 cm                                  LA Area: 19.5 cm2  LVOT: 1.6 cm                    LA volume/Index: 61.47 ml /35 ml/m2  Doppler Measurements   AV Peak Velocity: 159 cm/s     MV Peak E-Wave: 102 cm/s  AV Peak Gradient: 10.11 mmHg   MV Peak A-Wave: 33.9 cm/s  AV Mean Gradient: 6 mmHg       MV E/A Ratio: 3.01  LVOT Peak Velocity: 89.2 cm/s  AV Area (Continuity):1.01 cm2   TR Velocity:320 cm/s  TR Gradient:40.96 mmHg  Estimated RAP:3 mmHg  Estimated RVSP: 44 mmHg  E' Septal Velocity: 5.08 cm/s  E' Lateral Velocity: 6.32 cm/s  E/E' ratio: 19.9   Aortic Valve   Peak Velocity: 159 cm/s     Mean Velocity: 112 cm/s  Peak Gradient: 10.11 mmHg   Mean Gradient: 6 mmHg  Area (continuity): 1.01 cm2  AV VTI: 39.8 cm  Aorta   Aortic Root: 2.9 cm  Ascending Aorta: 2.8 cm  LVOT Diameter: 1.6 cm      Ct Head Wo Contrast    Result Date: 1/30/2021 EXAMINATION: CT OF THE HEAD WITHOUT CONTRAST  1/30/2021 12:16 am TECHNIQUE: CT of the head was performed without the administration of intravenous contrast. Dose modulation, iterative reconstruction, and/or weight based adjustment of the mA/kV was utilized to reduce the radiation dose to as low as reasonably achievable. COMPARISON: CT head 10/23/2020. HISTORY: ORDERING SYSTEM PROVIDED HISTORY: stroke sergey TECHNOLOGIST PROVIDED HISTORY: Has a \"code stroke\" or \"stroke alert\" been called? ->Yes Reason for exam:->stroke sergey Reason for Exam: Loss of Vision (pt brought in by [facility] from [address] [place] [address] where patient was brought there from home for BP check; loss of peripheral vision since 1300 today; hx CVA in October 2020 with + residual expressive asphasia; had a headache on arrival to [facility] but not now) Acuity: Acute Type of Exam: Initial FINDINGS: BRAIN/VENTRICLES: No acute hemorrhage. Periventricular and subcortical hypoattenuation is nonspecific and may be related to microvascular disease. Artifact partially obscures the jenna. Artifact partially obscures the posterior fossa. Encephalomalacia in the left parietal and temporal lobes noted. Loss of gray-white differentiation in the right parietal, occipital, and mesial temporal lobe noted. Encephalomalacia in the left basal ganglia noted. Cerebral volume loss and mild ventriculomegaly noted. There is no midline shift. Basal cisterns appear patent. ORBITS: Visualized orbits appear unremarkable on this unenhanced exam. SINUSES: Visualized paranasal sinuses appear clear. Trace partial opacification of the left mastoid air cells noted. Right mastoid air cells appear clear. SOFT TISSUES/SKULL:   No depressed calvarial fracture identified. 1. Acute infarct in the right posterior cerebral artery territory. No acute hemorrhage or midline shift. 2.  Other findings as described. Critical results were called by Dr. Tanner Medina DO to [address] [place] [address] on 1/30/2021 at 00:36. Xr Chest Portable    Result Date: 1/30/2021  EXAMINATION: ONE XRAY VIEW OF THE CHEST 1/30/2021 12:30 am COMPARISON: Chest x-ray dated 10/20/2020 HISTORY: ORDERING SYSTEM PROVIDED HISTORY: stroke sergey TECHNOLOGIST PROVIDED HISTORY: Reason for exam:->stroke sergey FINDINGS: HEART/MEDIASTINUM: The cardiomediastinal silhouette is stable. Median sternotomy wires are again noted. PLEURA/LUNGS: There are no focal consolidations or pleural effusions. There is no appreciable pneumothorax. BONES/SOFT TISSUE: No acute abnormality. No radiographic evidence of acute pulmonary disease.      Vl Dup Carotid Bilateral    Result Date: 1/30/2021 Carotid Duplex Study  Demographics   Patient Name      Madison Ray   Date of Study     01/30/2021          Gender             Female   Patient Number    [id number]          Date of Birth      1950   Visit Number      [id number]           Age                65 year(s)   Accession Number  [id number]          Room Number        3092   Corporate ID      [id number]            Sonographer        Streithorst, Gerome Holter,                                                           [address] [place] [address] Road, [facility], Interpreting       [facility] Vascular  Physician         MD                  Physician          Shy Fletcher MD,                                                           [facility]  Procedure Type of Study:   Cerebral:Carotid, VL CAROTID DUPLEX BILATERAL. Vascular Sonographer Report  Additional Indications:Acute CVA Impressions Right Impression The right internal carotid artery demonstrates total occlusion at the origin. The right vertebral artery demonstrates normal antegrade flow. The right subclavian artery is visualized and demonstrates multiphasic flow. Left Impression The left internal carotid artery appears to have a <50% diameter reducing stenosis based on velocity criteria. The left vertebral artery demonstrates normal antegrade flow. The left subclavian artery is visualized and demonstrates multiphasic flow. Conclusions   Summary   -The right internal carotid artery demonstrates total occlusion at the  origin.  -The left internal carotid artery appears to have a <50% diameter reducing  stenosis based on velocity criteria. Recommendations   -Recommend follow-up study in 12 months. Signature   ------------------------------------------------------------------  Electronically signed by Shy Fletcher MD, [facility] (Interpreting  physician) on 01/30/2021 at 10:49 AM  ------------------------------------------------------------------  Patient Status:Routine.  [address] [place] [address] Vascular Lab. Technical Quality:Adequate visualization. Plaque   - A plaque was found in the Left Prox ICA. The plaque characteristics are: medium echogenicity, moderate severity and heterogeneous texture. Velocities are measured in cm/s ; Diameters are measured in mm Carotid Right Measurements +---------------+----+---+-----+--+ ! Location       ! PSV ! EDV! Angle! RI! +---------------+----+---+-----+--+ ! Prox CCA       !41.7!0  ! 60   !1 ! +---------------+----+---+-----+--+ ! Mid CCA        !57.6!0  ! 60   !1 ! +---------------+----+---+-----+--+ ! Dist CCA       !46. 1!0  !61   !1 ! +---------------+----+---+-----+--+ ! Prox ECA       !846 !   !61   !  ! +---------------+----+---+-----+--+ ! Vertebral      !38.2!   !60   !  ! +---------------+----+---+-----+--+ ! Prox Subclavian! 104 !   !60   !  ! +---------------+----+---+-----+--+ Carotid Left Measurements +---------------+----+----+-----+----+ ! Location       ! PSV ! EDV ! Angle! RI  ! +---------------+----+----+-----+----+ ! Prox CCA       !82  !20. 3! 60   !0.75! +---------------+----+----+-----+----+ ! Mid CCA        ! 75  !20. 3! 60   !0.73! +---------------+----+----+-----+----+ ! Dist CCA       !70.8!16. 8!60   !0.76! +---------------+----+----+-----+----+ ! Prox ICA       !53. 6!14. 7!60   !0.73! +---------------+----+----+-----+----+ ! Mid ICA        !60.4!15. 7!60   !0.74! +---------------+----+----+-----+----+ ! Dist ICA       !54. 5!18. 7! 60   !0.66! +---------------+----+----+-----+----+ ! Prox ECA       !99.5!    !60   !    ! +---------------+----+----+-----+----+ ! Vertebral      !73.2!    !60   !    ! +---------------+----+----+-----+----+ ! Prox Subclavian! 118 !    !60   !    ! +---------------+----+----+-----+----+   - Additional Measurements:ICAPSV/CCAPSV 0.81. ICAEDV/CCAEDV 0.92.     Cta Head Neck W Contrast    Result Date: 1/30/2021 EXAMINATION: CTA OF THE HEAD AND NECK WITH CONTRAST 1/30/2021 12:17 am: TECHNIQUE: CTA of the head and neck was performed with the administration of intravenous contrast. Multiplanar reformatted images are provided for review. MIP images are provided for review. Stenosis of the internal carotid arteries measured using NASCET criteria. Dose modulation, iterative reconstruction, and/or weight based adjustment of the mA/kV was utilized to reduce the radiation dose to as low as reasonably achievable. COMPARISON: None. HISTORY: ORDERING SYSTEM PROVIDED HISTORY: stroke sergey TECHNOLOGIST PROVIDED HISTORY: Reason for exam:->stroke sergey Reason for Exam: Loss of Vision (pt brought in by [facility] from [facility] where patient was brought there from home for BP check; loss of peripheral vision since 1300 today; hx CVA in October 2020 with + residual expressive asphasia; had a headache on arrival to [facility] but not now) Acuity: Acute Type of Exam: Initial FINDINGS: CTA NECK: AORTIC ARCH/ARCH VESSELS: No dissection or arterial injury. No significant stenosis of the brachiocephalic or subclavian arteries. An aberrant right subclavian artery is noted, normal variant. CAROTID ARTERIES: There is abrupt occlusion of the proximal right cervical ICA, about 1 cm distal to the level of the bifurcation. Thick atherosclerotic calcified plaque is noted at the level of the left carotid bulb and extending into the origin of the left cervical ICA without hemodynamically significant stenosis by NASCET criteria. VERTEBRAL ARTERIES: No dissection, arterial injury, or significant stenosis. SOFT TISSUES: The lung apices are clear. No cervical or superior mediastinal lymphadenopathy. The larynx and pharynx are unremarkable. No acute abnormality of the salivary and thyroid glands. BONES: No acute osseous abnormality.  CTA HEAD: ANTERIOR CIRCULATION: There is continued occlusion of the right ICA into the intracranial compartment with reconstitution in the distal clinoid segment, most likely by way of collateral supply from the anterior and posterior communicating arteries. No significant stenosis of the intracranial internal carotid, anterior cerebral, or middle cerebral arteries. No aneurysm. POSTERIOR CIRCULATION: Occlusion of the distal P4 segment of the right PCA. No significant stenosis of the bilateral vertebral, basilar, or left posterior cerebral arteries. No aneurysm. There is persistent fetal origin of the left PCA, a normal variant. OTHER: No dural venous sinus thrombosis on this non-dedicated study. BRAIN: Again noted is a small to moderate size area of hypodensity with loss of gray-white differentiation involving the medial right occipital lobe suggestive of a acute/subacute infarct. Small areas of encephalomalacia are noted in the posterior left frontal and left temporal occipital region indicative of old insults. No mass effect or midline shift. No extra-axial fluid collection. Occlusion of the proximal right cervical ICA, about 1 cm distal to the bifurcation and extending into the intracranial compartment with reconstitution at the level of the distal clinoid, most likely by way of collateral supply from the anterior and posterior communicating arteries. Occlusion of the distal P4 segment of the right PCA. Again noted is a small to moderate size area of hypodensity with loss of gray-white differentiation involving the medial right occipital lobe suggestive of a acute/subacute infarct. Small areas of encephalomalacia are noted in the posterior left frontal and left temporal occipital region indicative of old insults.      Mri Brain Without Contrast    Result Date: 1/30/2021 EXAMINATION: MRI OF THE BRAIN WITHOUT CONTRAST  1/30/2021 10:53 am TECHNIQUE: Multiplanar multisequence MRI of the brain was performed without the administration of intravenous contrast. COMPARISON: 10/20/2020 HISTORY: ORDERING SYSTEM PROVIDED HISTORY: acute CVA TECHNOLOGIST PROVIDED HISTORY: Reason for exam:->acute CVA Reason for Exam: Slurred speech, vision loss since 1/29/2021.history of  A. Fib, stroke October 2020. previous mri 10/20/20. Acuity: Acute Type of Exam: Ongoing FINDINGS: INTRACRANIAL STRUCTURES/VENTRICLES: There are areas of restricted diffusion with associated areas of increased T2 signal involving the right occipital lobe, compatible with the right posterior cerebral artery infarct. There are also areas of involvement involving the posteromedial right thalamus. No other areas of restricted diffusion. The cerebral and cerebellar parenchyma demonstrate volume loss. There are old infarcts noted in the left temporal occipital region with associated areas of cortical laminar necrosis. There is also an old left parietal lobe infarct. There are no abnormal extra-axial fluid collections. The ventricles are proportional to the cerebral sulci. There are scattered areas of increased T2 signal noted supratentorially, compatible with mild chronic microvascular white matter ischemic disease. There are also areas of increased T2 signal noted centrally in the jenna. There is a small chronic lacunar infarct noted in the left cerebellar hemisphere. ORBITS: Lens implants from prior cataract surgery are noted. The orbits are otherwise unremarkable. SINUSES: There is scattered trace paranasal sinus disease. There is a left mastoid effusion. The right mastoid air cells are clear. BONES/SOFT TISSUES: The bone marrow signal intensity and craniocervical junction are normal in appearance. The pituitary gland is unremarkable. 1. Early subacute infarcts involving the right posteromedial thalamus and right occipital lobe, in the right posterior cerebral artery vascular distribution. 2. Cerebral parenchymal volume loss with mild chronic microvascular white matter ischemic disease noted both supra and infratentorially. 3. Chronic infarcts in the left temporal occipital region, and left parietal lobe. Small chronic lacunar infarct involving the left cerebellar hemisphere. Discharge Medications:       [place] [facility]   [place] Medication Instructions RUBÉN:[id number]    Printed on:02/04/21 1100   Medication Information                      albuterol (PROVENTIL) (2.5 MG/3ML) 0.083% nebulizer solution  Take 2.5 mg by nebulization every 6 hours as needed for Wheezing             albuterol sulfate HFA (VENTOLIN HFA) 108 (90 Base) MCG/ACT inhaler  Inhale 2 puffs into the lungs every 6 hours as needed for Wheezing             aspirin (ASPIRIN CHILDRENS) 81 MG chewable tablet  Take 1 tablet by mouth daily             budesonide-formoterol (SYMBICORT) 160-4.5 MCG/ACT AERO  Inhale 2 puffs into the lungs 2 times daily             ezetimibe (ZETIA) 10 MG tablet  Take 10 mg by mouth daily             glimepiride (AMARYL) 4 MG tablet  Take 4 mg by mouth 2 times daily              insulin glargine (LANTUS;BASAGLAR) 100 UNIT/ML injection pen  Inject 10 Units into the skin nightly             magnesium gluconate (MAGONATE) 500 MG tablet  Take 500 mg by mouth daily             metformin (GLUCOPHAGE-XR) 500 MG XR tablet  Take 1,000 mg by mouth 2 times daily              metoprolol (TOPROL-XL) 50 MG XL tablet  Take 50 mg by mouth daily. sertraline (ZOLOFT) 100 MG tablet  Take 100 mg by mouth daily                 Disposition:   Discharged to Home. Any [facility] needs that were indicated and/or required as been addressed and set up by Social Work. Condition at discharge: Pt was medically stable at the time of discharge. Activity: activity as tolerated    Total time taken for discharging this patient: 40 minutes. Greater than 70% of time was spent focused exclusively on this patient. Time was taken to review chart, discuss plans with consultants, reconciling medications, discussing plan answering questions with patient.      Signed:  Sonal Saleh  2/4/2021, 11:00 AM  ----------------------------------------------------------------------------------------------------------------------  Sincerely,  Sonal Saleh

## 2021-02-04 NOTE — PROGRESS NOTES
Other position/activity restrictions: The patient is a 65y.o.  years old female with history of hypertension A. fib, hyperlipidemia and prior stroke who was admitted to the hospital last night with acute visual disturbance. Symptoms started few hours prior to admission. According to report, she was playing with her dog at home when she slowly lost the vision from the periphery bilaterally and was unable to function. Degree was severe and persistent. No falling or injury. She does have residual aphasia from prior stroke from last year. No fever or chills. No other triggers or other associated symptoms, weakness or numbness or chest pain. No severe headache. Patient came into the ED for evaluation since her symptoms did not improve. Initial work-up revealed mild elevated high blood pressure. She had a CT of the head which showed possible acute right ischemic PCA stroke. CTA showed more proximal right ICA occlusion. She was admitted. Objective     Sit to Stand: Stand by assistance  Stand to sit: Stand by assistance, Contact guard assistance  Device: Rolling Walker  Assistance: Contact guard assistance  Distance: 30' + 60 + 80'  OT  PT Equipment Recommendations  Equipment Needed: No  Toilet - Technique: Ambulating  Equipment Used: Standard toilet  Toilet Transfers Comments: use of R grab bar, cuing for hand placement and sequence  Assessment        SLP                Body mass index is 26.93 kg/m².     Assessment:  Patient Active Problem List   Diagnosis    Lumbar radiculopathy    Acute CVA (cerebrovascular accident) ([address]    Received intravenous tissue plasminogen activator (tPA) in emergency department    Benign essential HTN    Nontraumatic cortical hemorrhage of left cerebral hemisphere ([address]    Dyslipidemia    PAF (paroxysmal atrial fibrillation) (HCC)    Thalamic stroke ([address]    DM (diabetes mellitus), type 2, uncontrolled with complications ([address]    Remote history of stroke  Persistent atrial fibrillation (HCC)    Hyponatremia    Coronary artery disease involving native coronary artery of native heart without angina pectoris       Plan:   Acute right ischemic stroke: ASA, coumadin initiation in 2 weeks from event. Lovenox PPx currently. PT/OT/SLP  H/O CVA: L MCA, hemorrhagic transformation following tPA admin  HTN  HLD  Afib  DM     Dispo: Patient is an appropriate ARU candidate. Able to tolerate the required 3 hours of therapy in ARU setting. Previously living alone at home independently prior to admit. Precert approved. Able to admit today. Orders placed for admission. Yaya Flores MD 2/4/2021, 11:32 AM    * This document was created using dictation software. While all precautions were taken to ensure accuracy, errors may have occurred. Please disregard any typographical errors.

## 2021-02-04 NOTE — PROGRESS NOTES
Physical Therapy  Facility/Department: [facility] ICU  Daily Treatment Note  NAME: Sylvester Gonzalez  :   MRN: [id number]    Date of Service: 2021    Discharge Recommendations:    Sylvester Gonzalez scored a 17/24 on the AM-PAC short mobility form. Current research shows that an AM-PAC score of 17 or less is typically not associated with a discharge to the patient's home setting. Based on the patient's AM-PAC score and their current functional mobility deficits, it is recommended that the patient have 5-7 sessions per week of Physical Therapy at d/c to increase the patient's independence. At this time, this patient demonstrates the endurance, and/or tolerance for 3 hours of therapy each day, with a treatment frequency of 5-7x/wk. Please see assessment section for further patient specific details. If patient discharges prior to next session this note will serve as a discharge summary. Please see below for the latest assessment towards goals. PT Equipment Recommendations  Equipment Needed: No    Assessment   Body structures, Functions, Activity limitations: Decreased functional mobility ; Decreased strength;Decreased endurance;Decreased safe awareness;Decreased balance Assessment: Pt presents with impaired functional strength and endurance and decreased standing balance without use of AD. Pt  Min A for ambulation without RW and displays difficulty scanning at times as pt was unable to avoid objects demonstrating decreased safety awareness. Ambulation with AD required CGA, however pt continued to have difficulty avoiding objects in the room. Pt performed transfers CGA/Min A with cueing required for B hand placement for safety. Despite mild balance improvement, the patient requires continued therapy prior to discharge home. Pt with PLOF of independence without AD and living alone at home with dogs without option to have 24/7 supervision/assist at home. Pt would benefit from continued skilled PT services to address deficits and facilitate return to PLOF. Treatment Diagnosis: decreased functional mobility, impaired gait  Prognosis: Good  Decision Making: Medium Complexity  Clinical Presentation: evolving  PT Education: Goals;PT Role;Plan of Care;Transfer Training;Family Education;Orientation;General Safety;Gait Training;Functional Mobility Training; Low Vision Education  Patient Education: d/c recommendations - verbalized understanding  Barriers to Learning: visual, language  REQUIRES PT FOLLOW UP: Yes  Activity Tolerance  Activity Tolerance: Other;Treatment limited secondary to medical complications (free text)  Activity Tolerance: limited due to decreased or blurred vision; increased time required for all tasks due to enviromental distractions and decreased safety awareness     Patient Diagnosis(es): The encounter diagnosis was Thalamic stroke ([facility] Response To Previous Treatment: Patient with no complaints from previous session. Subjective  Subjective: Pt reporting seeing triple. Denies pain. Pt agreeable to PT session. Word finding difficulty noted throughout tx. Request use of restroom and to have breakfast as she states \"being shakey. \"  General Comment  Comments: Sitting in recliner upon arrival          Orientation  Orientation  Overall Orientation Status: Within Functional Limits  Cognition      Objective      Transfers  Sit to Stand: Stand by assistance  Stand to sit: Stand by assistance;Contact guard assistance  Ambulation  Ambulation?: Yes  Ambulation 1  Surface: level tile  Device: Rolling Walker  Assistance: Contact guard assistance  Quality of Gait: decreased jason, slight sway noted, visual scanning of room and hallway noted  Gait Deviations: Decreased step height;Decreased step length  Distance: 30' + 60 + 90'  Comments: Pt required increased time to perform. continued L side neglect and running into obstacles on L. Stairs/Curb  Stairs?: No     Balance  Posture: Fair  Sitting - Static: Good  Sitting - Dynamic: Good  Standing - Static: Fair(with RW)  Standing - Dynamic: Fair(with RW)  Comments: no LOB  Exercises  Gluteal Sets: x10 B  Hip Flexion: x10 B  Knee Long Arc Quad: x10 B  Ankle Pumps: x10 B         Comment: Increased time required due to expressive aphasia, increased cueing for safety and to stay on task due to enviromental distaction. AM-PAC Score  AM-PAC Inpatient Mobility Raw Score : 17 (02/04/21 1120)  AM-PAC Inpatient T-Scale Score : 42.13 (02/04/21 1120)  Mobility Inpatient CMS 0-100% Score: 50.57 (02/04/21 1120)  Mobility Inpatient CMS G-Code Modifier : CK (02/04/21 1120)          Goals  Short term goals  Time Frame for Short term goals:  To be met prior to discharge  Short term goal 1: Bed mobility with supervision  Short term goal 2: Sit to/from stand with supervision Short term goal 3: Ambulate 150' with/without RW and supervision  Patient Goals   Patient goals : to go home  NO GOALS MET THIS [address]  Times per week: 5-7  Times per day: Daily  Current Treatment Recommendations: Strengthening, Functional Mobility Training, Transfer Training, Balance Training, Endurance Training, Gait Training, Safety Education & Training, Patient/Caregiver Education & Training, Neuromuscular Re-education, Home Exercise Program, Positioning, Modalities, Equipment Evaluation, Education, & procurement  Safety Devices  Type of devices: All fall risk precautions in place, Call light within reach, Nurse notified, Patient at risk for falls, Gait belt, Left in chair, Chair alarm in place  Restraints  Initially in place: No     Therapy Time   Individual Concurrent Group Co-treatment   Time In 1052         Time Out 1115         Minutes 23         Timed Code Treatment Minutes: Brandi Diadema 1903, [address]      I agree with the note above. Goals addressed by PT this session.    [address] [place], [place] [id number]

## 2021-02-05 LAB
ANION GAP SERPL CALCULATED.3IONS-SCNC: 8 MMOL/L (ref 3–16)
BUN BLDV-MCNC: 8 MG/DL (ref 7–20)
CALCIUM SERPL-MCNC: 10.8 MG/DL (ref 8.3–10.6)
CHLORIDE BLD-SCNC: 102 MMOL/L (ref 99–110)
CO2: 29 MMOL/L (ref 21–32)
CREAT SERPL-MCNC: 0.7 MG/DL (ref 0.6–1.2)
GFR AFRICAN AMERICAN: >60
GFR NON-AFRICAN AMERICAN: >60
GLUCOSE BLD-MCNC: 148 MG/DL (ref 70–99)
GLUCOSE BLD-MCNC: 150 MG/DL (ref 70–99)
GLUCOSE BLD-MCNC: 171 MG/DL (ref 70–99)
GLUCOSE BLD-MCNC: 240 MG/DL (ref 70–99)
GLUCOSE BLD-MCNC: 316 MG/DL (ref 70–99)
HCT VFR BLD CALC: 38.6 % (ref 36–48)
HEMOGLOBIN: 12.9 G/DL (ref 12–16)
MCH RBC QN AUTO: 30.8 PG (ref 26–34)
MCHC RBC AUTO-ENTMCNC: 33.4 G/DL (ref 31–36)
MCV RBC AUTO: 92.3 FL (ref 80–100)
PDW BLD-RTO: 13.8 % (ref 12.4–15.4)
PERFORMED ON: ABNORMAL
PLATELET # BLD: 283 K/UL (ref 135–450)
PMV BLD AUTO: 8.3 FL (ref 5–10.5)
POTASSIUM SERPL-SCNC: 4.8 MMOL/L (ref 3.5–5.1)
RBC # BLD: 4.18 M/UL (ref 4–5.2)
SODIUM BLD-SCNC: 139 MMOL/L (ref 136–145)
WBC # BLD: 5.6 K/UL (ref 4–11)

## 2021-02-05 PROCEDURE — 6370000000 HC RX 637 (ALT 250 FOR IP): Performed by: PHYSICAL MEDICINE & REHABILITATION

## 2021-02-05 PROCEDURE — 97165 OT EVAL LOW COMPLEX 30 MIN: CPT

## 2021-02-05 PROCEDURE — 94761 N-INVAS EAR/PLS OXIMETRY MLT: CPT

## 2021-02-05 PROCEDURE — 97116 GAIT TRAINING THERAPY: CPT

## 2021-02-05 PROCEDURE — 97530 THERAPEUTIC ACTIVITIES: CPT

## 2021-02-05 PROCEDURE — 94640 AIRWAY INHALATION TREATMENT: CPT

## 2021-02-05 PROCEDURE — 80048 BASIC METABOLIC PNL TOTAL CA: CPT

## 2021-02-05 PROCEDURE — 6360000002 HC RX W HCPCS: Performed by: PHYSICAL MEDICINE & REHABILITATION

## 2021-02-05 PROCEDURE — 92610 EVALUATE SWALLOWING FUNCTION: CPT

## 2021-02-05 PROCEDURE — 97162 PT EVAL MOD COMPLEX 30 MIN: CPT

## 2021-02-05 PROCEDURE — 97535 SELF CARE MNGMENT TRAINING: CPT

## 2021-02-05 PROCEDURE — 92523 SPEECH SOUND LANG COMPREHEN: CPT

## 2021-02-05 PROCEDURE — 85027 COMPLETE CBC AUTOMATED: CPT

## 2021-02-05 PROCEDURE — 36415 COLL VENOUS BLD VENIPUNCTURE: CPT

## 2021-02-05 PROCEDURE — 1280000000 HC REHAB R&B

## 2021-02-05 RX ORDER — EZETIMIBE 10 MG/1
10 TABLET ORAL NIGHTLY
Status: DISCONTINUED | OUTPATIENT
Start: 2021-02-05 | End: 2021-02-12 | Stop reason: HOSPADM

## 2021-02-05 RX ADMIN — Medication 2 PUFF: at 11:05

## 2021-02-05 RX ADMIN — METOPROLOL SUCCINATE 50 MG: 50 TABLET, EXTENDED RELEASE ORAL at 08:30

## 2021-02-05 RX ADMIN — INSULIN LISPRO 2 UNITS: 100 INJECTION, SOLUTION INTRAVENOUS; SUBCUTANEOUS at 21:00

## 2021-02-05 RX ADMIN — ASPIRIN 81 MG: 81 TABLET, CHEWABLE ORAL at 08:30

## 2021-02-05 RX ADMIN — INSULIN LISPRO 8 UNITS: 100 INJECTION, SOLUTION INTRAVENOUS; SUBCUTANEOUS at 12:46

## 2021-02-05 RX ADMIN — Medication 2 PUFF: at 21:20

## 2021-02-05 RX ADMIN — INSULIN LISPRO 2 UNITS: 100 INJECTION, SOLUTION INTRAVENOUS; SUBCUTANEOUS at 08:30

## 2021-02-05 RX ADMIN — SERTRALINE 100 MG: 50 TABLET, FILM COATED ORAL at 08:30

## 2021-02-05 RX ADMIN — INSULIN LISPRO 2 UNITS: 100 INJECTION, SOLUTION INTRAVENOUS; SUBCUTANEOUS at 17:19

## 2021-02-05 RX ADMIN — ENOXAPARIN SODIUM 40 MG: 40 INJECTION SUBCUTANEOUS at 08:30

## 2021-02-05 RX ADMIN — INSULIN GLARGINE 5 UNITS: 100 INJECTION, SOLUTION SUBCUTANEOUS at 20:59

## 2021-02-05 ASSESSMENT — PAIN SCALES - GENERAL: PAINLEVEL_OUTOF10: 0

## 2021-02-05 NOTE — PROGRESS NOTES
Physical Therapy    Facility/Department: [facility] ACUTE REHAB UNIT  Initial Assessment    NAME: Alia Tirado  : 1950  MRN: [id number]    Date of Service: 2021    Discharge Recommendations:  24 hour supervision or assist, Home with Home health PT, S Level 3   PT Equipment Recommendations  Equipment Needed: No  Other: Pt owns RW and rollator    Assessment   Body structures, Functions, Activity limitations: Decreased functional mobility ; Decreased strength;Decreased endurance;Decreased safe awareness;Decreased balance  Assessment: Pt presents with impaired functional mobility related to decreased vision and balance deficits. Pt will benefit from skilled PT services to address the above stated deficits and promote safety in the home environment. Treatment Diagnosis: decreased functional mobility, impaired gait  Prognosis: Good  Decision Making: Medium Complexity  PT Education: Goals;PT Role;Plan of Care;Transfer Training;Family Education;Orientation;General Safety;Gait Training;Functional Mobility Training; Low Vision [employer] Specific Education  Patient Education: d/c recommendations - verbalized understanding, but will require reinforcement  Barriers to Learning: visual, aphasia  REQUIRES PT FOLLOW UP: Yes  Activity Tolerance  Activity Tolerance: Patient Tolerated treatment well       Patient Diagnosis(es): CVA. has a past medical history of Actinic keratosis, Arthritis, Asthma, Atrial fibrillation ([address] CAD (coronary artery disease), Cerebral artery occlusion with cerebral infarction ([address] Depression, Diabetes mellitus ([address] Disc displacement, lumbar, Diverticulosis of colon, Hard of hearing, Hyperlipidemia, Hypertension, Ischemic heart disease, Past heart attack, Poor vision, Right leg weakness, and Scoliosis. has a past surgical history that includes Coronary artery bypass graft;  section;  Appendectomy; ovarian cyst removal; Hand surgery; Cardiac surgery (); skin biopsy; vascular surgery; Tonsillectomy; Lumbar disc surgery (8-); Lumbar spine surgery (10-11-12); and back surgery. Restrictions  Restrictions/Precautions  Restrictions/Precautions: Fall Risk  Required Braces or Orthoses?: No  Position Activity Restriction  Other position/activity restrictions: The patient is a 65y.o.  years old female with history of hypertension A. fib, hyperlipidemia and prior stroke who was admitted to the hospital last night with acute visual disturbance. Symptoms started few hours prior to admission. According to report, she was playing with her dog at home when she slowly lost the vision from the periphery bilaterally and was unable to function. Degree was severe and persistent. No falling or injury. She does have residual aphasia from prior stroke from last year. No fever or chills. No other triggers or other associated symptoms, weakness or numbness or chest pain. No severe headache. Patient came into the ED for evaluation since her symptoms did not improve. Initial work-up revealed mild elevated high blood pressure. She had a CT of the head which showed possible acute right ischemic PCA stroke. CTA showed more proximal right ICA occlusion. She was admitted. Vision/Hearing  Vision: Impaired  Vision Exceptions: Visual field cut(L side deficits)  Hearing: Within functional limits     Subjective  General  Chart Reviewed: Yes  Patient assessed for rehabilitation services?: Yes  Additional Pertinent Hx: CVA 2020, DM, HTN  Response To Previous Treatment: Not applicable  Family / Caregiver Present: No  Referring Practitioner: Dr Kenny Wagner  Referral Date : 02/04/21  Diagnosis: CVA  Follows Commands: Within Functional Limits  General Comment  Comments: Pt sitting in recliner on arrival.  States her vision is the biggest problem.   Pain Screening  Patient Currently in Pain: Denies  Vital Signs  Patient Currently in Pain: Denies       Orientation  Orientation  Overall Orientation Status: Impaired  Orientation Level: (limited by aphasia)  Social/Functional History  Social/Functional History  Lives With: Alone  Type of Home: House  Home Layout: One level  Home Access: Stairs to enter without rails  Entrance Stairs - Number of Steps: 1 KAYLYNN  Bathroom Shower/Tub: Tub/Shower unit, Shower chair with back  Bathroom Toilet: Standard  Bathroom Equipment: Grab bars in shower, Grab bars around toilet, Hand-held shower  Home Equipment: 4 wheeled walker, Rolling walker, Cane, [employer] Help From: Family(Daughter (Светлана Shields))  ADL Assistance: Independent  Homemaking Assistance: Independent  Homemaking Responsibilities: Yes  Ambulation Assistance: Independent(did not use RW at baseline)  Transfer Assistance: Independent  Active : Yes  Mode of Transportation: Car  Education: Senior in [employer]  Occupation: Retired  Type of occupation:  at [address]: relax on the couch  Additional Comments: denies recent falls  Cognition        Objective     Observation/Palpation  Posture: Good    AROM RLE (degrees)  RLE AROM: WFL  AROM LLE (degrees)  LLE AROM : WFL  Strength RLE  Strength RLE: WFL  Comment: mild decreased ankle dorsiflexion  Strength LLE  Strength LLE: WFL  Tone RLE  RLE Tone: Normotonic  Tone LLE  LLE Tone: Normotonic  Motor Control  Gross Motor?: WFL  Coordination  Rapid Alternating Movements: Dysdiadokinesia  Finger to Nose: Dysmetric  Heel to Shin: Abnormal  Sensation  Overall Sensation Status: Impaired(decreased light touch BLE)  Bed mobility  Bridging: Stand by assistance  Rolling to Left: Stand by assistance  Rolling to Right: Stand by assistance  Supine to Sit: Stand by assistance  Sit to Supine: Stand by assistance  Scooting: Stand by assistance  Transfers  Sit to Stand: Contact guard assistance  Stand to sit: Contact guard assistance  Bed to Chair: Contact guard assistance  Stand Pivot Transfers: Contact guard assistance  Car Transfer: Contact guard assistance  Comment: intermittent HHA due to visual deficits  Ambulation  Ambulation?: Yes  More Ambulation?: Yes  Ambulation 1  Surface: level tile  Device: Rolling Walker  Assistance: Contact guard assistance  Quality of Gait: decreased jason, slight sway noted, visual scanning of room and hallway noted  Ambulation 2  Surface - 2: level tile  Device 2: No device  Assistance 2: Contact guard assistance  Quality of Gait 2: wider SARWAT, medial/lateral sway, required HHA for visual compensation  Distance: 20'  Ambulation 3  Surface - 3: uneven  Device 3: No device  Assistance 3: Minimal assistance  Quality of Gait 3: same as trial 2  Distance: 10'  Stairs/Curb  Stairs?: Yes  Stairs  # Steps : 4  Stairs Height: 6\"  Rails: Bilateral  Assistance: Contact guard assistance  Comment: Pt utilizing sensation in BLE when descending to find edge of stair. Performed curb step negotiation of 6\" curb without AD with Shay. Balance  Posture: Fair  Sitting - Static: Good  Sitting - Dynamic: Good  Standing - Static: Fair;-  Standing - Dynamic: Fair;-  Comments: picked up object from floor with modA for balance        Plan   Plan  Times per week: 60 minutes a day 5 days a week  Current Treatment Recommendations: Strengthening, Functional Mobility Training, Transfer Training, Balance Training, Endurance Training, Gait Training, Safety Education & Training, Patient/Caregiver Education & Training, Neuromuscular Re-education, Home Exercise Program, Positioning, Modalities, Equipment Evaluation, Education, & procurement, Manual Therapy - Soft Tissue Mobilization, Stair training  Safety Devices  Type of devices:  All fall risk precautions in place, Call light within reach, Nurse notified, Patient at risk for falls, Gait belt, Left in chair, Chair alarm in place  Restraints  Initially in place: No    G-Code       OutComes Score                                                  AM-PAC Score             Goals  Short term goals  Time Frame for Short term goals: 1-2 weeks  Short term goal 1: Pt will perform bed mobility mod I  Short term goal 2: Pt will perform all transfers with supervision  Short term goal 3: Pt will ambulate 150' with LRAD and supervision  Short term goal 4: Pt will negotiate 1 step without HR and supervision  Patient Goals   Patient goals : to go home       Therapy Time   Individual Concurrent Group Co-treatment   Time In 0830         Time Out 0930         Minutes 60            Timed Code Treatment Minutes:  45    Total Treatment Minutes:  [address] [place], [place] [address]

## 2021-02-05 NOTE — PROGRESS NOTES
Speech Language Pathology  Facility/Department: [facility] ACUTE REHAB UNIT   CLINICAL BEDSIDE SWALLOW EVALUATION    NAME: Zenia Matthew  : [birth year]  MRN: [id number]    ARU ADMISSION DATE: 2021   Date of Eval: 2021  Evaluating Therapist: Lynn Guzman    ADMITTING DIAGNOSIS: has Lumbar radiculopathy; Acute CVA (cerebrovascular accident) ([facility] [address] Received intravenous tissue plasminogen activator (tPA) in emergency department; Benign essential HTN; Nontraumatic cortical hemorrhage of left cerebral hemisphere [facility]); Dyslipidemia; PAF (paroxysmal atrial fibrillation) ([facility] [address] Thalamic stroke ([facility] [address] DM (diabetes mellitus), type 2, uncontrolled with complications ([facility] [address] Remote history of stroke; Persistent atrial fibrillation ([facility] [address] Hyponatremia; Coronary artery disease involving native coronary artery of native heart without angina pectoris; and Acute ischemic stroke [facility]) on their problem list.  ONSET DATE: 2021    H&P:  70-year-old female with a history of recent left MCA CVA, A. Fib, CAD, DM, HTN, HLD, and depression who was admitted on  with aphasia and changes in vision. CT head revealed an acute infarct in the right PCA territory. CTA revealed occlusion of the proximal right ICA and occlusion of the distal P4 segment on the right. MRI revealed early subacute infarcts in the right thalamus, right occipital lobe, and right posterior cerebral artery territory. . A1c 8.7. Patient reports that she stopped taking her previous anticoagulation due to cost and was placed on Coumadin but stopped this due to bruising. She was evaluated by therapy and suggested continuing an inpatient setting prior to returning home. Recent Chest Xray:  2021  Impression   No radiographic evidence of acute pulmonary disease.         Current Diet level:  Current Diet : Regular  Current Liquid Diet : Thin    Primary Complaint  Pt does not report any concerns with her swallow function.      Reason for Referral  Sacha Rosas was referred for a bedside swallow evaluation to assess the efficiency of her swallow function, identify signs and symptoms of aspiration and make recommendations regarding safe dietary consistencies, effective compensatory strategies, and safe eating environment. Impression  Dysphagia Diagnosis: Swallow function appears grossly intact  Dysphagia Impression : Pt's swallow function appears grossly intact. Pt demonstrated adequate mastication, a-p propulsion, and laryngeal elevation. Pt did not have any oral residue. No overt clinical s/s of aspiration/penetration were assessed. Due to pt's past history of CVA, pt is still at risk for aspiration however there is no indication for dysphagia treatment at this time. Dysphagia Outcome Severity Scale: Level 6: Within functional limits/Modified independence     Treatment Plan  Requires SLP Intervention: No  Duration/Frequency of Treatment: No dysphagia treatment warranted  D/C Recommendations: To be determined     Recommended Diet and Intervention  Diet Solids Recommendation: Regular  Liquid Consistency Recommendation: Thin  Recommended Form of Meds: Whole with water     Compensatory Swallowing Strategies  Compensatory Swallowing Strategies: Small bites/sips;Upright as possible for all oral intake;Eat/Feed slowly    Treatment/Goals   No dysphagia goals warranted     General  Chart Reviewed: Yes  Subjective  Subjective: Pt upright in chair, alert, and willing to particpate in swallow evaluation. Behavior/Cognition: Alert; Cooperative;Pleasant mood  Respiratory Status: Room air  O2 Device: None (Room air)  Communication Observation: Aphasia; Apraxia  Dentition: Adequate  Patient Positioning: Upright in chair  Baseline Vocal Quality: Normal  Prior Dysphagia History: Pt recieved dysphagia treatment after first stroke. Pt progressed well and was d/c on a regular diet with thin liquids. Consistencies Administered: Reg solid; Thin - straw;Dysphagia Soft and Bite-Sized (Dysphagia III)    Vision/Hearing  Vision: Impaired  Vision Exceptions: Visual field cut(left visual field cut)  Hearing: Within functional limits    Oral Motor Deficits  Oral Motor: Within functional limits  Labial Symmetry: Abnormal symmetry right    Oral Phase Dysfunction  Oral Phase: WFL     Indicators of Pharyngeal Phase Dysfunction  Pharyngeal Phase: WFL    Prognosis  Prognosis for safe diet advancement: good  Individuals consulted  Consulted and agree with results and recommendations: Patient    Education  Patient Education: Reviewed results of evaluation, recommendations, nature of dysphagia and possible medical complications related to swallowing impairments. Patient Education Response: Verbalizes understanding  Safety Devices in place: Yes  Type of devices: All fall risk precautions in place; Left in chair;Call light within reach; Chair alarm in place; Patient at risk for falls      Pain:  Pain Assessment  Pain Assessment: 0-10  Pain Level: 0  Patient's Stated Pain Goal: No pain    Therapy Time  SLP Individual Minutes  Time In: 0930  Time Out: 1000  Minutes: 30       Signature:   Олег Ac M.A. CCC-SLP SSaritaPSarita [id number]  Speech-Language Pathologist [phone]  2/5/2021 3:02 PM     The speech-language pathologist was present, directed the patient's care, made skilled judgment and was responsible for assessment and treatment.     NGA Escobar,  Speech-Language Pathology

## 2021-02-05 NOTE — PLAN OF CARE
ARU PATIENT TREATMENT PLAN  [address]. [facility], [address]  [address]    : 1950  Acct #: [de-identified]  MRN: [id number]  PHYSICIAN:  Wellington Bauman MD  Primary Problem    Patient Active Problem List   Diagnosis    Lumbar radiculopathy    Acute [facility]rebrovascular accident) [facility]    Received intravenous tissue plasminogen activator (tPA) in emergency department    Benign essential HTN    Nontraumatic cortical hemorrhage of left cerebral hemisphere ([facility]    Dyslipidemia    PAF (paroxysmal atrial fibrillation) ([facility]    Thalamic stroke ([facility]    DM (diabetes mellitus), type 2, uncontrolled with complications (HCC)    Remote history of stroke    Persistent atrial fibrillation (HCC)    Hyponatremia    Coronary artery disease involving native coronary artery of native heart without angina pectoris    Acute ischemic stroke [facility])       Rehabilitation Diagnosis:     Acute right ischemic stroke: ASA, coumadin initiation in 2 weeks from event (). Lovenox PPx currently. PT/OT/SLP     H/O CVA: L MCA, hemorrhagic transformation following tPA admin. ASA, statin     HTN: toprol 50     HLD: - resume home zetia     Afib: BB, AC as above     DM: Lantus 10 -> 5, SSI. Takes metformin 500, glimepiride 4 at home. - resume metformin tomorrow     Depression: zoloft 100     ADMIT DATE:2021    Patient Goals: To get better and go home. Admitting Impairments: Stroke - Left Body Involvement  Activities: Impaired Eating, Hygiene, Toileting, Bathing, Dressing, Bed Mobility, Transfers, Ambulation, Stairs, and Endurance. Participation: Prior to admission, patient was living at home alone, was independent with all mobility and activities, was an active .      CARE PLAN     NURSING:  [employer] while on this unit will:  [x] Be continent of bowel and bladder     [x] Have an adequate number of bowel movements  [x] Urinate with no urinary retention >300ml in bladder  [x] Complete bladder protocol with godinez removal  [x] Maintain O2 SATs at _96__%  [x] Have pain managed while on ARU       [x] Be pain free by discharge   [x] Have no skin breakdown while on ARU  [x] Have improved skin integrity via wound measurements  [x] Have no signs/symptoms of infection at the wound site  [x] Be free from injury during hospitalization   [x] Complete education with patient/family with understanding demonstrated for:  [x] Adjustment   [] Other:     Nursing Interventions will include:  [x] bowel/bladder training   [x] education for medical assistive devices   [x] medication education   [x] O2 saturation management   [x] energy conservation   [x] stress management techniques   [] fall prevention   [] alarms protocol   [x] seating and positioning   [x] skin/wound care   [x] pressure relief instruction   [x] dressing changes     [x] infection protection   [x] DVT prophylaxis  [x] assistance with in room safety with transfers to bed, toilet, wheelchair, shower   [x] bathroom activities and hygiene  [x] Other:    Patient/Caregiver Education for:  [x] Disease/sustained injury/management     [x] Medication Use  [] Surgical intervention  [x] Safety  [x] Body mechanics and or joint protection  [x] Health maintenance     [] Other:     PHYSICAL THERAPY:  Goals:                  Short term goals  Time Frame for Short term goals: 1-2 weeks  Short term goal 1: Pt will perform bed mobility mod I  Short term goal 2: Pt will perform all transfers with supervision  Short term goal 3: Pt will ambulate 150' with LRAD and supervision  Short term goal 4: Pt will negotiate 1 step without HR and supervision               These goals were reviewed with this patient at the time of assessment and Samantha Fernandez is in agreement.      Plan of Care: Pt to be seen 5 out of 7 days per week per ARU protocol ( 60 minutes with PT)                  Current Treatment Recommendations: Strengthening, Functional Mobility Training, Transfer Training, Balance Training, Endurance Training, Gait Training, Safety Education & Training, Patient/Caregiver Education & Training, Neuromuscular Re-education, Home Exercise Program, Positioning, Modalities, Equipment Evaluation, Education, & procurement, Manual Therapy - Soft Tissue Mobilization, Stair training    OCCUPATIONAL THERAPY:  Goals:             Short term goals  Time Frame for Short term goals: 1-2 weeks  Short term goal 1: Pt will complete grooming Ruthie  Short term goal 2: Pt will complete functional mobility/transfers Ruthie  Short term goal 3: Pt will complete UB bathing/dressing Ruthie  Short term goal 4: Pt will complete LB bathing/dressing Ruthie  Short term goal 5: Pt will complete light IADL activity Ruthie :  Long term goals  Time Frame for Long term goals : STGs= LTGs :  These goals were reviewed with this patient at the time of assessment and Zenia Matthew is in agreement    Plan of Care:  Pt to be seen 5 out of 7 days per week per ARU protocol ( 60 minutes with OT)  Patient Education: Pt verbalized understanding    SPEECH THERAPY: Goals will be left blank if speech is not following this patient. Goals:                          Short-term Goals  Goal 1: Pt will complete moderately complex comprehension tasks (2 step directions/commands, abstract yes/no questions) to 80% accuracy. Goal 2: Patient will repeat multi-syllabic phrases/sentences to > 80% accuracy. Goal 3: Pt will improve visual perception awareness for functional task completion via graded tasks to mild cues. Goal 4: Pt will complete basic expressive language tasks (sentence completion, naming, automatics) to 80% accuracy with min cues.                  Plan of Care:  Pt to be seen 5 out of 7 days per week per ARU protocol ( 60 minutes with SLP)    Therapy Treatments will include:  [x]  therapeutic exercises    [x]  gait training     [x]  neuromuscular re-ed                            [x] transfer training             [] community reintegration    [] bed mobility                          []  w/c mobility and training  [x]  self care    [x]home mgmt    [x]  cognitive training            []  energy conservation        []  dysphagia tx    [x]  speech/language/communication therapy   [x]  group therapy    [x]  patient/family education    [] Other:    CASE MANAGEMENT:  Goals:   Assist patient/family with discharge planning, patient/family counseling, and coordination with insurance during ARU stay. Rodolfo Olivier will be seen a minimum of 3 hours of therapy per day, a minimum of 5 out of 7 days per week  (please see above for specific treatment plan per PT/OT/SLP). [] In this rare instance due to the nature of this patient's medical involvement, this patient will be seen 15 hours per week (900 minutes within a 7 day period). In addition, dietician/nutritionist may monitor calorie count as well as intake and collaboratively work with SLP on dietary upgrades. Neuropsychology/Psychology may evaluate and provide necessary support. Medical issues being managed closely and that require 24 hour availability of a physician:  [x] Swallowing Precautions  [x] Bowel/Bladder Fx  [] Weight bearing precautions  [] Wound Care    [x] Pain Mgmt   [] Infection Protection  [x] DVT Prophylaxis   [x] Fall Precautions  [x] Fluid/Electrolyte/Nutrition Balance  [x] Voice Protection   [] Respiratory  [] Other:    Medical Prognosis: [] Good  [x] Fair    [] Guarded   Total expected IRF days 14  Anticipated discharge destination:Home  [] Home Independently   [x] Home with supervision    []SNF     [] Other                                           Physician anticipated functional outcomes:  Patient will progress to being Independent or requiring supervision for all mobility and activities upon discharge.    IPOC brief synthesis: 75-year-old female with a history of recent left MCA CVA, A. Fib, CAD, DM, HTN, HLD, and depression who was admitted on 1/30 with aphasia and changes in vision. CT head revealed an acute infarct in the right PCA territory.  CTA revealed occlusion of the proximal right ICA and occlusion of the distal P4 segment on the right. MRI revealed early subacute infarcts in the right thalamus, right occipital lobe, and right posterior cerebral artery territory. .  A1c 8.7. Patient reports that she stopped taking her previous anticoagulation due to cost and was placed on Coumadin but stopped this due to bruising. She was evaluated by therapy and suggested continuing an inpatient setting prior to returning home.   She is admitted with the above goal.      I have reviewed this initial plan of care and agree with its contents:    Title   Name    Date    Time    Physician: Electronically signed by Nancy Manriquez MD on 2/6/2021 at 9:20 AM      Case Mgmt: Marylene Bimler, MSW, LSW, 02/05/2021, 8:34 AM    OT: Vasquez Hinton, OTR/L #[id number] 2/5/2021 1555    PT: Blake Christianson DPT [id number] 2/5/2021 1246    RN:  Jojo Schneider ,  02/5/2021 @00:16am     ST: Amber Do MA CCC-SLP 2/5/2021 [address] [place] [address]    :  Miguel Angel Fulton PT, DPT [id number]  2/5/21  4:05 PM

## 2021-02-05 NOTE — DISCHARGE INSTR - COC
Continuity of Care Form    Patient Name: Itzel Rivas   :    MRN:  [id number]    Admit date:  2021  Discharge date:  ***    Code Status Order: DNR-CCA   Advance Directives:   [facility] Documentation       Date/Time Healthcare Directive Type of Healthcare Directive Copy in [address] Agent's Name Healthcare Agent's Phone Number    [phone]  Yes, patient has an advance directive for healthcare treatment daughter says ' I GAVE IT TO ICU\"  Living will  Other (Comment) Daughter says \" i have a cope of my own\"  Adult Children -- --            Admitting Physician:  Armando Liu MD  PCP: Osmany Gotti    Discharging Nurse: [facility] Unit/Room#: FTB-8119/0363-34  Discharging Unit Phone Number: ***    Emergency Contact:   Extended Emergency Contact Information  Primary Emergency Contact: QiVania  Address: DAUGHTER  Home Phone: [phone]  Relation: Child    Past Surgical History:  Past Surgical History:   Procedure Laterality Date    APPENDECTOMY      BACK SURGERY     [address] GRAFT      HAND SURGERY      LUMPS/BONE SPURS    LUMBAR [address]  8-    Microlumbar discectomy L4-5 right    LUMBAR SPINE SURGERY  10-11-12    MICROLUMBAR RE-EXPLORATION L4-5 RIGHT, DECOMPRESSION OF    OVARIAN CYST REMOVAL      SKIN BIOPSY      PRE CANCERS FROZEN OFF    TONSILLECTOMY      VASCULAR SURGERY         Immunization History:   Immunization History   Administered Date(s) Administered    Tdap (Boostrix, Adacel) 2016       Active Problems:  Patient Active Problem List   Diagnosis Code    Lumbar radiculopathy M54.16    Acute CVA (cerebrovascular accident) ([facility] [address] I63.9    Received intravenous tissue plasminogen activator (tPA) in emergency department Z92.82    Benign essential HTN I10    Nontraumatic cortical hemorrhage of left cerebral hemisphere ([facility] [address] I61.1    Dyslipidemia E78.5    PAF (paroxysmal atrial fibrillation) (HCC) I48.0    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    DM (diabetes mellitus), type 2, uncontrolled with complications (HCC) Z13.6, E11.65    Remote history of stroke Z86.73    Persistent atrial fibrillation (HCC) I48.19    Hyponatremia E87.1    Coronary artery disease involving native coronary artery of native heart without angina pectoris I25.10    Acute ischemic stroke (HCC) I63.9       Isolation/Infection:   Isolation            No Isolation          Patient Infection Status       Infection Onset Added Last Indicated Last Indicated By Review Planned Expiration Resolved Resolved By    None active    Resolved    COVID-19 Rule Out 10/28/20 10/27/20 10/28/20 COVID-19 (Ordered)   10/28/20 Rule-Out Test Resulted    COVID-19 Rule Out 10/26/20 10/26/20 10/26/20 COVID-19 (Ordered)   10/26/20 Rule-Out Test Resulted            Nurse Assessment:  Last Vital Signs: /71   Pulse 84   Temp 98.3 °F (36.8 °C)   Resp 16   Ht 5' 4\" (1.626 m)   Wt 150 lb 9.6 oz (68.3 kg)   SpO2 93%   BMI 25.85 kg/m²     Last documented pain score (0-10 scale): Pain Level: 0  Last Weight:   Wt Readings from Last 1 Encounters:   02/04/21 150 lb 9.6 oz (68.3 kg)     Mental Status:  {IP PT MENTAL STATUS:20070}    IV Access:  { KAZ IV ACCESS:927905874}    Nursing Mobility/ADLs:  Walking   {[facility] DME BAPH:682824480}  Transfer  {[facility] DME EYTU:204403114}  Bathing  {[facility] DME EFRP:395802638}  Dressing  {P DME QAUJ:483547171}  Toileting  {P DME QFGR:442526162}  Feeding  {P DME WMWY:451812457}  Med Admin  {[facility] DME JLGT:847350729}  Med Delivery   { KAZ MED Delivery:041367280}    Wound Care Documentation and Therapy:        Elimination:  Continence:   · Bowel: {YES / HV:34861}  · Bladder: {YES / YX:39277}  Urinary Catheter: {Urinary Catheter:504720126}   Colostomy/Ileostomy/Ileal Conduit: {YES / BR:90601}       Date of Last BM: ***    Intake/Output Summary (Last 24 hours) at 2021 1437  Last data filed at 2021 0855  Gross per 24 hour   Intake 240 ml   Output --   Net 240 ml     No intake/output data recorded. Safety Concerns:     [address] [place] Safety Concerns:584745922}    Impairments/Disabilities:      [address] KAZ Impairments/Disabilities:874905507}    Nutrition Therapy:  Current Nutrition Therapy:   [address] KAZ Diet List:892562960}    Routes of Feeding: {CHP DME Other Feedings:935221680}  Liquids: {Slp liquid thickness:90498}  Daily Fluid Restriction: {CHP DME Yes amt example:554337531}  Last Modified Barium Swallow with Video (Video Swallowing Test): {Done Not Done AIUY:727110765}    Treatments at the Time of Hospital Discharge:   Respiratory Treatments: ***  Oxygen Therapy:  {Therapy; copd oxygen:37109}  Ventilator:    { CC Vent PHZE:906893560}    Rehab Therapies:  PT/OT/SLP/RN/Aide/MSW.    Weight Bearing Status/Restrictions: [address]  Weight Bearin}  Other Medical Equipment (for information only, NOT a DME order):  {EQUIPMENT:210885104}  Other Treatments:  HOME HEALTH CARE: LEVEL 3 [address] to establish plan of care for patient over 60 day period   [address] Initial home SN evaluation visit to occur within 24-48 hours for:   medication management   VS and clinical assessment   S&S chronic disease exacerbation education + when to contact MD / NP   care coordination   Medication Reconciliation during 1st SN visit     PT/OT/Speech    Evaluations in home within 24-48 hours of discharge to include DME and home safety   [employer] Frontload therapy 5 days, then 3x a week    OT to evaluate if patient has [address] needs for personal care       evaluation within 24-48 hours to evaluate resources & insurance for potential AL, [place], LTC, and Medicaid options       Palliative Care referral within 5 days of hospital discharge  PCP Visit scheduled within 3 - 7 days of hospital discharge      Telehealth-Homecare Vitals(If patient is agreeable and meets guidelines)      Patient's personal belongings (please select all that are sent with patient):  {CHP DME Belongings:001804933}    RN SIGNATURE:  {Esignature:926701216}    CASE MANAGEMENT/SOCIAL WORK SECTION    Inpatient Status Date: 2/4/2021    Readmission Risk Assessment Score:  Readmission Risk              Risk of Unplanned Readmission:        16           Discharging to Facility/ Agency     Name: Arnoldo Downs will call for Appointment  Phone: [phone]  Fax: [phone]        / signature: Electronically signed by ALEN Keenan on 2/11/2021 at 1:06 PM    PHYSICIAN SECTION    Prognosis: Fair    Condition at Discharge: Stable    Rehab Potential (if transferring to Rehab): Fair    Recommended Labs or Other Treatments After Discharge: PT/OT/SLP/RN/Aide/MSW. Physician Certification: I certify the above information and transfer of Adrian Dee  is necessary for the continuing treatment of the diagnosis listed and that she requires Home Care for greater 30 days.      Update Admission H&P: No change in H&P    PHYSICIAN SIGNATURE:  Electronically signed by Wellington Bauman MD on 2/11/21 at 12:09 PM EST

## 2021-02-05 NOTE — PROGRESS NOTES
Admission Period/Goal QM Codes    QM Admit Code Goal Code   Eating 5 6   Oral Hygiene 5 6   Toileting Hygiene 6 6   Shower/Bathing 4 6   UB Dressing 4 6   LB Dressing 4 6   Putting on/off Footwear 4 6   Rolling Left and Right 4 6   Sit To Lying 4 6   Lying to Sitting on Bedside 4 6   Sit to Stand 4 6   Chair/Bed to Chair Transfer 4 4   Toilet Transfers 4 6   Car Transfers 4 4   Walk 10 Feet 4 4   Walk 50 Feet with Two Turns 4 4   Walk 150 Feet 4 4   Walk 10 Feet on Uneven Surfaces 3 4   1 Step (Curb) 3 4   4 Steps 4 4   12 Steps 80 4   Picking up Object from Floor 3 4   Wheel 50 Feet with 2 Turns 9 -   Type - -   Wheel 150 Feet 9 -   Type - -       Following discussion at the Quality Measure Huddle, the above codes were determined to be the patient's usual performance at admission. OT:  Tamika Overton, OTR/L #[id number], 2/9/2021, [place]     PT:   Caty Gastelum DPT [id number] 2/9/2021 702     RN:  Libertad CAIN, RN 02- 16:31      ST:  [facility], [facility]-SLP 2/9/2021 9901     :  Juan F Ortiz PT, DPT [id number]  2/11/21  8:51 AM

## 2021-02-05 NOTE — PLAN OF CARE
Problem: IP BOWEL ELIMINATION  Goal: STG - Patient participates in bowel management program  Outcome: Ongoing     Problem: SAFETY  Goal: LTG - Patient will demonstrate safety requirements appropriate to situation/environment  Outcome: Ongoing     Problem: SAFETY  Goal: LTG - patient will utilize safety techniques  Outcome: Ongoing     Problem: SAFETY  Goal: STG - patient locks brakes on wheelchair  Outcome: Ongoing     Problem: SAFETY  Goal: STG - patient uses gait belt during all transfers  Outcome: Ongoing     Problem: PAIN  Goal: STG - Patient will increase activity level  Outcome: Ongoing

## 2021-02-05 NOTE — H&P
Patient: Nick Nair  [id number]  Date: 2021      Chief Complaint: Left sided weakness     History of Present Illness/Hospital Course:  49-year-old female with a history of recent left MCA CVA, A. Fib, CAD, DM, HTN, HLD, and depression who was admitted on  with aphasia and changes in vision. CT head revealed an acute infarct in the right PCA territory. CTA revealed occlusion of the proximal right ICA and occlusion of the distal P4 segment on the right. MRI revealed early subacute infarcts in the right thalamus, right occipital lobe, and right posterior cerebral artery territory. . A1c 8.7. Patient reports that she stopped taking her previous anticoagulation due to cost and was placed on Coumadin but stopped this due to bruising. She was evaluated by therapy and suggested continuing an inpatient setting prior to returning home. She has no current complaints.      Prior Level of Function:  Independent     Current Level of Function:  Darrius BREAUX     has a past medical history of Actinic keratosis, Arthritis, Asthma, Atrial fibrillation ([address] CAD (coronary artery disease), Cerebral artery occlusion with cerebral infarction ([address] Depression, Diabetes mellitus ([address] Disc displacement, lumbar, Diverticulosis of colon, Hard of hearing, Hyperlipidemia, Hypertension, Ischemic heart disease, Past heart attack, Poor vision, Right leg weakness, and Scoliosis. has a past surgical history that includes Coronary artery bypass graft;  section; Appendectomy; ovarian cyst removal; Hand surgery; Cardiac surgery (); skin biopsy; vascular surgery; Tonsillectomy; Lumbar disc surgery (8-); Lumbar spine surgery (10-11-12); and back surgery. reports that she has quit smoking. She has a 40.00 pack-year smoking history. She has never used smokeless tobacco. She reports that she does not drink alcohol or use drugs.     family history includes Bipolar Disorder in her mother; Cancer in her father; Diabetes in her mother and sister; Early Death in her father; Heart Attack in her father; Heart Disease in her father and sister; Heart Surgery in her father and sister; High Blood Pressure in her father, mother, and sister; Mental Illness in her mother.     Allergies: Morphine, Codeine, and Statins    Current Facility-Administered Medications   Medication Dose Route Frequency Provider Last Rate Last Admin    perflutren lipid microspheres (DEFINITY) injection 1.65 mg  1.5 mL Intravenous ONCE PRN Rupali King MD        enoxaparin (LOVENOX) injection 40 mg  40 mg Subcutaneous Daily Rupali King MD   40 mg at 02/05/21 0830    polyethylene glycol (GLYCOLAX) packet 17 g  17 g Oral Daily PRN Rupali King MD        sodium chloride flush 0.9 % injection 10 mL  10 mL Intravenous 2 times per day Rupali King MD        sodium chloride flush 0.9 % injection 10 mL  10 mL Intravenous PRN Rupali King MD        dextrose 5 % solution  100 mL/hr Intravenous PRN Rupali King MD        dextrose 50 % IV solution  12.5 g Intravenous PRN Rupali King MD        glucagon (rDNA) injection 1 mg  1 mg Intramuscular PRN Rupali King MD        glucose (GLUTOSE) 40 % oral gel 15 g  15 g Oral PRN Rupali King MD        insulin glargine (LANTUS;BASAGLAR) injection pen 10 Units  0.15 Units/kg Subcutaneous Nightly Rupali King MD        insulin lispro (1 Unit Dial) 0-12 Units  0-12 Units Subcutaneous TID WC Rupali King MD   8 Units at 02/05/21 1246    insulin lispro (1 Unit Dial) 0-6 Units  0-6 Units Subcutaneous Nightly Rupali King MD        acetaminophen (TYLENOL) tablet 650 mg  650 mg Oral Q4H PRN Rupali King MD        aspirin chewable tablet 81 mg  81 mg Oral Daily Rupali King MD   81 mg at 02/05/21 0830    budesonide-formoterol (SYMBICORT) 160-4.5 MCG/ACT inhaler 2 puff  2 puff Inhalation BID Rupali King MD   2 puff at 02/05/21 1105    diphenhydrAMINE (BENADRYL) tablet 25 mg  25 mg Oral Q6H PRN Zhen Hanna MD        hydrALAZINE (APRESOLINE) tablet 50 mg  50 mg Oral Q8H PRN Zhen Hanna MD        metoprolol succinate (TOPROL XL) extended release tablet 50 mg  50 mg Oral Daily Zhen Hanna MD   50 mg at 02/05/21 0830    ondansetron (ZOFRAN-ODT) disintegrating tablet 4 mg  4 mg Oral Q8H PRN Zhen Hanna MD        sertraline (ZOLOFT) tablet 100 mg  100 mg Oral Daily Zhen Hanna MD   100 mg at 02/05/21 0830    traZODone (DESYREL) tablet 50 mg  50 mg Oral Nightly PRN Zhen Hanna MD           REVIEW OF SYSTEMS:   CONSTITUTIONAL: negative for fevers, chills, diaphoresis, appetite change, fatigue, night sweats and unexpected weight change. HEENT: negative for hearing loss, tinnitus, ear drainage, sinus pressure, nasal congestion, epistaxis and snoring. RESPIRATORY: Negative for hemoptysis, cough, sputum production. CARDIOVASCULAR: negative for chest pain, palpitations, exertional chest pressure/discomfort, edema, syncope. GASTROINTESTINAL: negative for nausea, vomiting, diarrhea, constipation, blood in stool and abdominal pain. GENITOURINARY: negative for frequency, dysuria, urinary incontinence, decreased urine volume, and hematuria. HEMATOLOGIC/LYMPHATIC: negative for easy bruising, bleeding and lymphadenopathy. ALLERGIC/IMMUNOLOGIC: negative for recurrent infections, angioedema, anaphylaxis and drug reactions. ENDOCRINE: negative for weight changes and diabetic symptoms including polyuria, polydipsia and polyphagia. MUSCULOSKELETAL: negative for pain, joint swelling, decreased range of motion and muscle weakness. NEUROLOGICAL: negative for headaches, slurred speech. Positive unilateral weakness and vision deficits   PSYCHIATRIC/BEHAVIORAL: negative for hallucinations, behavioral problems, confusion and agitation.      All pertinent positives are noted in the HPI.     Physical Examination:  Vitals:   Patient Vitals for the past 24 hrs:   BP Temp Temp src Pulse Resp SpO2 Height Weight   02/05/21 1359 -- -- -- -- -- -- 5' 4\" (1.626 m) --   02/05/21 1105 -- -- -- -- -- 93 % -- --   02/05/21 0815 129/71 98.3 °F (36.8 °C) -- 84 16 93 % -- --   02/04/21 2100 (!) 161/74 97.9 °F (36.6 °C) Oral 65 18 94 % 5' 4\" (1.626 m) 150 lb 9.6 oz (68.3 kg)   02/04/21 1928 -- -- -- -- -- 94 % -- --     Psych: Stable mood, normal judgement, normal affect   Const: No distress  Eyes: Conjunctiva noninjected, no icterus noted; pupils equal, round. HENT: Atraumatic, normocephalic; Oral mucosa moist  Neck: Trachea midline, neck supple. No thyromegaly noted. CV: Regular rate and rhythm, no murmur rub or gallop noted  Resp: Lungs clear to auscultation bilaterally, no rales wheezes or ronchi, no retractions. Respirations unlabored. GI: Soft, nontender, nondistended. Normal bowel sounds. No palpable masses. Neuro: Alert, oriented, appropriate. Mild left facial droop. Left visual field cut. No other cranial nerve deficits appreciated. Sensation intact to light touch. Motor examination reveals: LUE/LLE 4/5 diffusely RUE/RLE 5/5. Reflexes normal and symmetric. Skin: Normal temperature and turgor  MSK: No joint abnormalities noted. Ext: No significant edema appreciated. No varicosities.     Lab Results   Component Value Date    WBC 5.6 02/05/2021    HGB 12.9 02/05/2021    HCT 38.6 02/05/2021    MCV 92.3 02/05/2021     02/05/2021     Lab Results   Component Value Date    INR 1.00 01/29/2021    INR 0.97 10/20/2020    INR 1.05 06/08/2019    PROTIME 11.6 01/29/2021    PROTIME 11.2 10/20/2020    PROTIME 12.0 06/08/2019     Lab Results   Component Value Date    CREATININE 0.7 02/05/2021    BUN 8 02/05/2021     02/05/2021    K 4.8 02/05/2021     02/05/2021    CO2 29 02/05/2021     Lab Results   Component Value Date    ALT 10 01/29/2021    AST 15 01/29/2021    ALKPHOS 94 01/29/2021    BILITOT <0.2 01/29/2021       Most recent echocardiogram revealed of increased T2 signal involving the right occipital   lobe, compatible with the right posterior cerebral artery infarct.  There are   also areas of involvement involving the posteromedial right thalamus.  No   other areas of restricted diffusion.  The cerebral and cerebellar parenchyma   demonstrate volume loss. Daved Jubilee are old infarcts noted in the left temporal   occipital region with associated areas of cortical laminar necrosis. Daved Jubilee   is also an old left parietal lobe infarct.       There are no abnormal extra-axial fluid collections.  The ventricles are   proportional to the cerebral sulci.       There are scattered areas of increased T2 signal noted supratentorially,   compatible with mild chronic microvascular white matter ischemic disease. There are also areas of increased T2 signal noted centrally in the jenna.       There is a small chronic lacunar infarct noted in the left cerebellar   hemisphere.       ORBITS: Lens implants from prior cataract surgery are noted.  The orbits are   otherwise unremarkable.       SINUSES: There is scattered trace paranasal sinus disease. Daved Jubilee is a left   mastoid effusion.  The right mastoid air cells are clear.       BONES/SOFT TISSUES: The bone marrow signal intensity and craniocervical   junction are normal in appearance.  The pituitary gland is unremarkable.           Impression   1. Early subacute infarcts involving the right posteromedial thalamus and   right occipital lobe, in the right posterior cerebral artery vascular   distribution. 2. Cerebral parenchymal volume loss with mild chronic microvascular white   matter ischemic disease noted both supra and infratentorially.    3. Chronic infarcts in the left temporal occipital region, and left parietal   lobe.  Small chronic lacunar infarct involving the left cerebellar hemisphere.            The above laboratory data have been reviewed.      The above imaging data have been reviewed.      The above medical testing have been reviewed. Body mass index is 25.85 kg/m². Barriers to Discharge: Hemiparesis, safety, endurance, ADLs    POST ADMISSION PHYSICIAN EVALUATION  The patient has agreed to being admitted to our comprehensive inpatient  rehabilitation facility consisting of at least 180 minutes of therapy a day,  5 out of 7 days a week. The patient/family has a good understanding of our discharge process. The  patient has potential to make improvement and is in need of at least two of  the following multidisciplinary therapies including but not limited to  physical, occupational, respiratory, and speech, nutritional services, wound care, and prosthetics and orthotics. Given the patients complex condition  and risk of further medical complications, rehabilitation services cannot be  safely provided at a lower level of care such as a skilled nursing facility. I have compared the patients medical and functional status at the time of the  preadmission screening and the same on this date, and there are no significant changes except as documented below in the assessment and plan. By signing this document, I acknowledge that I have personally performed a  full physical examination on this patient within 24 hours of admission to  this inpatient rehabilitation facility and have determined the patient to be  able to tolerate the above course of treatment at an intensive level for a  reasonable period of time. I will be completing a detailed individualized  Plan of Care for this patient by day four of the patients stay based upon the  Preadmission Screen, this Post-Admission Evaluation, and the therapy  evaluations. Assessment and Plan:  Acute right ischemic stroke: ASA, coumadin initiation in 2 weeks from event (2/13). Lovenox PPx currently. PT/OT/SLP    H/O CVA: L MCA, hemorrhagic transformation following tPA admin.  ASA, statin    HTN: toprol 50    HLD: - resume home zetia    Afib: BB, AC as above    DM: Lantus 10 -> 5, SSI. Takes metformin 500, glimepiride 4 at home. - resume metformin tomorrow    Depression: zoloft 100    Bowels: Per protocol  Bladder: Per protocol   Sleep: Trazodone provided prn. Pain: tylenol   DVT PPx: Lovenox   ELOS: 10-14    Oscar Haas MD 2/5/2021, 3:22 PM     * This document was created using dictation software. While all precautions were taken to ensure accuracy, errors may have occurred. Please disregard any typographical errors.

## 2021-02-05 NOTE — CARE COORDINATION
Social Work Admission Assessment    Objective:  Met with pt (spoke with patient's daughter) to complete initial assessment and review role of  in rehab process. Pt oriented to unit. Pt states understanding of this. Current Home Situation:  Patient lives in a one level home with one step to enter. Patient lives alone. Pt's plans re:  Return to work/school/volunteer:  Patient is retired but still worked part-time positions. Patient's daughter states that patient was very independent prior to admission. Daughter also states that patient loved to cook. Accessibility to community resources/transportation:  Patient's daughter will provide transportation at discharge. Has pt experienced a recent loss or signigicant life event that would impact their care or ability to participate? _X_No  __Yes - Explain    Has pt ever been treated for emotional disorders? _X_No  __Yes--How does that affect current situation:    How does pt and family cope with stressful events and this hospitalization? Patient's daughter reports that patient inocencia well to stressful situations. States that patient always is in \"good spirits\". Special Problem Areas:  States that patient is having issues with her speech and vision. Discharge Plan:Patient's daughter anticipates patient discharging to home with Amina Reed. Daughter states that she will not be able to provide the patient 24/7 support. Patient's daughter states that she works and will not be able to be with the patient. Impression/Plan:  Breana Graves (patient )is a 65year old female that has been admitted to ARU. Provided patient with this SW's card to contact as needed. Will continue to follow for support and discharge planning.

## 2021-02-05 NOTE — CONSULTS
Nutrition Assessment     Type and Reason for Visit: Initial, Consult    Nutrition Recommendations/Plan:   Modify diet: discontinue cardiac restriction    Nutrition Assessment:  Consult received for new admission to ARU. Pt on Cardiac, Carb Control diet and reports appetite is not as good as it usually is at home. Observed lunch tray, pt consumes ~75%. Recommend the cardiac diet modifier be dropped at this time to allow for greater meal choices. Encouraged pt to continue to avoid adding salt to her meals. Pt expressed understanding, no additional concerns. Malnutrition Assessment:  Malnutrition Status: No malnutrition    Nutrition Related Findings: expressive aphasia, will write her words vs speaking; no edema; active BS      Current Nutrition Therapies:    DIET CARB CONTROL;     Anthropometric Measures:  · Height: 5' 4\" (162.6 cm)  · Current Body Wt: 150 lb (68 kg)   · BMI: 25.7    Nutrition Diagnosis:   No nutrition diagnosis at this time     Nutrition Interventions:   Food and/or Nutrient Delivery:  Modify Current Diet  Nutrition Education/Counseling:  Education not indicated   Coordination of Nutrition Care:  Continue to monitor while inpatient    Goals:  PO intake 50% or greater       Nutrition Monitoring and Evaluation:   Food/Nutrient Intake Outcomes:  Food and Nutrient Intake    Discharge Planning:    No discharge needs at this time     Electronically signed by Irma Mattson RD, LD on 2/5/21 at 2:05 PM EST  Contact:  [phone]

## 2021-02-05 NOTE — PROGRESS NOTES
Speech Language Pathology  Facility/Department: [facility] ACUTE REHAB UNIT  Initial Speech/Language/Cognitive Assessment    NAME: Zenia Matthew  : [id number]   MRN: [id number]       ARU ADMISSION DATE: 2021   Date of Eval: 2021   Evaluating Therapist: Lynn Guzman SLP    ADMITTING DIAGNOSIS: has Lumbar radiculopathy; Acute CVA (cerebrovascular accident) ([facility] Received intravenous tissue plasminogen activator (tPA) in emergency department; Benign essential HTN; Nontraumatic cortical hemorrhage of left cerebral hemisphere [facility]); Dyslipidemia; PAF (paroxysmal atrial fibrillation) ([facility] Thalamic stroke ([facility] DM (diabetes mellitus), type 2, uncontrolled with complications ([facility] Remote history of stroke; Persistent atrial fibrillation ([facility] Hyponatremia; Coronary artery disease involving native coronary artery of native heart without angina pectoris; and Acute ischemic stroke [facility]) on their problem list.  DATE ONSET:     H&P  64-year-old female with a history of recent left MCA CVA, A. Fib, CAD, DM, HTN, HLD, and depression who was admitted on  with aphasia and changes in vision. CT head revealed an acute infarct in the right PCA territory. CTA revealed occlusion of the proximal right ICA and occlusion of the distal P4 segment on the right. MRI revealed early subacute infarcts in the right thalamus, right occipital lobe, and right posterior cerebral artery territory. . A1c 8.7. Patient reports that she stopped taking her previous anticoagulation due to cost and was placed on Coumadin but stopped this due to bruising. She was evaluated by therapy and suggested continuing an inpatient setting prior to returning home. RECENT RESULTS  CT OF HEAD/MRI:  2021  Impression   1. Early subacute infarcts involving the right posteromedial thalamus and   right occipital lobe, in the right posterior cerebral artery vascular   distribution.    2. Cerebral parenchymal volume loss with Assistance from daughter for medication and finance management. General  Chart Reviewed: Yes  Family / Caregiver Present: No  Subjective: Pt up in chair, alert, and cooperative during speech assessment. Social/Functional History  Lives With: Alone  Receives Help From: Family(Daughter Ruben Kapadia))  ADL Assistance: Independent  Homemaking Assistance: Needs assistance  Homemaking Responsibilities: Yes  Active : Yes  Education: Senior in [employer]  Occupation: Retired  Type of occupation:  at [address] [place] [address] Road: relax on the couch, homemaking (remodeling house), dogs, games on tabletsion  Vision: Impaired  Vision Exceptions: Left visual field cut  Hearing: Within functional limits     Objective:  Oral Motor: Within functional limits  Labial Symmetry: Abnormal symmetry right    Auditory Comprehension  Comprehension: Exceptions  Yes/No Questions: Mild  Complex Questions: To be assessed in therapy  Multistep Basic Commands: To be assessed in therapy  L/R Discrimination: Mild  Conversation: Moderate  Interfering Components: Motor planning  Effective Techniques: Repetition    Expression  Primary Mode of Expression: Verbal    Verbal Expression  Verbal Expression: Exceptions to functional limits  Initiation: Moderate  Repetition: Moderate  Confrontation: Moderate  Convergent: To be assessed in therapy  Divergent: To be assessed in therapy  Conversation: Moderate  Interfering Components: Left neglect;Paraphasia; Tangential speech    Written Expression  Dominant Hand: Right  Written Expression: Utilized functional writing to correct motor planning communication breakdowns.     Motor Speech  Motor Speech: Exceptions to WFL  Apraxia: Moderate    Cognition:   Overall Orientation Status: Within Normal Limits  Attention: Within Functional Limits  Memory: Within Funtional Limits  Other: would benefit from further cognitive assessment pending progress towards communication goals     Additional Assessments:  [place] Evidence-Based Language Test  Perceptual  - Copying objects:   - Object to object matchin/2  - Demonstrating object use from pictures:     Auditory Comprehension  - Yes/no questions: 10/12  - Following verbal commands: 3/4  - Identifying pictures by description:   - Identifying objects by function:     Verbal Expression  - Automatic speech:   - Sentence completion:   - Personal/orientation questions:   - Repetition: 2/3  - Object namin/2    Prognosis:  Speech Therapy Prognosis  Prognosis: Fair  Individuals consulted  Consulted and agree with results and recommendations: Patient    Education:  Patient Education: Role of SLP, results of assessment and general speech pathology plan of care reviewed with patient following evaluation. Patient Education Response: Verbalizes understanding  Safety Devices in place: Yes  Type of devices: All fall risk precautions in place; Left in chair;Call light within reach; Chair alarm in place; Patient at risk for falls     Pain:  Pain Assessment  Pain Assessment: 0-10  Pain Level: 0  Patient's Stated Pain Goal: No pain    Therapy Time:   Individual Concurrent Group Co-treatment   Time In          Time Out 1030         Minutes 30             Signature:  Rosio Dean M.A. CCC-SLP S.PSarita [id number]  Speech-Language Pathologist [phone]  2021 3:40 PM     The speech-language pathologist was present, directed the patient's care, made skilled judgment and was responsible for assessment and treatment.     RADHA Ontiveros.,  Speech-Language Pathology

## 2021-02-05 NOTE — PROGRESS NOTES
At 2045, the patient admitted to room 4909 per transport. Transferred to bed  with walker and gait belt with RN . Oriented to room, call light, phone, TV, thermostat, bed controls, bathroom, emergency cord, white board, therapy times, unit routine, visiting hours,  and meal times. Patient verbalized understanding. States bowel routine is every other day. Call light and personal items in reach, alarms active and in place.

## 2021-02-05 NOTE — PLAN OF CARE
Problem: IP BOWEL ELIMINATION  Goal: STG - Patient participates in bowel management program  Outcome: Ongoing     Problem: SAFETY  Goal: LTG - Patient will demonstrate safety requirements appropriate to situation/environment  Outcome: Ongoing     Problem: PAIN  Goal: STG - Patient will reduce or eliminate use of analgesics  Outcome: Ongoing

## 2021-02-05 NOTE — PROGRESS NOTES
Occupational Therapy   Occupational Therapy Initial Assessment  Date: 2021   Patient Name: Rosendo Celestin  MRN: [id number]     : 1950    Date of Service: 2021    Discharge Recommendations:  Home with Home health OT, S Level 3, Home with assist PRN  OT Equipment Recommendations  Equipment Needed: Yes  Mobility Devices: ADL Assistive Devices  Other: continue to assess pending progress    Assessment   Performance deficits / Impairments: Decreased functional mobility ; Decreased balance;Decreased vision/visual deficit; Decreased ADL status; Decreased safe awareness;Decreased high-level IADLs  Assessment: Pt is a 65year old female who is below her functional baseline for independence and saftey displaying the above deficits. Pt would benefit from continued OT services to improve independence and safety in occupational pursuits. Treatment Diagnosis: Pt with above functional deficits due to recent acute CVA. Prognosis: Good  Decision Making: Low Complexity  OT Education: OT Role;Plan of Care  Patient Education: Pt verbalized understanding  REQUIRES OT FOLLOW UP: Yes  Activity Tolerance  Activity Tolerance: Patient Tolerated treatment well  Safety Devices  Safety Devices in place: Yes  Type of devices: All fall risk precautions in place;Call light within reach; Chair alarm in place; Patient at risk for falls; Left in chair           Patient Diagnosis(es): CVA   has a past medical history of Actinic keratosis, Arthritis, Asthma, Atrial fibrillation ([address] CAD (coronary artery disease), Cerebral artery occlusion with cerebral infarction ([address] Depression, Diabetes mellitus ([address] Disc displacement, lumbar, Diverticulosis of colon, Hard of hearing, Hyperlipidemia, Hypertension, Ischemic heart disease, Past heart attack, Poor vision, Right leg weakness, and Scoliosis. has a past surgical history that includes Coronary artery bypass graft;  section;  Appendectomy; ovarian cyst removal; Hand surgery; Cardiac surgery (1984); skin biopsy; vascular surgery; Tonsillectomy; Lumbar disc surgery (8-); Lumbar spine surgery (10-11-12); and back surgery. Treatment Diagnosis: Pt with above functional deficits due to recent acute CVA. Restrictions  Restrictions/Precautions  Restrictions/Precautions: Fall Risk  Required Braces or Orthoses?: No  Position Activity Restriction  Other position/activity restrictions: The patient is a 65y.o.  years old female with history of hypertension A. fib, hyperlipidemia and prior stroke who was admitted to the hospital last night with acute visual disturbance. Symptoms started few hours prior to admission. According to report, she was playing with her dog at home when she slowly lost the vision from the periphery bilaterally and was unable to function. Degree was severe and persistent. No falling or injury. She does have residual aphasia from prior stroke from last year. No fever or chills. No other triggers or other associated symptoms, weakness or numbness or chest pain. No severe headache. Patient came into the ED for evaluation since her symptoms did not improve. Initial work-up revealed mild elevated high blood pressure. She had a CT of the head which showed possible acute right ischemic PCA stroke. CTA showed more proximal right ICA occlusion. She was admitted. Subjective   General  Chart Reviewed: Yes  Patient assessed for rehabilitation services?: Yes  Response to previous treatment: Patient with no complaints from previous session  Family / Caregiver Present: No  Diagnosis: Acute CVA  Subjective  Subjective: Pt seated in recliner upon entry, agreeable to OT session.   Patient Currently in Pain: Denies  Vital Signs  Patient Currently in Pain: Denies  Height and Weight  Height: 5' 4\" (162.6 cm)    Social/Functional History  Social/Functional History  Lives With: Alone  Type of Home: House  Home Layout: One level  Home Access: Stairs to enter without assistance  LE Dressing: Setup;Stand by assistance  Additional Comments: Pt ambulated to bathroom with RW at SBA to complete LB/UB dressing/bathing seated on shower chair with back. Tone RUE  RUE Tone: Normotonic  Tone LUE  LUE Tone: Normotonic  Coordination  Movements Are Fluid And Coordinated: Yes        Transfers  Sit to stand: Stand by assistance  Stand to sit: Stand by assistance     Vision - Basic Assessment  Prior Vision: No visual deficits  Oculo Motor Control: Impaired  Impairments: Tracking; Saccade  Vision Comments: Pt is able to accurately complete letter cancellation using compensatory strategies to look to the L, pt's visual midline appears to be shifted to the R based on clock drawing, unable to track past midline to the L, this writer questions if pt is experiencing homonomous hemianopia     Cognition  Overall Cognitive Status: Exceptions  Arousal/Alertness: Appropriate responses to stimuli  Following Commands: Follows one step commands with increased time  Attention Span: Attends with cues to redirect  Memory: Appears intact  Insights: Fully aware of deficits  Initiation: Requires cues for some  Cognition Comment: Difficult with expressive conversation.      Sensation  Overall Sensation Status: (diminshed light touch in R LE)        LUE AROM (degrees)  LUE AROM : WFL  RUE AROM (degrees)  RUE AROM : WFL  LUE Strength  Gross LUE Strength: WFL  RUE Strength  Gross RUE Strength: WFL          Plan   Plan  Times per week: 5-7 x 60 min  Times per day: Daily  Current Treatment Recommendations: Strengthening, ROM, Safety Education & Training, Self-Care / ADL, Cognitive Reorientation, Home Management Training, Patient/Caregiver Education & Training, Neuromuscular Re-education    Goals  Short term goals  Time Frame for Short term goals: 1-2 weeks  Short term goal 1: Pt will complete grooming Ruthie  Short term goal 2: Pt will complete functional mobility/transfers Ruthie  Short term goal 3: Pt will complete UB bathing/dressing Ruthie  Short term goal 4: Pt will complete LB bathing/dressing Ruthie  Short term goal 5: Pt will complete light IADL activity Ruthie  Long term goals  Time Frame for Long term goals : STGs= LTGs  Patient Goals   Patient goals : to get better and go home       Therapy Time   Individual Concurrent Group Co-treatment   Time In 1330         Time Out 1450         Minutes 80               Timed Code Treatment Minutes: 60      Total Treatment Minutes:  [address] [facility] S/OT  Directed and supervised by Codi Torrez OTR/VAL #[id number]

## 2021-02-06 LAB
GLUCOSE BLD-MCNC: 107 MG/DL (ref 70–99)
GLUCOSE BLD-MCNC: 237 MG/DL (ref 70–99)
GLUCOSE BLD-MCNC: 374 MG/DL (ref 70–99)
GLUCOSE BLD-MCNC: 82 MG/DL (ref 70–99)
PERFORMED ON: ABNORMAL
PERFORMED ON: NORMAL

## 2021-02-06 PROCEDURE — 6360000002 HC RX W HCPCS: Performed by: PHYSICAL MEDICINE & REHABILITATION

## 2021-02-06 PROCEDURE — 97535 SELF CARE MNGMENT TRAINING: CPT

## 2021-02-06 PROCEDURE — 6370000000 HC RX 637 (ALT 250 FOR IP): Performed by: PHYSICAL MEDICINE & REHABILITATION

## 2021-02-06 PROCEDURE — 97530 THERAPEUTIC ACTIVITIES: CPT

## 2021-02-06 PROCEDURE — 1280000000 HC REHAB R&B

## 2021-02-06 PROCEDURE — 97116 GAIT TRAINING THERAPY: CPT

## 2021-02-06 PROCEDURE — 94761 N-INVAS EAR/PLS OXIMETRY MLT: CPT

## 2021-02-06 PROCEDURE — 97112 NEUROMUSCULAR REEDUCATION: CPT

## 2021-02-06 PROCEDURE — 92507 TX SP LANG VOICE COMM INDIV: CPT

## 2021-02-06 RX ORDER — GLIMEPIRIDE 2 MG/1
4 TABLET ORAL
Status: DISCONTINUED | OUTPATIENT
Start: 2021-02-06 | End: 2021-02-12 | Stop reason: HOSPADM

## 2021-02-06 RX ADMIN — GLIMEPIRIDE 4 MG: 2 TABLET ORAL at 10:51

## 2021-02-06 RX ADMIN — INSULIN LISPRO 4 UNITS: 100 INJECTION, SOLUTION INTRAVENOUS; SUBCUTANEOUS at 10:53

## 2021-02-06 RX ADMIN — ASPIRIN 81 MG: 81 TABLET, CHEWABLE ORAL at 10:51

## 2021-02-06 RX ADMIN — ENOXAPARIN SODIUM 40 MG: 40 INJECTION SUBCUTANEOUS at 10:52

## 2021-02-06 RX ADMIN — METOPROLOL SUCCINATE 50 MG: 50 TABLET, EXTENDED RELEASE ORAL at 10:51

## 2021-02-06 RX ADMIN — SERTRALINE 100 MG: 50 TABLET, FILM COATED ORAL at 10:51

## 2021-02-06 RX ADMIN — INSULIN GLARGINE 5 UNITS: 100 INJECTION, SOLUTION SUBCUTANEOUS at 21:57

## 2021-02-06 RX ADMIN — METFORMIN HYDROCHLORIDE 500 MG: 500 TABLET ORAL at 17:33

## 2021-02-06 RX ADMIN — METFORMIN HYDROCHLORIDE 500 MG: 500 TABLET ORAL at 10:50

## 2021-02-06 RX ADMIN — INSULIN LISPRO 10 UNITS: 100 INJECTION, SOLUTION INTRAVENOUS; SUBCUTANEOUS at 11:52

## 2021-02-06 ASSESSMENT — PAIN SCALES - GENERAL: PAINLEVEL_OUTOF10: 0

## 2021-02-06 NOTE — PROGRESS NOTES
Physical Therapy  Facility/Department: [facility] ACUTE REHAB UNIT  Daily Treatment Note  NAME: Nilsa Villegas  : 1950  MRN: [id number]    Date of Service: 2021    Discharge Recommendations:  24 hour supervision or assist, Home with Home health PT, S Level 3   PT Equipment Recommendations  Equipment Needed: No  Other: Pt owns RW and rollator    Assessment   Body structures, Functions, Activity limitations: Decreased functional mobility ; Decreased strength;Decreased endurance;Decreased safe awareness;Decreased balance  Assessment: pt limited by visual field cut and required cues for visual scanning while maneuvering around obstacles. Pt with decreased safety awareness at times and decreased need for assistance. Pt ambulates best with RW but required CGA without LOB using cane, though was unsteady. Pt limited by back pain during session. Pt is below functional baseline and would benefit from continued skilled PT to maximize functional independence. Treatment Diagnosis: decreased functional mobility, impaired gait  Prognosis: Good  PT Education: Goals;PT Role;Plan of Care;Transfer Training;General Safety;Gait Training;Functional Mobility Training; Low Vision Education  Patient Education: verbalized understanding, but will require reinforcement  Barriers to Learning: visual, aphasia  REQUIRES PT FOLLOW UP: Yes  Activity Tolerance  Activity Tolerance: Patient Tolerated treatment well  Activity Tolerance: limited some by back pain but pt able to continue after short rest.     Patient Diagnosis(es): There were no encounter diagnoses.      has a past medical history of Actinic keratosis, Arthritis, Asthma, Atrial fibrillation ([address] CAD (coronary artery disease), Cerebral artery occlusion with cerebral infarction ([address] Depression, Diabetes mellitus ([address] Disc displacement, lumbar, Diverticulosis of colon, Hard of hearing, Hyperlipidemia, Hypertension, Ischemic heart disease, Past heart attack, Poor vision, Right leg weakness, and Scoliosis. has a past surgical history that includes Coronary artery bypass graft;  section; Appendectomy; ovarian cyst removal; Hand surgery; Cardiac surgery (); skin biopsy; vascular surgery; Tonsillectomy; Lumbar disc surgery (8-); Lumbar spine surgery (10-11-12); and back surgery. Restrictions  Restrictions/Precautions  Restrictions/Precautions: Fall Risk  Required Braces or Orthoses?: No  Position Activity Restriction  Other position/activity restrictions: The patient is a 65y.o.  years old female with history of hypertension A. fib, hyperlipidemia and prior stroke who was admitted to the hospital last night with acute visual disturbance. Symptoms started few hours prior to admission. According to report, she was playing with her dog at home when she slowly lost the vision from the periphery bilaterally and was unable to function. Degree was severe and persistent. No falling or injury. She does have residual aphasia from prior stroke from last year. No fever or chills. No other triggers or other associated symptoms, weakness or numbness or chest pain. No severe headache. Patient came into the ED for evaluation since her symptoms did not improve. Initial work-up revealed mild elevated high blood pressure. She had a CT of the head which showed possible acute right ischemic PCA stroke. CTA showed more proximal right ICA occlusion. She was admitted. Subjective   General  Chart Reviewed: Yes  Additional Pertinent Hx: CVA 2020, DM, HTN  Response To Previous Treatment: Not applicable  Family / Caregiver Present: No  Referring Practitioner: Dr Lizeth Calles  Subjective  Subjective: pt reports feeling ok. No c/o pain. Agreeable to PT.  \"I can walk to the bathroom by myself if they'd let me\"  General Comment  Comments: seated in chair on arrival          Orientation  Orientation  Orientation Level: (limited by aphasia, but appears to be intact)       Objective Transfers  Sit to Stand: Contact guard assistance  Stand to sit: Contact guard assistance  Stand Pivot Transfers: Contact guard assistance  Ambulation 1  Device: Hand-Held Assist  Assistance: Contact guard assistance  Quality of Gait: increased SARWAT, variable foot placement, lateral sway particularly when scanning hallway/room, decreased jason, variable step length  Distance: 180 ft  Comments: Pt required increased time to perform. continued L side neglect and running into obstacles on L. Ambulation 2  Device 2: Single point cane  Assistance 2: Contact guard assistance  Quality of Gait 2: similar gait pattern to when using HHA. occasionally reaching for objects in hallway  Gait Deviations: Slow Jason;Staggers  Distance: 180 ft  Comments: limited by back pain, 2 standing rest breaks required     Balance  Sitting - Static: Good  Sitting - Dynamic: Good  Standing - Static: Fair  Standing - Dynamic: Fair;-       Comment: in dining area, pt practiced maneuvering around objects in room and visually scanning room. Pt required significant increased time and occasional cues for directions. Pt ambulated back to room (see above for details) and requierd CGA for pivoting to commode and CGA for dynamic standing balance with use of rail. CGA for sit to stand from commode. Used w/c to return to gym. Completed standing balance activities: Rhomberg standing 1 min, add head turns x 5 each direction slowly, head nods up/down x 5, (CGa for these), Semitandem x 20 seconds with min A. With SARWAT a little wider than hip width, lateral weight shift and reach for target  x 10 with min cues for visual scan. Also completed weight shift and march with UE support and without x 10 each.        Goals  Short term goals  Time Frame for Short term goals: 1-2 weeks  Short term goal 1: Pt will perform bed mobility mod I  Short term goal 2: Pt will perform all transfers with supervision  Short term goal 3: Pt will ambulate 150' with LRAD and supervision  Short term goal 4: Pt will negotiate 1 step without HR and supervision  Patient Goals   Patient goals : to go home    Plan    Plan  Times per week: 60 minutes a day 5 days a week  Current Treatment Recommendations: Strengthening, Functional Mobility Training, Transfer Training, Balance Training, Endurance Training, Gait Training, Safety Education & Training, Patient/Caregiver Education & Training, Neuromuscular Re-education, Home Exercise Program, Positioning, Modalities, Equipment Evaluation, Education, & procurement, Manual Therapy - Soft Tissue Mobilization, Stair training  Safety Devices  Type of devices:  All fall risk precautions in place, Call light within reach, Patient at risk for falls, Gait belt, Left in chair, Chair alarm in place  Restraints  Initially in place: No     Therapy Time   Individual Concurrent Group Co-treatment   Time In 0930         Time Out 1030         Minutes 60         Timed Code Treatment Minutes: [address], [place], [address] [facility]

## 2021-02-06 NOTE — PLAN OF CARE
Problem: IP BOWEL ELIMINATION  Goal: STG - Patient participates in bowel management program  2/5/2021 1943 by Stephane Fernandez RN  Outcome: Ongoing  2/5/2021 1731 by Chris Valdez RN  Outcome: Ongoing     Problem: SAFETY  Goal: LTG - Patient will demonstrate safety requirements appropriate to situation/environment  2/5/2021 1943 by Stephane Fernandez RN  Outcome: Ongoing  2/5/2021 1731 by Chris Valdez RN  Outcome: Ongoing  Goal: LTG - patient will utilize safety techniques  Outcome: Ongoing  Goal: STG - patient locks brakes on wheelchair  Outcome: Ongoing  Goal: STG - Patient uses call light consistently to request assistance with transfers  Outcome: Ongoing  Goal: STG - patient uses gait belt during all transfers  Outcome: Ongoing     Problem: PAIN  Goal: STG - Patient will reduce or eliminate use of analgesics  2/5/2021 1943 by Stephane Fernandez RN  Outcome: Ongoing  2/5/2021 1731 by Chris Valdez RN  Outcome: Ongoing  Goal: STG - Patient will increase activity level  Outcome: Ongoing

## 2021-02-06 NOTE — PLAN OF CARE
Problem: Falls - Risk of:  Goal: Will remain free from falls  Description: Will remain free from falls  Note: Fall risk precautions in place, call light in reach, bed in lowest position, 2/2 bed rails up, bed wheels locked, bed side table within reach, bed alarm on, will continue to monitor. Problem: Falls - Risk of:  Goal: Absence of physical injury  Description: Absence of physical injury  Note: No injuries this shift.

## 2021-02-06 NOTE — PROGRESS NOTES
ACUTE REHAB UNIT  SPEECH/LANGUAGE PATHOLOGY      [x] Daily  [] Weekly Care Conference Note  [] Discharge    [address]     KRI:0/82/3473  NLY:[id number]  Room #: YDZ-1273/8326-99   Rehab Dx/Hx: Acute ischemic stroke [facility]) [I63.9]  Date of Admit: 2/4/2021      Precautions: falls and aspirations  Home situation: Pt lives at home (with two dogs). Her daughter does all medication and finance management. Pt is an active . ST Dx: aphasia; cognitive deficits (L visual spatial deficits)    Daily Treatment Info:   Date: 2/6/2021   Tx session 1 Tx session 2   Pain Denied  Denied    Subjective     Pt seen sitting upright in chair, pleasant and cooperative. Pt uses multi-modal communication to convey messages with conversational partner. Frequent apraxic vs paraphasic errors throughout session. Pt seen sitting upright in chair, again pleasant and cooperative. Pt noted with more perseveration this session. Pt is very eager to accurately express her thoughts/wants/needs. Objective:  Goals     Pt will complete moderately complex comprehension tasks (2 step directions/commands, abstract yes/no questions) to 80% accuracy. 2-step dx:  -80% independently  -repetition did not increase accuracy Pt seemingly responding to appropriately to conversational topics and questions ~90% of the time. Patient will repeat multi-syllabic phrases/sentences to > 80% accuracy. Goal not targeted this session. Poor repetition of target responses during confrontational naming task due to apraxic errors. Benefits from visual cues. Pt will improve visual perception awareness for functional task completion via graded tasks to mild cues. Goal not targeted this session. Goal not targeted this session. Pt will complete basic expressive language tasks (sentence completion, naming, automatics) to 80% accuracy with min cues. Use of automatic speech phrases upon introduction independently.     Pt used gestures and written language frequently to assist with communication breakdown. Benefited from phonemic cues and visual cues to assist with word finding deficits in conversation. Using these techniques, pt able to communicate rather effectively. Responsive naming:  -70% independently; increased time if needed  -increased to 90% given semantic cues, sentence completion cues, visual cues  -apraxia-like errors vs occasional phonemic paraphasias   Confrontational naming (photos):  -60% independently  -80% given semantic cues, phonemic cues, visual cues  -perseveration noted when attempting to name photos  -semantic paraphasias (ex. \"van\" for \"car/SUV\")  -using descriptions or similar words to assist with effectively communicating (ex. Wrote down [employer]" for [employer]")  -pt is often able to write down target responses but then has difficulty expressing verbally  -various apraxic-like errors    Reviewed handout with word finding strategies and provided examples/demonstrations of various strategies. In conversation, pt using multi-modal communication and various word finding strategies to express messages (verbal expression, gestures, written expression, use of opposites/synonyms, etc.)     Other areas targeted:     Education:   Educated pt re: role of SLP, word finding strategies, rationale for tx tasks, and plan of care. Pt v/u, benefits from information being simplified. Educated pt re: role of SLP, word finding strategies, rationale for tx tasks, and plan of care. Pt v/u, benefits from information being simplified.     Safety Devices: [x] Call light within reach  [x] Chair alarm activated  [] Bed alarm activated  [] Other: [x] Call light within reach  [x] Chair alarm activated  [] Bed alarm activated  [] Other:      Assessment:   Speech Therapy Diagnosis  Cognitive Diagnosis: Pt presents with left visual spatial deficits which are new since previous admission and will liekly impact safety and functional tasks upon d/c. Full cognitive assessment was limited due to deficits. Pt recalled SLP from previous admission and was oriented x 4. Communication Diagnosis: Pt presents with moderate experssive communication deficits. Expressive aphasia with apraxia of speech and mild receptive aphasia. Pt demonstrated moderate paraphasic and motor planning errors. Pt effectively utilized writing to repair communication breakdowns. Pt's overall communication appears similar to previous admission. Current Diet Order: DIET CARB CONTROL;    Recommended Form of Meds: Whole with water  Compensatory Swallowing Strategies: Small bites/sips, Upright as possible for all oral intake, Eat/Feed slowly     Plan:     Frequency:  5days/week   60 minutes/day  Discharge Recommendations:   Barriers: Pt with baseline language deficits from prior CVA  Discharge Recommendations:  [] Home independently  [] Home with assistance []  24 hour supervision  [] SNF [x] Other: TBD  Continued SLP Treatment:  [x] Yes [] No [] TBD based on progress while on ARU [] Vital Stim indicated [] Other:   Estimated discharge date: TBD     Type of Total Treatment Minutes   Session 1   Session 2   Time In 0900 1115   Time Out 0930 1148   Timed Code Minutes  0 0   Individual Treatment Minutes  30 33   Co-Treatment Minutes      Group Treatment Minutes      Concurrent Treatment Minutes        TOTAL DAILY MINUTES:  61    Electronically Signed by     Souleymane Horne M.S. [address] Pathologist  2/6/2021 12:39 PM

## 2021-02-06 NOTE — PROGRESS NOTES
Occupational Therapy  Facility/Department: [facility] ACUTE REHAB UNIT  Daily Treatment Note  NAME: Chantel Dhaliwal  : 1950  MRN: [id number]    Date of Service: 2021    Discharge Recommendations:  Home with Home health OT, S Level 3, Home with assist PRN  OT Equipment Recommendations  Other: continue to assess pending progress    Assessment   Assessment: Pt is a 65year old female who is below her functional baseline for independence and saftey displaying the above deficits. Pt would benefit from continued OT services to improve independence and safety in occupational pursuits. Treatment Diagnosis: Pt with above functional deficits due to recent acute CVA. Prognosis: Good  Patient Education: Pt verbalized understanding  REQUIRES OT FOLLOW UP: Yes  Activity Tolerance  Activity Tolerance: Patient Tolerated treatment well  Safety Devices  Safety Devices in place: Yes  Type of devices: All fall risk precautions in place;Call light within reach; Chair alarm in place; Patient at risk for falls; Left in chair         Patient Diagnosis(es): There were no encounter diagnoses. has a past medical history of Actinic keratosis, Arthritis, Asthma, Atrial fibrillation ([address] CAD (coronary artery disease), Cerebral artery occlusion with cerebral infarction ([address] Depression, Diabetes mellitus ([address] Disc displacement, lumbar, Diverticulosis of colon, Hard of hearing, Hyperlipidemia, Hypertension, Ischemic heart disease, Past heart attack, Poor vision, Right leg weakness, and Scoliosis. has a past surgical history that includes Coronary artery bypass graft;  section; Appendectomy; ovarian cyst removal; Hand surgery; Cardiac surgery (); skin biopsy; vascular surgery; Tonsillectomy; Lumbar disc surgery (8-); Lumbar spine surgery (10-11-12); and back surgery.     Restrictions  Restrictions/Precautions  Restrictions/Precautions: Fall Risk  Required Braces or Orthoses?: No  Position Activity Pt will complete grooming Ruthie  Short term goal 2: Pt will complete functional mobility/transfers Ruthie  Short term goal 3: Pt will complete UB bathing/dressing Ruthie  Short term goal 4: Pt will complete LB bathing/dressing Ruthie  Short term goal 5: Pt will complete light IADL activity Ruthie  Long term goals  Time Frame for Long term goals : STGs= LTGs  Patient Goals   Patient goals : to get better and go home       Therapy Time   Individual Concurrent Group Co-treatment   Time In 0730         Time Out 0830         Minutes 60         Timed Code Treatment Minutes: [address]  [place] OTR/L, [id number]

## 2021-02-06 NOTE — PROGRESS NOTES
Sylvester Gonzalez  2/6/2021  [id number]    Chief Complaint: Acute ischemic stroke ([address]    Subjective:   No overnight events. No current complaints. Was still hallucinating intermittently yesterday per her report. None this am. Glucoses above goal.     ROS: No CP, SOB, dyspnea    Objective:  Patient Vitals for the past 24 hrs:   BP Temp Temp src Pulse Resp SpO2 Height   02/06/21 0853 -- -- -- -- 16 97 % --   02/05/21 2121 -- -- -- -- -- 95 % --   02/05/21 2051 (!) 134/56 97.6 °F (36.4 °C) Oral 77 16 95 % --   02/05/21 1359 -- -- -- -- -- -- 5' 4\" (1.626 m)   02/05/21 1105 -- -- -- -- -- 93 % --     Gen: No distress, pleasant. Resting in bed  HEENT: Normocephalic, atraumatic. CV: Regular rate and rhythm. No MRG   Resp: No respiratory distress. CTAB   Abd: Soft, nontender nondistended  Ext: No edema. Neuro: Alert, oriented, appropriately interactive. Aphasia    Laboratory data: Available via EMR. Therapy progress:  PT  Position Activity Restriction  Other position/activity restrictions: The patient is a 65y.o.  years old female with history of hypertension A. fib, hyperlipidemia and prior stroke who was admitted to the hospital last night with acute visual disturbance. Symptoms started few hours prior to admission. According to report, she was playing with her dog at home when she slowly lost the vision from the periphery bilaterally and was unable to function. Degree was severe and persistent. No falling or injury. She does have residual aphasia from prior stroke from last year. No fever or chills. No other triggers or other associated symptoms, weakness or numbness or chest pain. No severe headache. Patient came into the ED for evaluation since her symptoms did not improve. Initial work-up revealed mild elevated high blood pressure. She had a CT of the head which showed possible acute right ischemic PCA stroke. CTA showed more proximal right ICA occlusion. She was admitted.   Objective     Sit to Stand: Contact guard assistance  Stand to sit: Contact guard assistance  Bed to Chair: Contact guard assistance  Device: Rolling Walker  Assistance: Contact guard assistance  OT  PT Equipment Recommendations  Equipment Needed: No  Other: Pt owns RW and rollator  Toilet - Technique: Ambulating  Equipment Used: Standard toilet  Toilet Transfers Comments: use of R grab bar  Assessment        SLP  Current Diet : Regular  Current Liquid Diet : Thin  Diet Solids Recommendation: Regular  Liquid Consistency Recommendation: Thin    Body mass index is 25.85 kg/m². Assessment:  Patient Active Problem List   Diagnosis    Lumbar radiculopathy    [facility]    Received intravenous tissue plasminogen activator (tPA) in emergency department    Benign essential HTN    Nontraumatic cortical hemorrhage of left cerebral hemisphere [facility]    Dyslipidemia    PAF (paroxysmal atrial fibrillation) (HCC)    Thalamic stroke [facility]    DM (diabetes mellitus), type 2, uncontrolled with complications ([facility]    Remote history of stroke    Persistent atrial fibrillation (HCC)    Hyponatremia    Coronary artery disease involving native coronary artery of native heart without angina pectoris    Acute ischemic stroke ([facility]       Plan:   Acute right ischemic stroke: ASA, coumadin initiation in 2 weeks from event (2/13). Lovenox PPx currently. PT/OT/SLP     H/O CVA: L MCA, hemorrhagic transformation following tPA admin. ASA, statin     HTN: toprol 50     HLD: resumed home zetia     Afib: BB, AC as above     DM: Lantus decreased to 5, SSI. Takes metformin 1000, glimepiride 4 BID at home. - Resume metformin 500 and glimepiride     Depression: zoloft 100     Bowels: Per protocol  Bladder: Per protocol   Sleep: Trazodone provided prn. Pain: tylenol   DVT PPx: Patria Hutchinson MD 2/6/2021, 9:20 AM    * This document was created using dictation software.   While all precautions were taken to ensure accuracy, errors may have occurred. Please disregard any typographical errors.

## 2021-02-07 LAB
GLUCOSE BLD-MCNC: 132 MG/DL (ref 70–99)
GLUCOSE BLD-MCNC: 146 MG/DL (ref 70–99)
GLUCOSE BLD-MCNC: 161 MG/DL (ref 70–99)
GLUCOSE BLD-MCNC: 224 MG/DL (ref 70–99)
GLUCOSE BLD-MCNC: 74 MG/DL (ref 70–99)
PERFORMED ON: ABNORMAL
PERFORMED ON: NORMAL

## 2021-02-07 PROCEDURE — 94761 N-INVAS EAR/PLS OXIMETRY MLT: CPT

## 2021-02-07 PROCEDURE — 94640 AIRWAY INHALATION TREATMENT: CPT

## 2021-02-07 PROCEDURE — 6370000000 HC RX 637 (ALT 250 FOR IP): Performed by: PHYSICAL MEDICINE & REHABILITATION

## 2021-02-07 PROCEDURE — 6360000002 HC RX W HCPCS: Performed by: PHYSICAL MEDICINE & REHABILITATION

## 2021-02-07 PROCEDURE — 1280000000 HC REHAB R&B

## 2021-02-07 RX ADMIN — GLIMEPIRIDE 4 MG: 2 TABLET ORAL at 08:40

## 2021-02-07 RX ADMIN — METOPROLOL SUCCINATE 50 MG: 50 TABLET, EXTENDED RELEASE ORAL at 08:39

## 2021-02-07 RX ADMIN — INSULIN GLARGINE 5 UNITS: 100 INJECTION, SOLUTION SUBCUTANEOUS at 22:36

## 2021-02-07 RX ADMIN — Medication 2 PUFF: at 21:36

## 2021-02-07 RX ADMIN — METFORMIN HYDROCHLORIDE 500 MG: 500 TABLET ORAL at 08:39

## 2021-02-07 RX ADMIN — ENOXAPARIN SODIUM 40 MG: 40 INJECTION SUBCUTANEOUS at 08:48

## 2021-02-07 RX ADMIN — ASPIRIN 81 MG: 81 TABLET, CHEWABLE ORAL at 08:40

## 2021-02-07 RX ADMIN — INSULIN LISPRO 4 UNITS: 100 INJECTION, SOLUTION INTRAVENOUS; SUBCUTANEOUS at 13:18

## 2021-02-07 RX ADMIN — SERTRALINE 100 MG: 50 TABLET, FILM COATED ORAL at 08:40

## 2021-02-07 RX ADMIN — INSULIN LISPRO 2 UNITS: 100 INJECTION, SOLUTION INTRAVENOUS; SUBCUTANEOUS at 08:42

## 2021-02-07 RX ADMIN — Medication 2 PUFF: at 13:25

## 2021-02-07 RX ADMIN — METFORMIN HYDROCHLORIDE 500 MG: 500 TABLET ORAL at 16:57

## 2021-02-07 RX ADMIN — EZETIMIBE 10 MG: 10 TABLET ORAL at 22:33

## 2021-02-07 RX ADMIN — INSULIN LISPRO 2 UNITS: 100 INJECTION, SOLUTION INTRAVENOUS; SUBCUTANEOUS at 16:57

## 2021-02-07 ASSESSMENT — PAIN SCALES - GENERAL
PAINLEVEL_OUTOF10: 0

## 2021-02-07 NOTE — PATIENT CARE CONFERENCE
[facility]  Inpatient Rehabilitation  Weekly Team Conference Note    Patient Name: Justen Sifuentes        MRN: [id number]    : 1950  (65 y.o.)  Gender: female   Referring Practitioner: Dr Lexy Almonte  Diagnosis: CVA    The team conference for this patient was held on 21 at 11:00am by:  Ryan Fleming MD    CASE MANAGEMENT:  Assessment: Patient lives alone. Patient's daughter anticipates patient discharging to home [place] [id number]. PHYSICAL THERAPY:    Bed Mobility:   Scooting: Stand by assistance    Transfers:  Sit to Stand: Contact guard assistance  Stand to sit: Contact guard assistance  Bed to Chair: Contact guard assistance    Ambulation 1  Surface: level tile  Device: Rolling Walker  Assistance: Contact guard assistance  Quality of Gait: increased SARWAT, variable foot placement, lateral sway particularly when scanning hallway/room, decreased jason, variable step length  Distance: 185'  Comments: Pt required increased time to perform. continued L side neglect and running into obstacles on L. Stairs  # Steps : 4  Stairs Height: 6\"  Rails: Bilateral  Assistance: Contact guard assistance  Comment: Pt utilizing sensation in BLE when descending to find edge of stair. Performed curb step negotiation of 6\" curb without AD with Shay.     QM:  Roll Left and Right  Assistance Needed: Supervision or touching assistance  CARE Score: 4  Discharge Goal: Independent  Sit to Lying  Assistance Needed: Supervision or touching assistance  CARE Score: 4  Discharge Goal: Independent  Lying to Sitting on Side of Bed  Assistance Needed: Supervision or touching assistance  CARE Score: 4  Discharge Goal: Independent  Sit to Stand  Assistance Needed: Setup or clean-up assistance  CARE Score: 5  Discharge Goal: Independent  Chair/Bed-to-Chair Transfer  Assistance Needed: Supervision or touching assistance  CARE Score: 4  Discharge Goal: Supervision or touching assistance  Car Transfer  Assistance Needed: Supervision or touching assistance  CARE Score: 4  Discharge Goal: Supervision or touching assistance  Walk 10 Feet  Assistance Needed: Supervision or touching assistance  CARE Score: 4  Discharge Goal: Supervision or touching assistance  Walk 50 Feet with Two Turns  Assistance Needed: Supervision or touching assistance  CARE Score: 4  Discharge Goal: Supervision or touching assistance  Walk 150 Feet  Assistance Needed: Supervision or touching assistance  CARE Score: 4  Discharge Goal: Supervision or touching assistance  Walking 10 Feet on Uneven Surfaces  Assistance Needed: Partial/moderate assistance  CARE Score: 3  Discharge Goal: Supervision or touching assistance  1 Step (Curb)  Assistance Needed: Partial/moderate assistance  CARE Score: 3  Discharge Goal: Supervision or touching assistance  4 Steps  Assistance Needed: Supervision or touching assistance  CARE Score: 4  Discharge Goal: Supervision or touching assistance  12 Steps  Reason if not Attempted: Not attempted due to medical condition or safety concerns  CARE Score: 88  Discharge Goal: Supervision or touching assistance  Picking Up Object  Assistance Needed: Partial/moderate assistance  CARE Score: 3  Discharge Goal: Supervision or touching assistance  Wheelchair Ability  Uses a Wheelchair and/or Scooter?: No    SPEECH THERAPY:    Diet Level:DIET CARB CONTROL; Assessment:   Pt continues to present with moderate communication deficits (apraxia of speech > expressive aphasia and mild receptive aphasia) with motor planning and paraphasic errors. Pt utilizes writing to repair communication breakdowns, errors increase when frustrated but overall improvements with task completion and increased functional communication with staff members. Visual errors persist but seem to be improving slightly, will likely be a barrier towards an independent discharge.       OCCUPATIONAL THERAPY:    ADL:   ADL  Feeding: Verbal cueing, Setup  Grooming: Supervision, Setup  UE Bathing: Setup, Stand by assistance  LE Bathing: Setup, Stand by assistance  UE Dressing: Setup, Stand by assistance  LE Dressing: Setup, Stand by assistance  Toileting: Supervision  Additional Comments: Pt ambulated to/from bathroom wiht RW at SUP, completed toileting then grooming at sink in stance. Toilet Transfers: Toilet Transfers  Toilet - Technique: Ambulating  Equipment Used: Standard toilet  Toilet Transfer: Supervision  Toilet Transfers Comments: use of R grab bar    Tub/ShowerTransfers:     Shower Transfers  Shower - Transfer Type: To and From  Shower - Transfer To: Shower seat with back  Shower - Technique: Ambulating  Shower Transfers: Stand by assistance    QM:  Eating  Assistance Needed: Setup or clean-up assistance  CARE Score: 5  Discharge Goal: Independent  Oral Hygiene  Assistance Needed: Setup or clean-up assistance  CARE Score: 5  Discharge Goal: Independent  Toileting Hygiene  Assistance Needed: Supervision or touching assistance  CARE Score: 4  Discharge Goal: Independent  Toilet Transfer  Assistance Needed: Supervision or touching assistance  CARE Score: 4  Discharge Goal: Independent  Shower/Bathe Self  Assistance Needed: Supervision or touching assistance  CARE Score: 4  Discharge Goal: Independent  Upper Body Dressing  Assistance Needed: Setup or clean-up assistance  CARE Score: 5  Discharge Goal: Independent  Lower Body Dressing  Assistance Needed: Setup or clean-up assistance  CARE Score: 5  Discharge Goal: Independent  Putting On/Taking Off Footwear  Assistance Needed: Setup or clean-up assistance  CARE Score: 5  Discharge Goal: Independent    NUTRITION:  Weight: 150 lb 9.6 oz (68.3 kg) / Body mass index is 25.85 kg/m². Diet Order:CCC    Supplements:NA    PO intake usually greater than 50% of meals. Please see nutrition note for details.     NURSING:    [facility] Fall Risk Score: 40  Wounds/Incisions/Ulcers: None  Medication Review: Daily with patient  Pain:Tylenol PRN for HA if needed. Only Periferal vision, states sometimes has blurry vision,the will say ,\"But ,I think my vision is a little better. \"  Consultations/Labs/X-rays: Lantus Discontinued. BS running low at bedtime. Risk for Readmission: 17%    Patient/Family Education provided by team:    Discharge Plan   Estimated Length of Stay:7 days  Destination: home health  Pass:  Services at Discharge: [facility] PT S3, [facility] OT S3  Equipment at Discharge: RW, shower chair (pt owns)   Factors facilitating achievement of predicted outcomes: cooperative  Barriers to the achievement of predicted outcomes: vision, aphasia, lives alone  Patient Goals:to go home, to get better and go home    Plan of Care  Interdisciplinary Individualized Plan of Care Review:    Continue Current Plan of Care:  Yes  Modifications:_____________________________    Rehab Team Members in attendance for Team Conference:  Javi Mosquera, MSW, LSW    Vinh Zavala RD, KADI    [place], [place]. D, Neuropsychologist    Taylor Gonsales, OTR/L    Apryl Mitchell, PT, DPT    Irina House M.A., Nyoka Bame RN Andrez Chasten PT, DPT,     I approve the established interdisciplinary plan of care as documented within the medical record of Justen Sifuentes.     Ryan Fleming MD  Electronically signed by Ryan Fleming MD on 2/9/2021 at 3:51 PM

## 2021-02-07 NOTE — PROGRESS NOTES
Patient home supplied medication in bin over sink in room, has been labeled by pharmacy for nightly use.  Electronically signed by Marietta Holman RN on 2/7/2021 at 11:41 AM

## 2021-02-07 NOTE — PLAN OF CARE
Problem: IP BOWEL ELIMINATION  Goal: STG - Patient participates in bowel management program  Outcome: Ongoing     Problem: SAFETY  Goal: LTG - Patient will demonstrate safety requirements appropriate to situation/environment  Outcome: Ongoing  Goal: LTG - patient will utilize safety techniques  Outcome: Ongoing  Goal: STG - patient locks brakes on wheelchair  Outcome: Ongoing  Goal: STG - Patient uses call light consistently to request assistance with transfers  Outcome: Ongoing  Goal: STG - patient uses gait belt during all transfers  Outcome: Ongoing     Problem: PAIN  Goal: STG - Patient will reduce or eliminate use of analgesics  Outcome: Ongoing  Goal: STG - Patient will increase activity level  Outcome: Ongoing     Problem: Falls - Risk of:  Goal: Will remain free from falls  Description: Will remain free from falls  Outcome: Ongoing  Goal: Absence of physical injury  Description: Absence of physical injury  Outcome: Ongoing

## 2021-02-08 LAB
ANION GAP SERPL CALCULATED.3IONS-SCNC: 7 MMOL/L (ref 3–16)
BUN BLDV-MCNC: 8 MG/DL (ref 7–20)
CALCIUM SERPL-MCNC: 10.4 MG/DL (ref 8.3–10.6)
CHLORIDE BLD-SCNC: 103 MMOL/L (ref 99–110)
CO2: 27 MMOL/L (ref 21–32)
CREAT SERPL-MCNC: 0.7 MG/DL (ref 0.6–1.2)
GFR AFRICAN AMERICAN: >60
GFR NON-AFRICAN AMERICAN: >60
GLUCOSE BLD-MCNC: 153 MG/DL (ref 70–99)
GLUCOSE BLD-MCNC: 167 MG/DL (ref 70–99)
GLUCOSE BLD-MCNC: 172 MG/DL (ref 70–99)
GLUCOSE BLD-MCNC: 192 MG/DL (ref 70–99)
GLUCOSE BLD-MCNC: 240 MG/DL (ref 70–99)
GLUCOSE BLD-MCNC: 84 MG/DL (ref 70–99)
HCT VFR BLD CALC: 37 % (ref 36–48)
HEMOGLOBIN: 12 G/DL (ref 12–16)
MCH RBC QN AUTO: 30.2 PG (ref 26–34)
MCHC RBC AUTO-ENTMCNC: 32.3 G/DL (ref 31–36)
MCV RBC AUTO: 93.4 FL (ref 80–100)
PDW BLD-RTO: 13.8 % (ref 12.4–15.4)
PERFORMED ON: ABNORMAL
PERFORMED ON: NORMAL
PLATELET # BLD: 281 K/UL (ref 135–450)
PMV BLD AUTO: 8.7 FL (ref 5–10.5)
POTASSIUM SERPL-SCNC: 5 MMOL/L (ref 3.5–5.1)
RBC # BLD: 3.96 M/UL (ref 4–5.2)
SODIUM BLD-SCNC: 137 MMOL/L (ref 136–145)
WBC # BLD: 7.2 K/UL (ref 4–11)

## 2021-02-08 PROCEDURE — 6370000000 HC RX 637 (ALT 250 FOR IP): Performed by: PHYSICAL MEDICINE & REHABILITATION

## 2021-02-08 PROCEDURE — 97535 SELF CARE MNGMENT TRAINING: CPT

## 2021-02-08 PROCEDURE — 80048 BASIC METABOLIC PNL TOTAL CA: CPT

## 2021-02-08 PROCEDURE — 92507 TX SP LANG VOICE COMM INDIV: CPT

## 2021-02-08 PROCEDURE — 85027 COMPLETE CBC AUTOMATED: CPT

## 2021-02-08 PROCEDURE — 97116 GAIT TRAINING THERAPY: CPT

## 2021-02-08 PROCEDURE — 97530 THERAPEUTIC ACTIVITIES: CPT

## 2021-02-08 PROCEDURE — 97112 NEUROMUSCULAR REEDUCATION: CPT

## 2021-02-08 PROCEDURE — 1280000000 HC REHAB R&B

## 2021-02-08 PROCEDURE — 6360000002 HC RX W HCPCS: Performed by: PHYSICAL MEDICINE & REHABILITATION

## 2021-02-08 PROCEDURE — 36415 COLL VENOUS BLD VENIPUNCTURE: CPT

## 2021-02-08 RX ADMIN — METFORMIN HYDROCHLORIDE 500 MG: 500 TABLET ORAL at 11:05

## 2021-02-08 RX ADMIN — ENOXAPARIN SODIUM 40 MG: 40 INJECTION SUBCUTANEOUS at 11:06

## 2021-02-08 RX ADMIN — GLIMEPIRIDE 4 MG: 2 TABLET ORAL at 11:06

## 2021-02-08 RX ADMIN — METOPROLOL SUCCINATE 50 MG: 50 TABLET, EXTENDED RELEASE ORAL at 11:05

## 2021-02-08 RX ADMIN — EZETIMIBE 10 MG: 10 TABLET ORAL at 23:41

## 2021-02-08 RX ADMIN — SERTRALINE 100 MG: 50 TABLET, FILM COATED ORAL at 11:05

## 2021-02-08 RX ADMIN — INSULIN LISPRO 4 UNITS: 100 INJECTION, SOLUTION INTRAVENOUS; SUBCUTANEOUS at 12:42

## 2021-02-08 RX ADMIN — INSULIN LISPRO 2 UNITS: 100 INJECTION, SOLUTION INTRAVENOUS; SUBCUTANEOUS at 17:36

## 2021-02-08 RX ADMIN — ASPIRIN 81 MG: 81 TABLET, CHEWABLE ORAL at 11:06

## 2021-02-08 RX ADMIN — METFORMIN HYDROCHLORIDE 500 MG: 500 TABLET ORAL at 17:35

## 2021-02-08 RX ADMIN — INSULIN LISPRO 2 UNITS: 100 INJECTION, SOLUTION INTRAVENOUS; SUBCUTANEOUS at 11:07

## 2021-02-08 ASSESSMENT — PAIN SCALES - GENERAL
PAINLEVEL_OUTOF10: 0

## 2021-02-08 NOTE — PROGRESS NOTES
ACUTE REHAB UNIT  SPEECH/LANGUAGE PATHOLOGY      [x] Daily  [] Weekly Care Conference Note  [] Discharge    [address] [place]     VET:2/35/5609  PNR:[id number]  Room #: EHU-9259/4311-95   Rehab Dx/Hx: Acute ischemic stroke [facility]) [I63.9]  Date of Admit: 2/4/2021      Precautions: falls and aspirations  Home situation: Pt lives at home (with two dogs). Her daughter does all medication and finance management. Pt is an active . ST Dx: aphasia; cognitive deficits (L visual spatial deficits)    Daily Treatment Info:   Date: 2/8/2021   Tx session 1 Tx session 2   Pain None reported  None reported   Subjective     Pt up in chair for therapy. Alert and agreeable. Able to maintain eye contact at midline. Pt reports hallucinations (I.e. little people with hats) are gone. Pt up in chair, alert, and willing to participate in [address] [place] [address] DrSarita Requires extended time and effort for functional communication    Pt reported that \"everything is harder than last time\", referring to her previous stay in ARU and intermittently tearful. Objective:  Goals     Pt will complete moderately complex comprehension tasks (2 step directions/commands, abstract yes/no questions) to 80% accuracy. Goal not targeted this session. Goal not targeted this session. Patient will repeat multi-syllabic phrases/sentences to > 80% accuracy. Goal not targeted this session. Repeat Multi-Syllabic Sentences  - 5/7 (71%) independently  - Pt benefited from mod-max tactile cues (I.e. tapping out each word) and model from SLP. Pt will improve visual perception awareness for functional task completion via graded tasks to mild cues. Appropriately aware of visual deficits as a barrier towards discharge, the need for 24/7 supervision, and attempting to come up with a solution. Filling out Lunch/Dinner Order  - pt had difficult time attending to food items on the left side of the menu.  Required mod verbal/visual cues x 3 to look repair communication breakdowns. Pt's overall communication appears similar to previous admission. Current Diet Order: DIET CARB CONTROL; Recommended Form of Meds: Whole with water  Compensatory Swallowing Strategies: Small bites/sips, Upright as possible for all oral intake, Eat/Feed slowly     Plan:     Frequency:  5days/week   60 minutes/day  Discharge Recommendations:   Barriers: Pt with baseline language deficits from prior CVA  Discharge Recommendations:  [] Home independently  [] Home with assistance []  24 hour supervision  [] SNF [x] Other: TBD  Continued SLP Treatment:  [x] Yes [] No [] TBD based on progress while on ARU [] Vital Stim indicated [] Other:   Estimated discharge date: TBD     Type of Total Treatment Minutes   Session 1   Session 2   Time In 0830 1020   Time Out 0900 1055   Timed Code Minutes      Individual Treatment Minutes  30 35   Co-Treatment Minutes      Group Treatment Minutes      Concurrent Treatment Minutes        TOTAL DAILY MINUTES:  65    Electronically Signed by     Myra Hope M.A. CCC-SLP AURELIANO [id number]  Speech-Language Pathologist [phone]  2/8/2021 9:11 AM     The speech-language pathologist was present, directed the patient's care, made skilled judgment and was responsible for assessment and treatment.     RADHA Neely.,  Speech-Language Pathology

## 2021-02-08 NOTE — PROGRESS NOTES
Occupational Therapy  Facility/Department: [facility] ACUTE REHAB UNIT  Daily Treatment Note  NAME: Gloria Quinonez  : 1950  MRN: [id number]    Date of Service: 2021    Discharge Recommendations:  Home with Home health OT, S Level 3, Home with assist PRN  OT Equipment Recommendations  Equipment Needed: Yes  Mobility Devices: ADL Assistive Devices  Other: continue to assess pending progress    Assessment   Performance deficits / Impairments: Decreased functional mobility ; Decreased balance;Decreased vision/visual deficit; Decreased ADL status; Decreased safe awareness;Decreased high-level IADLs  Assessment: Pt is a 65year old female who is below her functional baseline for independence and saftey displaying the above deficits. Pt would benefit from continued OT services to improve independence and safety in occupational pursuits. Treatment Diagnosis: Pt with above functional deficits due to recent acute CVA. Prognosis: Good  Decision Making: Low Complexity  OT Education: OT Role;Plan of Care;ADL Adaptive Strategies;IADL Safety  Patient Education: Pt verbalized understanding  REQUIRES OT FOLLOW UP: Yes  Activity Tolerance  Activity Tolerance: Patient Tolerated treatment well  Safety Devices  Safety Devices in place: Yes  Type of devices: All fall risk precautions in place;Call light within reach; Chair alarm in place; Patient at risk for falls; Left in chair         Patient Diagnosis(es): CVA     has a past medical history of Actinic keratosis, Arthritis, Asthma, Atrial fibrillation ([address] CAD (coronary artery disease), Cerebral artery occlusion with cerebral infarction ([address] Depression, Diabetes mellitus ([address] Disc displacement, lumbar, Diverticulosis of colon, Hard of hearing, Hyperlipidemia, Hypertension, Ischemic heart disease, Past heart attack, Poor vision, Right leg weakness, and Scoliosis. has a past surgical history that includes Coronary artery bypass graft;  section;  Appendectomy; ovarian cyst removal; Hand surgery; Cardiac surgery (1984); skin biopsy; vascular surgery; Tonsillectomy; Lumbar disc surgery (8-); Lumbar spine surgery (10-11-12); and back surgery. Restrictions  Restrictions/Precautions  Restrictions/Precautions: Fall Risk  Required Braces or Orthoses?: No  Position Activity Restriction  Other position/activity restrictions: The patient is a 65y.o.  years old female with history of hypertension A. fib, hyperlipidemia and prior stroke who was admitted to the hospital last night with acute visual disturbance. Symptoms started few hours prior to admission. According to report, she was playing with her dog at home when she slowly lost the vision from the periphery bilaterally and was unable to function. Degree was severe and persistent. No falling or injury. She does have residual aphasia from prior stroke from last year. No fever or chills. No other triggers or other associated symptoms, weakness or numbness or chest pain. No severe headache. Patient came into the ED for evaluation since her symptoms did not improve. Initial work-up revealed mild elevated high blood pressure. She had a CT of the head which showed possible acute right ischemic PCA stroke. CTA showed more proximal right ICA occlusion. She was admitted. Subjective   General  Chart Reviewed: Yes  Patient assessed for rehabilitation services?: Yes  Response to previous treatment: Patient with no complaints from previous session  Family / Caregiver Present: No  Diagnosis: Acute CVA  Subjective  Subjective: Pt seated in recliner upon entry, agreeable to OT session.   Vital Signs  Patient Currently in Pain: Denies     Objective    ADL  UE Bathing: Setup;Stand by assistance  LE Bathing: Setup;Stand by assistance  UE Dressing: Setup;Stand by assistance  LE Dressing: Setup;Stand by assistance  Toileting: Stand by assistance  Additional Comments: Pt ambulated to bathroom for ADL completion with RW at [facility] . Bathing and dressing completed on shower chair with back. Balance  Sitting Balance: Stand by assistance  Standing Balance: Stand by assistance  Standing Balance  Time: ~30 minutes  Activity: functional mobilty/transfers, ADL and activity completion  Comment: no LOB noted    Functional Mobility  Functional - Mobility Device: Rolling Walker  Activity: Other(to/from dining area)  Assist Level: Stand by assistance  Functional Mobility Comments: ~120 ft total    Toilet Transfers  Toilet - Technique: Ambulating  Equipment Used: Standard toilet  Toilet Transfer: Stand by assistance  Toilet Transfers Comments: use of R grab bar    Shower Transfers  Shower - Transfer Type: To and From  Shower - Transfer To: Shower seat with back  Shower - Technique: Ambulating  Shower Transfers: Stand by assistance       Transfers  Sit to stand: Stand by assistance  Stand to sit: Stand by assistance               Additional Activities Comment  Additional Activities: Pt completed card matching activity in stance at [facility] with 1 seated rest break - addressed visual scanning, activity tolerance and balance- visual scanning to R WFL, increased time for visual scanning to L. Pt then completed simulated kitchen activity and retrieved 7 items from high/low cabinets/drawers at [facility], 1 VC to locate all items - addressed functional mobilty, kitchen mobility, walker management, visual scanning, reaching across midline, balance and activity tolerance. minimal VC for walker management.              Plan   Plan  Times per week: 5-7 x 60 min  Times per day: Daily  Current Treatment Recommendations: Strengthening, ROM, Safety Education & Training, Self-Care / ADL, Cognitive Reorientation, Home Management Training, Patient/Caregiver Education & Training, Neuromuscular Re-education    Goals  Short term goals  Time Frame for Short term goals: 1-2 weeks  Short term goal 1: Pt will complete grooming Ruthie - not met, progressing  Short term goal 2: Pt will complete functional mobility/transfers Ruthie - not met, progressing  Short term goal 3: Pt will complete UB bathing/dressing Ruthie - not met, progressing  Short term goal 4: Pt will complete LB bathing/dressing Ruthie - not met, progressing  Short term goal 5: Pt will complete light IADL activity Ruthie - not met, progressing  Long term goals  Time Frame for Long term goals : STGs= LTGs  Patient Goals   Patient goals : to get better and go home       Therapy Time   Individual Concurrent Group Co-treatment   Time In 1245         Time Out 1347         Minutes 62               Timed Code Treatment Minutes: 62  Total Treatment Minutes:  [facility] S/OT   Directed and supervised by Julienne Brown OTR/L #[id number]

## 2021-02-08 NOTE — PROGRESS NOTES
Kristen Perez  2/8/2021  [id number]    Chief Complaint: Acute ischemic stroke ([address]    Subjective:   No overnight events. No current complaints. No recent hallucinations. Glucoses much improved on home regimen. Labs reviewed. ROS: No CP, SOB, dyspnea    Objective:  Patient Vitals for the past 24 hrs:   BP Temp Temp src Pulse Resp SpO2   02/07/21 2223 (!) 142/78 97.7 °F (36.5 °C) Oral 87 16 96 %   02/07/21 2136 -- -- -- -- -- 95 %   02/07/21 1325 -- -- -- -- 16 95 %     Gen: No distress, pleasant. Resting in bedside chair  HEENT: Normocephalic, atraumatic. CV: Regular rate and rhythm. No MRG   Resp: No respiratory distress. CTAB   Abd: Soft, nontender nondistended  Ext: No edema. Neuro: Alert, oriented, appropriately interactive. Aphasia    Laboratory data: Available via EMR. Therapy progress:  PT  Position Activity Restriction  Other position/activity restrictions: The patient is a 65y.o.  years old female with history of hypertension A. fib, hyperlipidemia and prior stroke who was admitted to the hospital last night with acute visual disturbance. Symptoms started few hours prior to admission. According to report, she was playing with her dog at home when she slowly lost the vision from the periphery bilaterally and was unable to function. Degree was severe and persistent. No falling or injury. She does have residual aphasia from prior stroke from last year. No fever or chills. No other triggers or other associated symptoms, weakness or numbness or chest pain. No severe headache. Patient came into the ED for evaluation since her symptoms did not improve. Initial work-up revealed mild elevated high blood pressure. She had a CT of the head which showed possible acute right ischemic PCA stroke. CTA showed more proximal right ICA occlusion. She was admitted.   Objective     Sit to Stand: Contact guard assistance  Stand to sit: Contact guard assistance  Bed to Chair: Contact guard assistance  Device: Hand-Held Assist  Assistance: Contact guard assistance  Distance: 180 ft  OT  PT Equipment Recommendations  Equipment Needed: No  Other: Pt owns RW and rollator  Toilet - Technique: Ambulating  Equipment Used: Standard toilet  Toilet Transfers Comments: use of R grab bar  Assessment        SLP  Current Diet : Regular  Current Liquid Diet : Thin  Diet Solids Recommendation: Regular  Liquid Consistency Recommendation: Thin    Body mass index is 25.85 kg/m². Assessment:  Patient Active Problem List   Diagnosis    Lumbar radiculopathy    [facility]    Received intravenous tissue plasminogen activator (tPA) in emergency department    Benign essential HTN    Nontraumatic cortical hemorrhage of left cerebral hemisphere ([facility]    Dyslipidemia    [facility]ox[facility]) [facility]    Thalamic stroke ([facility]    DM (diabetes mellitus), type 2, uncontrolled with complications ([facility]    Remote history of stroke    Persistent atrial fibrillation (HCC)    Hyponatremia    Coronary artery disease involving native coronary artery of native heart without angina pectoris    Acute ischemic stroke ([facility]       Plan:   Acute right ischemic stroke: ASA, coumadin initiation in 2 weeks from event (2/13). Lovenox PPx currently. PT/OT/SLP     H/O CVA: L MCA, hemorrhagic transformation following tPA admin. ASA, statin     HTN: toprol 50     HLD: resumed home zetia     Afib: BB, AC as above     DM: Lantus decreased to 5 - d/c, SSI. Takes metformin 1000, glimepiride 4 BID at home. Resumed metformin 500 and glimepiride     Depression: zoloft 100     Bowels: Per protocol  Bladder: Per protocol   Sleep: Trazodone provided prn. Pain: tylenol   DVT PPx: Lovenox     Morris Cornell MD 2/8/2021, 8:47 AM    * This document was created using dictation software. While all precautions were taken to ensure accuracy, errors may have occurred.   Please disregard any typographical errors.

## 2021-02-08 NOTE — CARE COORDINATION
JESSIE informed by RN that pt's doctor office called, [phone], requesting a call back. JESSIE called and spoke with RN with Dr. Nitish Ya. Asked SW call when pt's discharges so they can coordinate care.       Electronically signed by ALEN Stringer LSW on 2/8/2021 at 12:22 PM

## 2021-02-08 NOTE — PROGRESS NOTES
Physical Therapy  Facility/Department: [facility] ACUTE REHAB UNIT  Daily Treatment Note  NAME: Itzel Rivas  : 1950  MRN: [id number]    Date of Service: 2021    Discharge Recommendations:  24 hour supervision or assist, Home with Home health PT, S Level 3   PT Equipment Recommendations  Equipment Needed: No  Other: Pt owns RW and rollator    Assessment   Body structures, Functions, Activity limitations: Decreased functional mobility ; Decreased strength;Decreased endurance;Decreased safe awareness;Decreased balance  Assessment: Pt with improvement in overall functional mobility, but requires assistance to cross midline and demonstrates decreased safety awareness. Treatment Diagnosis: decreased functional mobility, impaired gait  Prognosis: Good  PT Education: Goals;PT Role;Plan of Care;Transfer Training;General Safety;Gait Training;Functional Mobility Training; Low Vision Education  Patient Education: verbalized understanding, but will require reinforcement  Barriers to Learning: visual, aphasia  REQUIRES PT FOLLOW UP: Yes  Activity Tolerance  Activity Tolerance: Patient Tolerated treatment well     Patient Diagnosis(es): There were no encounter diagnoses. has a past medical history of Actinic keratosis, Arthritis, Asthma, Atrial fibrillation ([address] CAD (coronary artery disease), Cerebral artery occlusion with cerebral infarction ([address] Depression, Diabetes mellitus ([address] Disc displacement, lumbar, Diverticulosis of colon, Hard of hearing, Hyperlipidemia, Hypertension, Ischemic heart disease, Past heart attack, Poor vision, Right leg weakness, and Scoliosis. has a past surgical history that includes Coronary artery bypass graft;  section; Appendectomy; ovarian cyst removal; Hand surgery; Cardiac surgery (); skin biopsy; vascular surgery; Tonsillectomy; Lumbar disc surgery (8-);  Lumbar spine surgery (10-11-12); and back surgery. Restrictions  Restrictions/Precautions  Restrictions/Precautions: Fall Risk  Required Braces or Orthoses?: No  Position Activity Restriction  Other position/activity restrictions: The patient is a 65y.o.  years old female with history of hypertension A. fib, hyperlipidemia and prior stroke who was admitted to the hospital last night with acute visual disturbance. Symptoms started few hours prior to admission. According to report, she was playing with her dog at home when she slowly lost the vision from the periphery bilaterally and was unable to function. Degree was severe and persistent. No falling or injury. She does have residual aphasia from prior stroke from last year. No fever or chills. No other triggers or other associated symptoms, weakness or numbness or chest pain. No severe headache. Patient came into the ED for evaluation since her symptoms did not improve. Initial work-up revealed mild elevated high blood pressure. She had a CT of the head which showed possible acute right ischemic PCA stroke. CTA showed more proximal right ICA occlusion. She was admitted. Subjective   General  Chart Reviewed: Yes  Additional Pertinent Hx: CVA 2020, DM, HTN  Response To Previous Treatment: Patient with no complaints from previous session. Family / Caregiver Present: No  Subjective  Subjective: Pt in chair on arrival.  States she still is having difficulty with her vision.   Pain Screening  Patient Currently in Pain: Denies  Vital Signs  Patient Currently in Pain: Denies       Orientation     Cognition      Objective      Transfers  Sit to Stand: Contact guard assistance  Stand to sit: Contact guard assistance  Stand Pivot Transfers: Contact guard assistance  Ambulation  Ambulation?: Yes  Ambulation 1  Surface: level tile  Device: Rolling Walker  Assistance: Contact guard assistance  Quality of Gait: increased SARWAT, variable foot placement, lateral sway particularly when scanning hallway/room, decreased jason, variable step length  Distance: 185'     Balance  Posture: Fair  Sitting - Static: Good  Sitting - Dynamic: Good  Standing - Static: Fair  Standing - Dynamic: Fair;-            Comment: 1st session:  Performed ambulation as documented above. Performed STS without UE assist X 5 wtih Shay progressing to [address]. Performed target stepping with pt demonstrating diffiuclty with R vs L as well as colors. Pt unable to cross midline iwth BLE and could only turn to reach target on opposite side of body. When provided with modA and LE block, pt able to cross midline with very slow jason. Performed toilet transfer with SBA and toileting with SBA. Pt returned to room and remained in bedside chair with alarm on and all needs in reach. 2nd session:  Pt sitting in bedside chair on arrival.  Pt voicing frustration with vision and current level of functioning. Provided encouragement and active listening. Performed target reaching in stance X 10 cones R and L X 2 trial with crossing midline and trunk rotation. Pt requiring consistent cues to cross midline instead of passing cone R to L or L to R. Performed alternating stacking of cones in stagger stance with CGA with decreased speed stacking L. Performed seated ball roll forward/backward/CW/CCW X 10 with faciliation for coordination. Pt returned to chair with alarm on and all needs in reach.               G-Code     OutComes Score                                                     AM-PAC Score             Goals  Short term goals  Time Frame for Short term goals: 1-2 weeks  Short term goal 1: Pt will perform bed mobility mod I  Short term goal 2: Pt will perform all transfers with supervision  Short term goal 3: Pt will ambulate 150' with LRAD and supervision  Short term goal 4: Pt will negotiate 1 step without HR and supervision  Patient Goals   Patient goals : to go home    Plan    Plan  Times per week: 60 minutes a day 5 days a week  Current Treatment Recommendations: Strengthening, Functional Mobility Training, Transfer Training, Balance Training, Endurance Training, Gait Training, Safety Education & Training, Patient/Caregiver Education & Training, Neuromuscular Re-education, Home Exercise Program, Positioning, Modalities, Equipment Evaluation, Education, & procurement, Manual Therapy - Soft Tissue Mobilization, Stair training  Safety Devices  Type of devices:  All fall risk precautions in place, Call light within reach, Patient at risk for falls, Gait belt, Left in chair, Chair alarm in place  Restraints  Initially in place: No     Therapy Time   Individual Concurrent Group Co-treatment   Time In 0930         Time Out 1009         Minutes 39              Second Session Therapy Time:   Individual Concurrent Group Co-treatment   Time In 1125         Time Out 1155         Minutes 30           Timed Code Treatment Minutes:  39+30    Total Treatment Minutes:  [facility], [address] [facility], [facility] [facility] [address] [place] [id number]

## 2021-02-09 LAB
GLUCOSE BLD-MCNC: 110 MG/DL (ref 70–99)
GLUCOSE BLD-MCNC: 128 MG/DL (ref 70–99)
GLUCOSE BLD-MCNC: 224 MG/DL (ref 70–99)
GLUCOSE BLD-MCNC: 74 MG/DL (ref 70–99)
GLUCOSE BLD-MCNC: 99 MG/DL (ref 70–99)
PERFORMED ON: ABNORMAL
PERFORMED ON: NORMAL
PERFORMED ON: NORMAL

## 2021-02-09 PROCEDURE — 6370000000 HC RX 637 (ALT 250 FOR IP): Performed by: PHYSICAL MEDICINE & REHABILITATION

## 2021-02-09 PROCEDURE — 97116 GAIT TRAINING THERAPY: CPT

## 2021-02-09 PROCEDURE — 97129 THER IVNTJ 1ST 15 MIN: CPT

## 2021-02-09 PROCEDURE — 97530 THERAPEUTIC ACTIVITIES: CPT

## 2021-02-09 PROCEDURE — 1280000000 HC REHAB R&B

## 2021-02-09 PROCEDURE — 97130 THER IVNTJ EA ADDL 15 MIN: CPT

## 2021-02-09 PROCEDURE — 97110 THERAPEUTIC EXERCISES: CPT

## 2021-02-09 PROCEDURE — 97535 SELF CARE MNGMENT TRAINING: CPT

## 2021-02-09 PROCEDURE — 92507 TX SP LANG VOICE COMM INDIV: CPT

## 2021-02-09 PROCEDURE — 6360000002 HC RX W HCPCS: Performed by: PHYSICAL MEDICINE & REHABILITATION

## 2021-02-09 RX ADMIN — SERTRALINE 100 MG: 50 TABLET, FILM COATED ORAL at 09:10

## 2021-02-09 RX ADMIN — ASPIRIN 81 MG: 81 TABLET, CHEWABLE ORAL at 09:10

## 2021-02-09 RX ADMIN — METOPROLOL SUCCINATE 50 MG: 50 TABLET, EXTENDED RELEASE ORAL at 09:10

## 2021-02-09 RX ADMIN — GLIMEPIRIDE 4 MG: 2 TABLET ORAL at 09:10

## 2021-02-09 RX ADMIN — TRAZODONE HYDROCHLORIDE 50 MG: 50 TABLET ORAL at 22:49

## 2021-02-09 RX ADMIN — ENOXAPARIN SODIUM 40 MG: 40 INJECTION SUBCUTANEOUS at 09:10

## 2021-02-09 RX ADMIN — METFORMIN HYDROCHLORIDE 500 MG: 500 TABLET ORAL at 09:13

## 2021-02-09 RX ADMIN — INSULIN LISPRO 4 UNITS: 100 INJECTION, SOLUTION INTRAVENOUS; SUBCUTANEOUS at 12:06

## 2021-02-09 RX ADMIN — EZETIMIBE 10 MG: 10 TABLET ORAL at 22:49

## 2021-02-09 RX ADMIN — METFORMIN HYDROCHLORIDE 500 MG: 500 TABLET ORAL at 17:21

## 2021-02-09 ASSESSMENT — PAIN SCALES - GENERAL
PAINLEVEL_OUTOF10: 0
PAINLEVEL_OUTOF10: 0

## 2021-02-09 NOTE — PROGRESS NOTES
Autumn Lone 2/9/2021  [id number]    Chief Complaint: Acute ischemic stroke ([address]    Subjective:   No overnight events. No current complaints. No recent hallucinations. Glucoses labile on home regimen. ROS: No CP, SOB, dyspnea    Objective:  Patient Vitals for the past 24 hrs:   BP Temp Temp src Pulse Resp SpO2   02/09/21 0914 (!) 150/71 98.3 °F (36.8 °C) Oral 80 17 96 %   02/08/21 2215 (!) 160/78 97.7 °F (36.5 °C) Oral 72 16 97 %   02/08/21 1642 (!) 173/77 98 °F (36.7 °C) Oral 77 17 96 %     Gen: No distress, pleasant. Ambulating in therapy  HEENT: Normocephalic, atraumatic. CV: Regular rate and rhythm. No MRG   Resp: No respiratory distress. CTAB   Abd: Soft, nontender nondistended  Ext: No edema. Neuro: Alert, oriented, appropriately interactive. Aphasia    Laboratory data: Available via EMR. Therapy progress:  PT  Position Activity Restriction  Other position/activity restrictions: The patient is a 65y.o.  years old female with history of hypertension A. fib, hyperlipidemia and prior stroke who was admitted to the hospital last night with acute visual disturbance. Symptoms started few hours prior to admission. According to report, she was playing with her dog at home when she slowly lost the vision from the periphery bilaterally and was unable to function. Degree was severe and persistent. No falling or injury. She does have residual aphasia from prior stroke from last year. No fever or chills. No other triggers or other associated symptoms, weakness or numbness or chest pain. No severe headache. Patient came into the ED for evaluation since her symptoms did not improve. Initial work-up revealed mild elevated high blood pressure. She had a CT of the head which showed possible acute right ischemic PCA stroke. CTA showed more proximal right ICA occlusion. She was admitted.   Objective     Sit to Stand: Stand by assistance  Stand to sit: Stand by assistance  Bed to Chair: Stand by assistance  Device: Rolling Walker  Assistance: Contact guard assistance  Distance: 449'  OT  PT Equipment Recommendations  Equipment Needed: No  Other: Pt owns RW and rollator  Toilet - Technique: Ambulating  Equipment Used: Standard toilet  Toilet Transfers Comments: use of R grab bar  Assessment        SLP  Current Diet : Regular  Current Liquid Diet : Thin  Diet Solids Recommendation: Regular  Liquid Consistency Recommendation: Thin    Body mass index is 25.85 kg/m². Assessment:  Patient Active Problem List   Diagnosis    Lumbar radiculopathy    [facility]    Received intravenous tissue plasminogen activator (tPA) in emergency department    Benign essential HTN    Nontraumatic cortical hemorrhage of left cerebral hemisphere ([facility]    Dyslipidemia    PAF (paroxysmal atrial fibrillation) (HCC)    Thalamic stroke ([facility]    DM (diabetes mellitus), type 2, uncontrolled with complications ([facility]    Remote history of stroke    Persistent atrial fibrillation (HCC)    Hyponatremia    Coronary artery disease involving native coronary artery of native heart without angina pectoris    Acute ischemic stroke ([facility]       Plan:   Acute right ischemic stroke: ASA, coumadin initiation in 2 weeks from event (2/13). Lovenox PPx currently. PT/OT/SLP     H/O CVA: L MCA, hemorrhagic transformation following tPA admin. ASA, statin     HTN: toprol 50     HLD: resumed home zetia     Afib: BB, AC as above     DM: Lantus weaned, SSI. Takes metformin 1000, glimepiride 4 BID at home. Resumed metformin 500 and glimepiride     Depression: zoloft 100     Bowels: Per protocol  Bladder: Per protocol   Sleep: Trazodone provided prn. Pain: tylenol   DVT PPx: Lovenox     Team conference was held today on the patient and discussed directly with the patient utilizing their entire treatment team. Please see separate team note for details. Total treatment time for today's care >35 min.     Sadie Richard MD 2/9/2021, 3:51 PM    * This document was created using dictation software. While all precautions were taken to ensure accuracy, errors may have occurred. Please disregard any typographical errors.

## 2021-02-09 NOTE — PROGRESS NOTES
Physical Therapy  Facility/Department: [facility] ACUTE REHAB UNIT  Daily Treatment Note  NAME: Sacha Rosas  : 1950  MRN: [id number]    Date of Service: 2021    Discharge Recommendations:  Home with Home health PT, S Level 3, Home with assist PRN   PT Equipment Recommendations  Equipment Needed: No  Other: Pt owns RW and rollator    Assessment   Body structures, Functions, Activity limitations: Decreased functional mobility ; Decreased strength;Decreased endurance;Decreased safe awareness;Decreased balance  Assessment: Pt with decreased core strength, but demonstrates overall improved functional mobility with improved vision. Treatment Diagnosis: decreased functional mobility, impaired gait  Prognosis: Good  PT Education: Goals;PT Role;Plan of Care;Transfer Training;General Safety;Gait Training;Functional Mobility Training; Low Vision Education  Patient Education: verbalized understanding, but will require reinforcement  Barriers to Learning: visual, aphasia  REQUIRES PT FOLLOW UP: Yes  Activity Tolerance  Activity Tolerance: Patient Tolerated treatment well     Patient Diagnosis(es): CVA. has a past medical history of Actinic keratosis, Arthritis, Asthma, Atrial fibrillation ([address] CAD (coronary artery disease), Cerebral artery occlusion with cerebral infarction ([address] Depression, Diabetes mellitus ([address] Disc displacement, lumbar, Diverticulosis of colon, Hard of hearing, Hyperlipidemia, Hypertension, Ischemic heart disease, Past heart attack, Poor vision, Right leg weakness, and Scoliosis. has a past surgical history that includes Coronary artery bypass graft;  section; Appendectomy; ovarian cyst removal; Hand surgery; Cardiac surgery (); skin biopsy; vascular surgery; Tonsillectomy; Lumbar disc surgery (8-); Lumbar spine surgery (10-11-12); and back surgery.     Restrictions  Restrictions/Precautions  Restrictions/Precautions: Fall Risk  Required Braces or Orthoses?: No  Position Activity Restriction  Other position/activity restrictions: The patient is a 65y.o.  years old female with history of hypertension A. fib, hyperlipidemia and prior stroke who was admitted to the hospital last night with acute visual disturbance. Symptoms started few hours prior to admission. According to report, she was playing with her dog at home when she slowly lost the vision from the periphery bilaterally and was unable to function. Degree was severe and persistent. No falling or injury. She does have residual aphasia from prior stroke from last year. No fever or chills. No other triggers or other associated symptoms, weakness or numbness or chest pain. No severe headache. Patient came into the ED for evaluation since her symptoms did not improve. Initial work-up revealed mild elevated high blood pressure. She had a CT of the head which showed possible acute right ischemic PCA stroke. CTA showed more proximal right ICA occlusion. She was admitted. Subjective   General  Chart Reviewed: Yes  Additional Pertinent Hx: CVA 2020, DM, HTN  Response To Previous Treatment: Patient with no complaints from previous session. Family / Caregiver Present: No  Subjective  Subjective: Pt in chair on arrival.  States she feels her vision is getting better.   Pain Screening  Patient Currently in Pain: Denies  Vital Signs  Patient Currently in Pain: Denies       Orientation     Cognition      Objective   Bed mobility  Bridging: Stand by assistance  Rolling to Left: Stand by assistance  Rolling to Right: Stand by assistance  Supine to Sit: Stand by assistance  Sit to Supine: Stand by assistance  Scooting: Stand by assistance  Transfers  Sit to Stand: Stand by assistance  Stand to sit: Stand by assistance  Bed to Chair: Stand by assistance  Stand Pivot Transfers: Stand by assistance  Ambulation  Ambulation?: Yes  More Ambulation?: Yes  Ambulation 1  Surface: level tile  Device: Rolling Walker  Assistance: Contact guard assistance  Quality of Gait: increased SARWAT, variable foot placement, lateral sway particularly when scanning hallway/room, decreased jason, variable step length  Distance: 449'  Ambulation 2  Surface - 2: level tile  Device 2: No device  Assistance 2: Contact guard assistance  Quality of Gait 2: same as trial 1, vc to increase R step length and height, vc to increase B heel strike  Distance: 80'     Balance  Posture: Fair  Sitting - Static: Good  Sitting - Dynamic: Good  Standing - Static: Fair  Standing - Dynamic: Fair;-            Comment: 1st session:  Performed ambulation trial 1. Performed cone weaving X 5 cones with RW X 4 trials with CGA. Pt with wide turns, but did not hit cone. Performed toilet transfer with SBA. Toileting mod I. Pt returned to recliner. Performed seated LAQ, marching X 10 with 2#.  Pt remained in recliner with alarm on and all needs in reach. 2nd session:  Pt sitting in recliner on arrival.  Performed toilet transfer as documented in session 1. Performed ambulation trial 2. Performed supine bridging on ball X 10, B trunk rotation on ball X 10, B hamstring curls with ball X 10, lower ab ball lift with hip adduction X 5. Pt returned to room and remained in chair with alarm on and all needs in reach.               G-Code     OutComes Score                                                     AM-PAC Score             Goals  Short term goals  Time Frame for Short term goals: 1-2 weeks  Short term goal 1: Pt will perform bed mobility mod I  Short term goal 2: Pt will perform all transfers with supervision  Short term goal 3: Pt will ambulate 150' with LRAD and supervision  Short term goal 4: Pt will negotiate 1 step without HR and supervision  Patient Goals   Patient goals : to go home    Plan    Plan  Times per week: 60 minutes a day 5 days a week  Current Treatment Recommendations: Strengthening, Functional Mobility Training, Transfer Training, Balance Training, Endurance Training, Gait Training, Safety Education & Training, Patient/Caregiver Education & Training, Neuromuscular Re-education, Home Exercise Program, Positioning, Modalities, Equipment Evaluation, Education, & procurement, Manual Therapy - Soft Tissue Mobilization, Stair training  Safety Devices  Type of devices:  All fall risk precautions in place, Call light within reach, Patient at risk for falls, Gait belt, Left in chair, Chair alarm in place  Restraints  Initially in place: No     Therapy Time   Individual Concurrent Group Co-treatment   Time In 0930         Time Out 1000         Minutes 30              Second Session Therapy Time:   Individual Concurrent Group Co-treatment   Time In 1405         Time Out 1435         Minutes 30           Timed Code Treatment Minutes:  30+30    Total Treatment Minutes:  [address] [place], [place] [address]

## 2021-02-09 NOTE — PROGRESS NOTES
ACUTE REHAB UNIT  SPEECH/LANGUAGE PATHOLOGY      [x] Daily  [x] Weekly Care Conference Note  [] Discharge    [address] [facility]:  ND  Room #: OMP-2342/6212-62   Rehab Dx/Hx: Acute ischemic stroke [facility]) [I63.9]  Date of Admit: 2021      Precautions: falls and aspirations  Home situation: Pt lives at home (with two dogs). Her daughter does all medication and finance management. Pt is an active . ST Dx: aphasia; cognitive deficits (L visual spatial deficits)    Daily Treatment Info:   Date: 2021   Tx session 1 Tx session 2   Pain None reported Denied   Subjective     Pt up in chair, alert, and willing to participate in [address] Pt up in chair and seen writing alphabet into notebook. Cooperative during ST session. Weekly Update: Pt continues to present with moderate communication deficits (apraxia of speech > expressive aphasia and mild receptive aphasia) with motor planning and paraphasic errors. Pt utilizes writing to repair communication breakdowns, errors increase when frustrated but overall improvements with task completion and increased functional communication with staff members. Visual errors persist but seem to be improving slightly, will likely be a barrier towards an independent discharge. Objective:  Goals     Pt will complete moderately complex comprehension tasks (2 step directions/commands, abstract yes/no questions) to 80% accuracy. Goal not targeted this session. Goal not targeted this session. Patient will repeat multi-syllabic phrases/sentences to > 80% accuracy. Goal not targeted this session.     Read Multi-syllabic Phrases (Story Titles)  - 2/7 (29%) independently   - 6/7 (86%) with tactile cues (I.e. tapping table/arm) and verbal model from SLP    Repeat Multi-syllabic Phrases (Story Title)  - 4/4 (100%) independently  - Pt benefited from tactile cues (I.e. tapping on arm) and melodic intonation of model phrases     Pt will improve visual perception awareness for functional task completion via graded tasks to mild cues. Pt able to attend to left visual field during entire length of ST session. Pt reports that her vision worsens throughout the day when she becomes tired. Fill in Missing Numbers  - 15/15 (100%) independently  - Time: 25 seconds    Trail Activity (A to Z)  - pt able to connect A-B-C, but needed min verbal cues to connect C-D instead of C-E.   - Task was abandoned after pt connected F-C.    - Pt requested to write out alphabet on paper. Pt had moderate difficulty with order (I.e. PSVNXYZ instead of QRSTU) when written horizontally. Pt's accuracy increased when letters were written vertically. - pt reported that she intends to practice writing the alphabet in between therapy sessions. Pt attended to left visual field throughout session. Required min verbal cues x 2 to reread phrases as she would not attend to the first word (I.e. [place] vs [place])     Pt will complete basic expressive language tasks (sentence completion, naming, automatics) to 80% accuracy with min cues. Goal not targeted this session. Complete the Phrase (Story Titles)  - 13/17 (76%) independently  - 15/17 (88%) with semantic cues  - pt independently utilized melodic intonation for word finding challenges x 2     Other areas targeted:     Education:   Provided pt ongoing education re tasks and overall POC. Pt v/u. Provided pt ongoing education re speech tasks and overall POC. Pt v/u. Safety Devices: [x] Call light within reach  [x] Chair alarm activated  [] Bed alarm activated  [] Other: [x] Call light within reach  [x] Chair alarm activated  [] Bed alarm activated  [] Other:      Assessment:   Speech Therapy Diagnosis  Cognitive Diagnosis: Pt presents with left visual spatial deficits which are new since previous admission and will liekly impact safety and functional tasks upon d/c.  Full cognitive assessment was limited due to deficits. Pt recalled SLP from previous admission and was oriented x 4. Communication Diagnosis: Pt presents with moderate experssive communication deficits. Expressive aphasia with apraxia of speech and mild receptive aphasia. Pt demonstrated moderate paraphasic and motor planning errors. Pt effectively utilized writing to repair communication breakdowns. Pt's overall communication appears similar to previous admission. Current Diet Order: DIET CARB CONTROL; Recommended Form of Meds: Whole with water  Compensatory Swallowing Strategies: Small bites/sips, Upright as possible for all oral intake, Eat/Feed slowly     Plan:     Frequency:  5days/week   60 minutes/day  Discharge Recommendations:   Barriers: Pt with baseline language deficits from prior CVA  Discharge Recommendations:  [] Home independently  [x] Home with assistance []  24 hour supervision  [] SNF [x] Other:  Daughter to assist with iADLs (med, finance management, cooking/cleaning supervision initially)   Continued SLP Treatment:  [x] Yes [] No [] TBD based on progress while on ARU [] Vital Stim indicated [] Other:   Estimated discharge date: 2/12/21     Type of Total Treatment Minutes   Session 1   Session 2   Time In 0900 1030   Time Out 0930 1100   Timed Code Minutes  30    Individual Treatment Minutes  30 30   Co-Treatment Minutes      Group Treatment Minutes      Concurrent Treatment Minutes        TOTAL DAILY MINUTES:  60    Electronically Signed by     Luciana Monroy M.A. CCC-SLP AURELIANO [id number]  Speech-Language Pathologist [phone]  2/9/2021 9:36 AM     The speech-language pathologist was present, directed the patient's care, made skilled judgment and was responsible for assessment and treatment.     RADHA Norman.,  Speech-Language Pathology

## 2021-02-09 NOTE — CARE COORDINATION
Team conference held today. Spoke with patient to discuss progress with therapy, barriers to discharge, and plans to return home. Estimated discharge date set for 2/12.2021. Patient anticipates discharging to home with [facility]. Will continue to follow for support and discharge planning.

## 2021-02-09 NOTE — PROGRESS NOTES
Occupational Therapy  Facility/Department: [facility] ACUTE REHAB UNIT  Daily Treatment Note  NAME: Ebonie Agosto  : 1950  MRN: [id number]    Date of Service: 2021    Discharge Recommendations:  Home with Home health OT, S Level 3, Home with assist PRN  OT Equipment Recommendations  Equipment Needed: Yes  Other: continue to assess pending progress    Assessment   Performance deficits / Impairments: Decreased functional mobility ; Decreased balance;Decreased vision/visual deficit; Decreased ADL status; Decreased safe awareness;Decreased high-level IADLs  Assessment: Pt is a 65year old female who is below her functional baseline for independence and saftey displaying the above deficits. Pt would benefit from continued OT services to improve independence and safety in occupational pursuits. Treatment Diagnosis: Pt with above functional deficits due to recent acute CVA. Prognosis: Good  Decision Making: Low Complexity  OT Education: OT Role;Plan of Care;ADL Adaptive Strategies;IADL Safety  Patient Education: Pt verbalized understanding  REQUIRES OT FOLLOW UP: Yes  Activity Tolerance  Activity Tolerance: Patient Tolerated treatment well  Safety Devices  Safety Devices in place: Yes  Type of devices: All fall risk precautions in place;Call light within reach; Chair alarm in place; Patient at risk for falls; Left in chair         Patient Diagnosis(es): CVA      has a past medical history of Actinic keratosis, Arthritis, Asthma, Atrial fibrillation ([address] CAD (coronary artery disease), Cerebral artery occlusion with cerebral infarction ([address] Depression, Diabetes mellitus ([address] Disc displacement, lumbar, Diverticulosis of colon, Hard of hearing, Hyperlipidemia, Hypertension, Ischemic heart disease, Past heart attack, Poor vision, Right leg weakness, and Scoliosis. has a past surgical history that includes Coronary artery bypass graft;  section;  Appendectomy; ovarian cyst removal; Hand surgery; Cardiac surgery (1984); skin biopsy; vascular surgery; Tonsillectomy; Lumbar disc surgery (8-); Lumbar spine surgery (10-11-12); and back surgery. Restrictions  Restrictions/Precautions  Restrictions/Precautions: Fall Risk  Required Braces or Orthoses?: No  Position Activity Restriction  Other position/activity restrictions: The patient is a 65y.o.  years old female with history of hypertension A. fib, hyperlipidemia and prior stroke who was admitted to the hospital last night with acute visual disturbance. Symptoms started few hours prior to admission. According to report, she was playing with her dog at home when she slowly lost the vision from the periphery bilaterally and was unable to function. Degree was severe and persistent. No falling or injury. She does have residual aphasia from prior stroke from last year. No fever or chills. No other triggers or other associated symptoms, weakness or numbness or chest pain. No severe headache. Patient came into the ED for evaluation since her symptoms did not improve. Initial work-up revealed mild elevated high blood pressure. She had a CT of the head which showed possible acute right ischemic PCA stroke. CTA showed more proximal right ICA occlusion. She was admitted. Subjective   General  Chart Reviewed: Yes  Patient assessed for rehabilitation services?: Yes  Response to previous treatment: Patient with no complaints from previous session  Family / Caregiver Present: No  Diagnosis: Acute CVA  Subjective  Subjective: Pt reclined in bed upon entry asleep, woken easily, agreeable to OT session. Vital Signs  Patient Currently in Pain: Denies     Objective    ADL  Grooming: Supervision;Setup  Toileting: Supervision  Additional Comments: Pt ambulated to/from bathroom wiht RW at SUP, completed toileting then grooming at sink in stance.           Balance  Sitting Balance: Supervision  Standing Balance: Supervision  Standing Balance  Time: ~20 minutes  Activity: functional mobilty/transfers, ADL and activity completion  Comment: no LOB noted    Functional Mobility  Functional - Mobility Device: Rolling Walker  Activity: To/from bathroom; Other(to/from dining area)  Assist Level: Supervision  Functional Mobility Comments: ~120 ft total    Toilet Transfers  Toilet - Technique: Ambulating  Equipment Used: Standard toilet  Toilet Transfer: Supervision    Bed mobility  Supine to Sit: Supervision  Transfers  Sit to stand: Supervision  Stand to sit: Supervision             Additional Activities Comment  Additional Activities: Pt completed simulated kitchen activity in stance in kitchen area at [facility]- made tea in mug using microwave following 3-step directions - addressed kitchen mobilty, scanning environment, safety, balance and activity tolerance. moderate VC for walker managment and scanning environment. Education provided on kitchen safety and benefit of utilizing mircowave for meal prep.  Pt then completed card matching activity in stance and seated at [facility] d/t fatigue - addressed visual scanning, activity toerlance and balance (R side scanning WFL, increased time for L side scanning)        Plan   Plan  Times per week: 5-7 x 60 min  Times per day: Daily  Current Treatment Recommendations: Strengthening, ROM, Safety Education & Training, Self-Care / ADL, Cognitive Reorientation, Home Management Training, Patient/Caregiver Education & Training, Neuromuscular Re-education    Goals  Short term goals  Time Frame for Short term goals: 1-2 weeks  Short term goal 1: Pt will complete grooming Ruthie - not met, progressing  Short term goal 2: Pt will complete functional mobility/transfers Ruthie - not met, progressing  Short term goal 3: Pt will complete UB bathing/dressing Ruthie - not met, progressing  Short term goal 4: Pt will complete LB bathing/dressing Ruthie - not met, progressing  Short term goal 5: Pt will complete light IADL activity Ruthie - not met, progressing  Long term goals  Time Frame for Long term goals : STGs= LTGs  Patient Goals   Patient goals : to get better and go home       Therapy Time   Individual Concurrent Group Co-treatment   Time In 0730         Time Out 0830         Minutes 60               Timed Code Treatment Minutes:  60 Minutes    Total Treatment Minutes:  60 minutes     Junie AGUIRRE/OT  Directed and supervised by David Kennedy OTR/VAL #[id number]

## 2021-02-10 LAB
BILIRUBIN URINE: NEGATIVE
BLOOD, URINE: NEGATIVE
CLARITY: CLEAR
COLOR: YELLOW
GLUCOSE BLD-MCNC: 129 MG/DL (ref 70–99)
GLUCOSE BLD-MCNC: 137 MG/DL (ref 70–99)
GLUCOSE BLD-MCNC: 227 MG/DL (ref 70–99)
GLUCOSE BLD-MCNC: 76 MG/DL (ref 70–99)
GLUCOSE BLD-MCNC: 79 MG/DL (ref 70–99)
GLUCOSE URINE: NEGATIVE MG/DL
KETONES, URINE: NEGATIVE MG/DL
LEUKOCYTE ESTERASE, URINE: NEGATIVE
MICROSCOPIC EXAMINATION: NORMAL
NITRITE, URINE: NEGATIVE
PERFORMED ON: ABNORMAL
PERFORMED ON: NORMAL
PERFORMED ON: NORMAL
PH UA: 6.5 (ref 5–8)
PROTEIN UA: NEGATIVE MG/DL
SPECIFIC GRAVITY UA: <1.005 (ref 1–1.03)
URINE TYPE: NORMAL
UROBILINOGEN, URINE: 0.2 E.U./DL

## 2021-02-10 PROCEDURE — 92507 TX SP LANG VOICE COMM INDIV: CPT

## 2021-02-10 PROCEDURE — 97530 THERAPEUTIC ACTIVITIES: CPT

## 2021-02-10 PROCEDURE — 97116 GAIT TRAINING THERAPY: CPT

## 2021-02-10 PROCEDURE — 97535 SELF CARE MNGMENT TRAINING: CPT

## 2021-02-10 PROCEDURE — 81003 URINALYSIS AUTO W/O SCOPE: CPT

## 2021-02-10 PROCEDURE — 6370000000 HC RX 637 (ALT 250 FOR IP): Performed by: PHYSICAL MEDICINE & REHABILITATION

## 2021-02-10 PROCEDURE — 97129 THER IVNTJ 1ST 15 MIN: CPT

## 2021-02-10 PROCEDURE — 1280000000 HC REHAB R&B

## 2021-02-10 PROCEDURE — 6360000002 HC RX W HCPCS: Performed by: PHYSICAL MEDICINE & REHABILITATION

## 2021-02-10 RX ORDER — LISINOPRIL 5 MG/1
5 TABLET ORAL DAILY
Status: DISCONTINUED | OUTPATIENT
Start: 2021-02-10 | End: 2021-02-11

## 2021-02-10 RX ADMIN — METFORMIN HYDROCHLORIDE 500 MG: 500 TABLET ORAL at 09:36

## 2021-02-10 RX ADMIN — GLIMEPIRIDE 4 MG: 2 TABLET ORAL at 09:36

## 2021-02-10 RX ADMIN — EZETIMIBE 10 MG: 10 TABLET ORAL at 21:51

## 2021-02-10 RX ADMIN — SERTRALINE 100 MG: 50 TABLET, FILM COATED ORAL at 09:32

## 2021-02-10 RX ADMIN — METFORMIN HYDROCHLORIDE 500 MG: 500 TABLET ORAL at 16:54

## 2021-02-10 RX ADMIN — ASPIRIN 81 MG: 81 TABLET, CHEWABLE ORAL at 09:32

## 2021-02-10 RX ADMIN — METOPROLOL SUCCINATE 50 MG: 50 TABLET, EXTENDED RELEASE ORAL at 09:32

## 2021-02-10 RX ADMIN — LISINOPRIL 5 MG: 5 TABLET ORAL at 12:40

## 2021-02-10 RX ADMIN — ENOXAPARIN SODIUM 40 MG: 40 INJECTION SUBCUTANEOUS at 09:32

## 2021-02-10 RX ADMIN — INSULIN LISPRO 4 UNITS: 100 INJECTION, SOLUTION INTRAVENOUS; SUBCUTANEOUS at 12:41

## 2021-02-10 ASSESSMENT — PAIN SCALES - GENERAL
PAINLEVEL_OUTOF10: 0
PAINLEVEL_OUTOF10: 0

## 2021-02-10 NOTE — PROGRESS NOTES
D: Therapy states the patient had frequent urination today and expressed confusion and concern about a new blood pressure medication ordered (Lisinopril). Pt was afraid she was allergic to it. Pt demonstrated no symptoms of an allergic reaction following the administration of the medication. A: Roderick Mishra served Dr. Foreign Zheng to make him aware of the patient's increase in urination. R: Will wait for his reply and pass on information to night shift.

## 2021-02-10 NOTE — PROGRESS NOTES
Nick Nair  2/10/2021  [id number]    Chief Complaint: Acute ischemic stroke [facility])    Subjective:   No overnight events. No current complaints. No hallucinations. Glucoses improving. BP above goal.    ROS: No CP, SOB, dyspnea    Objective:  Patient Vitals for the past 24 hrs:   BP Temp Temp src Pulse Resp SpO2   02/10/21 0921 (!) 158/79 98.1 °F (36.7 °C) Oral 71 16 99 %   02/09/21 2230 (!) 151/83 97.7 °F (36.5 °C) Oral 65 18 98 %     Gen: No distress, pleasant. Ambulating in therapy  HEENT: Normocephalic, atraumatic. CV: Regular rate and rhythm. No MRG   Resp: No respiratory distress. CTAB   Abd: Soft, nontender nondistended  Ext: No edema. Neuro: Alert, oriented, appropriately interactive. Aphasia    Laboratory data: Available via EMR. Therapy progress:  PT  Position Activity Restriction  Other position/activity restrictions: The patient is a 65y.o.  years old female with history of hypertension A. fib, hyperlipidemia and prior stroke who was admitted to the hospital last night with acute visual disturbance. Symptoms started few hours prior to admission. According to report, she was playing with her dog at home when she slowly lost the vision from the periphery bilaterally and was unable to function. Degree was severe and persistent. No falling or injury. She does have residual aphasia from prior stroke from last year. No fever or chills. No other triggers or other associated symptoms, weakness or numbness or chest pain. No severe headache. Patient came into the ED for evaluation since her symptoms did not improve. Initial work-up revealed mild elevated high blood pressure. She had a CT of the head which showed possible acute right ischemic PCA stroke. CTA showed more proximal right ICA occlusion. She was admitted.   Objective     Sit to Stand: Stand by assistance  Stand to sit: Stand by assistance  Bed to Chair: Stand by assistance  Device: Rolling Walker  Assistance: Contact guard assistance  Distance: 80'  OT  PT Equipment Recommendations  Equipment Needed: No  Other: Pt owns RW and rollator  Toilet - Technique: Ambulating  Equipment Used: Standard toilet  Toilet Transfers Comments: use of R grab bar  Assessment        SLP  Current Diet : Regular  Current Liquid Diet : Thin  Diet Solids Recommendation: Regular  Liquid Consistency Recommendation: Thin    Body mass index is 25.85 kg/m². Assessment:  Patient Active Problem List   Diagnosis    Lumbar radiculopathy    Acute [facility] (cerebrovascular accident) [facility]    Received intravenous tissue plasminogen activator (tPA) in emergency department    Benign essential HTN    Nontraumatic cortical hemorrhage of left cerebral hemisphere ([facility]    Dyslipidemia    PAF (paroxysmal atrial fibrillation) (HCC)    Thalamic stroke ([facility]    DM (diabetes mellitus), type 2, uncontrolled with complications ([facility]    Remote history of stroke    Persistent atrial fibrillation (HCC)    Hyponatremia    Coronary artery disease involving native coronary artery of native heart without angina pectoris    Acute ischemic stroke ([facility]       Plan:   Acute right ischemic stroke: ASA, coumadin initiation in 2 weeks from event (2/13). Lovenox PPx currently. PT/OT/SLP     H/O CVA: L MCA, hemorrhagic transformation following tPA admin. ASA, statin     HTN: toprol 50, - lisinopril 5     HLD: resumed home zetia     Afib: BB, AC as above     DM: Lantus weaned, SSI. Takes metformin 1000, glimepiride 4 BID at home. Resumed metformin 500 and glimepiride     Depression: zoloft 100     Bowels: Per protocol  Bladder: Per protocol   Sleep: Trazodone provided prn. Pain: tylenol   DVT PPx: Lovenox     Frank Herrera MD 2/10/2021, 9:47 AM    * This document was created using dictation software. While all precautions were taken to ensure accuracy, errors may have occurred. Please disregard any typographical errors.

## 2021-02-10 NOTE — PROGRESS NOTES
Occupational Therapy  Facility/Department: [facility] ACUTE REHAB UNIT  Daily Treatment Note  NAME: Rodolfo Olivier  : 1950  MRN: [id number]    Date of Service: 2/10/2021    Discharge Recommendations:  Home with Home health OT, S Level 3, Home with assist PRN  OT Equipment Recommendations  Equipment Needed: Yes  Other: continue to assess pending progress    Assessment   Performance deficits / Impairments: Decreased functional mobility ; Decreased balance;Decreased vision/visual deficit; Decreased ADL status; Decreased safe awareness;Decreased high-level IADLs  Assessment: Pt is a 65year old female who is below her functional baseline for independence and saftey displaying the above deficits. Pt would benefit from continued OT services to improve independence and safety in occupational pursuits. Treatment Diagnosis: Pt with above functional deficits due to recent acute CVA. Prognosis: Good  Decision Making: Low Complexity  OT Education: OT Role;Plan of Care;ADL Adaptive Strategies  Patient Education: Education provided on scanning techniques, pt verbalized and demonstrated understanding. REQUIRES OT FOLLOW UP: Yes  Activity Tolerance  Activity Tolerance: Patient Tolerated treatment well  Safety Devices  Safety Devices in place: Yes  Type of devices: All fall risk precautions in place;Call light within reach; Chair alarm in place; Patient at risk for falls; Left in chair         Patient Diagnosis(es): CVA     has a past medical history of Actinic keratosis, Arthritis, Asthma, Atrial fibrillation ([address] CAD (coronary artery disease), Cerebral artery occlusion with cerebral infarction ([address] Depression, Diabetes mellitus ([address] Disc displacement, lumbar, Diverticulosis of colon, Hard of hearing, Hyperlipidemia, Hypertension, Ischemic heart disease, Past heart attack, Poor vision, Right leg weakness, and Scoliosis. has a past surgical history that includes Coronary artery bypass graft;   section; SUP, completed shower on shower chair with back with 1 stance for lc hygiene, then completed UB/LB dressing seated on shower chair, 1 stance for clothing management over hips, grooming  then completed in stance at sink. Balance  Sitting Balance: Supervision  Standing Balance: Supervision  Standing Balance  Time: ~15 minutes  Activity: functional mobilty/transfers, ADL completion  Comment: no LOB noted    Functional Mobility  Functional - Mobility Device: Rolling Walker  Activity: To/from bathroom; Other(to/from dining area)  Assist Level: Supervision  Functional Mobility Comments: ~120 ft total    Shower Transfers  Shower - Transfer Type: To and From  Shower - Transfer To: Shower seat with back  Shower - Technique: Ambulating  Shower Transfers: Supervision    Bed mobility  Supine to Sit: Supervision  Transfers  Sit to stand: Supervision  Stand to sit: Supervision              Additional Activities Comment  Additional Activities: Pt completed visual scanning activity while walking to the dining area - located 6 post-it notes on R/L walls of the hallway at SUP. Pt then completed simulated shopping list activity - pt located 6 items from a provided grocery list from ads and located the prices with Marquis Green for scanning education.         Plan   Plan  Times per week: 5-7 x 60 min  Times per day: Daily  Current Treatment Recommendations: Strengthening, ROM, Safety Education & Training, Self-Care / ADL, Cognitive Reorientation, Home Management Training, Patient/Caregiver Education & Training, Neuromuscular Re-education    Goals  Short term goals  Time Frame for Short term goals: 1-2 weeks  Short term goal 1: Pt will complete grooming Ruthie - goal met 2/10  Short term goal 2: Pt will complete functional mobility/transfers Ruthie - not met, progressing  Short term goal 3: Pt will complete UB bathing/dressing Ruthie - goal met 2/10  Short term goal 4: Pt will complete LB bathing/dressing Ruthie - goal met 2/10  Short term goal 5: Pt will complete light IADL activity Ruthie - not met, progressing  Long term goals  Time Frame for Long term goals : STGs= LTGs  Patient Goals   Patient goals : to get better and go home       Therapy Time   Individual Concurrent Group Co-treatment   Time In 0730         Time Out 0838         Minutes 68               Timed Code Treatment Minutes:  68 minutes  Total Treatment Minutes:   68 minutes     Yvette Roldan S/OT  Directed and supervised by Radha Ribeiro OTR/L #[id number]

## 2021-02-10 NOTE — PROGRESS NOTES
ACUTE REHAB UNIT  SPEECH/LANGUAGE PATHOLOGY      [x] Daily  [] Weekly Care Conference Note  [] Discharge    [address] [place]     GELY:[birth year]  JAILENE:[id number]  Room #: MWD-7516/4450-20   Rehab Dx/Hx: Acute ischemic stroke [facility]) [I63.9]  Date of Admit: 2/4/2021      Precautions: falls and aspirations  Home situation: Pt lives at home (with two dogs). Her daughter does all medication and finance management. Pt is an active . ST Dx: aphasia; cognitive deficits (L visual spatial deficits)    Daily Treatment Info:   Date: 2/10/2021   Tx session 1 Tx session 2   Pain Denied None reported   Subjective     Pt up in chair, alert, and pleasant during ST session. Pt upright in chair and willing to participate in therapy. Objective:  Goals     Pt will complete moderately complex comprehension tasks (2 step directions/commands, abstract yes/no questions) to 80% accuracy. Following 2-Step Directions  - 4/9 (44%) independently  - 8/9 (89%) with min verbal cues  - pt had difficult time completing second step of direction, usually modifying the first step    Goal not targeted this session. Patient will repeat multi-syllabic phrases/sentences to > 80% accuracy. Goal not targeted this session. Goal not targeted this session. Pt will improve visual perception awareness for functional task completion via graded tasks to mild cues. Spatial Orientation: Map  - 5/5 (100%) independently   - pt stated that her vision is improving and notices more difficulty when she wakes up/ becomes tired. Pt able to attend to left visual field entire length of session. Pt will complete basic expressive language tasks (sentence completion, naming, automatics) to 80% accuracy with min cues. Goal not targeted this session. Functional Communication   - Pt reported concerns with new medications given this afternoon.  Pt required mild to moderate assistance from SLP to repair communication breakdowns related to apraxic and word finding errors. - Pt had difficulty with comprehension of complex information (I.e. taking new medication brought in by pt's daughter because she is allergic to statins). Other areas targeted: Pt able to recall when her anticipated d/c date is (2/12/2021) and d/c plans (I.e. daughter staying with her on the weekends). Called pt's daughter to discuss ST progress, d/c recommendations (I.e neuro opthamologist), and d/c plans. Education:   Provided pt ongoing education re tasks and overall POC. Pt v/u. Provided pt and pt's daughter education re POC and d/c recommendations. Pt and Pt's daughter v/u. Safety Devices: [x] Call light within reach  [x] Chair alarm activated  [] Bed alarm activated  [] Other: [x] Call light within reach  [x] Chair alarm activated  [] Bed alarm activated  [] Other:      Assessment:   Speech Therapy Diagnosis  Cognitive Diagnosis: Pt presents with left visual spatial deficits which are new since previous admission and will liekly impact safety and functional tasks upon d/c. Full cognitive assessment was limited due to deficits. Pt recalled SLP from previous admission and was oriented x 4. Communication Diagnosis: Pt presents with moderate experssive communication deficits. Expressive aphasia with apraxia of speech and mild receptive aphasia. Pt demonstrated moderate paraphasic and motor planning errors. Pt effectively utilized writing to repair communication breakdowns. Pt's overall communication appears similar to previous admission. Current Diet Order: DIET CARB CONTROL;    Recommended Form of Meds: Whole with water  Compensatory Swallowing Strategies: Small bites/sips, Upright as possible for all oral intake, Eat/Feed slowly     Plan:     Frequency:  5days/week   60 minutes/day  Discharge Recommendations:   Barriers: Pt with baseline language deficits from prior CVA  Discharge Recommendations:  [] Home independently  [x] Home with assistance []  24 hour supervision  [] SNF [x] Other:  Daughter to assist with iADLs (med, finance management, cooking/cleaning supervision initially)   Continued SLP Treatment:  [x] Yes [] No [] TBD based on progress while on ARU [] Vital Stim indicated [] Other:   Estimated discharge date: 2/12/21     Type of Total Treatment Minutes   Session 1   Session 2   Time In 0900 1245   Time Out 0930 1315   Timed Code Minutes  15    Individual Treatment Minutes  30 30   Co-Treatment Minutes      Group Treatment Minutes      Concurrent Treatment Minutes        TOTAL DAILY MINUTES:  60    Electronically Signed by     Jessy Shaw M.A. CCC-SLP S.PSarita [phone]  Speech-Language Pathologist [phone]  2/10/2021 9:50 AM     The speech-language pathologist was present, directed the patient's care, made skilled judgment and was responsible for assessment and treatment.     RADHA Steinberg.,  Speech-Language Pathology

## 2021-02-10 NOTE — PROGRESS NOTES
Physical Therapy  Facility/Department: [facility] ACUTE REHAB UNIT  Daily Treatment Note  NAME: Elma Montiel  : 1950  MRN: [id number]    Date of Service: 2/10/2021    Discharge Recommendations:  Home with Home health PT, S Level 3, Home with assist PRN   PT Equipment Recommendations  Equipment Needed: No  Other: Pt owns RW and rollator    Assessment   Body structures, Functions, Activity limitations: Decreased functional mobility ; Decreased strength;Decreased endurance;Decreased safe awareness;Decreased balance  Assessment: Pt with decreased core strength, but demonstrates overall improved functional mobility with improved vision. Pt with LOB when ambulating with head turns. Treatment Diagnosis: decreased functional mobility, impaired gait  Prognosis: Good  PT Education: Goals;PT Role;Plan of Care;Transfer Training;General Safety;Gait Training;Functional Mobility Training; Low Vision Education  Patient Education: verbalized understanding, but will require reinforcement  Barriers to Learning: visual, aphasia  REQUIRES PT FOLLOW UP: Yes  Activity Tolerance  Activity Tolerance: Patient Tolerated treatment well     Patient Diagnosis(es): CVA. has a past medical history of Actinic keratosis, Arthritis, Asthma, Atrial fibrillation ([address] CAD (coronary artery disease), Cerebral artery occlusion with cerebral infarction ([address] Depression, Diabetes mellitus ([address] Disc displacement, lumbar, Diverticulosis of colon, Hard of hearing, Hyperlipidemia, Hypertension, Ischemic heart disease, Past heart attack, Poor vision, Right leg weakness, and Scoliosis. has a past surgical history that includes Coronary artery bypass graft;  section; Appendectomy; ovarian cyst removal; Hand surgery; Cardiac surgery (); skin biopsy; vascular surgery; Tonsillectomy; Lumbar disc surgery (8-); Lumbar spine surgery (10-11-12); and back surgery.     Restrictions  Restrictions/Precautions  Restrictions/Precautions: Fall Risk  Required Braces or Orthoses?: No  Position Activity Restriction  Other position/activity restrictions: The patient is a 65y.o.  years old female with history of hypertension A. fib, hyperlipidemia and prior stroke who was admitted to the hospital last night with acute visual disturbance. Symptoms started few hours prior to admission. According to report, she was playing with her dog at home when she slowly lost the vision from the periphery bilaterally and was unable to function. Degree was severe and persistent. No falling or injury. She does have residual aphasia from prior stroke from last year. No fever or chills. No other triggers or other associated symptoms, weakness or numbness or chest pain. No severe headache. Patient came into the ED for evaluation since her symptoms did not improve. Initial work-up revealed mild elevated high blood pressure. She had a CT of the head which showed possible acute right ischemic PCA stroke. CTA showed more proximal right ICA occlusion. She was admitted. Subjective   General  Chart Reviewed: Yes  Additional Pertinent Hx: CVA 2020, DM, HTN  Response To Previous Treatment: Patient with no complaints from previous session. Family / Caregiver Present: No  Subjective  Subjective: Pt in chair on arrival.  States she feels like she is getting better every day.   Pain Screening  Patient Currently in Pain: Denies  Vital Signs  Patient Currently in Pain: Denies       Orientation     Cognition      Objective   Bed mobility  Sit to Supine: Stand by assistance  Transfers  Sit to Stand: Stand by assistance  Stand to sit: Stand by assistance  Stand Pivot Transfers: Stand by assistance  Ambulation  Ambulation?: Yes  Ambulation 1  Surface: level tile  Device: No Device  Assistance: Stand by assistance;Contact guard assistance  Quality of Gait: increased SARWAT, variable foot placement, lateral sway particularly when scanning hallway/room, decreased jason, variable step length, LOB to the R when turning head left  Distance: 449'  Comments: Pt required 4 standing rest breaks  Stairs/Curb  Stairs?: Yes  Stairs  # Steps : 12  Rails: Bilateral  Assistance: Contact guard assistance  Comment: Pt utilizing sensation in BLE when descending to find edge of stair. Balance  Posture: Fair  Sitting - Static: Good  Sitting - Dynamic: Good  Standing - Static: Fair  Standing - Dynamic: Fair;-            Comment: 1st session:  Performed toilet transfer with SBA and toileting mod I. Perfomred ambulation and stair negotiation as documented above. Pt returned to room and remained in recliner with alarm on and all needs in reach. 2nd session:  Pt sitting in recliner on arrival.  Performed toilet transfer and toileting same as trial 1. Performed ambulation of 180' without AD with SBA. Performed floor to ceiling transition with SB X 10 and trunk rotation wtih SB X 10 with SBA. Performed chair push pull in tandem stance B and feet together X 10 with SBA. Pt returned to room and requested to use bathroom with same transfer and toileting as beginning of [facility]. RN notified of frequent urination. Pt returned to bed as documented above with alarm on and all needs in reach.               G-Code     OutComes Score                                                     AM-PAC Score             Goals  Short term goals  Time Frame for Short term goals: 1-2 weeks  Short term goal 1: Pt will perform bed mobility mod I  Short term goal 2: Pt will perform all transfers with supervision  Short term goal 3: Pt will ambulate 150' with LRAD and supervision  Short term goal 4: Pt will negotiate 1 step without HR and supervision  Patient Goals   Patient goals : to go home    Plan    Plan  Times per week: 60 minutes a day 5 days a week  Current Treatment Recommendations: Strengthening, Functional Mobility Training, Transfer Training, Balance Training, Endurance Training, Gait Training, Safety Education & Training, Patient/Caregiver Education & Training, Neuromuscular Re-education, Home Exercise Program, Positioning, Modalities, Equipment Evaluation, Education, & procurement, Manual Therapy - Soft Tissue Mobilization, Stair training  Safety Devices  Type of devices:  All fall risk precautions in place, Call light within reach, Patient at risk for falls, Gait belt, Left in chair, Chair alarm in place  Restraints  Initially in place: No     Therapy Time   Individual Concurrent Group Co-treatment   Time In 0930         Time Out 1000         Minutes 30              Second Session Therapy Time:   Individual Concurrent Group Co-treatment   Time In 1315         Time Out 1345         Minutes 30           Timed Code Treatment Minutes:  30+30    Total Treatment Minutes:  [address], [place] [id number]

## 2021-02-11 LAB
ANION GAP SERPL CALCULATED.3IONS-SCNC: 7 MMOL/L (ref 3–16)
BUN BLDV-MCNC: 7 MG/DL (ref 7–20)
CALCIUM SERPL-MCNC: 10.3 MG/DL (ref 8.3–10.6)
CHLORIDE BLD-SCNC: 104 MMOL/L (ref 99–110)
CO2: 26 MMOL/L (ref 21–32)
CREAT SERPL-MCNC: <0.5 MG/DL (ref 0.6–1.2)
GFR AFRICAN AMERICAN: >60
GFR NON-AFRICAN AMERICAN: >60
GLUCOSE BLD-MCNC: 134 MG/DL (ref 70–99)
GLUCOSE BLD-MCNC: 169 MG/DL (ref 70–99)
GLUCOSE BLD-MCNC: 186 MG/DL (ref 70–99)
GLUCOSE BLD-MCNC: 85 MG/DL (ref 70–99)
GLUCOSE BLD-MCNC: 88 MG/DL (ref 70–99)
GLUCOSE BLD-MCNC: 99 MG/DL (ref 70–99)
HCT VFR BLD CALC: 39.2 % (ref 36–48)
HEMOGLOBIN: 12.6 G/DL (ref 12–16)
MCH RBC QN AUTO: 30 PG (ref 26–34)
MCHC RBC AUTO-ENTMCNC: 32.1 G/DL (ref 31–36)
MCV RBC AUTO: 93.5 FL (ref 80–100)
PDW BLD-RTO: 13.5 % (ref 12.4–15.4)
PERFORMED ON: ABNORMAL
PERFORMED ON: NORMAL
PERFORMED ON: NORMAL
PLATELET # BLD: 277 K/UL (ref 135–450)
PMV BLD AUTO: 8.5 FL (ref 5–10.5)
POTASSIUM SERPL-SCNC: 4.2 MMOL/L (ref 3.5–5.1)
RBC # BLD: 4.19 M/UL (ref 4–5.2)
SODIUM BLD-SCNC: 137 MMOL/L (ref 136–145)
WBC # BLD: 5.4 K/UL (ref 4–11)

## 2021-02-11 PROCEDURE — 85027 COMPLETE CBC AUTOMATED: CPT

## 2021-02-11 PROCEDURE — 97530 THERAPEUTIC ACTIVITIES: CPT

## 2021-02-11 PROCEDURE — 6360000002 HC RX W HCPCS: Performed by: PHYSICAL MEDICINE & REHABILITATION

## 2021-02-11 PROCEDURE — 97129 THER IVNTJ 1ST 15 MIN: CPT

## 2021-02-11 PROCEDURE — 97535 SELF CARE MNGMENT TRAINING: CPT

## 2021-02-11 PROCEDURE — 92507 TX SP LANG VOICE COMM INDIV: CPT

## 2021-02-11 PROCEDURE — 94761 N-INVAS EAR/PLS OXIMETRY MLT: CPT

## 2021-02-11 PROCEDURE — 36415 COLL VENOUS BLD VENIPUNCTURE: CPT

## 2021-02-11 PROCEDURE — 97130 THER IVNTJ EA ADDL 15 MIN: CPT

## 2021-02-11 PROCEDURE — 6370000000 HC RX 637 (ALT 250 FOR IP): Performed by: PHYSICAL MEDICINE & REHABILITATION

## 2021-02-11 PROCEDURE — 80048 BASIC METABOLIC PNL TOTAL CA: CPT

## 2021-02-11 PROCEDURE — 1280000000 HC REHAB R&B

## 2021-02-11 PROCEDURE — 97110 THERAPEUTIC EXERCISES: CPT

## 2021-02-11 PROCEDURE — 97116 GAIT TRAINING THERAPY: CPT

## 2021-02-11 RX ORDER — LISINOPRIL 5 MG/1
5 TABLET ORAL ONCE
Status: COMPLETED | OUTPATIENT
Start: 2021-02-11 | End: 2021-02-11

## 2021-02-11 RX ORDER — LISINOPRIL 10 MG/1
10 TABLET ORAL DAILY
Qty: 30 TABLET | Refills: 3 | Status: SHIPPED | OUTPATIENT
Start: 2021-02-12

## 2021-02-11 RX ORDER — LISINOPRIL 10 MG/1
10 TABLET ORAL DAILY
Status: DISCONTINUED | OUTPATIENT
Start: 2021-02-12 | End: 2021-02-12 | Stop reason: HOSPADM

## 2021-02-11 RX ORDER — METFORMIN HYDROCHLORIDE 500 MG/1
500 TABLET, EXTENDED RELEASE ORAL 2 TIMES DAILY
Qty: 30 TABLET | Refills: 3
Start: 2021-02-11

## 2021-02-11 RX ADMIN — ASPIRIN 81 MG: 81 TABLET, CHEWABLE ORAL at 08:26

## 2021-02-11 RX ADMIN — APIXABAN 5 MG: 5 TABLET, FILM COATED ORAL at 17:15

## 2021-02-11 RX ADMIN — METOPROLOL SUCCINATE 50 MG: 50 TABLET, EXTENDED RELEASE ORAL at 08:26

## 2021-02-11 RX ADMIN — INSULIN LISPRO 2 UNITS: 100 INJECTION, SOLUTION INTRAVENOUS; SUBCUTANEOUS at 17:16

## 2021-02-11 RX ADMIN — METFORMIN HYDROCHLORIDE 500 MG: 500 TABLET ORAL at 17:15

## 2021-02-11 RX ADMIN — METFORMIN HYDROCHLORIDE 500 MG: 500 TABLET ORAL at 08:26

## 2021-02-11 RX ADMIN — SERTRALINE 100 MG: 50 TABLET, FILM COATED ORAL at 08:26

## 2021-02-11 RX ADMIN — ENOXAPARIN SODIUM 40 MG: 40 INJECTION SUBCUTANEOUS at 08:27

## 2021-02-11 RX ADMIN — INSULIN LISPRO 2 UNITS: 100 INJECTION, SOLUTION INTRAVENOUS; SUBCUTANEOUS at 12:20

## 2021-02-11 RX ADMIN — EZETIMIBE 10 MG: 10 TABLET ORAL at 20:39

## 2021-02-11 RX ADMIN — LISINOPRIL 5 MG: 5 TABLET ORAL at 11:34

## 2021-02-11 RX ADMIN — LISINOPRIL 5 MG: 5 TABLET ORAL at 08:26

## 2021-02-11 ASSESSMENT — PAIN SCALES - GENERAL
PAINLEVEL_OUTOF10: 0
PAINLEVEL_OUTOF10: 0

## 2021-02-11 NOTE — CARE COORDINATION
JESSIE spoke with patient's daughter regarding patient's discharge plan. Patient's daughter stating that she will be able to provide the patient PRN assistance (daily phone calls and daily visits after work ). Patient's daughter requesting that [facility] contact daughter at [phone] regarding when therapist, nurse etc. Will visit pateint. Daughter stating that she will provide patient transportation to home on tomorrow. JESSIE contacted [place] liaison for [facility] and gave patient's daughter phone #.  JESSIE also contacted patient's PCP Dr. Riya Garcia to inform office of patient's discharge on 2/12/2021.     Electronically signed by ALEN Chaney on 2/11/2021 at 11:44 AM

## 2021-02-11 NOTE — DISCHARGE SUMMARY
Physical Medicine & Rehabilitation  Discharge Summary     Patient Identification:  Samantha Fernandez  :   Admit date: 2021  Discharge date: 2021  Attending provider: Rupali King MD        Primary care provider: Gilmer Alejandro     Discharge Diagnoses:   Patient Active Problem List   Diagnosis    Lumbar radiculopathy    [facility]    Received intravenous tissue plasminogen activator (tPA) in emergency department    Benign essential HTN    Nontraumatic cortical hemorrhage of left cerebral hemisphere ([facility]    Dyslipidemia    PAF (paroxysmal atrial fibrillation) ([facility]    Thalamic stroke ([facility]    DM (diabetes mellitus), type 2, uncontrolled with complications [facility]    Remote history of stroke    Persistent atrial fibrillation [facility]    Hyponatremia    Coronary artery disease involving native coronary artery of native heart without angina pectoris    Acute ischemic stroke [facility])       Discharge Functional Status:    Physical therapy:  Bed Mobility: Scooting: Stand by assistance  Transfers: Sit to Stand: Modified independent  Stand to sit: Modified independent  Bed to Chair: Modified independent  Comment: 1st session:  Performed functional mobility as documented above.   Pt returned to room and remained in recliner with alarm on and all needs in reach., Ambulation 1  Surface: level tile  Device: No Device  Assistance: Modified Independent  Quality of Gait: increased SARWAT, variable foot placement, lateral sway particularly when scanning hallway/room, decreased jason, variable step length, LOB to the R when turning head left, decreased arm swing  Distance: 290'  Comments: Pt required 2 standing rest breaks, Stairs  # Steps : 12  Stairs Height: 6\"  Rails: Bilateral  Assistance: Modified independent   Comment: Pt utilizing sensation in BLE when descending to find edge of stair, negotiated curb step with CGA without AD and mod I with AD  Mobility:  , PT Equipment Recommendations  Equipment Needed: No  Other: Pt owns RW and rollator, Assessment: Pt continues to demonstrate improvement in overall functional mobility with improvement in vision. Pt still with difficulty ambulating on uneven surfaces. Pt will continue to benefit from skilled PT services to address remaining deficits and return to PLOF. Occupational therapy:  ,  , Assessment: Pt is a 65year old female who presented with the above deficits. Pt has displayed progress with functional mobility/transfers,  ALDL/IADL completion, balance, activity tolerance, vision, and safety awareness. Pt would benefit from home OT services to continue maintaining and progressing with independence and safety in all occupational pursuits. Speech therapy:       Inpatient Rehabilitation Course:   Amadeo Fabry is a 65 y.o. female admitted to inpatient rehabilitation on 2/4/2021 s/p Acute ischemic stroke ([facility] The patient participated in an aggressive multidisciplinary inpatient rehabilitation program involving 3 hours of therapy per day, at least 5 days per week. She progressed well in therapy and was able to be discharged to home with home health care. Her medical rehab course was significant for below:    Acute right ischemic stroke: ASA. AC held per neuro recs. PT/OT/SLP. After discussion with patient and pharmacy, it was determined patient was not an appropriate candidate for prolonged coumadin management. Poor followup in the past. Eliquis was suggested and stressed to the patient the importance of continuing this medication and that the cost would be worth the investment to prevent another stroke. Eliquis Rx provided.      H/O CVA: L MCA, hemorrhagic transformation following tPA admin. ASA, statin     HTN: toprol 50, start lisinopril and increased to 10. Continue this current regimen. Rx provided.      HLD: resumed home zetia     Afib: BB, AC as above     DM: Lantus weaned, SSI.  Takes metformin 1000, budesonide-formoterol 160-4.5 MCG/ACT Aero  Commonly known as: Symbicort  Inhale 2 puffs into the lungs 2 times daily  Notes to patient: Uses: a bronchodilator and steroid inhaler that reduces inflammation and relaxes muscles in the airways to improve breathing  Side Effects: headache, sore throat, runny stuffy nose, nausea, vomiting, diarrhea     ezetimibe 10 MG tablet  Commonly known as: Abdoulaye Denise  Notes to patient: Uses:  To treat high cholesterol  Side Effects: Abdomen fullness, constipation, indigestion      glimepiride 4 MG tablet  Commonly known as: AMARYL  Notes to patient: Uses: with proper diet and exercise to control high blood sugar  Side effects: nausea and upset stomach      magnesium gluconate 500 MG tablet  Commonly known as: MAGONATE  Notes to patient: Use:Dietary Supplement  Side effects: Nausea, diarrhea     metoprolol succinate 50 MG extended release tablet  Commonly known as: TOPROL XL  Notes to patient: Uses;to treat chest pains(angina) heart failure and high blood pressure  Side effects: drowsiness, dizziness, tiredness, diarrhoea     sertraline 100 MG tablet  Commonly known as: ZOLOFT  Notes to patient: Uses: to treat depression, panic attacks, social anxiety disorder  Side effects; nausea, dizziness, dry mouth        STOP taking these medications    albuterol (2.5 MG/3ML) 0.083% nebulizer solution  Commonly known as: PROVENTIL  Notes to patient: Uses: a bronchodilator used help prevent bronchospasms, helps treat wheezing in asthma patients  Side Effects: nervousness, shaking, dizziness, dry mouth     insulin glargine 100 UNIT/ML injection pen  Commonly known as: LANTUS;KRISTINE  Notes to patient: Uses : to lower blood sugar   Side effects: low Blood sugar, allergic reaction, itching, rash     Ventolin  (90 Base) MCG/ACT inhaler  Generic drug: albuterol sulfate HFA  Notes to patient: Uses: a bronchodilator used help prevent bronchospasms, helps treat wheezing in asthma patients  Side Effects: nervousness, shaking, dizziness, dry mouth           Where to Get Your Medications      These medications were sent to [facility], [address]  [address], [address]    Phone: [phone]   · apixaban 5 MG Tabs tablet  · lisinopril 10 MG tablet     Information about where to get these medications is not yet available    Ask your nurse or doctor about these medications  · metFORMIN 500 MG extended release tablet         I spent over 35 minutes on this discharge encounter between counseling, coordination of care, and medication reconciliation. To comply with [address] [facility] bylaw R.II.4.1:  Discharge order placed in advance to facilitate patient's discharge needs. Jorge Alvarez MD    * This document was created using dictation software. While all precautions were taken to ensure accuracy, errors may have occurred. Please disregard any typographical errors.

## 2021-02-11 NOTE — PROGRESS NOTES
ACUTE REHAB UNIT  SPEECH/LANGUAGE PATHOLOGY      [x] Daily  [] Weekly Care Conference Note  [x] Discharge    Patient:Loren Myers     VUN:1/62[birth year]17  BET:[id number]  Room #: QEZ-5744/1914-75   Rehab Dx/Hx: Acute ischemic stroke [facility]) [I63.9]  Date of Admit: 2/4/2021      Precautions: falls and aspirations  Home situation: Pt lives at home (with two dogs). Her daughter does all medication and finance management. Pt is an active . ST Dx: aphasia; cognitive deficits (L visual spatial deficits)    Daily Treatment Info:   Date: 2/11/2021   Tx session 1 Tx session 2   Pain None reported Denied    Subjective     Pt finishing OT session and transferred to chair. Pt willing to participate in [address] [place] [address] Dr. Pt seen sitting upright in chair, alert and cooperative. Pt actively participating and conversing in conversation. Pt became frustrated towards end of session with interruptions from various people entering room. Pt indicated this makes her struggle more with communicating effectively. Discharge Summary: Pt has made progress towards goals. She continues to present with expressive/receptive aphasia and apraxia of speech. Pt utilizes multi-modal communication (verbal comm. , writing, gestures) and various word finding strategies to increase ability to express herself. Frequent apraxic and/or paraphasic errors are noted in communication. Pt continues to have minor visual deficits/errors, which will be a barrier for returning to home independently. She would benefit from continued SLP treatment. Objective:  Goals     Pt will complete moderately complex comprehension tasks (2 step directions/commands, abstract yes/no questions) to 80% accuracy. Goal not targeted this session. Goal not targeted this session. Goal met in one session; continue for consistency. Patient will repeat multi-syllabic phrases/sentences to > 80% accuracy.  Pt required mild to moderate assistance from SLP to repair communication breakdowns in structured conversation due to apraxic/word finding errors. Pt able to fix apraxic errors 2/3 when given verbal model from SLP and visual cue of writing the word in her notebook. Repeating 3-4 word phrases/sentences created in prior session (sentence completion task):  -61% (11/18) independently  -78% (14/18) given a repetition and or visual cue  -errors appeared apraxic in nature; required multi-sensory cues to correct  -occasionally benefited from saying phrases in unison    GOAL NOT MET     Pt will improve visual perception awareness for functional task completion via graded tasks to mild cues. Spatial Orientation: Map  - 3/6 (50%) independently  - 4/6 (67%) with min verbal cues  - pt recalled working on a map activity yesterday    Pt able to attend to left visual field entire length of session. Pt reported that her vision is improving. Spatial orientation: Map  -50% (2/4) independently  -100% with min verbal/visual cues  -min cues for scanning for target information on map  -pt appeared to have difficulty completing some questions due to ?reduced reading comprehension    Attention to L visual field:  -when initially placing map on L side of pt's table, pt needed min cues to locate  -one instance when pt needed verbal cue to locate item on L side of table    GOAL NOT MET     Pt will complete basic expressive language tasks (sentence completion, naming, automatics) to 80% accuracy with min cues. Finish the Phrase  - 18/18 (100%) independently (verbal or written)  - pt independently utilized writing in her notebook for word finding difficulties  Automatics:  -counting 1-8: needed cues for initiation, as pt initially started reciting VIOLETTE and then ABCs. Required visual cues, then able to recite 8/8 numbers  -discussed using automatics to assist with communication (ex.  Pt said \"age\" earlier in session when trying to communicate the word \"eight\"; demonstrated how counting to target number may assist in correct production  -cardinal directions (NESW)--required demonstration and saying in unison with SLP first    When making apraxia/motor planning errors during conversation and structured tasks, SLP provided models, verbal cues, and visual cues to assist with correct production. GOAL NOT MET     Other areas targeted: Pt recalled speaking with rehab MD about her teeth and medication changes. Education:   Provided pt ongoing education re tasks and POC. Pt v/u. Benefits from information being simplified to improve comprehension. Educated pt re: role of SLP, rationale for tx tasks, word finding strategies, communication strategies, and recommended continued SLP services. Pt v/u. Benefits from information being simplified . Safety Devices: [x] Call light within reach  [x] Chair alarm activated  [] Bed alarm activated  [] Other: [x] Call light within reach  [x] Chair alarm activated  [] Bed alarm activated  [] Other:      Assessment:   Speech Therapy Diagnosis  Cognitive Diagnosis: Pt presents with left visual spatial deficits which are new since previous admission and will liekly impact safety and functional tasks upon d/c. Full cognitive assessment was limited due to deficits. Pt recalled SLP from previous admission and was oriented x 4. Communication Diagnosis: Pt presents with moderate experssive communication deficits. Expressive aphasia with apraxia of speech and mild receptive aphasia. Pt demonstrated moderate paraphasic and motor planning errors. Pt effectively utilized writing to repair communication breakdowns. Pt's overall communication appears similar to previous admission. Current Diet Order: DIET CARB CONTROL;    Recommended Form of Meds: Whole with water  Compensatory Swallowing Strategies: Small bites/sips, Upright as possible for all oral intake, Eat/Feed slowly     Plan:     Frequency:  5days/week   60 minutes/day  Discharge Recommendations:   Barriers: Pt with baseline language deficits from prior CVA  Discharge Recommendations:  [] Home independently  [x] Home with assistance []  24 hour supervision  [] SNF [x] Other:  Daughter to assist with iADLs (med, finance management, cooking/cleaning supervision initially)   Continued SLP Treatment:  [x] Yes [] No [] TBD based on progress while on ARU [] Vital Stim indicated [] Other:   Estimated discharge date: 2/12/21     Type of Total Treatment Minutes   Session 1   Session 2   Time In 0930 1120   Time Out 1000 1155   Timed Code Minutes  15 15   Individual Treatment Minutes  30 35   Co-Treatment Minutes      Group Treatment Minutes      Concurrent Treatment Minutes        TOTAL DAILY MINUTES:  72    Electronically Signed by     Benedict Ozuna M.S. [address] Pathologist  2/11/2021 2:23 PM     The speech-language pathologist was present, directed the patient's care, made skilled judgment and was responsible for assessment and treatment.  (Session 1 only)    NGA Blanchard,  Speech-Language Pathology

## 2021-02-11 NOTE — PROGRESS NOTES
Physical Therapy  Facility/Department: [facility] ACUTE REHAB UNIT  Daily Treatment Note/Discharge Summary  NAME: Dallas Vallejo  : 1950  MRN: [id number]    Date of Service: 2021    Discharge Recommendations:  Home with Home health PT, S Level 3, Home with assist PRN   PT Equipment Recommendations  Equipment Needed: Recommend grab bar installed on door frame to assist with entering and exiting the home. Assessment   Body structures, Functions, Activity limitations: Decreased functional mobility ; Decreased strength;Decreased endurance;Decreased safe awareness;Decreased balance  Assessment: Pt continues to demonstrate improvement in overall functional mobility with improvement in vision. Pt still with difficulty ambulating on uneven surfaces. Pt will continue to benefit from skilled PT services to address remaining deficits and return to PLOF. Treatment Diagnosis: decreased functional mobility, impaired gait  Prognosis: Good  PT Education: Goals;PT Role;Plan of Care;Transfer Training;General Safety;Gait Training;Functional Mobility Training; Low Vision Education  Patient Education: verbalized understanding, but will require reinforcement. Discussed recommendation to install grab bar on door frame to enter and exit the house. Barriers to Learning: visual, aphasia  REQUIRES PT FOLLOW UP: Yes  Activity Tolerance  Activity Tolerance: Patient Tolerated treatment well     Patient Diagnosis(es): CVA. has a past medical history of Actinic keratosis, Arthritis, Asthma, Atrial fibrillation ([address] CAD (coronary artery disease), Cerebral artery occlusion with cerebral infarction ([address] Depression, Diabetes mellitus ([address] Disc displacement, lumbar, Diverticulosis of colon, Hard of hearing, Hyperlipidemia, Hypertension, Ischemic heart disease, Past heart attack, Poor vision, Right leg weakness, and Scoliosis. has a past surgical history that includes Coronary artery bypass graft;   section; Appendectomy; ovarian cyst removal; Hand surgery; Cardiac surgery (1984); skin biopsy; vascular surgery; Tonsillectomy; Lumbar disc surgery (8-); Lumbar spine surgery (10-11-12); and back surgery. Restrictions  Restrictions/Precautions  Restrictions/Precautions: Fall Risk  Required Braces or Orthoses?: No  Position Activity Restriction  Other position/activity restrictions: The patient is a 65y.o.  years old female with history of hypertension A. fib, hyperlipidemia and prior stroke who was admitted to the hospital last night with acute visual disturbance. Symptoms started few hours prior to admission. According to report, she was playing with her dog at home when she slowly lost the vision from the periphery bilaterally and was unable to function. Degree was severe and persistent. No falling or injury. She does have residual aphasia from prior stroke from last year. No fever or chills. No other triggers or other associated symptoms, weakness or numbness or chest pain. No severe headache. Patient came into the ED for evaluation since her symptoms did not improve. Initial work-up revealed mild elevated high blood pressure. She had a CT of the head which showed possible acute right ischemic PCA stroke. CTA showed more proximal right ICA occlusion. She was admitted. Subjective   General  Chart Reviewed: Yes  Additional Pertinent Hx: CVA 2020, DM, HTN  Response To Previous Treatment: Patient with no complaints from previous session. Family / Caregiver Present: No  Subjective  Subjective: Pt in chair on arrival.  States she feels like she is getting better every day and feels ready to go home.   Pain Screening  Patient Currently in Pain: Denies  Vital Signs  Patient Currently in Pain: Denies       Orientation     Cognition      Objective      Transfers  Sit to Stand: Modified independent  Stand to sit: Modified independent  Bed to Chair: Modified independent  Stand Pivot Transfers: Modified independent  Car Transfer: Modified independent  Ambulation  Ambulation?: Yes  More Ambulation?: Yes  Ambulation 1  Surface: level tile  Device: No Device  Assistance: Modified Independent  Quality of Gait: increased SARWAT, variable foot placement, lateral sway particularly when scanning hallway/room, decreased jason, variable step length, LOB to the R when turning head left, decreased arm swing  Distance: 290'  Comments: Pt required 2 standing rest breaks  Ambulation 2  Surface - 2: uneven  Device 2: Rolling Walker  Assistance 2: Supervision  Quality of Gait 2: wide SARWAT, varied step length, increased medial lateral sway without AD, required CGA-Shay without AD  Distance: 10'  Stairs/Curb  Stairs?: Yes  Stairs  # Steps : 12  Stairs Height: 6\"  Rails: Bilateral  Assistance: Modified independent   Comment: Pt utilizing sensation in BLE when descending to find edge of stair, negotiated curb step with CGA without AD and mod I with AD     Balance  Posture: Fair  Sitting - Static: Good  Sitting - Dynamic: Good  Standing - Static: Fair  Standing - Dynamic: Fair  Comments: picked up object from floor mod I without AD            Comment: 1st session:  Performed functional mobility as documented above. Pt returned to room and remained in recliner with alarm on and all needs in reach. 2nd session:  Pt sitting in recliner on arrival.  Pt ambulated 160' without AD mod I. Performed sit to supine and supine to sit transition mod I. Performed supine SLR X 10 B, bridging X 10, bridging with LE extension X 5. Performed transition to prone mod I and quadruped with facilitation at hips for pelvic tilt. Performed alternating UEs X 10 and alternating LEs X 10. Pt returned to sitting EOB mod I and returned to room and remained in recliner with alarm on and all needs in reach.            G-Code     OutComes Score                                                     AM-PAC Score             Goals  Short term goals  Time Frame for Short term goals: 1-2 weeks  Short term goal 1: Pt will perform bed mobility mod I - met  Short term goal 2: Pt will perform all transfers with supervision - met  Short term goal 3: Pt will ambulate 150' with LRAD and supervision - met  Short term goal 4: Pt will negotiate 1 step without HR and supervision - met  Patient Goals   Patient goals : to go home    Plan    Plan  Times per week: 60 minutes a day 5 days a week  Current Treatment Recommendations: Strengthening, Functional Mobility Training, Transfer Training, Balance Training, Endurance Training, Gait Training, Safety Education & Training, Patient/Caregiver Education & Training, Neuromuscular Re-education, Home Exercise Program, Positioning, Modalities, Equipment Evaluation, Education, & procurement, Manual Therapy - Soft Tissue Mobilization, Stair training  Safety Devices  Type of devices:  All fall risk precautions in place, Call light within reach, Patient at risk for falls, Gait belt, Left in chair, Chair alarm in place  Restraints  Initially in place: No     Therapy Time   Individual Concurrent Group Co-treatment   Time In 1030         Time Out 1100         Minutes 30              Second Session Therapy Time:   Individual Concurrent Group Co-treatment   Time In 1325         Time Out 1355         Minutes 30           Timed Code Treatment Minutes:  30+30    Total Treatment Minutes:  [address] [place], [place] [address]

## 2021-02-11 NOTE — PROGRESS NOTES
Occupational Therapy  Facility/Department: [facility] ACUTE REHAB UNIT  Daily Treatment and Discharge Note  NAME: Gloria Quinonez  : 1950  MRN: [id number]    Date of Service: 2021    Discharge Recommendations:  Home with Home health OT, S Level 3, Home with assist PRN  OT Equipment Recommendations  Equipment Needed: No, pt owns needed DME    Assessment   Performance deficits / Impairments: Decreased functional mobility ; Decreased balance;Decreased vision/visual deficit; Decreased ADL status; Decreased safe awareness;Decreased high-level IADLs  Assessment: Pt is a 65year old female who presented with the above deficits. Pt has displayed progress with functional mobility/transfers,  ALDL/IADL completion, balance, activity tolerance, vision, and safety awareness. Pt would benefit from home OT services to continue maintaining and progressing with independence and safety in all occupational pursuits. Treatment Diagnosis: Pt with above functional deficits due to recent acute CVA. Prognosis: Good  Decision Making: Low Complexity  OT Education: OT Role;Plan of Care;ADL Adaptive Strategies;Transfer Training  Patient Education: Pt educated on purpose of walker tray, verbalized and demonstrated understanding  REQUIRES OT FOLLOW UP: Yes  Activity Tolerance  Activity Tolerance: Patient Tolerated treatment well  Safety Devices  Type of devices: All fall risk precautions in place;Call light within reach; Chair alarm in place; Patient at risk for falls; Left in chair         Patient Diagnosis(es): CVA     has a past medical history of Actinic keratosis, Arthritis, Asthma, Atrial fibrillation ([address] CAD (coronary artery disease), Cerebral artery occlusion with cerebral infarction ([address] Depression, Diabetes mellitus ([address] Disc displacement, lumbar, Diverticulosis of colon, Hard of hearing, Hyperlipidemia, Hypertension, Ischemic heart disease, Past heart attack, Poor vision, Right leg weakness, and Scoliosis.    has a past surgical history that includes Coronary artery bypass graft;  section; Appendectomy; ovarian cyst removal; Hand surgery; Cardiac surgery (); skin biopsy; vascular surgery; Tonsillectomy; Lumbar disc surgery (8-); Lumbar spine surgery (10-11-12); and back surgery. Restrictions  Restrictions/Precautions  Restrictions/Precautions: Fall Risk  Required Braces or Orthoses?: No  Position Activity Restriction  Other position/activity restrictions: The patient is a 65y.o.  years old female with history of hypertension A. fib, hyperlipidemia and prior stroke who was admitted to the hospital last night with acute visual disturbance. Symptoms started few hours prior to admission. According to report, she was playing with her dog at home when she slowly lost the vision from the periphery bilaterally and was unable to function. Degree was severe and persistent. No falling or injury. She does have residual aphasia from prior stroke from last year. No fever or chills. No other triggers or other associated symptoms, weakness or numbness or chest pain. No severe headache. Patient came into the ED for evaluation since her symptoms did not improve. Initial work-up revealed mild elevated high blood pressure. She had a CT of the head which showed possible acute right ischemic PCA stroke. CTA showed more proximal right ICA occlusion. She was admitted. Subjective   General  Chart Reviewed: Yes  Patient assessed for rehabilitation services?: Yes  Response to previous treatment: Patient with no complaints from previous session  Family / Caregiver Present: No  Diagnosis: Acute CVA  Subjective  Subjective: Pt seated in bed upon entry, pleasant and agreeable to OT session.   Vital Signs  Patient Currently in Pain: Denies     Objective    ADL  Grooming: Modified independent (oral hygiene and face washing)  Toileting: Modified independent   Additional Comments: Pt amabulated to/from bathroom with ANURAG Hendricks 2/11  Long term goals  Time Frame for Long term goals : STGs= LTGs  Patient Goals   Patient goals : to get better and go home       Therapy Time   Individual Concurrent Group Co-treatment   Time In 0830         Time Out 0930         Minutes 60               Timed Code Treatment Minutes:  60 Minutes    Total Treatment Minutes:  60 minutes     Curtis Torres S/OT   Directed and supervised by Monet Dior OTR/VAL #[id number]

## 2021-02-11 NOTE — PROGRESS NOTES
[place]  2/11/2021  [id number]    Chief Complaint: Acute ischemic stroke ([address]    Subjective:   No overnight events. No current complaints. Glucoses low. BP above goal.    ROS: No CP, SOB, dyspnea    Objective:  Patient Vitals for the past 24 hrs:   BP Temp Temp src Pulse Resp SpO2   02/11/21 0825 (!) 158/80 98.1 °F (36.7 °C) Oral 71 16 96 %   02/10/21 2130 (!) 156/76 97.7 °F (36.5 °C) Oral 71 16 97 %   02/10/21 0921 (!) 158/79 98.1 °F (36.7 °C) Oral 71 16 99 %     Gen: No distress, pleasant. Resting in bed  HEENT: Normocephalic, atraumatic. CV: Regular rate and rhythm. No MRG   Resp: No respiratory distress. CTAB   Abd: Soft, nontender nondistended  Ext: No edema. Neuro: Alert, oriented, appropriately interactive. Aphasia    Laboratory data: Available via EMR. Therapy progress:  PT  Position Activity Restriction  Other position/activity restrictions: The patient is a 65y.o.  years old female with history of hypertension A. fib, hyperlipidemia and prior stroke who was admitted to the hospital last night with acute visual disturbance. Symptoms started few hours prior to admission. According to report, she was playing with her dog at home when she slowly lost the vision from the periphery bilaterally and was unable to function. Degree was severe and persistent. No falling or injury. She does have residual aphasia from prior stroke from last year. No fever or chills. No other triggers or other associated symptoms, weakness or numbness or chest pain. No severe headache. Patient came into the ED for evaluation since her symptoms did not improve. Initial work-up revealed mild elevated high blood pressure. She had a CT of the head which showed possible acute right ischemic PCA stroke. CTA showed more proximal right ICA occlusion. She was admitted.   Objective     Sit to Stand: Stand by assistance  Stand to sit: Stand by assistance  Bed to Chair: Stand by assistance  Device: No Device  Assistance: Stand by assistance, Contact guard assistance  Distance: 449'  OT  PT Equipment Recommendations  Equipment Needed: No  Other: Pt owns RW and rollator  Toilet - Technique: Ambulating  Equipment Used: Standard toilet  Toilet Transfers Comments: use of R grab bar  Assessment        SLP  Current Diet : Regular  Current Liquid Diet : Thin  Diet Solids Recommendation: Regular  Liquid Consistency Recommendation: Thin    Body mass index is 25.85 kg/m². Assessment:  Patient Active Problem List   Diagnosis    Lumbar radiculopathy    [facility]) [facility]    Received intravenous tissue plasminogen activator (tPA) in emergency department    Benign essential HTN    Nontraumatic cortical hemorrhage of left cerebral hemisphere ([facility]    Dyslipidemia    PAF (paroxysmal atrial fibrillation) (HCC)    Thalamic stroke ([facility]    DM (diabetes mellitus), type 2, uncontrolled with complications ([facility]    Remote history of stroke    Persistent atrial fibrillation (HCC)    Hyponatremia    Coronary artery disease involving native coronary artery of native heart without angina pectoris    Acute ischemic stroke ([facility]       Plan:   Acute right ischemic stroke: ASA, coumadin initiation in 2 weeks from event (2/13). Lovenox PPx currently. PT/OT/SLP     H/O CVA: L MCA, hemorrhagic transformation following tPA admin. ASA, statin     HTN: toprol 50, - lisinopril 5 ->10     HLD: resumed home zetia     Afib: BB, AC as above     DM: Lantus weaned, SSI. Takes metformin 1000, glimepiride 4 BID at home. Resumed metformin 500 and glimepiride - hold      Depression: zoloft 100     Bowels: Per protocol  Bladder: Per protocol   Sleep: Trazodone provided prn. Pain: tylenol   DVT PPx: Lovenox - > eliquis    Dispo: Prep d/c for tomorrow. Catrachita Taylor MD 2/11/2021, 9:06 AM    * This document was created using dictation software.   While all precautions were taken to ensure accuracy, errors may have occurred. Please disregard any typographical errors.

## 2021-02-11 NOTE — PLAN OF CARE
Problem: SAFETY  Goal: STG - Patient uses call light consistently to request assistance with transfers  Outcome: Ongoing  Note: Patient calls appropriately. Problem: Falls - Risk of:  Goal: Will remain free from falls  Description: Will remain free from falls  Outcome: Ongoing  Note: Yellow FALL RISK band on patient. Non skid footwear in place. Alarms used appropriately. Patient instructed to call and wait for staff before getting up. Rounding done to anticipate needs. Appropriate safety devices used for transfers      Problem: Falls - Risk of:  Goal: Absence of physical injury  Description: Absence of physical injury  Outcome: Ongoing  Note: No injuries this shift.

## 2021-02-12 VITALS
RESPIRATION RATE: 16 BRPM | DIASTOLIC BLOOD PRESSURE: 79 MMHG | TEMPERATURE: 98.6 F | HEIGHT: 64 IN | HEART RATE: 64 BPM | SYSTOLIC BLOOD PRESSURE: 168 MMHG | WEIGHT: 150.6 LBS | OXYGEN SATURATION: 99 % | BODY MASS INDEX: 25.71 KG/M2

## 2021-02-12 LAB
GLUCOSE BLD-MCNC: 138 MG/DL (ref 70–99)
PERFORMED ON: ABNORMAL

## 2021-02-12 PROCEDURE — 6370000000 HC RX 637 (ALT 250 FOR IP): Performed by: PHYSICAL MEDICINE & REHABILITATION

## 2021-02-12 RX ADMIN — ASPIRIN 81 MG: 81 TABLET, CHEWABLE ORAL at 10:44

## 2021-02-12 RX ADMIN — METFORMIN HYDROCHLORIDE 500 MG: 500 TABLET ORAL at 10:44

## 2021-02-12 RX ADMIN — APIXABAN 5 MG: 5 TABLET, FILM COATED ORAL at 10:44

## 2021-02-12 RX ADMIN — LISINOPRIL 10 MG: 10 TABLET ORAL at 10:44

## 2021-02-12 RX ADMIN — METOPROLOL SUCCINATE 50 MG: 50 TABLET, EXTENDED RELEASE ORAL at 10:44

## 2021-02-12 RX ADMIN — SERTRALINE 100 MG: 50 TABLET, FILM COATED ORAL at 10:44

## 2021-02-12 NOTE — PROGRESS NOTES
[facility] home care referral. Spoke with viri/Vania re: home care plan of care/services. Agreeable. Demographic's verified. Aware of discharge with home care. Home care orders sent to [facility].

## 2021-02-12 NOTE — PROGRESS NOTES
CLINICAL PHARMACY NOTE: MEDS TO [address] Select Patient?: No  Total # of Prescriptions Filled: 2   The following medications were delivered to the patient:  · eliquis 5mg  · Lisinopril 10mg  Total # of Interventions Completed: 0  Time Spent (min): 30    Additional Documentation:  Added free trial eliquis card  Medications picked up by daughter in [place] [address]

## 2021-02-12 NOTE — PROGRESS NOTES
Pt discharged to home with home care. PCA taking patient down with the wheelchair. Accompanied by daughter. Transported in personal vehicle. Discharge instructions, Rx, and personal belongings given to pt. Explanation of discharge medications and instructions understood by verbal statement. No questions, comments or concerns at this time.  Electronically signed by Ruby Law RN on 2/12/2021 at 12:18 PM

## 2021-02-12 NOTE — PLAN OF CARE
Problem: Falls - Risk of:  Goal: Will remain free from falls  Description: Will remain free from falls  2/11/2021 2033 by JACQUELINE Babcock  Outcome: Ongoing     Problem: Falls - Risk of:  Goal: Absence of physical injury  Description: Absence of physical injury  2/11/2021 2033 by JACQUELINE Babcock  Outcome: Ongoing

## 2021-02-12 NOTE — CARE COORDINATION
Social Work Discharge Summary    Patient discharged home via car with daughter. Patient to receive home nursing, therapy, aide and  from [facility].  No further needs voiced by pt or treatment team. ALEN Chavarria, LSW    Electronically signed by ALEN Deutsch on 2/12/2021 at 11:57 AM

## 2022-10-23 ENCOUNTER — HOSPITAL ENCOUNTER (INPATIENT)
Age: 72
LOS: 2 days | Discharge: HOME HEALTH | DRG: 305 | End: 2022-10-25
Attending: FAMILY MEDICINE | Admitting: FAMILY MEDICINE
Payer: MEDICARE

## 2022-10-23 ENCOUNTER — APPOINTMENT (OUTPATIENT)
Dept: CT IMAGING | Age: 72
DRG: 305 | End: 2022-10-23
Payer: MEDICARE

## 2022-10-23 ENCOUNTER — APPOINTMENT (OUTPATIENT)
Dept: GENERAL RADIOLOGY | Age: 72
DRG: 305 | End: 2022-10-23
Payer: MEDICARE

## 2022-10-23 DIAGNOSIS — G45.9 TIA (TRANSIENT ISCHEMIC ATTACK): Primary | ICD-10-CM

## 2022-10-23 PROBLEM — R42 DIZZINESS: Status: ACTIVE | Noted: 2022-10-23

## 2022-10-23 LAB
A/G RATIO: 1.4 (ref 1.1–2.2)
ALBUMIN SERPL-MCNC: 4.3 G/DL (ref 3.4–5)
ALP BLD-CCNC: 71 U/L (ref 40–129)
ALT SERPL-CCNC: 9 U/L (ref 10–40)
ANION GAP SERPL CALCULATED.3IONS-SCNC: 14 MMOL/L (ref 3–16)
AST SERPL-CCNC: 14 U/L (ref 15–37)
BASOPHILS ABSOLUTE: 0.1 K/UL (ref 0–0.2)
BASOPHILS RELATIVE PERCENT: 0.7 %
BILIRUB SERPL-MCNC: <0.2 MG/DL (ref 0–1)
BUN BLDV-MCNC: 9 MG/DL (ref 7–20)
CALCIUM SERPL-MCNC: 9.6 MG/DL (ref 8.3–10.6)
CHLORIDE BLD-SCNC: 102 MMOL/L (ref 99–110)
CHP ED QC CHECK: NORMAL
CO2: 22 MMOL/L (ref 21–32)
CREAT SERPL-MCNC: 0.7 MG/DL (ref 0.6–1.2)
EOSINOPHILS ABSOLUTE: 0.1 K/UL (ref 0–0.6)
EOSINOPHILS RELATIVE PERCENT: 1.4 %
GFR SERPL CREATININE-BSD FRML MDRD: >60 ML/MIN/{1.73_M2}
GLUCOSE BLD-MCNC: 110 MG/DL
GLUCOSE BLD-MCNC: 110 MG/DL (ref 70–99)
GLUCOSE BLD-MCNC: 126 MG/DL (ref 70–99)
GLUCOSE BLD-MCNC: 131 MG/DL (ref 70–99)
HCT VFR BLD CALC: 38.4 % (ref 36–48)
HEMOGLOBIN: 12.6 G/DL (ref 12–16)
INR BLD: 1.25 (ref 0.87–1.14)
LYMPHOCYTES ABSOLUTE: 0.9 K/UL (ref 1–5.1)
LYMPHOCYTES RELATIVE PERCENT: 10.8 %
MCH RBC QN AUTO: 30.1 PG (ref 26–34)
MCHC RBC AUTO-ENTMCNC: 32.8 G/DL (ref 31–36)
MCV RBC AUTO: 91.8 FL (ref 80–100)
MONOCYTES ABSOLUTE: 0.4 K/UL (ref 0–1.3)
MONOCYTES RELATIVE PERCENT: 5.3 %
NEUTROPHILS ABSOLUTE: 6.7 K/UL (ref 1.7–7.7)
NEUTROPHILS RELATIVE PERCENT: 81.8 %
PDW BLD-RTO: 13 % (ref 12.4–15.4)
PERFORMED ON: ABNORMAL
PERFORMED ON: ABNORMAL
PLATELET # BLD: 319 K/UL (ref 135–450)
PMV BLD AUTO: 8.2 FL (ref 5–10.5)
POTASSIUM REFLEX MAGNESIUM: 5 MMOL/L (ref 3.5–5.1)
PROTHROMBIN TIME: 15.7 SEC (ref 11.7–14.5)
RBC # BLD: 4.18 M/UL (ref 4–5.2)
SODIUM BLD-SCNC: 138 MMOL/L (ref 136–145)
TOTAL PROTEIN: 7.3 G/DL (ref 6.4–8.2)
TROPONIN: <0.01 NG/ML
WBC # BLD: 8.2 K/UL (ref 4–11)

## 2022-10-23 PROCEDURE — 84484 ASSAY OF TROPONIN QUANT: CPT

## 2022-10-23 PROCEDURE — 85025 COMPLETE CBC W/AUTO DIFF WBC: CPT

## 2022-10-23 PROCEDURE — 80053 COMPREHEN METABOLIC PANEL: CPT

## 2022-10-23 PROCEDURE — 6370000000 HC RX 637 (ALT 250 FOR IP): Performed by: FAMILY MEDICINE

## 2022-10-23 PROCEDURE — 6360000004 HC RX CONTRAST MEDICATION: Performed by: PHYSICIAN ASSISTANT

## 2022-10-23 PROCEDURE — 99285 EMERGENCY DEPT VISIT HI MDM: CPT

## 2022-10-23 PROCEDURE — 70496 CT ANGIOGRAPHY HEAD: CPT

## 2022-10-23 PROCEDURE — 70450 CT HEAD/BRAIN W/O DYE: CPT

## 2022-10-23 PROCEDURE — 93005 ELECTROCARDIOGRAM TRACING: CPT | Performed by: FAMILY MEDICINE

## 2022-10-23 PROCEDURE — 71045 X-RAY EXAM CHEST 1 VIEW: CPT

## 2022-10-23 PROCEDURE — 2060000000 HC ICU INTERMEDIATE R&B

## 2022-10-23 PROCEDURE — 6360000002 HC RX W HCPCS: Performed by: FAMILY MEDICINE

## 2022-10-23 PROCEDURE — 4A03X5D MEASUREMENT OF ARTERIAL FLOW, INTRACRANIAL, EXTERNAL APPROACH: ICD-10-PCS | Performed by: INTERNAL MEDICINE

## 2022-10-23 PROCEDURE — 36415 COLL VENOUS BLD VENIPUNCTURE: CPT

## 2022-10-23 PROCEDURE — 85610 PROTHROMBIN TIME: CPT

## 2022-10-23 RX ORDER — ENOXAPARIN SODIUM 100 MG/ML
40 INJECTION SUBCUTANEOUS DAILY
Status: DISCONTINUED | OUTPATIENT
Start: 2022-10-24 | End: 2022-10-23

## 2022-10-23 RX ORDER — GLIMEPIRIDE 2 MG/1
4 TABLET ORAL
Status: DISCONTINUED | OUTPATIENT
Start: 2022-10-23 | End: 2022-10-25 | Stop reason: HOSPADM

## 2022-10-23 RX ORDER — LISINOPRIL 10 MG/1
10 TABLET ORAL DAILY
Status: DISCONTINUED | OUTPATIENT
Start: 2022-10-24 | End: 2022-10-25 | Stop reason: HOSPADM

## 2022-10-23 RX ORDER — POLYETHYLENE GLYCOL 3350 17 G/17G
17 POWDER, FOR SOLUTION ORAL DAILY PRN
Status: DISCONTINUED | OUTPATIENT
Start: 2022-10-23 | End: 2022-10-25 | Stop reason: HOSPADM

## 2022-10-23 RX ORDER — EZETIMIBE 10 MG/1
10 TABLET ORAL DAILY
Status: DISCONTINUED | OUTPATIENT
Start: 2022-10-23 | End: 2022-10-23 | Stop reason: RX

## 2022-10-23 RX ORDER — METOPROLOL SUCCINATE 50 MG/1
50 TABLET, EXTENDED RELEASE ORAL DAILY
Status: DISCONTINUED | OUTPATIENT
Start: 2022-10-24 | End: 2022-10-25 | Stop reason: HOSPADM

## 2022-10-23 RX ORDER — MAGNESIUM GLUCONATE 27 MG(500)
500 TABLET ORAL DAILY
Status: DISCONTINUED | OUTPATIENT
Start: 2022-10-23 | End: 2022-10-23 | Stop reason: CLARIF

## 2022-10-23 RX ORDER — ASPIRIN 81 MG/1
81 TABLET, CHEWABLE ORAL DAILY
Status: DISCONTINUED | OUTPATIENT
Start: 2022-10-24 | End: 2022-10-25 | Stop reason: HOSPADM

## 2022-10-23 RX ORDER — ENOXAPARIN SODIUM 100 MG/ML
1 INJECTION SUBCUTANEOUS 2 TIMES DAILY
Status: DISCONTINUED | OUTPATIENT
Start: 2022-10-23 | End: 2022-10-25 | Stop reason: HOSPADM

## 2022-10-23 RX ORDER — DEXTROSE MONOHYDRATE 100 MG/ML
INJECTION, SOLUTION INTRAVENOUS CONTINUOUS PRN
Status: DISCONTINUED | OUTPATIENT
Start: 2022-10-23 | End: 2022-10-25 | Stop reason: HOSPADM

## 2022-10-23 RX ORDER — WARFARIN SODIUM 5 MG/1
5 TABLET ORAL
Status: DISCONTINUED | OUTPATIENT
Start: 2022-10-24 | End: 2022-10-24 | Stop reason: DRUGHIGH

## 2022-10-23 RX ORDER — BUDESONIDE AND FORMOTEROL FUMARATE DIHYDRATE 160; 4.5 UG/1; UG/1
2 AEROSOL RESPIRATORY (INHALATION) 2 TIMES DAILY
Status: DISCONTINUED | OUTPATIENT
Start: 2022-10-23 | End: 2022-10-23 | Stop reason: CLARIF

## 2022-10-23 RX ORDER — LABETALOL HYDROCHLORIDE 5 MG/ML
10 INJECTION, SOLUTION INTRAVENOUS EVERY 10 MIN PRN
Status: DISCONTINUED | OUTPATIENT
Start: 2022-10-23 | End: 2022-10-25 | Stop reason: HOSPADM

## 2022-10-23 RX ORDER — ONDANSETRON 4 MG/1
4 TABLET, ORALLY DISINTEGRATING ORAL EVERY 8 HOURS PRN
Status: DISCONTINUED | OUTPATIENT
Start: 2022-10-23 | End: 2022-10-25 | Stop reason: HOSPADM

## 2022-10-23 RX ORDER — LIDOCAINE 50 MG/G
OINTMENT TOPICAL PRN
Status: DISCONTINUED | OUTPATIENT
Start: 2022-10-23 | End: 2022-10-25 | Stop reason: HOSPADM

## 2022-10-23 RX ORDER — ONDANSETRON 2 MG/ML
4 INJECTION INTRAMUSCULAR; INTRAVENOUS EVERY 6 HOURS PRN
Status: DISCONTINUED | OUTPATIENT
Start: 2022-10-23 | End: 2022-10-25 | Stop reason: HOSPADM

## 2022-10-23 RX ORDER — WARFARIN SODIUM 2.5 MG/1
2.5 TABLET ORAL
Status: DISCONTINUED | OUTPATIENT
Start: 2022-10-23 | End: 2022-10-24 | Stop reason: DRUGHIGH

## 2022-10-23 RX ORDER — WARFARIN SODIUM 5 MG/1
5 TABLET ORAL
COMMUNITY

## 2022-10-23 RX ORDER — LANOLIN ALCOHOL/MO/W.PET/CERES
400 CREAM (GRAM) TOPICAL DAILY
Status: DISCONTINUED | OUTPATIENT
Start: 2022-10-24 | End: 2022-10-25 | Stop reason: HOSPADM

## 2022-10-23 RX ADMIN — IOPAMIDOL 75 ML: 755 INJECTION, SOLUTION INTRAVENOUS at 12:09

## 2022-10-23 RX ADMIN — ENOXAPARIN SODIUM 60 MG: 100 INJECTION SUBCUTANEOUS at 20:57

## 2022-10-23 RX ADMIN — WARFARIN SODIUM 2.5 MG: 2.5 TABLET ORAL at 22:06

## 2022-10-23 SDOH — ECONOMIC STABILITY: TRANSPORTATION INSECURITY
IN THE PAST 12 MONTHS, HAS THE LACK OF TRANSPORTATION KEPT YOU FROM MEDICAL APPOINTMENTS OR FROM GETTING MEDICATIONS?: NO

## 2022-10-23 SDOH — ECONOMIC STABILITY: TRANSPORTATION INSECURITY
IN THE PAST 12 MONTHS, HAS LACK OF TRANSPORTATION KEPT YOU FROM MEETINGS, WORK, OR FROM GETTING THINGS NEEDED FOR DAILY LIVING?: NO

## 2022-10-23 SDOH — ECONOMIC STABILITY: HOUSING INSECURITY: IN THE LAST 12 MONTHS, HOW MANY PLACES HAVE YOU LIVED?: 1

## 2022-10-23 SDOH — ECONOMIC STABILITY: HOUSING INSECURITY
IN THE LAST 12 MONTHS, WAS THERE A TIME WHEN YOU DID NOT HAVE A STEADY PLACE TO SLEEP OR SLEPT IN A SHELTER (INCLUDING NOW)?: NO

## 2022-10-23 SDOH — ECONOMIC STABILITY: INCOME INSECURITY: IN THE LAST 12 MONTHS, WAS THERE A TIME WHEN YOU WERE NOT ABLE TO PAY THE MORTGAGE OR RENT ON TIME?: NO

## 2022-10-23 SDOH — ECONOMIC STABILITY: FOOD INSECURITY: WITHIN THE PAST 12 MONTHS, YOU WORRIED THAT YOUR FOOD WOULD RUN OUT BEFORE YOU GOT MONEY TO BUY MORE.: NEVER TRUE

## 2022-10-23 SDOH — HEALTH STABILITY: PHYSICAL HEALTH: ON AVERAGE, HOW MANY DAYS PER WEEK DO YOU ENGAGE IN MODERATE TO STRENUOUS EXERCISE (LIKE A BRISK WALK)?: 0 DAYS

## 2022-10-23 SDOH — HEALTH STABILITY: PHYSICAL HEALTH: ON AVERAGE, HOW MANY MINUTES DO YOU ENGAGE IN EXERCISE AT THIS LEVEL?: 0 MIN

## 2022-10-23 SDOH — ECONOMIC STABILITY: FOOD INSECURITY: WITHIN THE PAST 12 MONTHS, THE FOOD YOU BOUGHT JUST DIDN'T LAST AND YOU DIDN'T HAVE MONEY TO GET MORE.: NEVER TRUE

## 2022-10-23 ASSESSMENT — PATIENT HEALTH QUESTIONNAIRE - PHQ9
2. FEELING DOWN, DEPRESSED OR HOPELESS: MORE THAN HALF THE DAYS
SUM OF ALL RESPONSES TO PHQ QUESTIONS 1-9: 12
5. POOR APPETITE OR OVEREATING: NOT AT ALL
4. FEELING TIRED OR HAVING LITTLE ENERGY: MORE THAN HALF THE DAYS
1. LITTLE INTEREST OR PLEASURE IN DOING THINGS: MORE THAN HALF THE DAYS
SUM OF ALL RESPONSES TO PHQ9 QUESTIONS 1 & 2: 4
8. MOVING OR SPEAKING SO SLOWLY THAT OTHER PEOPLE COULD HAVE NOTICED. OR THE OPPOSITE, BEING SO FIGETY OR RESTLESS THAT YOU HAVE BEEN MOVING AROUND A LOT MORE THAN USUAL: NOT AT ALL
3. TROUBLE FALLING OR STAYING ASLEEP: NEARLY EVERY DAY
10. IF YOU CHECKED OFF ANY PROBLEMS, HOW DIFFICULT HAVE THESE PROBLEMS MADE IT FOR YOU TO DO YOUR WORK, TAKE CARE OF THINGS AT HOME, OR GET ALONG WITH OTHER PEOPLE: NOT DIFFICULT AT ALL
9. THOUGHTS THAT YOU WOULD BE BETTER OFF DEAD, OR OF HURTING YOURSELF: NOT AT ALL
7. TROUBLE CONCENTRATING ON THINGS, SUCH AS READING THE NEWSPAPER OR WATCHING TELEVISION: NOT AT ALL
6. FEELING BAD ABOUT YOURSELF - OR THAT YOU ARE A FAILURE OR HAVE LET YOURSELF OR YOUR FAMILY DOWN: NEARLY EVERY DAY

## 2022-10-23 ASSESSMENT — LIFESTYLE VARIABLES
HOW OFTEN DO YOU HAVE A DRINK CONTAINING ALCOHOL: NEVER
HOW MANY STANDARD DRINKS CONTAINING ALCOHOL DO YOU HAVE ON A TYPICAL DAY: PATIENT DOES NOT DRINK

## 2022-10-23 ASSESSMENT — SOCIAL DETERMINANTS OF HEALTH (SDOH)
HOW HARD IS IT FOR YOU TO PAY FOR THE VERY BASICS LIKE FOOD, HOUSING, MEDICAL CARE, AND HEATING?: NOT HARD AT ALL
WITHIN THE LAST YEAR, HAVE YOU BEEN AFRAID OF YOUR PARTNER OR EX-PARTNER?: NO
IN A TYPICAL WEEK, HOW MANY TIMES DO YOU TALK ON THE PHONE WITH FAMILY, FRIENDS, OR NEIGHBORS?: MORE THAN THREE TIMES A WEEK
HOW OFTEN DO YOU ATTENT MEETINGS OF THE CLUB OR ORGANIZATION YOU BELONG TO?: NEVER
DO YOU BELONG TO ANY CLUBS OR ORGANIZATIONS SUCH AS CHURCH GROUPS UNIONS, FRATERNAL OR ATHLETIC GROUPS, OR SCHOOL GROUPS?: NO
HOW OFTEN DO YOU ATTEND CHURCH OR RELIGIOUS SERVICES?: NEVER
WITHIN THE LAST YEAR, HAVE YOU BEEN KICKED, HIT, SLAPPED, OR OTHERWISE PHYSICALLY HURT BY YOUR PARTNER OR EX-PARTNER?: NO
HOW OFTEN DO YOU GET TOGETHER WITH FRIENDS OR RELATIVES?: ONCE A WEEK
WITHIN THE LAST YEAR, HAVE YOU BEEN HUMILIATED OR EMOTIONALLY ABUSED IN OTHER WAYS BY YOUR PARTNER OR EX-PARTNER?: NO
WITHIN THE LAST YEAR, HAVE TO BEEN RAPED OR FORCED TO HAVE ANY KIND OF SEXUAL ACTIVITY BY YOUR PARTNER OR EX-PARTNER?: NO

## 2022-10-23 ASSESSMENT — PAIN - FUNCTIONAL ASSESSMENT: PAIN_FUNCTIONAL_ASSESSMENT: NONE - DENIES PAIN

## 2022-10-23 NOTE — ED PROVIDER NOTES
[facility]        Pt Name: Evans David  MRN: [id number]  [place] [birth year]  Date of evaluation: 10/23/2022  Provider: Mellissa Ziegler PA-C  PCP: Caleb Leal  Note Started: 3:02 PM EDT      YANA. I have evaluated this patient. My supervising physician was available for consultation. Triage CHIEF COMPLAINT       Chief Complaint   Patient presents with    Dizziness     Arrived per [address] [place] [address] EMS from home d/t dizziness that started approx 0100 with hx of CVA; family concerned for another stroke; 190/100; afib; confusion; slurred speech is normal; eye drift in the left eye per normal;          HISTORY OF PRESENT ILLNESS   (Location/Symptom, Timing/Onset, Context/Setting, Quality, Duration, Modifying Factors, Severity)  Note limiting factors. Chief Complaint: Carol Levy is a 74 y.o. female who presents to the emergency department, initial history comes from the patient, later on in the visit her daughter add some of the history. The patient last known well was 1:00 in the morning, she states that she woke up this morning, got up to urinate and felt profoundly dizzy. She states that she fell back into bed, she was very dizzy at that time and felt bad and went back to sleep. Sometime later she got up, let the dog out, experienced dizziness again and decided to go back to sleep, she states that when she finally did get up she called her daughter who told her that she should come to the emergency department. 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 She is on aspirin and warfarin. Recently she had her warfarin decreased because she was placed on doxycycline and her INR was 3.99. On my initial exam she had a difficult time searching for certain words, and called things inappropriately.   Upon reevaluation with her daughter in the room the daughter states that this is her norm. Nursing Notes were all reviewed and agreed with or any disagreements were addressed in the HPI.     REVIEW OF SYSTEMS    (2-9 systems for level 4, 10 or more for level 5)     Review of Systems   Unable to perform ROS: Acuity of condition     PAST MEDICAL HISTORY     Past Medical History:   Diagnosis Date    Actinic keratosis     Arthritis     Asthma     UNSPECIFIED    Atrial fibrillation (HCC)     CAD (coronary artery disease)     Cerebral artery occlusion with cerebral infarction ([facility] [address] 10/2020    residual expressive aphasia    Depression     worse with pain    Diabetes mellitus ([facility] [address]     TYPE II    Disc displacement, lumbar     Diverticulosis of colon     Hard of hearing     left    Hyperlipidemia     Hypertension     Ischemic heart disease     Past heart attack     28 YRS AGO    Poor vision     legally blind    Right leg weakness     Scoliosis        SURGICAL HISTORY     Past Surgical History:   Procedure Laterality Date    APPENDECTOMY      BACK SURGERY      CARDIAC SURGERY       SECTION      ONE    CORONARY ARTERY BYPASS GRAFT      HAND SURGERY      LUMPS/BONE SPURS    LUMBAR One Arch Last SURGERY  8-    Microlumbar discectomy L4-5 right    LUMBAR SPINE SURGERY  10-11-12    MICROLUMBAR RE-EXPLORATION L4-5 RIGHT, DECOMPRESSION OF    OVARIAN CYST REMOVAL      SKIN BIOPSY      PRE CANCERS FROZEN OFF    TONSILLECTOMY      VASCULAR SURGERY         CURRENTMEDICATIONS       Previous Medications    APIXABAN (ELIQUIS) 5 MG TABS TABLET    Take 1 tablet by mouth 2 times daily    ASPIRIN (ASPIRIN CHILDRENS) 81 MG CHEWABLE TABLET    Take 1 tablet by mouth daily    BUDESONIDE-FORMOTEROL (SYMBICORT) 160-4.5 MCG/ACT AERO    Inhale 2 puffs into the lungs 2 times daily    EZETIMIBE (ZETIA) 10 MG TABLET    Take 10 mg by mouth daily    GLIMEPIRIDE (AMARYL) 4 MG TABLET    Take 4 mg by mouth 2 times daily     LISINOPRIL (PRINIVIL;ZESTRIL) 10 MG TABLET    Take 1 tablet by mouth daily    MAGNESIUM GLUCONATE (MAGONATE) 500 MG TABLET    Take 500 mg by mouth daily    METFORMIN (GLUCOPHAGE-XR) 500 MG EXTENDED RELEASE TABLET    Take 1 tablet by mouth 2 times daily    METOPROLOL (TOPROL-XL) 50 MG XL TABLET    Take 50 mg by mouth daily. SERTRALINE (ZOLOFT) 100 MG TABLET    Take 100 mg by mouth daily       ALLERGIES     Morphine, Codeine, and Statins    FAMILYHISTORY       Family History   Problem Relation Age of Onset    Bipolar Disorder Mother     Mental Illness Mother         SUICIDE ATTEMPTS    Diabetes Mother     High Blood Pressure Mother     Heart Disease Father     Heart Attack Father     Heart Surgery Father     Early Death Father     Cancer Father     High Blood Pressure Father     Heart Disease Sister     Heart Surgery Sister     High Blood Pressure Sister     Diabetes Sister         SOCIAL HISTORY       Social History     Socioeconomic History    Marital status:      Spouse name: None    Number of children: None    Years of education: None    Highest education level: None   Tobacco Use    Smoking status: Former     Packs/day: 1.00     Years: 40.00     Pack years: 40.00     Types: Cigarettes    Smokeless tobacco: Never   Vaping Use    Vaping Use: Never used   Substance and Sexual Activity    Alcohol use: No     Comment: RARELY NOW    Drug use: No       SCREENINGS   NIH Stroke Scale  Interval: Baseline  Level of Consciousness (1a): Alert  LOC Questions (1b):  Answers both correctly (delayed-per normal)  LOC Commands (1c): Performs both tasks correctly  Best Gaze (2): Normal  Visual (3): No visual loss  Facial Palsy (4): (!) Minor paralysis (hx of CVA)  Motor Arm, Left (5a): No drift  Motor Arm, Right (5b): Drift, but does not hit bed  Motor Leg, Left (6a): No drift  Motor Leg, Right (6b): Drift, but does not hit bed  Limb Ataxia (7): Absent  Sensory (8): Normal  Best Language (9): Mild to moderate aphasia  Dysarthria (10): Mild to moderate, slurs some words    [place] Coma Scale  Eye Opening: Spontaneous  Best Verbal Response: Confused  Best Motor Response: Obeys commands  Emelyn Coma Scale Score: 14        PHYSICAL EXAM    (up to 7 for level 4, 8 or more for level 5)     ED Triage Vitals [10/23/22 1148]   BP Temp Temp Source Heart Rate Resp SpO2 Height Weight   (!) 185/100 98 °F (36.7 °C) Oral 95 20 94 % 5' 4\" (1.626 m) 180 lb (81.6 kg)       Physical Exam  Vitals and nursing note reviewed. Constitutional:       Appearance: Normal appearance. She is well-developed. She is not ill-appearing or diaphoretic. HENT:      Head: Normocephalic and atraumatic. Right Ear: External ear normal.      Left Ear: External ear normal.      Nose: Nose normal.   Eyes:      General:         Right eye: No discharge. Left eye: No discharge. Comments: Test of skew was normal EOMs unable to be obtained due to the patient's hemianopsia   Pulmonary:      Effort: Pulmonary effort is normal. No respiratory distress. Breath sounds: No stridor. Musculoskeletal:         General: Normal range of motion. Cervical back: Normal range of motion and neck supple. Skin:     General: Skin is warm and dry. Coloration: Skin is not pale. Neurological:      Mental Status: She is alert and oriented to person, place, and time. Cranial Nerves: Cranial nerve deficit present. Motor: Weakness present.    Psychiatric:         Mood and Affect: Mood normal.         Behavior: Behavior normal.       DIAGNOSTIC RESULTS   LABS:    Labs Reviewed   CBC WITH AUTO DIFFERENTIAL - Abnormal; Notable for the following components:       Result Value    Lymphocytes Absolute 0.9 (*)     All other components within normal limits   COMPREHENSIVE METABOLIC PANEL W/ REFLEX TO MG FOR LOW K - Abnormal; Notable for the following components:    Glucose 131 (*)     ALT 9 (*)     AST 14 (*)     All other components within normal limits   PROTIME-INR - Abnormal; Notable for the following components: Protime 15.7 (*)     INR 1.25 (*)     All other components within normal limits   POCT GLUCOSE - Abnormal; Notable for the following components:    POC Glucose 110 (*)     All other components within normal limits   POCT GLUCOSE - Normal   TROPONIN       When ordered, only abnormal lab results are displayed. All other labs were within normal range or not returned as of this dictation. EKG: When ordered, EKG's are interpreted by the Emergency Department Physician in the absence of a cardiologist.  Please see their note for interpretation of EKG. RADIOLOGY:   Non-plain film images such as CT, Ultrasound and MRI are read by the radiologist. Plain radiographic images are visualized andpreliminarily interpreted by the  ED Provider with the below findings:        Interpretation perthe Radiologist below, if available at the time of this note:    XR CHEST PORTABLE   Final Result   No acute cardiopulmonary disease. CTA HEAD NECK W CONTRAST   Final Result   1. 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 2. Chronic severe stenosis vs occlusion of the right distal PCA. 3. Otherwise, the remaining major arteries of the head and neck appear patent   without significant stenosis. 4. Aberrant right subclavian artery with a retroesophageal course, anatomic   variant. 5. Left upper central incisor periapical lucency. Can correlate with dental   examination. RECOMMENDATIONS:   1.1 cm incidental right thyroid nodule. No follow-up imaging is recommended. Reference: J Am Jaqueline Radiol. 2015 Feb;12(2): 143-50         CT HEAD WO CONTRAST   Final Result   1. No evident acute intracranial abnormality. 2. Chronic findings as above.            CT HEAD WO CONTRAST    Result Date: 10/23/2022  EXAMINATION: CT OF THE HEAD WITHOUT CONTRAST 10/23/2022 12:11 pm TECHNIQUE: CT of the head was performed without the administration of intravenous contrast. Automated exposure control, iterative reconstruction, and/or weight based adjustment of the mA/kV was utilized to reduce the radiation dose to as low as reasonably achievable. COMPARISON: Brain MRI 01/30/2021, head CT 01/29/2021 HISTORY: ORDERING SYSTEM PROVIDED HISTORY: Stroke Symptoms TECHNOLOGIST PROVIDED HISTORY: Has a \"code stroke\" or \"stroke alert\" been called? ->Yes Reason for exam:->Stroke Symptoms Decision Support Exception - unselect if not a suspected or confirmed emergency medical condition->Emergency Medical Condition (MA) Reason for Exam: Stroke Symptoms FINDINGS: BRAIN/VENTRICLES:  No masses nor acute intracranial hemorrhage. Unchanged encephalomalacia in the left precentral gyrus a near the margin of the left temporal neck septal lobes. New encephalomalacia in the right occipital lobe. Otherwise intact gray/white matter differentiation without findings of acute ischemia. No mass effect nor midline shift. Patent basilar cisterns and foramen magnum. No hydrocephalus. Age-appropriate mild diffuse atrophy. Minimal deep periventricular white matter hypodensities likely due to chronic small vessel ischemia. ORBITS:  Visualized portions appear normal without acute abnormality. SINUSES:  Partial opacification scattered minimal partial opacification of the left mastoid air cells likely due to effusion. SOFT TISSUES/SKULL:  No acute soft tissue abnormality. Severe atherosclerotic calcifications. No acute fracture. 1. No evident acute intracranial abnormality. 2. Chronic findings as above. XR CHEST PORTABLE    Result Date: 10/23/2022  EXAMINATION: ONE XRAY VIEW OF THE CHEST 10/23/2022 12:14 pm COMPARISON: 01/30/2021 HISTORY: ORDERING SYSTEM PROVIDED HISTORY: stroke symptoms TECHNOLOGIST PROVIDED HISTORY: Reason for exam:->stroke symptoms Reason for Exam: Stroke symptoms FINDINGS: Sternotomy wires are present. Cardiac silhouette is normal.  Thoracic aortic calcification is noted.   Hilar contours are normal.  No consolidation, pleural effusion or pneumothorax is identified. No acute cardiopulmonary disease. CTA HEAD NECK W CONTRAST    Result Date: 10/23/2022  EXAMINATION: CTA OF THE HEAD AND NECK WITH CONTRAST 10/23/2022 12:13 pm: TECHNIQUE: CTA of the head and neck was performed with the administration of intravenous contrast. Multiplanar reformatted images are provided for review. MIP images are provided for review. Stenosis of the internal carotid arteries measured using NASCET criteria. Automated exposure control, iterative reconstruction, and/or weight based adjustment of the mA/kV was utilized to reduce the radiation dose to as low as reasonably achievable. 3D reconstructed images were performed on a separate workstation and provided for review. This scan was analyzed using [employer]. ai contact LVO. Identification of suspected findings is not for diagnostic use beyond notification. Viz LVO is limited to analysis of imaging data and should not be used in-lieu of full patient evaluation or relied upon to make or confirm diagnosis. COMPARISON: 01/30/2021 HISTORY: ORDERING SYSTEM PROVIDED HISTORY: Stroke Symptoms TECHNOLOGIST PROVIDED HISTORY: Has a \"code stroke\" or \"stroke alert\" been called? ->Yes Reason for exam:->Stroke Symptoms Decision Support Exception - unselect if not a suspected or confirmed emergency medical condition->Emergency Medical Condition (MA) Reason for Exam: stroke symptoms Additional signs and symptoms: Dizziness (Arrived per [facility] from home d/t dizziness that started approx 0100 with hx of CVA; family concerned for another stroke; 190/100; afib; confusion; slurred speech is normal; eye drift in the left eye per normal; ) FINDINGS: CTA NECK: AORTIC ARCH/ARCH VESSELS: No dissection or arterial injury. No significant stenosis of the brachiocephalic or subclavian arteries. Aberrant right subclavian artery with a retroesophageal course, anatomic variant.  CAROTID ARTERIES: Chronic occlusion of the right cervical internal carotid artery starting just distal to the bifurcation. The bilateral common and left internal carotid artery appear patent without significant stenosis. VERTEBRAL ARTERIES: No dissection, arterial injury, or significant stenosis. SOFT TISSUES: The lung apices are clear. No cervical or superior mediastinal lymphadenopathy. The larynx and pharynx are unremarkable. No acute abnormality of the salivary and thyroid glands. Incidental 1.1 cm right posterior thyroid nodule. BONES: No acute osseous abnormality. Multilevel cervical spondylosis, most notable at C4-C5, C5-C6, and C6-C7. No high-grade bony spinal canal stenosis. CTA HEAD: ANTERIOR CIRCULATION: Reconstitution of flow in the right clinoid intracranial internal carotid artery likely related to collateral flow. No significant stenosis of the left intracranial internal carotid, bilateral anterior cerebral, or bilateral middle cerebral arteries. No aneurysm. POSTERIOR CIRCULATION: No significant stenosis of the vertebral, basilar, or left posterior cerebral arteries. Chronic severe stenosis/occlusion of the distal right posterior cerebral artery. Bilateral posterior communicating arteries present with a hypoplastic left P1 segment, anatomic variant. No aneurysm. OTHER: No dural venous sinus thrombosis on this non-dedicated study. Left upper central incisor periapical lucency. BRAIN: No mass effect or midline shift. No extra-axial fluid collection. Multifocal chronic encephalomalacia/gliosis. 1. 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 2. Chronic severe stenosis vs occlusion of the right distal PCA. 3. Otherwise, the remaining major arteries of the head and neck appear patent without significant stenosis. 4. Aberrant right subclavian artery with a retroesophageal course, anatomic variant. 5. Left upper central incisor periapical lucency. Can correlate with dental examination. RECOMMENDATIONS: 1.1 cm incidental right thyroid nodule.  No follow-up imaging is recommended. Reference: J Am Jaqueline Radiol. 2015 Feb;12(2): 143-50         PROCEDURES   Unless otherwise noted below, none     Procedures    CRITICAL CARE TIME     There was a high probability of life-threatening clinical deterioration in the patient's condition requiring my urgent intervention. I personally saw the patient and independently provided 45 minutes of non-concurrent critical care out of the total shared critical care time provided, excluding separately reportable procedures. CONSULTS:  IP CONSULT TO PHARMACY  PHARMACY TO CHANGE BASE FLUIDS      EMERGENCY DEPARTMENT COURSE and DIFFERENTIAL DIAGNOSIS/MDM:   Vitals:    Vitals:    10/23/22 1230 10/23/22 1300 10/23/22 1315 10/23/22 1330   BP: (!) 180/84 (!) 187/78 (!) 160/93 (!) 171/78   Pulse: 89 80 89 82   Resp:       Temp:       TempSrc:       SpO2: 96% 94% 93% 93%   Weight:       Height:           Patient was given thefollowing medications:  Medications   iopamidol (ISOVUE-370) 76 % injection 75 mL (75 mLs IntraVENous Given 10/23/22 1209)         Is this patient to be included in the SEP-1 Core Measure due to severe sepsis or septic shock? No   Exclusion criteria - the patient is NOT to be included for SEP-1 Core Measure due to: Infection is not suspected    The differential diagnosis for this dizziness includes peripheral versus central etiologies. My suspicion for a TIA is moderate at this time. The patient does have chronic occlusions, and she has a low INR. Given that her last known well was 1:00, she was outside the window for [place] K, I discussed this with the stroke team, she does not have signs or symptoms consistent with an LVO and no direct interventions are indicated at this time. We will admit the patient for a TIA and continued evaluation and management    I am the Primary Clinician of Record. FINAL IMPRESSION      1.  TIA (transient ischemic attack)          DISPOSITION/PLAN   DISPOSITION Decision To Admit 10/23/2022 03:00:36 PM      PATIENT REFERREDTO:  No follow-up provider specified.     DISCHARGE MEDICATIONS:  New Prescriptions    No medications on file       DISCONTINUED MEDICATIONS:  Discontinued Medications    No medications on file              (Please note that portions ofthis note were completed with a voice recognition program.  Efforts were made to edit the dictations but occasionally words are mis-transcribed.)    Ayanna Gtz PA-C (electronically signed)             Ayanna Gtz PA-C  10/23/22 1082

## 2022-10-23 NOTE — PROGRESS NOTES
Admission complete. Daughter states expressive aphasia the patient is exhibiting now is baseline for her.  All abnormal NIHSS scores are patient baseline

## 2022-10-23 NOTE — ED NOTES
Report given to inpatient nurse. No questions comments or concerns voice. Inpatient nurse to transport upstairs with all personal effects.        Naresh Chavis RN  10/23/22 4699

## 2022-10-24 ENCOUNTER — APPOINTMENT (OUTPATIENT)
Dept: MRI IMAGING | Age: 72
DRG: 305 | End: 2022-10-24
Payer: MEDICARE

## 2022-10-24 PROBLEM — I73.9 PAD (PERIPHERAL ARTERY DISEASE) (HCC): Status: ACTIVE | Noted: 2022-10-24

## 2022-10-24 PROBLEM — S81.809A WOUNDS, MULTIPLE OPEN, LOWER EXTREMITY: Status: ACTIVE | Noted: 2022-10-24

## 2022-10-24 PROBLEM — I16.0 HYPERTENSIVE URGENCY: Status: ACTIVE | Noted: 2022-10-24

## 2022-10-24 LAB
ANION GAP SERPL CALCULATED.3IONS-SCNC: 12 MMOL/L (ref 3–16)
BUN BLDV-MCNC: 9 MG/DL (ref 7–20)
CALCIUM SERPL-MCNC: 9.7 MG/DL (ref 8.3–10.6)
CHLORIDE BLD-SCNC: 101 MMOL/L (ref 99–110)
CHOLESTEROL, TOTAL: 192 MG/DL (ref 0–199)
CO2: 23 MMOL/L (ref 21–32)
CREAT SERPL-MCNC: 0.8 MG/DL (ref 0.6–1.2)
EKG ATRIAL RATE: 138 BPM
EKG DIAGNOSIS: NORMAL
EKG Q-T INTERVAL: 382 MS
EKG QRS DURATION: 122 MS
EKG QTC CALCULATION (BAZETT): 482 MS
EKG R AXIS: -66 DEGREES
EKG T AXIS: -29 DEGREES
EKG VENTRICULAR RATE: 96 BPM
GFR SERPL CREATININE-BSD FRML MDRD: >60 ML/MIN/{1.73_M2}
GLUCOSE BLD-MCNC: 174 MG/DL (ref 70–99)
GLUCOSE BLD-MCNC: 238 MG/DL (ref 70–99)
GLUCOSE BLD-MCNC: 248 MG/DL (ref 70–99)
GLUCOSE BLD-MCNC: 253 MG/DL (ref 70–99)
HCT VFR BLD CALC: 38.6 % (ref 36–48)
HDLC SERPL-MCNC: 55 MG/DL (ref 40–60)
HEMOGLOBIN: 12.5 G/DL (ref 12–16)
INR BLD: 1.3 (ref 0.87–1.14)
LDL CHOLESTEROL CALCULATED: 87 MG/DL
MCH RBC QN AUTO: 29.7 PG (ref 26–34)
MCHC RBC AUTO-ENTMCNC: 32.5 G/DL (ref 31–36)
MCV RBC AUTO: 91.4 FL (ref 80–100)
PDW BLD-RTO: 13 % (ref 12.4–15.4)
PERFORMED ON: ABNORMAL
PLATELET # BLD: 283 K/UL (ref 135–450)
PMV BLD AUTO: 8.1 FL (ref 5–10.5)
POTASSIUM SERPL-SCNC: 4.8 MMOL/L (ref 3.5–5.1)
PROTHROMBIN TIME: 16.1 SEC (ref 11.7–14.5)
RBC # BLD: 4.23 M/UL (ref 4–5.2)
SODIUM BLD-SCNC: 136 MMOL/L (ref 136–145)
TRIGL SERPL-MCNC: 248 MG/DL (ref 0–150)
VLDLC SERPL CALC-MCNC: 50 MG/DL
WBC # BLD: 6.5 K/UL (ref 4–11)

## 2022-10-24 PROCEDURE — 92526 ORAL FUNCTION THERAPY: CPT

## 2022-10-24 PROCEDURE — 94761 N-INVAS EAR/PLS OXIMETRY MLT: CPT

## 2022-10-24 PROCEDURE — 92610 EVALUATE SWALLOWING FUNCTION: CPT

## 2022-10-24 PROCEDURE — 97535 SELF CARE MNGMENT TRAINING: CPT

## 2022-10-24 PROCEDURE — 6360000002 HC RX W HCPCS: Performed by: FAMILY MEDICINE

## 2022-10-24 PROCEDURE — 36415 COLL VENOUS BLD VENIPUNCTURE: CPT

## 2022-10-24 PROCEDURE — 84443 ASSAY THYROID STIM HORMONE: CPT

## 2022-10-24 PROCEDURE — 80061 LIPID PANEL: CPT

## 2022-10-24 PROCEDURE — 82607 VITAMIN B-12: CPT

## 2022-10-24 PROCEDURE — 97530 THERAPEUTIC ACTIVITIES: CPT

## 2022-10-24 PROCEDURE — 97166 OT EVAL MOD COMPLEX 45 MIN: CPT

## 2022-10-24 PROCEDURE — 2060000000 HC ICU INTERMEDIATE R&B

## 2022-10-24 PROCEDURE — 6370000000 HC RX 637 (ALT 250 FOR IP): Performed by: FAMILY MEDICINE

## 2022-10-24 PROCEDURE — 85610 PROTHROMBIN TIME: CPT

## 2022-10-24 PROCEDURE — 92523 SPEECH SOUND LANG COMPREHEN: CPT

## 2022-10-24 PROCEDURE — 85027 COMPLETE CBC AUTOMATED: CPT

## 2022-10-24 PROCEDURE — 80048 BASIC METABOLIC PNL TOTAL CA: CPT

## 2022-10-24 PROCEDURE — 93010 ELECTROCARDIOGRAM REPORT: CPT | Performed by: INTERNAL MEDICINE

## 2022-10-24 PROCEDURE — 70551 MRI BRAIN STEM W/O DYE: CPT

## 2022-10-24 PROCEDURE — 82746 ASSAY OF FOLIC ACID SERUM: CPT

## 2022-10-24 PROCEDURE — 83036 HEMOGLOBIN GLYCOSYLATED A1C: CPT

## 2022-10-24 PROCEDURE — 97116 GAIT TRAINING THERAPY: CPT

## 2022-10-24 PROCEDURE — 97162 PT EVAL MOD COMPLEX 30 MIN: CPT

## 2022-10-24 PROCEDURE — 6370000000 HC RX 637 (ALT 250 FOR IP): Performed by: INTERNAL MEDICINE

## 2022-10-24 PROCEDURE — 99222 1ST HOSP IP/OBS MODERATE 55: CPT | Performed by: PSYCHIATRY & NEUROLOGY

## 2022-10-24 RX ORDER — HYDRALAZINE HYDROCHLORIDE 20 MG/ML
10 INJECTION INTRAMUSCULAR; INTRAVENOUS EVERY 4 HOURS PRN
Status: DISCONTINUED | OUTPATIENT
Start: 2022-10-24 | End: 2022-10-25 | Stop reason: HOSPADM

## 2022-10-24 RX ORDER — INSULIN LISPRO 100 [IU]/ML
0-8 INJECTION, SOLUTION INTRAVENOUS; SUBCUTANEOUS
Status: DISCONTINUED | OUTPATIENT
Start: 2022-10-24 | End: 2022-10-25

## 2022-10-24 RX ORDER — WARFARIN SODIUM 5 MG/1
5 TABLET ORAL DAILY
Status: DISCONTINUED | OUTPATIENT
Start: 2022-10-25 | End: 2022-10-25 | Stop reason: HOSPADM

## 2022-10-24 RX ORDER — INSULIN LISPRO 100 [IU]/ML
0-4 INJECTION, SOLUTION INTRAVENOUS; SUBCUTANEOUS NIGHTLY
Status: DISCONTINUED | OUTPATIENT
Start: 2022-10-24 | End: 2022-10-25

## 2022-10-24 RX ORDER — DEXTROSE MONOHYDRATE 100 MG/ML
INJECTION, SOLUTION INTRAVENOUS CONTINUOUS PRN
Status: DISCONTINUED | OUTPATIENT
Start: 2022-10-24 | End: 2022-10-24

## 2022-10-24 RX ORDER — WARFARIN SODIUM 7.5 MG/1
7.5 TABLET ORAL
Status: COMPLETED | OUTPATIENT
Start: 2022-10-24 | End: 2022-10-24

## 2022-10-24 RX ADMIN — ASPIRIN 81 MG 81 MG: 81 TABLET ORAL at 09:11

## 2022-10-24 RX ADMIN — GLIMEPIRIDE 4 MG: 2 TABLET ORAL at 16:08

## 2022-10-24 RX ADMIN — LIDOCAINE: 50 OINTMENT TOPICAL at 21:53

## 2022-10-24 RX ADMIN — Medication 400 MG: at 09:11

## 2022-10-24 RX ADMIN — LISINOPRIL 10 MG: 10 TABLET ORAL at 09:11

## 2022-10-24 RX ADMIN — WARFARIN SODIUM 7.5 MG: 7.5 TABLET ORAL at 18:33

## 2022-10-24 RX ADMIN — GLIMEPIRIDE 4 MG: 2 TABLET ORAL at 09:16

## 2022-10-24 RX ADMIN — SERTRALINE 150 MG: 50 TABLET, FILM COATED ORAL at 09:11

## 2022-10-24 RX ADMIN — METOPROLOL SUCCINATE 50 MG: 50 TABLET, EXTENDED RELEASE ORAL at 09:11

## 2022-10-24 NOTE — PROGRESS NOTES
[facility]   Phone: [phone]    Physical Therapy    [x] Initial Evaluation            [] Daily Treatment Note         [] Discharge Summary      Patient: Suellen Lopez   :    MRN: [id number]   Date of Service:  10/24/2022  Admitting Diagnosis: Dizziness  Current Admission Summary: Suellen Lopez is a 74 y.o. female who presents to the emergency department, initial history comes from the patient, later on in the visit her daughter add some of the history. The patient last known well was 1:00 in the morning, she states that she woke up this morning, got up to urinate and felt profoundly dizzy. She states that she fell back into bed, she was very dizzy at that time and felt bad and went back to sleep. Sometime later she got up, let the dog out, experienced dizziness again and decided to go back to sleep, she states that when she finally did get up she called her daughter who told her that she should come to the emergency department. 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 She is on aspirin and warfarin. Recently she had her warfarin decreased because she was placed on doxycycline and her INR was 3.99. On my initial exam she had a difficult time searching for certain words, and called things inappropriately. Upon reevaluation with her daughter in the room the daughter states that this is her norm. Past Medical History:  has a past medical history of Actinic keratosis, Arthritis, Asthma, Atrial fibrillation ([address] CAD (coronary artery disease), Cerebral artery occlusion with cerebral infarction ([address] Depression, Diabetes mellitus ([address] Disc displacement, lumbar, Diverticulosis of colon, Hard of hearing, Hyperlipidemia, Hypertension, Ischemic heart disease, Past heart attack, Poor vision, Right leg weakness, and Scoliosis.   Past Surgical History:  has a past surgical history that includes Coronary artery bypass graft;  section; Appendectomy; ovarian cyst removal; Hand surgery; Cardiac surgery (); skin biopsy; vascular surgery; Tonsillectomy; Lumbar disc surgery (8-); Lumbar spine surgery (10-11-12); and back surgery. Discharge Recommendations: Hector Ornelas scored a 15/24 on the AM-PAC short mobility form. Current research shows that an AM-PAC score of 17 or less is typically not associated with a discharge to the patient's home setting. Based on the patient's AM-PAC score and their current functional mobility deficits, it is recommended that the patient have 5-7 sessions per week of Physical Therapy at d/c to increase the patient's independence. At this time, this patient demonstrates complex nursing, medical, and rehabilitative needs, and would benefit from intensive rehabilitation services upon discharge from the Inpatient setting. This patient demonstrates the ability to participate in and benefit from an intensive therapy program with a coordinated interdisciplinary team approach to foster frequent, structured, and documented communication among disciplines, who will work together to establish, prioritize, and achieve treatment goals. However, following conversations with patient, family, and physician, patient remains adamant about returning home rather than going to additional skilled PT in a facility. Therefore home health PT is recommended. Please see assessment section for further patient specific details.       HOME HEALTH CARE: LEVEL 4 SICK     - Initial home health evaluation to occur within 24-48 hours, in patient home   - Therapy evaluations in home within 24-48 hours of discharge; including DME and home safety   - Frontload therapy 5 days, then 3x a week   - Therapy to evaluate if patient has [id number] needs for personal care   -  evaluation within 24-48 hours, includes evaluation of resources and insurance to determine [place], [place], LTC, and Medicaid options   If patient discharges prior to next session this note will serve as a discharge summary. Please see below for the latest assessment towards goals. DME Required For Discharge: no DME required at discharge  Precautions/Restrictions: no restrictions  Weight Bearing Restrictions: no restrictions  [] Right Upper Extremity  [] Left Upper Extremity [] Right Lower Extremity  [] Left Lower Extremity     Required Braces/Orthotics: no braces required   [] Right  [] Left  Positional Restrictions:no positional restrictions    Pre-Admission Information   Lives With: alone    Type of Home: house  Home Layout: one level  Home Access:  1 step to enter without rails   Bathroom Layout: walker accessible, tub/shower unit  Bathroom Equipment: grab bars in shower, shower chair, hand held shower head  Toilet Height: standard height  Home Equipment: rolling walker, rollator - 4 wheeled walker, single point cane, reacher  Transfer Assistance: Independent without use of device  Ambulation Assistance:modified independent with use of single point cane  ADL Assistance: independent with all ADL's  IADL Assistance: independent with homemaking tasks  Active :        [] Yes  [x] No  Hand Dominance: [] Left  [x] Right  Current Employment: retired.     Hobbies: games on ipad   Recent Falls: 1 fall in the past 6 months     Examination   Vision:   Vision Gross Assessment: Impaired; R sided hemianopsia   Hearing:   WFL  Observation:   General Observation:  intermittent cervical tremor visible in seated; BLE tremors upon standing    Posture:   Rounded shoulders, slight forward flexion at trunk   Sensation:   WFL    Coordination Testing:   Finger to Nose: WFL    ROM:   (B) LE AROM WFL  Strength:   (B) LE strength grossly WFL  Decision Making: medium complexity  Clinical Presentation: evolving      Subjective  General: Pt denies pain upon arrival; reports dizziness and noodle feeling in BLE with standing. Pain: 0/10  Pain Interventions: not applicable       Functional Mobility  Bed Mobility  Supine to Sit: contact guard assistance  Rolling Left: stand by assistance  Rolling Right: stand by assistance  Comments:  Transfers  Sit to stand transfer: contact guard assistance  Stand to sit transfer: contact guard assistance  Stand pivot transfer: minimal assistance  Comments:  Ambulation  Surface:level surface  Assistive Device: rolling walker  Assistance: minimal assistance  Distance: 25'   Gait Mechanics: ataxic gait pattern; tremors in BLE, wide SARWAT, decreased gait speed   Comments:    Stair Mobility  Stair mobility not completed on this date. Comments:  Wheelchair Mobility:  No w/c mobility completed on this date. Comments:  Balance  Static Sitting Balance: fair (-): maintains balance at CGA to SBA with use of UE support  Dynamic Sitting Balance: fair (-): maintains balance at CGA with use of UE support  Static Standing Balance: fair (-): maintains balance at CGA with use of UE support  Dynamic Standing Balance: poor (+): requires min (A) to maintain balance  Comments:    Other Therapeutic Interventions  Pt completed UB bathing and dressing; see OT notes for details. Temo-Hallpike maneuver, no nystagmus but pt was symptomatic for dizziness when rotating to left so Epley maneuver was carried out; pt reported symptoms increased with positional changes and then subsided. Increased time for discussion with family and physician regarding discharge planning.      Functional Outcomes  AM-PAC Inpatient Mobility Raw Score : 15              Cognition  WFL  Comments: word finding difficulty from previous strokes; able to write down word to answer if not able to accurately verbalize word  Orientation:    alert and oriented x 4  Command Following:   [facility]    Education  Barriers To Learning: language  Patient Education: patient educated on goals, PT role and benefits, plan of care, precautions, general safety, functional mobility training, adaptive device training, family education, disease specific education, transfer training, discharge recommendations  Learning Assessment:  patient verbalizes understanding, would benefit from continued reinforcement    Assessment  Activity Tolerance: Pt fatigues quickly; increased dizziness with change in head position; visible cervical tremors and BLE tremors   Impairments Requiring Therapeutic Intervention: decreased functional mobility, decreased ADL status, decreased strength, decreased endurance, decreased balance, decreased vision, vestibular impairment, decreased posture  Prognosis: fair  Clinical Assessment: Pt presenting below baseline function secondary to recent dizziness; per MRI and notes from neurologist pt is negative for acute CVA but with history of 2 prior CVA's. Neurologist is suspicious of inner ear attack being cause of dizziness but no nystagmus was noted with vestibular testing for BPPV. Cervical tremors are noted in seated and BLE tremor is visible in standing with ataxic gait pattern during ambulation. Pt was previously independent without use of AD but is requiring CGA for transfers and Shay for ambulation on this date. Increased time was carried out with phone conversation with pt's daughter and with physician regarding discharge planning. Pt would continue to benefit from skilled PT services to address balance deficits and facilitate safe functional mobility.    Safety Interventions: patient left in chair, chair alarm in place, call light within reach, gait belt, patient at risk for falls, and nurse notified    Plan  Frequency: 5-7 x/week  Current Treatment Recommendations: strengthening, balance training, functional mobility training, transfer training, gait training, endurance training, neuromuscular re-education, patient/caregiver education, and safety education    Goals  Patient Goals: Improve independence    Short Term Goals:  Time Frame: Time of discharge   Patient will complete bed mobility at [facility] independent   Patient will complete transfers at supervision   Patient will ambulate 100 ft with use of rolling walker at stand by assistance  Patient will ascend/descend 1 stairs without use of HR at stand by assistance    Therapy Session Time      Individual Group Co-treatment   Time In     1054   Time Out     1217   Minutes     83     Timed Code Treatment Minutes:   68  Total Treatment Minutes:  83 minutes        Electronically Signed By: Brandi Bob [id number], PT, DPT [id number]

## 2022-10-24 NOTE — PROGRESS NOTES
Pharmacy to Dose Warfarin    Pharmacy consulted to dose warfarin for Afib. INR Goal: 2-3    INR today: 1.3    Assessment/Plan:  - INR subtherapeutic on home dose of warfarin  - Will give increased dose of warfarin 7.5 mg tonight and continue with lovenox bridge until INR > 2    Pharmacy will continue to follow.     Jud Lam, PharmD, BCPS  Clinical Pharmacist  [id number]

## 2022-10-24 NOTE — PROGRESS NOTES
[facility]   Phone: [phone]    Occupational Therapy    [x] Initial Evaluation            [] Daily Treatment Note         [] Discharge Summary      Patient: Laurie Phillips   :    MRN: [id number]   Date of Service:  10/24/2022    Admitting Diagnosis: Dizziness  Current Admission Summary: Laurie Phillips is a 74 y.o. female who presents to the emergency department, initial history comes from the patient, later on in the visit her daughter add some of the history. The patient last known well was 1:00 in the morning, she states that she woke up this morning, got up to urinate and felt profoundly dizzy. She states that she fell back into bed, she was very dizzy at that time and felt bad and went back to sleep. Sometime later she got up, let the dog out, experienced dizziness again and decided to go back to sleep, she states that when she finally did get up she called her daughter who told her that she should come to the emergency department. 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 She is on aspirin and warfarin. Recently she had her warfarin decreased because she was placed on doxycycline and her INR was 3.99. On my initial exam she had a difficult time searching for certain words, and called things inappropriately. Upon reevaluation with her daughter in the room the daughter states that this is her norm. Past Medical History:  has a past medical history of Actinic keratosis, Arthritis, Asthma, Atrial fibrillation ([address] CAD (coronary artery disease), Cerebral artery occlusion with cerebral infarction ([address] Depression, Diabetes mellitus ([address] Disc displacement, lumbar, Diverticulosis of colon, Hard of hearing, Hyperlipidemia, Hypertension, Ischemic heart disease, Past heart attack, Poor vision, Right leg weakness, and Scoliosis.   Past Surgical History:  has a past surgical history that includes Coronary artery bypass graft;  section; Appendectomy; ovarian cyst removal; Hand surgery; Cardiac surgery (); skin biopsy; vascular surgery; Tonsillectomy; Lumbar disc surgery (8-); Lumbar spine surgery (10-11-12); and back surgery. Discharge Recommendations: Miya Menchaca scored a 17/24 on the AM-PAC ADL Inpatient form. Current research shows that an AM-PAC score of 17 or less is typically not associated with a discharge to the patient's home setting. Based on the patient's AM-PAC score and their current ADL deficits, it is recommended that the patient have 5-7 sessions per week of Occupational Therapy at d/c to increase the patient's independence. At this time, this patient demonstrates complex nursing, medical, and rehabilitative needs, and would benefit from intensive rehabilitation services upon discharge from the Inpatient setting. This patient demonstrates the ability to participate in and benefit from an intensive therapy program with a coordinated interdisciplinary team approach to foster frequent, structured, and documented communication among disciplines, who will work together to establish, prioritize, and achieve treatment goals. Please see assessment section for further patient specific details. If patient discharges prior to next session this note will serve as a discharge summary. Please see below for the latest assessment towards goals. **following conversations with patient, family, and physician, patient remains adamant about returning home rather than going to additional skilled PT in a facility. Therefore home health OT is recommended. Please see assessment section for further patient specific details. **        HOME HEALTH CARE: LEVEL 4 SICK     - Initial home health evaluation to occur within 24-48 hours, in patient home   - Therapy evaluations in home within 24-48 hours of discharge; including DME and home safety   - Frontload therapy 5 days, then 3x a week   - Therapy to evaluate if patient has [address] needs for personal care   -  evaluation within 24-48 hours, includes evaluation of resources and insurance to determine [place], [place], LTC, and Medicaid options   If patient discharges prior to next session this note will serve as a discharge summary. Please see below for the latest assessment towards goals. DME Required For Discharge: bsc for safety  Precautions/Restrictions: no restrictions  Weight Bearing Restrictions: no restrictions  [] Right Upper Extremity        [] Left Upper Extremity         [] Right Lower Extremity         [] Left Lower Extremity             Required Braces/Orthotics: no braces required                   [] Right            [] Left  Positional Restrictions:no positional restrictions     Pre-Admission Information   Lives With: alone                     Type of Home: house  Home Layout: one level  Home Access:  1 step to enter without rails   Bathroom Layout: walker accessible, tub/shower unit  Bathroom Equipment: grab bars in shower, shower chair, hand held shower head  Toilet Height: standard height  Home Equipment: rolling walker, rollator - 4 wheeled walker, single point cane, reacher  Transfer Assistance: Independent without use of device  Ambulation Assistance:modified independent with use of single point cane  ADL Assistance: independent with all ADL's  IADL Assistance: independent with homemaking tasks  Active :        [] Yes                 [x] No  Hand Dominance: [] Left                 [x] Right  Current Employment: retired.     Hobbies: games on ipad   Recent Falls: 1 fall in the past 6 months      Examination   Vision:   Vision Gross Assessment: Impaired; R sided hemianopsia   Hearing:   WFL  Observation:   General Observation:  intermittent cervical tremor visible in seated; BLE tremors upon standing    Posture:   Rounded shoulders, slight forward flexion at trunk Sensation:   WFL     Coordination Testing:   Finger to Nose: WFL    ROM:   (B) UE AROM WFL  Strength:   (B) UE strength grossly WFL    Decision Making: medium complexity  Clinical Presentation: unstable      Subjective  General: Patient supine in bed upon arrival, reporting improved nausea but continued dizziness, aphasia but able to describe BLEs feeling \"like a noodle\"  Pain: 0/10  Pain Interventions: not applicable        Activities of Daily Living  Basic Activities of Daily Living  Grooming: setup assistance  Grooming Comments: Increased time due to intermittent dizziness, requiring BUE support (patient able to sit unsupported intermittently. Difficult to assess triggering positions for dizziness, sporadic throughout. No nystagmus noted  Upper Extremity Bathing: minimal assistance  Upper Extremity Dressing: minimal assistance  Lower Extremity Dressing: minimal assistance moderate assistance  General Comments: Patient required increased time for all tasks due to intermittent dizziness  Instrumental Activities of Daily Living  No IADL completed on this date. Functional Mobility  Bed Mobility  Supine to Sit: contact guard assistance  Rolling Left: stand by assistance  Rolling Right: stand by assistance  Comments:  Transfers  Sit to stand transfer:contact guard assistance  Stand to sit transfer: contact guard assistance  Bed / Chair transfer: minimal assistance. Bed / Chair equipment: rolling walker  Bed / Chair comments: BLE tremors upon stance and mobility, ataxic gait with wide SARWAT, slow jason. Patient expresses intermittent dizziness and fear of falling  Comments:  Functional Mobility:  Sitting Balance: stand by assistance, contact guard assistance. Sitting Balance Comment: intermittent dizziness reported. Patient able to sit unsupported with intermittent reaching for bed rails  Standing Balance: minimal assistance.     Standing Balance Comment: tremors/jerking BLE, intermittent neck jerking/tremor  Functional Mobility: .  minimal assistance  Functional Mobility Device Use: rolling walker  Functional Mobility Comment: ~25 feet total ataxic gait with wide SARWAT, slow jason    Other Therapeutic Interventions  See PT note for full vestibular assessment as [place]-Hallpike complete, positive dizziness with head turn left, negative for nystagmus  Functional Outcomes  AM-PAC Inpatient Daily Activity Raw Score: 17    Cognition  Overall Cognitive Status: Impaired  Arousal/Alterness: appropriate responses to stimuli  Safety Judgement: decreased awareness of need for assistance, decreased awareness of need for safety  Problem Solving: assistance required to generate solutions, assistance required to implement solutions, decreased awareness of errors, assistance required to identify errors made, assistance required to correct errors made  Insights: decreased awareness of deficits  Initiation: requires cues for some  Sequencing: requires cues for some  Orientation:    alert and oriented x 4  Command Following:   accurately follows one step commands     Education  Barriers To Learning: cognition, language, and physical  Patient Education: patient educated on goals, OT role and benefits, plan of care, discharge recommendations  Learning Assessment:  patient verbalizes understanding, would benefit from continued reinforcement    Assessment  Activity Tolerance:  Tolerated fair (decreased safety awareness, aphasia, fearful of falling)  Impairments Requiring Therapeutic Intervention: decreased functional mobility, decreased ADL status, decreased safety awareness, decreased cognition, decreased endurance, decreased balance, decreased IADL, decreased posture  Prognosis: good  Clinical Assessment: Patient presents with the above deficits, which are below baseline, and would benefit from continued skilled OT to address  Safety Interventions: patient left in chair, chair alarm in place, call light within reach, patient at risk for falls, and nurse notified    Plan  Frequency: 5-7 x/week  Current Treatment Recommendations: balance training, functional mobility training, transfer training, endurance training, patient/caregiver education, ADL/self-care training, home management training, cognitive reorientation, safety education, and equipment evaluation/education    Goals  Patient Goals:  To return home   Short Term Goals:  Time Frame: Upon discharge  Patient will complete lower body ADL at set up assistance   Patient will complete toileting at supervision   Patient will complete grooming at supervision   Patient will complete functional transfers at supervision   Patient will complete functional mobility at supervision   Patient will increase functional standing balance to 7-8 minutes supervision for improved ADL completion  Patient will complete bed mobility at supervision     Therapy Session Time     Individual Group Co-treatment   Time In    1054   Time Out    1217   Minutes    83        Timed Code Treatment Minutes:   68  Total Treatment Minutes:  83       Electronically Signed By: GREGORY Raphael/VAL [id number]

## 2022-10-24 NOTE — PROGRESS NOTES
Clinical Pharmacy Note: Pharmacy to Dose Warfarin    Pharmacy consulted by Dr. Bry Mata to dose warfarin. Margie Tavares is a 74 y.o. female  is receiving warfarin for indication: a-fib. INR Goal Range: 2.0-3.0  Prior to admission warfarin dosing regimen: 5 mg MF; 2.5 mg all other days  INR today:   Lab Results   Component Value Date/Time    INR 1.25 10/23/2022 12:01 PM       Assessment/Plan:  INR is subtherapeutic on prior to admission dosing regimen. Possible concomitant drug-drug interactions include: sertraline   Based on today's assessment, dose warfarin continue home dose and monitor. Daily INR is ordered. Pharmacy will continue to monitor and make adjustments to regimen as necessary.      Thank you for the consult,     Anna Santamaria [facility]

## 2022-10-24 NOTE — H&P
HOSPITALISTS HISTORY AND PHYSICAL    10/23/2022 8:02 PM    Patient Information:  Miracle Case is a 74 y.o. female [id number]  PCP:  Lisandro Hogue (Tel: [phone] )    Chief complaint:    Chief Complaint   Patient presents with    Dizziness     Arrived per [facility] from home d/t dizziness that started approx 0100 with hx of CVA; family concerned for another stroke; 190/100; afib; confusion; slurred speech is normal; eye drift in the left eye per normal;         History of Present Illness:  Asaf Nelson is a 74 y.o. female with h/o CVA, Expressive aphasia, Afib , chronic anticoagulation, PVD, DM , HTN presented with c/o dizziness, vertigo and nausea. Symptoms started this morning. She had couple of strokes in the past with residual aphasia and blindness. Per daughter at bedside she has had declining memory since her previous strokes. Gets her numbers and time mixed up.   cT head today showed no acute pathology  CTA head showed chronic stenosis of ICA and PCA  She is on coumadin. INR is subtherapeutic  BP is elevated   She allergic to Statin     REVIEW OF SYSTEMS:   Constitutional: Negative for fever,chills or night sweats  ENT: Negative for rhinorrhea, epistaxis, hoarseness, sore throat. Respiratory: Negative for shortness of breath,wheezing  Cardiovascular: Negative for chest pain, palpitations   Gastrointestinal: Negative for nausea, vomiting, diarrhea  Genitourinary: Negative for polyuria, dysuria   Hematologic/Lymphatic: Negative for bleeding tendency, easy bruising  Musculoskeletal: Negative for myalgias and arthralgias  Neurologic: Negative for confusion,dysarthria. +ve vertigo  Skin: Negative for itching,rash  Psychiatric: Negative for depression,anxiety, agitation. Endocrine: Negative for polydipsia,polyuria,heat /cold intolerance.     Past Medical History:   has a past medical by mouth daily 30 tablet 3    sertraline (ZOLOFT) 100 MG tablet Take 150 mg by mouth daily      glimepiride (AMARYL) 4 MG tablet Take 4 mg by mouth 2 times daily       metoprolol (TOPROL-XL) 50 MG XL tablet Take 50 mg by mouth daily.          Current Facility-Administered Medications   Medication Dose Route Frequency Provider Last Rate Last Admin    [START ON 10/24/2022] aspirin chewable tablet 81 mg  81 mg Oral Daily MD Janiya Mendoza ON 10/24/2022] lisinopril (PRINIVIL;ZESTRIL) tablet 10 mg  10 mg Oral Daily MD Janiya Mendoza ON 10/24/2022] metoprolol succinate (TOPROL XL) extended release tablet 50 mg  50 mg Oral Daily MD Janiya Mendoza ON 10/24/2022] sertraline (ZOLOFT) tablet 150 mg  150 mg Oral Daily Liliam Arteaga MD        glimepiride (AMARYL) tablet 4 mg  4 mg Oral BID AC Liliam Arteaga MD        ondansetron (ZOFRAN-ODT) disintegrating tablet 4 mg  4 mg Oral Q8H PRN Denisha Ogden MD        Or    ondansetron (ZOFRAN) injection 4 mg  4 mg IntraVENous Q6H PRN Denisha Ogden MD        polyethylene glycol (GLYCOLAX) packet 17 g  17 g Oral Daily PRN MD Janiya Mendoza ON 10/24/2022] enoxaparin (LOVENOX) injection 40 mg  40 mg SubCUTAneous Daily Liliam Arteaga MD        labetalol (NORMODYNE;TRANDATE) injection 10 mg  10 mg IntraVENous Q10 Min PRN MD Janiya Mendoza ON 10/24/2022] magnesium oxide (MAG-OX) tablet 400 mg  400 mg Oral Daily Liliam Prieto MD        mometasone-formoterol (DULERA) 200-5 MCG/ACT inhaler 2 puff  2 puff Inhalation BID Liliam Arteaga MD        dextrose bolus 10% 125 mL  125 mL IntraVENous PRN Denisha Ogden MD        Or    dextrose bolus 10% 250 mL  250 mL IntraVENous PRN Denisha Ogden MD        glucagon (rDNA) injection 1 mg  1 mg SubCUTAneous PRN Liliam Arteaga MD        dextrose 10 % infusion   IntraVENous Continuous PRN Liliam Arteaga MD        collagenase ointment 1 g PATIENT SUPPLIED (Patient Supplied)  1 each Topical Daily Liliam Lauren Caldera MD        lidocaine (XYLOCAINE) 5 % ointment PATIENT SUPPLIED (Patient Supplied)   Topical PRN Bonifacio Negron MD          Allergies: Allergies   Allergen Reactions    Morphine Other (See Comments) and Nausea And Vomiting     BLOOD PRESSURE BOTTOMED OUT  Feel funny      Codeine Nausea Only, Other (See Comments) and Palpitations     SWEATS  generalized unwell feeling when takes  Feel funny      Statins Itching        Social History:  Patient Lives    reports that she quit smoking about 18 years ago. Her smoking use included cigarettes. She has a 40.00 pack-year smoking history. She has never used smokeless tobacco. She reports that she does not drink alcohol and does not use drugs. Family History:  family history includes Bipolar Disorder in her mother; Cancer in her father; Diabetes in her mother and sister; Early Death in her father; Heart Attack in her father; Heart Disease in her father and sister; Heart Surgery in her father and sister; High Blood Pressure in her father, mother, and sister; Mental Illness in her mother. ,     Physical Exam:  BP (!) 170/93   Pulse 98   Temp 98.7 °F (37.1 °C) (Axillary)   Resp 18   Ht 5' 4\" (1.626 m)   Wt 135 lb 8 oz (61.5 kg)   SpO2 95%   BMI 23.26 kg/m²     General appearance:  Appears comfortable. Well nourished  Eyes: Sclera clear, pupils equal  ENT: Moist mucus membranes, no thrush. Trachea midline. Cardiovascular: Regular rhythm, normal S1, S2. No murmur, gallop, rub. No edema in lower extremities  Respiratory: Clear to auscultation bilaterally, no wheeze, good inspiratory effort  Gastrointestinal: Abdomen soft, non-tender, not distended, normal bowel sounds  Musculoskeletal: No cyanosis in digits, neck supple  Neurology: Cranial nerves grossly intact. Alert and oriented in time, place and person. No speech or motor deficits  Psychiatry: Appropriate affect.  Not agitated  Skin: Warm, dry, normal turgor, no rash  Brisk capillary refill, peripheral pulses palpable   Labs:  CBC:   Lab Results   Component Value Date/Time    WBC 8.2 10/23/2022 12:01 PM    RBC 4.18 10/23/2022 12:01 PM    HGB 12.6 10/23/2022 12:01 PM    HCT 38.4 10/23/2022 12:01 PM    MCV 91.8 10/23/2022 12:01 PM    MCH 30.1 10/23/2022 12:01 PM    MCHC 32.8 10/23/2022 12:01 PM    RDW 13.0 10/23/2022 12:01 PM     10/23/2022 12:01 PM    MPV 8.2 10/23/2022 12:01 PM     BMP:    Lab Results   Component Value Date/Time     10/23/2022 12:01 PM    K 5.0 10/23/2022 12:01 PM     10/23/2022 12:01 PM    CO2 22 10/23/2022 12:01 PM    BUN 9 10/23/2022 12:01 PM    CREATININE 0.7 10/23/2022 12:01 PM    CALCIUM 9.6 10/23/2022 12:01 PM    GFRAA >60 02/11/2021 05:45 AM    GFRAA >60 11/11/2012 11:19 AM    LABGLOM >60 10/23/2022 12:01 PM    GLUCOSE 110 10/23/2022 12:33 PM     XR CHEST PORTABLE   Final Result   No acute cardiopulmonary disease. CTA HEAD NECK W CONTRAST   Final Result   1. 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 2. Chronic severe stenosis vs occlusion of the right distal PCA. 3. Otherwise, the remaining major arteries of the head and neck appear patent   without significant stenosis. 4. Aberrant right subclavian artery with a retroesophageal course, anatomic   variant. 5. Left upper central incisor periapical lucency. Can correlate with dental   examination. RECOMMENDATIONS:   1.1 cm incidental right thyroid nodule. No follow-up imaging is recommended. Reference: J Am Jaqueline Radiol. 2015 Feb;12(2): 143-50         CT HEAD WO CONTRAST   Final Result   1. No evident acute intracranial abnormality. 2. Chronic findings as above. MRI brain without contrast    (Results Pending)     Chest Xray:   EKG:    I visualized CXR images and EKG strips    Discussed case  with     Problem List  Principal Problem:    Dizziness  Resolved Problems:    * No resolved hospital problems.  *        Assessment/Plan:   Acute dizziness , vertigo concerning for CVA involving post circulation     - CT of the head is negative for acute findings   -MRI of the brain and EChocardiogram pending  -  A1C, lipid panel  Aspirin has been given  Allergic to statin   Cont coumadin  - Keep NPO Speech and language therapy and PT/OT consulted   - Neurology consult in place  Permissive HTN     Chronic AFib  HR ranging from 80- 105  INR is subtherapeutic  Covering with full jerry Lovenox    DVT prophylaxis   Code status   Diet   IV access   Kang Catheter    Admit as inpatient. I anticipate hospitalization spanningmore4 than two midnights for investigation and treatment of the above medically necessary diagnoses. Please note that some part of this chart was generated using Dragon dictation software. Although every effort was made to ensure the accuracy of this automated transcription, some errors in transcription may have occurred inadvertently. If you may need any clarification, please do not hesitate to contact me through [facility].        Kirstie Grant MD    10/23/2022 8:02 PM

## 2022-10-24 NOTE — ACP (ADVANCE CARE PLANNING)
Advanced Care Planning Note. Purpose of Encounter: Advanced care planning in light of history of stroke  Parties In Attendance: Patient  Decisional Capacity: Yes  Subjective: Patient is unsteady on feet  Objective: Cr 0.8  Goals of Care Determination: Patient wants limited support (No CPR, no vent, no HD, no surgery, no trach, no PEG)  Plan:  MRI Brain. Neuro and PMR consults. ARU  Code Status: DNR CC    Time spent on Advanced care Plannin minutes  Advanced Care Planning Documents: Completed advanced directives on chart, daughter is the POA.     Sahra Del Cid MD  10/24/2022 7:05 PM

## 2022-10-24 NOTE — CONSULTS
In patient Neurology consult        [facility]      MD Omayra Elizaldeine Blake  1950    Date of Service: 10/24/2022    Referring Physician: Cayetano Ortiz MD      Reason for the consult and CC: Acute dizziness    HPI:   The patient is a 74y.o.  years old female with history of A. fib, diabetes, hypertension and hyperlipidemia who was admitted to the hospital yesterday with acute dizziness. Symptoms started yesterday morning. Description sudden spinning sensation at home with ataxia and lightheadedness. Duration was persistent. Degree was severe. No falling or injury. No chest pain or dysphagia or dysarthria. No focal weakness. No other relieving or aggravating factors. She came to the ED. Initial imaging showed no acute stroke and CTA showed right chronic cervical ICA occlusion with intracranial stenosis. She was admitted to the hospital.  Further work-up with MRI brain today showed no acute stroke. She denies any headache or chest pain. Other review of system was unremarkable.       Family History   Problem Relation Age of Onset    Bipolar Disorder Mother     Mental Illness Mother         SUICIDE ATTEMPTS    Diabetes Mother     High Blood Pressure Mother     Heart Disease Father     Heart Attack Father     Heart Surgery Father     Early Death Father     Cancer Father     High Blood Pressure Father     Heart Disease Sister     Heart Surgery Sister     High Blood Pressure Sister     Diabetes Sister      Past Surgical History:   Procedure Laterality Date    APPENDECTOMY      BACK SURGERY      CARDIAC SURGERY  1984     SECTION      ONE    CORONARY ARTERY BYPASS GRAFT      HAND SURGERY      LUMPS/BONE SPURS    LUMBAR One Arch Last SURGERY  6-    Microlumbar discectomy L4-5 right    LUMBAR SPINE SURGERY  10-11-12    MICROLUMBAR RE-EXPLORATION L4-5 RIGHT, DECOMPRESSION OF    OVARIAN CYST REMOVAL      SKIN BIOPSY      PRE CANCERS FROZEN OFF    TONSILLECTOMY      VASCULAR SURGERY          Past Medical History:   Diagnosis Date    Actinic keratosis     Arthritis     Asthma     UNSPECIFIED    Atrial fibrillation (HCC)     CAD (coronary artery disease)     Cerebral artery occlusion with cerebral infarction ([facility] [address] 10/2020    residual expressive aphasia    Depression     worse with pain    Di[facility]     TYPE II    Disc displacement, lumbar     Diverticulosis of colon     Hard of hearing     left    Hyperlipidemia     Hypertension     Ischemic heart disease     Past heart attack     28 YRS AGO    Poor vision     legally blind    Right leg weakness     Scoliosis      Social History     Tobacco Use    Smoking status: Former     Packs/day: 1.00     Years: 40.00     Pack years: 40.00     Types: Cigarettes     Quit date:      Years since quittin.8    Smokeless tobacco: Never   Vaping Use    Vaping Use: Never used   Substance Use Topics    Alcohol use: No     Comment: RARELY NOW    Drug use: No     Allergies   Allergen Reactions    Morphine Other (See Comments) and Nausea And Vomiting     BLOOD PRESSURE BOTTOMED OUT  Feel funny      Codeine Nausea Only, Other (See Comments) and Palpitations     SWEATS  generalized unwell feeling when takes  Feel funny      Statins Itching     Current Facility-Administered Medications   Medication Dose Route Frequency Provider Last Rate Last Admin    aspirin chewable tablet 81 mg  81 mg Oral Daily Liliam Arteaga MD   81 mg at 10/24/22 0911    lisinopril (PRINIVIL;ZESTRIL) tablet 10 mg  10 mg Oral Daily Danielle Torres MD   10 mg at 10/24/22 0911    metoprolol succinate (TOPROL XL) extended release tablet 50 mg  50 mg Oral Daily Liliam Arteaga MD   50 mg at 10/24/22 0911    sertraline (ZOLOFT) tablet 150 mg  150 mg Oral Daily Liliam Arteaga MD   150 mg at 10/24/22 0911    glimepiride (AMARYL) tablet 4 mg  4 mg Oral BID AC Liliam Arteaga MD   4 mg at 10/24/22 0916    ondansetron (ZOFRAN-ODT) disintegrating tablet 4 mg  4 mg Oral Q8H PRN Liliam Fareed Cordero MD        Or    [facility]ron [facility]) injection 4 mg  4 mg IntraVENous Q6H PRN Dione Chaney MD        polyethylene glycol (GLYCOLAX) packet 17 g  17 g Oral Daily PRN Dione Chaney MD        labetalol (NORMODYNE;TRANDATE) injection 10 mg  10 mg IntraVENous Q10 Min PRN Dione Chaney MD        magnesium oxide (MAG-OX) tablet 400 mg  400 mg Oral Daily Dione Chaney MD   400 mg at 10/24/22 0911    mometasone-formoterol (DULERA) 200-5 MCG/ACT inhaler 2 puff  2 puff Inhalation BID Dione Chaney MD        dextrose bolus 10% 125 mL  125 mL IntraVENous PRN Dione Chaney MD        Or    dextrose bolus 10% 250 mL  250 mL IntraVENous PRN Dione Chaney MD        glucagon (rDNA) injection 1 mg  1 mg SubCUTAneous PRN Dione Chaney MD        dextrose 10 % infusion   IntraVENous Continuous PRN Liliam Arteaga MD        collagenase ointment 1 g PATIENT SUPPLIED (Patient Supplied)  1 each Topical Daily Liliam Cordero MD   1 g at 10/23/22 2058    lidocaine (XYLOCAINE) 5 % ointment PATIENT SUPPLIED (Patient Supplied)   Topical PRN Dione Chaney MD        enoxaparin (LOVENOX) injection 60 mg  1 mg/kg SubCUTAneous BID Dione Chaney MD   60 mg at 10/23/22 2057    warfarin (COUMADIN) tablet 5 mg  5 mg Oral Once per day on Mon Fri Dione Chaney MD        warfarin (COUMADIN) tablet 2.5 mg  2.5 mg Oral Once per day on Sun Tue Wed Thu Sat Dione Chaney MD   2.5 mg at 10/23/22 2206       ROS : A 10-14 system review of constitutional, cardiovascular, respiratory, eyes, musculoskeletal, endocrine, GI, ENT, skin, hematological, genitourinary, psychiatric and neurologic systems was obtained and updated today and is unremarkable except as mentioned in my HPI      Exam:     Constitutional:   Vitals:    10/24/22 0658 10/24/22 0808 10/24/22 0843 10/24/22 1141   BP: (!) 136/92 (!) 183/97  (!) 170/82   Pulse: 88 93  74   Resp: 16 18  18   Temp: 98.1 °F (36.7 °C) 97.7 °F (36.5 °C)  98 °F (36.7 °C)   TempSrc: Oral Oral  Oral   SpO2: 94% 93% 94% 96% Weight:       Height:           General appearance and observation: Normal development and appear in no acute distress. Eye:  Fundus: No blurring of optic disc. Neck: supple  Cardiovascular: No lower leg edema with good pulsation. Mental Status:   Oriented to person, place, problem, and time. Memory: Good immediate recall. Intact remote memory  Normal attention span and concentration. Language: intact naming, repeating and fluency   Good fund of Knowledge. Aware of current events and vocabulary   Cranial Nerves:   II: Visual fields: Legally blind . Pupils: equal, round, reactive to light  III,IV,VI: Extra Ocular Movements are intact. No nystagmus  V: Facial sensation is intact  VII: Facial strength and movements: intact and symmetric  VIII: Hearing: Intact  IX: Palate elevation is symmetric  XI: Shoulder shrug is intact  XII: Tongue movements are normal  Musculoskeletal: 5/5 in all 4 extremities. Tone: Normal tone. Reflexes: Symmetric 2+ in the arms and 2+ in the legs   Planters: flexor bilaterally. Coordination: no pronator drift, no dysmetria with FNF in upper extremities. Normal REM. Sensation: normal to all modalities in both arms and legs. Gait/Posture: Unsteady.      Data:  LABS:   Lab Results   Component Value Date/Time     10/23/2022 12:01 PM    K 5.0 10/23/2022 12:01 PM     10/23/2022 12:01 PM    CO2 22 10/23/2022 12:01 PM    BUN 9 10/23/2022 12:01 PM    CREATININE 0.7 10/23/2022 12:01 PM    GFRAA >60 02/11/2021 05:45 AM    GFRAA >60 11/11/2012 11:19 AM    LABGLOM >60 10/23/2022 12:01 PM    GLUCOSE 110 10/23/2022 12:33 PM    CALCIUM 9.6 10/23/2022 12:01 PM     Lab Results   Component Value Date/Time    WBC 8.2 10/23/2022 12:01 PM    RBC 4.18 10/23/2022 12:01 PM    HGB 12.6 10/23/2022 12:01 PM    HCT 38.4 10/23/2022 12:01 PM    MCV 91.8 10/23/2022 12:01 PM    RDW 13.0 10/23/2022 12:01 PM     10/23/2022 12:01 PM     Lab Results   Component Value Date    INR 1.25 (H) 10/23/2022    PROTIME 15.7 (H) 10/23/2022       Neuroimaging was independently reviewed by myself and discussed results with the patient  Reviewed notes from different physicians  Reviewed lab and blood testing    Impression:  New onset dizziness and vertigo. Likely peripheral vertigo or benign paroxysmal vertigo  Paroxysmal A. fib  Hypertension, not controlled  Hyperlipidemia  Diabetes, not controlled    Recommendation:  Continue Coumadin and monitor INR  Statin  Continue blood pressure monitor and blood pressure control  PT and OT  Fall precautions  Telemetry  A1c  Telemetry  Lipid panel  Discussed risk of fall and injury with the patient  Monitor blood pressure closely at home  Can be discharged once medically stable  No further recommendation        Thank you for referring such patient. If you have any questions regarding my consult note, please don't hesitate to call me. Irma Gil MD  [phone]    This dictation was generated by voice recognition computer software.  Although all attempts are made to edit the dictation for accuracy, there may be errors in the  transcription that are not intended

## 2022-10-24 NOTE — PROGRESS NOTES
Speech Language Pathology  Facility/Department: [address]  SLP Clinical Swallow Evaluation and Speech Language Cognitive Assessment     Patient: Juan Antonio White   :    MRN: [id number]      Evaluation Date: 10/24/2022      Admitting Dx: TIA (transient ischemic attack) [G45.9]  Dizziness [R42]  Pain: Denies                                  H&P: Juan Antonio White is a 74 y.o. female with h/o CVA, Expressive aphasia, Afib , chronic anticoagulation, PVD, DM , HTN presented with c/o dizziness, vertigo and nausea. Symptoms started this morning. She had couple of strokes in the past with residual aphasia and blindness. Per daughter at bedside she has had declining memory since her previous strokes. Gets her numbers and time mixed up.   cT head today showed no acute pathology  CTA head showed chronic stenosis of ICA and PCA    Imaging:  Chest X-ray:   Impression   No acute cardiopulmonary disease. MRI:  Impression   1. No acute intracranial abnormality. No acute infarct. 2. Areas of chronic infarct involving the left frontal and bilateral   occipital lobes. 3. Loss of the normal signal void within the right internal carotid artery   compatible with chronic occlusion. 4. Mild global parenchymal volume loss with mild chronic microvascular   ischemic changes. History/Prior Level of Function:   Living Status: Home  Prior Dysphagia History: Per chart review, Pt has a history of oropharyngeal dysphagia with most recent clinical swallow evaluation completed at this facility on 21 (see report). A regular diet with thin liquids was recommended at that time. Currently the Pt reports no noted difficulty with swallowing function. Prior Speech History: Per chart review, most recent speech language evaluation completed at this facility on 21 (see report).  Currently the Pt continues to present with expressive aphasia which she reports as consistent with her baseline following prior CVA. However, ongoing assessment of current speech language/cognitive function is indicated. Reason for referral: SLP evaluation orders received due to CVA protocol . DYSPHAGIA BEDSIDE SWALLOW EVALUATION   Dysphagia Impressions/Dysphagia Diagnosis: Oropharyngeal Dysphagia   Pt alert and responsive on RA. No overt facial asymmetry noted at rest. Pt able to complete OME with adequate strength, ROM and coordination for alternating movements. With po trials, Pt demonstrates mild reduced bolus control and A-P propulsion with all textures. Clinical symptoms of mild delayed swallow initiation and reduced laryngeal excursion after the swallow noted with all textures. Delayed cough noted with thin liquids given as a \"rinse\" in the presence of suspected solid texture residue x1. No other overt symptoms of aspiration were noted with any texture. However, brief SLP follow up is indicated to ensure diet texture tolerance. Recommended Diet and Intervention:  Diet Solids Recommendation:  Regular texture diet  Liquid Consistency Recommendation:   Thin liquids  Recommended form of Meds: Meds whole with water or Meds in puree            Dysphagia Therapeutic Intervention:  Diet Tolerance Monitoring , Patient/Family Education     Compensatory Swallowing Strategies:  Upright as possible with all PO intake , Small bites/sips , Swallow 2 times per bite     Oral Mechanism Exam:  [x]WFL []Mild   [] Moderate  []Severe  []To be assessed       SHORT TERM DYSPHAGIA GOALS  Pt will functionally tolerate recommended diet with no overt clinical s/s of aspiration   Pt will demonstrate understanding of aspiration risk and precautions via education/demonstration with occasional prompting    Patient Positioning: Upright in bed       SPEECH LANGUAGE COGNITIVE ASSESSMENT:     Speech Diagnosis:   Expressive Aphasia , Cognitive-Linguistic Deficits , Speech Language Deficits     Impressions:  Pt presents with expressive aphasia impacting functional word choice and self expression in isolation and in connected speech. Pt is noted to utilize compensatory strategies (ie self correction, written speech, audible spelling) in order to facilitate functional communication. Expressive aphasia is consistent with Pt's baseline function following prior CVA, but it is unclear if any decline in function is present from prior baseline. Mild reduced auditory processing impacting comprehension and retention of complex questions, commands and questions noted. Pt is currently oriented x4. Mild reduced short term recall of listed items,and new learning noted. Verbal problem solving appears functional for basic tasks. Ongoing SLP assessment of speech language/cognitive function is indicated to determine if current function is a decline or is consistent with Pt's prior baseline function. COMPREHENSION  Auditory Comprehension: Mild   Functional Ability to Comprehend Basic Commands/Questions   Impaired Complex questions  Impaired Multi-step commands  Impaired Conversation    EXPRESSION  Verbal Expression: Moderate   Impaired Confrontational Naming  Impaired Conversation  Paraphasias      Pragmatics/Social Functioning: Within functional limits          MOTOR SPEECH  Motor Speech: Mild   Apraxia: Inconsistent errors  and Numerous attempts at the word    VOICE  Voice: Within functional limits        COGNITION    Overall Orientation : Within functional limits    Oriented x4    Attention: Within functional limits           Memory: Mild    Impaired Short-term Memory  Impaired Recall of New Learning   Impaired Immediate/working Memory    Problem Solving: Within functional limits    Able to complete simple functional tasks    Safety/Judgement:  To be assessed         GOALS:  Short Term Speech/Language/Cognitive Goals:   Pt will improve verbal expression for functional expression via graded tasks to 80%  Pt will participate in ongoing cognitive assessment with goals to be established as indicated     Plan of care: 1-2 times to ensure diet tolerance. Discharge Recommendations:  Discharge recommendations to be determined pending ongoing follow-up during acute care stay    EDUCATION:   Provided education regarding role of SLP, results of assessment, recommendations and general speech pathology plan of care. [x] Pt verbalized understanding and agreement   [] Pt requires ongoing learning   [] No evidence of comprehension     If patient discharges prior to next visit, this note will serve as discharge.      Treatment time:  Timed Code Treatment Minutes: 0 min  Total Treatment time:40 min    Cuca Jorgensen [id number]

## 2022-10-24 NOTE — PROGRESS NOTES
Physician Progress Note      PATIENT:               Sherly Paz  CSN #:                  [id number]  :                       1950  ADMIT DATE:       10/23/2022 11:42 AM  DISCH DATE:  RESPONDING  PROVIDER #:        Katrina Johansen MD          QUERY TEXT:    Pt admitted with dizziness. Pt noted to have HTN. If possible, please   document in progress notes and discharge summary if you are evaluating and/or   treating any of the following: The medical record reflects the following:  Risk Factors: History of HTN, per daughter: she has had declining memory  Clinical Indicators: B/P 185/010; per Neuro consult: \"Hypertension, not   controlled. \"  Treatment: Labetalol, Zestril    Hypertensive Urgency: Defined as SBP of >180 OR DBP >120 w/o associated organ   damage. S/s may or may not be present, but can include severe headache, SOB,   epistaxis, severe anxiety. Tx: adjustment of oral BP medication; IV meds not   usually required. Hypertensive Emergency: SBP of >180 OR DBP >120 w/ associated organ damage   (CVA, MI, acute CHF, KATHY, encephalopathy, pulmonary edema, and unstable   angina). Documentation should include specific organ affected. Requires   immediate treatment, usually with IV meds. Hypertensive Crisis, unspecified: SBP of > 180 OR DBP > 120 and includes   damage to blood vessels, including inflammation, leakage of fluid or blood and   can cause stroke, headache, heart failure and eclampsia. Source: [employer] and Quick Reference Guide  Options provided:  -- Hypertensive Crisis  -- Hypertensive Emergency  -- Hypertensive Urgency  -- Other - I will add my own diagnosis  -- Disagree - Not applicable / Not valid  -- Disagree - Clinically unable to determine / Unknown  -- Refer to Clinical Documentation Reviewer    PROVIDER RESPONSE TEXT:    This patient has hypertensive urgency. Query created by: Ramila Rice on 10/24/2022 2:26 PM      QUERY TEXT:    Pt admitted with Dizziness. Pt noted to have HTN and Right carotid stenosis. If possible, please document in progress notes and discharge summary if you   are evaluating and /or treating any of the following: The medical record reflects the following:  Risk Factors: Hx CVA  Clinical Indicators: B/P as high as 185/100 during stay; CT and MRI Negative;   CTA showed right chronic cervical ICA occlusion with intracranial stenosis. Per Neuro consult: \"New onset dizziness and vertigo. Likely peripheral   vertigo or benign paroxysmal vertigo. .. Hypertension, not controlled. .. Continue blood pressure monitor and blood pressure control. \"  Treatment: CT and MRI, CTA, Neuro consult, Antihypertensives  Options provided:  -- TIA symptoms due to HTN, TIA ruled out after study  -- TIA symptoms due to BPV, TIA ruled out after study  -- TIA symptoms due to Right Carotid Stenosis, TIA ruled out after study  -- Other - I will add my own diagnosis  -- Disagree - Not applicable / Not valid  -- Disagree - Clinically unable to determine / Unknown  -- Refer to Clinical Documentation Reviewer    PROVIDER RESPONSE TEXT:    TIA symptoms are due to HTN, TIA ruled out after study.     Query created by: Mozella Landau on 10/24/2022 2:35 PM      Electronically signed by:  Radha Kenney MD 10/24/2022 6:55 PM

## 2022-10-24 NOTE — PLAN OF CARE
Problem: Discharge Planning  Goal: Discharge to home or other facility with appropriate resources  Outcome: Progressing  Flowsheets (Taken 10/24/2022 0808 by Philippe Carreon)  Discharge to home or other facility with appropriate resources:   Identify barriers to discharge with patient and caregiver   Identify discharge learning needs (meds, wound care, etc)   Refer to discharge planning if patient needs post-hospital services based on physician order or complex needs related to functional status, cognitive ability or social support system   Arrange for interpreters to assist at discharge as needed   Arrange for needed discharge resources and transportation as appropriate     Problem: Safety - Adult  Goal: Free from fall injury  Outcome: Progressing     Problem: ABCDS Injury Assessment  Goal: Absence of physical injury  Outcome: Progressing     Problem: Skin/Tissue Integrity  Goal: Absence of new skin breakdown  Description: 1. Monitor for areas of redness and/or skin breakdown  2. Assess vascular access sites hourly  3. Every 4-6 hours minimum:  Change oxygen saturation probe site  4. Every 4-6 hours:  If on nasal continuous positive airway pressure, respiratory therapy assess nares and determine need for appliance change or resting period.   Outcome: Progressing     Problem: Chronic Conditions and Co-morbidities  Goal: Patient's chronic conditions and co-morbidity symptoms are monitored and maintained or improved  Outcome: Progressing

## 2022-10-24 NOTE — PROGRESS NOTES
Hospitalist Progress Note      PCP: Tanner Rouse    Date of Admission: 10/23/2022    Chief Complaint: Dizziness    Hospital Course: 73 yo F with CAD, h/o Afib on Coumadin with h/o stroke, DM2, hyperlipidemia came to ER with dizziness. Admitted as inpatient for dizziness secondary to hypertensive urgency vs stroke/TIA vs BPPV. Seen by Neurology. MRI Brain:     1. No acute intracranial abnormality. No acute infarct. 2. Areas of chronic infarct involving the left frontal and bilateral   occipital lobes. 3. Loss of the normal signal void within the right internal carotid artery   compatible with chronic occlusion. 4. Mild global parenchymal volume loss with mild chronic microvascular   ischemic changes. PT/OT recommend ARU. PMR consulted. Daughter notes ongoing wound in legs followed as OP. KAYLENE noted severe R femoral arterial disease. CT Aortogram and Vascular consults requested    Subjective:  Patient denies CP, SOB, HA or abdominal pain. No dizziness. Unsteady on feet.       Medications:  Reviewed    Infusion Medications    dextrose       Scheduled Medications    [START ON 10/25/2022] warfarin  5 mg Oral Daily    aspirin  81 mg Oral Daily    lisinopril  10 mg Oral Daily    metoprolol succinate  50 mg Oral Daily    sertraline  150 mg Oral Daily    glimepiride  4 mg Oral BID AC    magnesium oxide  400 mg Oral Daily    mometasone-formoterol  2 puff Inhalation BID    collagenase  1 each Topical Daily    enoxaparin  1 mg/kg SubCUTAneous BID     PRN Meds: ondansetron **OR** ondansetron, polyethylene glycol, labetalol, dextrose bolus **OR** dextrose bolus, glucagon (rDNA), dextrose, lidocaine      Intake/Output Summary (Last 24 hours) at 10/24/2022 1856  Last data filed at 10/24/2022 1343  Gross per 24 hour   Intake 240 ml   Output --   Net 240 ml       Physical Exam Performed:    BP (!) 149/73   Pulse 61   Temp 98.6 °F (37 °C) (Oral)   Resp 14   Ht 5' 4\" (1.626 m)   Wt 135 lb 8 oz (61.5 kg)   SpO2 98%   BMI 23.26 kg/m²     General appearance: No apparent distress, appears stated age and cooperative. HEENT: Pupils equal, round, and reactive to light. Conjunctivae/corneas clear. Neck: Supple, with full range of motion. No jugular venous distention. Trachea midline. Respiratory:  Normal respiratory effort. Clear to auscultation, bilaterally without Rales/Wheezes/Rhonchi. Cardiovascular: Regular rate and rhythm with normal S1/S2 without murmurs, rubs or gallops. Abdomen: Soft, non-tender, non-distended with normal bowel sounds. Musculoskeletal: No clubbing, cyanosis or edema bilaterally. Full range of motion without deformity. Skin: Skin color, texture, turgor normal.  No rashes or lesions. Neurologic:  Neurovascularly intact without any focal sensory/motor deficits. Cranial nerves: II-XII intact, grossly non-focal.  Psychiatric: Alert and oriented, thought content appropriate, normal insight  Capillary Refill: Brisk, 3 seconds, normal   Peripheral Pulses: +2 palpable, equal bilaterally       Labs:   Recent Labs     10/23/22  1201 10/24/22  1308   WBC 8.2 6.5   HGB 12.6 12.5   HCT 38.4 38.6    283     Recent Labs     10/23/22  1201 10/24/22  1308    136   K 5.0 4.8    101   CO2 22 23   BUN 9 9   CREATININE 0.7 0.8   CALCIUM 9.6 9.7     Recent Labs     10/23/22  1201   AST 14*   ALT 9*   BILITOT <0.2   ALKPHOS 71     Recent Labs     10/23/22  1201 10/24/22  1308   INR 1.25* 1.30*     Recent Labs     10/23/22  1201   TROPONINI <0.01       Urinalysis:      Lab Results   Component Value Date/Time    NITRU Negative 02/10/2021 11:20 PM    BLOODU Negative 02/10/2021 11:20 PM    SPECGRAV <1.005 02/10/2021 11:20 PM    GLUCOSEU Negative 02/10/2021 11:20 PM       Radiology:  MRI brain without contrast   Final Result   1. No acute intracranial abnormality. No acute infarct. 2. Areas of chronic infarct involving the left frontal and bilateral   occipital lobes.    3. Loss of the normal signal void within the right internal carotid artery   compatible with chronic occlusion. 4. Mild global parenchymal volume loss with mild chronic microvascular   ischemic changes. RECOMMENDATIONS:   Unavailable         XR CHEST PORTABLE   Final Result   No acute cardiopulmonary disease. CTA HEAD NECK W CONTRAST   Final Result   1. 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 2. Chronic severe stenosis vs occlusion of the right distal PCA. 3. Otherwise, the remaining major arteries of the head and neck appear patent   without significant stenosis. 4. Aberrant right subclavian artery with a retroesophageal course, anatomic   variant. 5. Left upper central incisor periapical lucency. Can correlate with dental   examination. RECOMMENDATIONS:   1.1 cm incidental right thyroid nodule. No follow-up imaging is recommended. Reference: J Am Jaqueline Radiol. 2015 Feb;12(2): 143-50         CT HEAD WO CONTRAST   Final Result   1. No evident acute intracranial abnormality. 2. Chronic findings as above. Assessment/Plan:    Active Hospital Problems    Diagnosis     Hypertensive urgency [I16.0]      Priority: Medium    Dizziness [R42]      Priority: Medium    Coronary artery disease involving native coronary artery of native heart without angina pectoris [I25.10]     DM2 (diabetes mellitus, type 2) (HCC) [E11.9]     Remote history of stroke [Z86.73]      PMR consult for ARU  Continue ASA  Continue Lovenox bid until INR > 2  Continue Coumadin  Continue Toprol XL and Lisinopril  Add SSI  Neuro input appreciated  Check orthostatics  Check UA, TSH, B12 and Ammonia  F/U A1C  Start Rocephin IV if UA positive for UTI  CTA Aortogram with BL LE runoff for PAD  Vascular consult for PAD  14. Wound consult for LE wounds    DVT Prophylaxis: Lovenox/Coumadin  Diet: ADULT DIET; Regular; 5 carb choices (75 gm/meal)  Code Status: DNR-CC  PT/OT Eval Status:  Following    Dispo - ARU vs home    Discussed with patient, nursing, PT/OT and CM. Discussed with daughter on phone. Suspect severe PAD may be indolent issue here.     Fernanda Olson MD

## 2022-10-24 NOTE — PROGRESS NOTES
Shift assessment complete. Telemetry on. BP elevated but all other vital signs stable. NIHSS complete; see flowsheets. Wound care done by patient. Blood glucose 126. Pt given snack upon request. Medications given per MAR. Call light within reach and bed alarm on. Pt denies any further needs at this time.

## 2022-10-25 VITALS
DIASTOLIC BLOOD PRESSURE: 79 MMHG | OXYGEN SATURATION: 95 % | BODY MASS INDEX: 25.49 KG/M2 | TEMPERATURE: 98.7 F | RESPIRATION RATE: 17 BRPM | HEART RATE: 73 BPM | WEIGHT: 149.3 LBS | SYSTOLIC BLOOD PRESSURE: 176 MMHG | HEIGHT: 64 IN

## 2022-10-25 PROBLEM — G45.9 TIA (TRANSIENT ISCHEMIC ATTACK): Status: ACTIVE | Noted: 2022-10-25

## 2022-10-25 LAB
AMMONIA: 14 UMOL/L (ref 11–51)
ANION GAP SERPL CALCULATED.3IONS-SCNC: 11 MMOL/L (ref 3–16)
BUN BLDV-MCNC: 13 MG/DL (ref 7–20)
CALCIUM SERPL-MCNC: 9.4 MG/DL (ref 8.3–10.6)
CHLORIDE BLD-SCNC: 102 MMOL/L (ref 99–110)
CO2: 25 MMOL/L (ref 21–32)
CREAT SERPL-MCNC: 0.7 MG/DL (ref 0.6–1.2)
ESTIMATED AVERAGE GLUCOSE: 177.2 MG/DL
FOLATE: 7.9 NG/ML (ref 4.78–24.2)
GFR SERPL CREATININE-BSD FRML MDRD: >60 ML/MIN/{1.73_M2}
GLUCOSE BLD-MCNC: 189 MG/DL (ref 70–99)
GLUCOSE BLD-MCNC: 208 MG/DL (ref 70–99)
GLUCOSE BLD-MCNC: 318 MG/DL (ref 70–99)
GLUCOSE BLD-MCNC: 86 MG/DL (ref 70–99)
HBA1C MFR BLD: 7.8 %
HCT VFR BLD CALC: 37.8 % (ref 36–48)
HEMOGLOBIN: 12.6 G/DL (ref 12–16)
INR BLD: 1.26 (ref 0.87–1.14)
MCH RBC QN AUTO: 30.3 PG (ref 26–34)
MCHC RBC AUTO-ENTMCNC: 33.2 G/DL (ref 31–36)
MCV RBC AUTO: 91.2 FL (ref 80–100)
PDW BLD-RTO: 13.1 % (ref 12.4–15.4)
PERFORMED ON: ABNORMAL
PERFORMED ON: ABNORMAL
PERFORMED ON: NORMAL
PLATELET # BLD: 284 K/UL (ref 135–450)
PMV BLD AUTO: 7.9 FL (ref 5–10.5)
POTASSIUM SERPL-SCNC: 4 MMOL/L (ref 3.5–5.1)
PROTHROMBIN TIME: 15.8 SEC (ref 11.7–14.5)
RBC # BLD: 4.15 M/UL (ref 4–5.2)
SODIUM BLD-SCNC: 138 MMOL/L (ref 136–145)
TSH REFLEX: 3.6 UIU/ML (ref 0.27–4.2)
VITAMIN B-12: 679 PG/ML (ref 211–911)
WBC # BLD: 5.6 K/UL (ref 4–11)

## 2022-10-25 PROCEDURE — 6360000002 HC RX W HCPCS: Performed by: INTERNAL MEDICINE

## 2022-10-25 PROCEDURE — 92526 ORAL FUNCTION THERAPY: CPT

## 2022-10-25 PROCEDURE — 6370000000 HC RX 637 (ALT 250 FOR IP): Performed by: FAMILY MEDICINE

## 2022-10-25 PROCEDURE — 85027 COMPLETE CBC AUTOMATED: CPT

## 2022-10-25 PROCEDURE — 97530 THERAPEUTIC ACTIVITIES: CPT

## 2022-10-25 PROCEDURE — 99222 1ST HOSP IP/OBS MODERATE 55: CPT | Performed by: SURGERY

## 2022-10-25 PROCEDURE — 6370000000 HC RX 637 (ALT 250 FOR IP): Performed by: INTERNAL MEDICINE

## 2022-10-25 PROCEDURE — 92507 TX SP LANG VOICE COMM INDIV: CPT

## 2022-10-25 PROCEDURE — APPNB30 APP NON BILLABLE TIME 0-30 MINS: Performed by: NURSE PRACTITIONER

## 2022-10-25 PROCEDURE — 85610 PROTHROMBIN TIME: CPT

## 2022-10-25 PROCEDURE — 97116 GAIT TRAINING THERAPY: CPT

## 2022-10-25 PROCEDURE — 82140 ASSAY OF AMMONIA: CPT

## 2022-10-25 PROCEDURE — 36415 COLL VENOUS BLD VENIPUNCTURE: CPT

## 2022-10-25 PROCEDURE — 97535 SELF CARE MNGMENT TRAINING: CPT

## 2022-10-25 PROCEDURE — 80048 BASIC METABOLIC PNL TOTAL CA: CPT

## 2022-10-25 RX ORDER — NIFEDIPINE 30 MG/1
30 TABLET, EXTENDED RELEASE ORAL DAILY
Qty: 30 TABLET | Refills: 0 | Status: SHIPPED | OUTPATIENT
Start: 2022-10-25

## 2022-10-25 RX ORDER — INSULIN LISPRO 100 [IU]/ML
0-4 INJECTION, SOLUTION INTRAVENOUS; SUBCUTANEOUS NIGHTLY
Status: DISCONTINUED | OUTPATIENT
Start: 2022-10-25 | End: 2022-10-25 | Stop reason: HOSPADM

## 2022-10-25 RX ORDER — INSULIN LISPRO 100 [IU]/ML
0-16 INJECTION, SOLUTION INTRAVENOUS; SUBCUTANEOUS
Status: DISCONTINUED | OUTPATIENT
Start: 2022-10-25 | End: 2022-10-25 | Stop reason: HOSPADM

## 2022-10-25 RX ADMIN — SERTRALINE 150 MG: 50 TABLET, FILM COATED ORAL at 08:45

## 2022-10-25 RX ADMIN — LISINOPRIL 10 MG: 10 TABLET ORAL at 08:45

## 2022-10-25 RX ADMIN — ASPIRIN 81 MG 81 MG: 81 TABLET ORAL at 08:45

## 2022-10-25 RX ADMIN — METOPROLOL SUCCINATE 50 MG: 50 TABLET, EXTENDED RELEASE ORAL at 08:46

## 2022-10-25 RX ADMIN — GLIMEPIRIDE 4 MG: 2 TABLET ORAL at 06:26

## 2022-10-25 RX ADMIN — HYDRALAZINE HYDROCHLORIDE 10 MG: 20 INJECTION, SOLUTION INTRAMUSCULAR; INTRAVENOUS at 12:44

## 2022-10-25 RX ADMIN — Medication 400 MG: at 08:46

## 2022-10-25 RX ADMIN — INSULIN LISPRO 12 UNITS: 100 INJECTION, SOLUTION INTRAVENOUS; SUBCUTANEOUS at 12:45

## 2022-10-25 ASSESSMENT — PAIN SCALES - GENERAL
PAINLEVEL_OUTOF10: 0
PAINLEVEL_OUTOF10: 5
PAINLEVEL_OUTOF10: 0
PAINLEVEL_OUTOF10: 5

## 2022-10-25 ASSESSMENT — PAIN DESCRIPTION - ORIENTATION
ORIENTATION: RIGHT
ORIENTATION: RIGHT

## 2022-10-25 ASSESSMENT — PAIN DESCRIPTION - LOCATION
LOCATION: LEG
LOCATION: LEG

## 2022-10-25 ASSESSMENT — PAIN DESCRIPTION - DESCRIPTORS
DESCRIPTORS: BURNING
DESCRIPTORS: BURNING

## 2022-10-25 NOTE — PROGRESS NOTES
Data- discharge order received, pt verbalized agreement to discharge, [facility] for [facility] and/or new Durable Medical Equipment through [facility] for DMEs, Earl Alcantar reviewed and signed by MD, to be completed by RN. Action- AVS prepared, discharge instructions prepared and given to patient and daughter, medication information packet given r/t NEW or CHANGED prescriptions, pt verbalized understanding further self-review. D/C instruction summary: Diet- regular, carb control- 5 choice, Activity- as tolerated w/ walker, follow up with Primary Care Physician Marta Hinton [phone] appointment to be made by patient, immunizations reviewed and up to date, medications prescriptions to be filled and sent to patient's preferred pharmacy. Inpatient surgical procedural reviewed. Contact information provided to above agencies used. Response- Case Management/ reported faxing completed KAZ and AVS to needed HHC/DME services stated above. Pt belongings gathered, IV removed, pt dressed in clothes. Disposition is home with HHC/DME as stated above, transported with belongings, taken to lobby via w/c with belongings and daughter, no complications. 1. WEIGHT: Admit Weight: 180 lb (81.6 kg) (10/23/22 1148)        Today  Weight: 149 lb 4.8 oz (67.7 kg) (10/25/22 0342)       2.  O2 SAT.: SpO2: 95 % (10/25/22 1115)

## 2022-10-25 NOTE — PROGRESS NOTES
Call to [facility], spoke to Unique Hein, states they have her down and will call when they can take her.

## 2022-10-25 NOTE — CONSULTS
Hector Ornelas  10/25/2022  [id number]    Rehab Brief Consult:    Patient is unfortunately unlikely to be approved for ARU thru [facility] given her admission diagnoses. She is also wanting to discharge to home. She is a fall risk but home with [facility] would be appropriate. We will sign off. Thank you for the consultation.     Mare Moreno MD 10/25/2022 9:04 AM

## 2022-10-25 NOTE — PLAN OF CARE
Problem: Skin/Tissue Integrity  Goal: Absence of new skin breakdown  Description: 1. Monitor for areas of redness and/or skin breakdown  2. Assess vascular access sites hourly  3. Every 4-6 hours minimum:  Change oxygen saturation probe site  4. Every 4-6 hours:  If on nasal continuous positive airway pressure, respiratory therapy assess nares and determine need for appliance change or resting period. Note: Venous ulcers right d leg. Patient uses Santyl and lidocaine ointment. Wounds cleansed with saline, santyl applied to wound, then lidocaine, wounds covered with saline moistened gauze, silicone foam bordered dressings, then coban applied to hold dressings in place.

## 2022-10-25 NOTE — PROGRESS NOTES
Physical Therapy    [facility] [address]   Phone: [phone]    Physical Therapy    [] Initial Evaluation            [x] Daily Treatment Note         [] Discharge Summary      Patient: Jessy Mishra   : 1976   MRN: [id number]   Date of Service:  10/25/2022  Admitting Diagnosis: Dizziness  Current Admission Summary: Jessy Mishra is a 74 y.o. female who presents to the emergency department, initial history comes from the patient, later on in the visit her daughter add some of the history. The patient last known well was 1:00 in the morning, she states that she woke up this morning, got up to urinate and felt profoundly dizzy. She states that she fell back into bed, she was very dizzy at that time and felt bad and went back to sleep. Sometime later she got up, let the dog out, experienced dizziness again and decided to go back to sleep, she states that when she finally did get up she called her daughter who told her that she should come to the emergency department. 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 She is on aspirin and warfarin. Recently she had her warfarin decreased because she was placed on doxycycline and her INR was 3.99. On my initial exam she had a difficult time searching for certain words, and called things inappropriately. Upon reevaluation with her daughter in the room the daughter states that this is her norm. Past Medical History:  has a past medical history of Actinic keratosis, Arthritis, Asthma, Atrial fibrillation ([address] CAD (coronary artery disease), Cerebral artery occlusion with cerebral infarction ([address] Depression, Diabetes mellitus ([address] Disc displacement, lumbar, Diverticulosis of colon, Hard of hearing, Hyperlipidemia, Hypertension, Ischemic heart disease, Past heart attack, Poor vision, Right leg weakness, and Scoliosis.   Past Surgical History:  has a past surgical history that includes Coronary artery bypass graft;  section; Appendectomy; ovarian cyst removal; Hand surgery; Cardiac surgery (); skin biopsy; vascular surgery; Tonsillectomy; Lumbar disc surgery (8-); Lumbar spine surgery (10-11-12); and back surgery. Discharge Recommendations: Yuliana White scored a 18/24 on the AM-PAC short mobility form. Current research shows that an AM-PAC score of 18 or greater is typically associated with a discharge to the patient's home setting. Based on the patient's AM-PAC score and their current functional mobility deficits, it is recommended that the patient have 2-3 sessions per week of Physical Therapy at d/c to increase the patient's independence. At this time, this patient demonstrates the endurance and safety to discharge home with HHPT  and a follow up treatment frequency of 2-3x/wk. Please see assessment section for further patient specific details. If patient discharges prior to next session this note will serve as a discharge summary. Please see below for the latest assessment towards goals. Recommend HHPT with 24/ supervision         HOME HEALTH CARE: LEVEL 4 SICK     - Initial home health evaluation to occur within 24-48 hours, in patient home   - Therapy evaluations in home within 24-48 hours of discharge; including DME and home safety   - Frontload therapy 5 days, then 3x a week   - Therapy to evaluate if patient has [address] needs for personal care   -  evaluation within 24-48 hours, includes evaluation of resources and insurance to determine [place], [place], LTC, and Medicaid options   If patient discharges prior to next session this note will serve as a discharge summary. Please see below for the latest assessment towards goals.        DME Required For Discharge: no DME required at discharge  Precautions/Restrictions: no restrictions  Weight Bearing Restrictions: no restrictions  [] Right Upper Extremity        [] Left Upper Extremity         [] Right Lower Extremity         [] Left Lower Extremity             Required Braces/Orthotics: no braces required                   [] Right            [] Left  Positional Restrictions:no positional restrictions    Pre-Admission Information   Lives With: alone                     Type of Home: house  Home Layout: one level  Home Access:  1 step to enter without rails   Bathroom Layout: walker accessible, tub/shower unit  Bathroom Equipment: grab bars in shower, shower chair, hand held shower head  Toilet Height: standard height  Home Equipment: rolling walker, rollator - 4 wheeled walker, single point cane, reacher  Transfer Assistance: Independent without use of device  Ambulation Assistance:modified independent with use of single point cane  ADL Assistance: independent with all ADL's  IADL Assistance: independent with homemaking tasks  Active :        [] Yes                 [x] No  Hand Dominance: [] Left                 [x] Right  Current Employment: retired. Hobbies: games on [address]: 1 fall in the past 6 months       Subjective  General: Pt seated in recliner upon arrival. Pt agreeable to PT treatment however pt agitated from multiple disciplines coming into her room at one time and becomes emotionally distressed. Pt only agreeable to ambulate to door and back to \"demonstrate\" she is safe to go home. Reports she already worked with therapy today and does not need to show therapy she can go home \"again\". Pt emotional and reports she would like therapist to tell doctor Juan Galeana that she can go home. Pt provided with support and education. Pain: 4/10. Location: R LE   Pain Interventions: RN notified       Functional Mobility  Bed Mobility  Bed mobility not completed on this date.   Comments: Pt in recliner at beginning and EOS   Transfers  Sit to stand transfer: stand by assistance  Stand to sit transfer: stand by assistance  Comments:  Ambulation  Surface:level surface  Assistive Device: rolling walker  Assistance: stand by assistance  Distance: 30ft   Gait Mechanics: decreased jason, decreased step length, decreased step height   Comments:   Stair Mobility  Stair mobility not completed on this date. Comments:  Wheelchair Mobility:  No w/c mobility completed on this date. Comments:  Balance  Static Sitting Balance: good: independent with functional balance in unsupported position  Dynamic Sitting Balance: good: independent with functional balance in unsupported position  Static Standing Balance: fair (-): maintains balance at SBA with use of UE support  Dynamic Standing Balance: fair (-): maintains balance at SBA with use of UE support  Comments:    Other Therapeutic Interventions    Functional Outcomes  -PAC Inpatient Mobility Raw Score : 25              Cognition  WFL  Comments: word finding difficulty from previous strokes; able to write down word to answer if not able to accurately verbalize word  Orientation:    alert and oriented x 4  Command Following:   [facility]     Education  Barriers To Learning: language  Patient Education: patient educated on goals, PT role and benefits, plan of care, precautions, general safety, functional mobility training, adaptive device training, family education, disease specific education, transfer training, discharge recommendations  Learning Assessment:  patient verbalizes understanding, would benefit from continued reinforcement     Assessment  Activity Tolerance: Pt fatigues quickly; reports mild dizziness throughout session.    Impairments Requiring Therapeutic Intervention: decreased functional mobility, decreased ADL status, decreased strength, decreased endurance, decreased balance, decreased vision, vestibular impairment, decreased posture  Prognosis: fair  Clinical Assessment: Pt presenting below baseline function secondary to recent dizziness; per MRI and notes from neurologist pt is negative for acute CVA but with history of 2 prior CVA's. Neurologist is suspicious of inner ear attack being cause of dizziness but no nystagmus was noted with vestibular testing during previous therapy session. Pt was previously independent without use of AD but is requiring SBA for transfers and SBA for ambulation with RW on this date. Pt would continue to benefit from skilled PT services to address balance deficits and facilitate safe functional mobility.    Safety Interventions: patient left in chair, chair alarm in place, call light within reach, gait belt, patient at risk for falls, and nurse notified, family member present     Plan  Frequency: 5-7 x/week  Current Treatment Recommendations: strengthening, balance training, functional mobility training, transfer training, gait training, endurance training, neuromuscular re-education, patient/caregiver education, and safety education     Goals  Patient Goals: Improve independence    Short Term Goals:  Time Frame: Time of discharge   Patient will complete bed mobility at modified independent   Patient will complete transfers at supervision   Patient will ambulate 100 ft with use of rolling walker at stand by assistance  Patient will ascend/descend 1 stairs without use of HR at stand by assistance  (NO GOALS MET THIS DATE 10/24/22)     Therapy Session Time      Individual Group Co-treatment   Time In 1356       Time Out 1419       Minutes 23         Total Treatment Minutes:  23       Electronically Signed By: Ovidio Dorsey Do [facility] 83, [address] [facility] 18 left upper extremity findings/left lower extremity findings

## 2022-10-25 NOTE — CARE COORDINATION
[facility] Wound Ostomy Continence Nurse  Consult Note       NAME:  Juan Antonio White  MEDICAL RECORD NUMBER:  [id number]  AGE: 74 y.o.    GENDER: female  : 1950  TODAY'S DATE:  10/25/2022    Subjective   Reason for WOCN Evaluation and Assessment: venous ulcers      Juan Antonio White is a 74 y.o. female referred by:   [x] Physician  [x] Nursing  [] Other:     Wound Identification:  Wound Type: venous  Contributing Factors: venous stasis and arterial insufficiency    Wound History: present on admission, patient goes to [address] [place] [address], last seen 10/19/22  Current Wound Care Treatment:  santyl, lidocaine cream, non adherent dressing and dry dressing    Patient Goal of Care:  [x] Wound Healing  [] Odor Control  [] Palliative Care  [] Pain Control   [] Other:         PAST MEDICAL HISTORY        Diagnosis Date    Actinic keratosis     Arthritis     Asthma     UNSPECIFIED    Atrial fibrillation (HCC)     CAD (coronary artery disease)     Cerebral artery occlusion with cerebral infarction ([facility] 10/2020    residual expressive aphasia    Depression     worse with pain    Diabetes mellitus ([facility]     TYPE II    Disc displacement, lumbar     Diverticulosis of colon     Hard of hearing     left    Hyperlipidemia     Hypertension     Ischemic heart disease     Past heart attack     28 YRS AGO    Poor vision     legally blind    Right leg weakness     Scoliosis        PAST SURGICAL HISTORY    Past Surgical History:   Procedure Laterality Date    APPENDECTOMY      BACK  [address]      ONE    CORONARY ARTERY BYPASS GRAFT      HAND SURGERY      LUMPS/BONE SPURS    LUMBAR [address] Last SURGERY  8-    Microlumbar discectomy L4-5 right    LUMBAR SPINE SURGERY  10-11-12    MICROLUMBAR RE-EXPLORATION L4-5 RIGHT, DECOMPRESSION OF    OVARIAN CYST REMOVAL      SKIN BIOPSY      PRE CANCERS FROZEN OFF    TONSILLECTOMY      VASCULAR SURGERY         FAMILY HISTORY    Family History daily 1 Inhaler 3    ezetimibe (ZETIA) 10 MG tablet Take 10 mg by mouth daily      magnesium gluconate (MAGONATE) 500 MG tablet Take 500 mg by mouth daily      aspirin (ASPIRIN CHILDRENS) 81 MG chewable tablet Take 1 tablet by mouth daily 30 tablet 3    sertraline (ZOLOFT) 100 MG tablet Take 150 mg by mouth daily      glimepiride (AMARYL) 4 MG tablet Take 4 mg by mouth 2 times daily       metoprolol (TOPROL-XL) 50 MG XL tablet Take 50 mg by mouth daily.            Objective    BP (!) 176/79   Pulse 73   Temp 98.7 °F (37.1 °C) (Oral)   Resp 17   Ht 5' 4\" (1.626 m)   Wt 149 lb 4.8 oz (67.7 kg)   SpO2 95%   BMI 25.63 kg/m²     LABS:  WBC:    Lab Results   Component Value Date/Time    WBC 5.6 10/25/2022 05:26 AM     H/H:    Lab Results   Component Value Date/Time    HGB 12.6 10/25/2022 05:26 AM    HCT 37.8 10/25/2022 05:26 AM     PTT:    Lab Results   Component Value Date/Time    APTT 32.0 10/20/2020 08:25 AM   [APTT}  PT/INR:    Lab Results   Component Value Date/Time    PROTIME 15.8 10/25/2022 05:26 AM    INR 1.26 10/25/2022 05:26 AM     HgBA1c:    Lab Results   Component Value Date/Time    LABA1C 7.8 10/24/2022 01:08 PM       Assessment   Kevin Risk Score: Kevin Scale Score: 18    Patient Active Problem List   Diagnosis Code    Lumbar radiculopathy M54.16    Acute CVA (cerebrovascular accident) ([facility] I63.9    Received intravenous tissue plasminogen activator (tPA) in emergency department Z92.82    Benign essential HTN I10    Nontraumatic cortical hemorrhage of left cerebral hemisphere (HCC) I61.1    Dyslipidemia E78.5    PAF (paroxysmal atrial fibrillation) (HCC) I48.0    Thalamic stroke (HCC) I63.81    DM2 (diabetes mellitus, type 2) (HCC) E11.9    Remote history of stroke Z86.73    Persistent atrial fibrillation (HCC) I48.19    Hyponatremia E87.1    Coronary artery disease involving native coronary artery of native heart without angina pectoris I25.10    Acute ischemic stroke ([facility] I63.9    Dizziness R42 Hypertensive urgency I16.0    PAD (peripheral artery disease) ([facility]) I73.9    Wounds, multiple open, lower extremity S81.809A    TIA (transient ischemic attack) G45.9       Measurements:  Wound 10/23/22 Pretibial Right ulcer being treated by wound care at [facility] (Active)   Wound Image   10/25/22 1442   Wound Etiology Venous 10/25/22 1442   Dressing Status New dressing applied 10/25/22 1442   Wound Cleansed Cleansed with saline 10/25/22 1442   Dressing/Treatment Coban/self-adherent bandages;Foam;Gauze dressing/dressing sponge; Pharmaceutical agent (see MAR) 10/25/22 1442   Dressing Change Due 10/26/22 10/25/22 1442   Wound Assessment Slough;Pink/red 10/25/22 1442   Fariba-wound Assessment Intact 10/25/22 1442   Margins Attached edges; Defined edges 10/25/22 1442   Wound Thickness Description not for Pressure Injury Full thickness 10/25/22 1442   Number of days: 1       Wound 10/23/22 Tibial Right;Posterior venous ulcer (Active)   Wound Image   10/25/22 1442   Wound Etiology Venous 10/25/22 1442   Dressing Status New dressing applied 10/25/22 1442   Wound Cleansed Cleansed with saline 10/25/22 1442   Dressing/Treatment Foam;Gauze dressing/dressing sponge;Coban/self-adherent bandages; Pharmaceutical agent (see MAR) 10/25/22 1442   Wound Assessment Devitalized tissue; Non-blanchable erythema;Pink/red 10/25/22 1442   Drainage Amount None 10/25/22 1442   Fariba-wound Assessment Non-blanchable erythema 10/25/22 1442   Margins Defined edges 10/25/22 1442   Wound Thickness Description not for Pressure Injury Full thickness 10/25/22 1442   Number of days: 1      Patient seen for venous ulcers to right leg. Patient was admitted for TIA and is being discharged today. This nurse called  Wound center but no one has called back. Met with patient who does daily wound care and goes to the wound center once a week. Patient agreeable to visit. Old dressings removed. Wounds assessed and photographed.   Wound on medial right let is covered with slough, wound to lateral posterior leg has eschar and lc wound is red and patient reports it is painful when touched. Patient has wound care supplies with her. Patient agreeable to have [id number] change dressing. Patient uses Santyl and lidocaine ointment. Wounds cleansed with saline, santyl applied to wound, then lidocaine, wounds covered with saline moistened gauze, silicone foam bordered dressings, then coban applied to hold dressings in place. Patient reports she is to got back to wound clinic later this week. Right anterior leg    Right posterior leg    Response to treatment:  Well tolerated by patient. Pain Assessment:  Severity:  0 / 10  Quality of pain: N/A  Wound Pain Timing/Severity: none  Premedicated: No    Plan   Plan of Care: Wound 10/23/22 Pretibial Right ulcer being treated by wound care at [facility]-Dressing/Treatment: Coban/self-adherent bandages, Foam, Gauze dressing/dressing sponge, Pharmaceutical agent (see MAR) (santyl and lidocaine (pt supplied))  Wound 10/23/22 Tibial Right;Posterior venous ulcer-Dressing/Treatment: Foam, Gauze dressing/dressing sponge, Coban/self-adherent bandages, Pharmaceutical agent (see MAR) (santyl and lidocaine cream (patient supplied))    Specialty Bed Required : No   [] Low Air Loss   [] Pressure Redistribution  [] Fluid Immersion  [] Bariatric  [] Total Pressure Relief  [] Other:     Current Diet: ADULT DIET;  Regular; 5 carb choices (75 gm/meal)  Dietician consult:  Yes    Discharge Plan:  Placement for patient upon discharge: home with support    Patient appropriate for Outpatient [address]: Yes    Referrals:  []   [] [address] [place] [employer] [place] [facility]  [] Supplies  [] Other    Patient/Caregiver Teaching:  Level of patient/caregiver understanding able to:   [] Indicates understanding       [] Needs reinforcement  [] Unsuccessful      [] Verbal Understanding  [] Demonstrated understanding       [] No evidence of learning  [] Refused teaching         [] N/A       Electronically signed by  EMIL CespedesN, RN, [address] [place] [address]  [phone] on 10/25/2022 at 2:47 PM

## 2022-10-25 NOTE — DISCHARGE INSTRUCTIONS
Continue home wound care as instructed from [facility].  Please follow-up after discharge    [address] [facility]    [address] [facility] Renetta [address]    [address] [facility], [address] [facility] [address], [address]ela [address]    [place], [address]    [phone] (Work)    [phone] (Fax)

## 2022-10-25 NOTE — PROGRESS NOTES
Hospitalist Progress Note      PCP: Fredis Clarke    Date of Admission: 10/23/2022    Chief Complaint: Dizziness    Hospital Course: 75 yo F with CAD, h/o Afib on Coumadin with h/o stroke, DM2, hyperlipidemia came to ER with dizziness. Admitted as inpatient for dizziness secondary to hypertensive urgency vs stroke/TIA vs BPPV. Seen by Neurology. MRI Brain:     1. No acute intracranial abnormality. No acute infarct. 2. Areas of chronic infarct involving the left frontal and bilateral   occipital lobes. 3. Loss of the normal signal void within the right internal carotid artery   compatible with chronic occlusion. 4. Mild global parenchymal volume loss with mild chronic microvascular   ischemic changes. PT/OT recommend ARU. PMR consulted. Daughter notes ongoing wound in legs followed as OP. KAYLENE noted severe R femoral arterial disease. Vascular consults requested    Subjective:  Patient denies CP, SOB, HA or abdominal pain. No dizziness. Wants to go home.       Medications:  Reviewed    Infusion Medications    dextrose       Scheduled Medications    warfarin  5 mg Oral Daily    insulin lispro  0-8 Units SubCUTAneous TID WC    insulin lispro  0-4 Units SubCUTAneous Nightly    aspirin  81 mg Oral Daily    lisinopril  10 mg Oral Daily    metoprolol succinate  50 mg Oral Daily    sertraline  150 mg Oral Daily    glimepiride  4 mg Oral BID AC    magnesium oxide  400 mg Oral Daily    mometasone-formoterol  2 puff Inhalation BID    collagenase  1 each Topical Daily    enoxaparin  1 mg/kg SubCUTAneous BID     PRN Meds: hydrALAZINE, ondansetron **OR** ondansetron, polyethylene glycol, labetalol, dextrose bolus **OR** dextrose bolus, glucagon (rDNA), dextrose, lidocaine      Intake/Output Summary (Last 24 hours) at 10/25/2022 0812  Last data filed at 10/24/2022 1343  Gross per 24 hour   Intake 240 ml   Output --   Net 240 ml         Physical Exam Performed:    BP (!) 159/73   Pulse 76   Temp 98 °F (36.7 °C) (Oral)   Resp 18   Ht 5' 4\" (1.626 m)   Wt 149 lb 4.8 oz (67.7 kg)   SpO2 93%   BMI 25.63 kg/m²     General appearance: No apparent distress, appears stated age and cooperative. HEENT: Pupils equal, round, and reactive to light. Conjunctivae/corneas clear. Neck: Supple, with full range of motion. No jugular venous distention. Trachea midline. Respiratory:  Normal respiratory effort. Clear to auscultation, bilaterally without Rales/Wheezes/Rhonchi. Cardiovascular: Regular rate and rhythm with normal S1/S2 without murmurs, rubs or gallops. Abdomen: Soft, non-tender, non-distended with normal bowel sounds. Musculoskeletal: No clubbing, cyanosis or edema bilaterally. Full range of motion without deformity. Skin: Skin color, texture, turgor normal.  No rashes or lesions. Neurologic:  Neurovascularly intact without any focal sensory/motor deficits.  Cranial nerves: II-XII intact, grossly non-focal.  Psychiatric: Alert and oriented, thought content appropriate, normal insight  Capillary Refill: Brisk, 3 seconds, normal   Peripheral Pulses: +2 palpable, equal bilaterally       Labs:   Recent Labs     10/23/22  1201 10/24/22  1308 10/25/22  0526   WBC 8.2 6.5 5.6   HGB 12.6 12.5 12.6   HCT 38.4 38.6 37.8    283 284       Recent Labs     10/23/22  1201 10/24/22  1308 10/25/22  0526    136 138   K 5.0 4.8 4.0    101 102   CO2 22 23 25   BUN 9 9 13   CREATININE 0.7 0.8 0.7   CALCIUM 9.6 9.7 9.4       Recent Labs     10/23/22  1201   AST 14*   ALT 9*   BILITOT <0.2   ALKPHOS 71       Recent Labs     10/23/22  1201 10/24/22  1308 10/25/22  0526   INR 1.25* 1.30* 1.26*       Recent Labs     10/23/22  1201   TROPONINI <0.01         Urinalysis:      Lab Results   Component Value Date/Time    NITRU Negative 02/10/2021 11:20 PM    BLOODU Negative 02/10/2021 11:20 PM    SPECGRAV <1.005 02/10/2021 11:20 PM    GLUCOSEU Negative 02/10/2021 11:20 PM       Radiology:  MRI brain without contrast Final Result   1. No acute intracranial abnormality. No acute infarct. 2. Areas of chronic infarct involving the left frontal and bilateral   occipital lobes. 3. Loss of the normal signal void within the right internal carotid artery   compatible with chronic occlusion. 4. Mild global parenchymal volume loss with mild chronic microvascular   ischemic changes. RECOMMENDATIONS:   Unavailable         XR CHEST PORTABLE   Final Result   No acute cardiopulmonary disease. CTA HEAD NECK W CONTRAST   Final Result   1. 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 2. Chronic severe stenosis vs occlusion of the right distal PCA. 3. Otherwise, the remaining major arteries of the head and neck appear patent   without significant stenosis. 4. Aberrant right subclavian artery with a retroesophageal course, anatomic   variant. 5. Left upper central incisor periapical lucency. Can correlate with dental   examination. RECOMMENDATIONS:   1.1 cm incidental right thyroid nodule. No follow-up imaging is recommended. Reference: J Am Jaqueline Radiol. 2015 Feb;12(2): 143-50         CT HEAD WO CONTRAST   Final Result   1. No evident acute intracranial abnormality. 2. Chronic findings as above.                  Assessment/Plan:    Active Hospital Problems    Diagnosis     Hypertensive urgency [I16.0]      Priority: Medium    PAD (peripheral artery disease) ([facility] [I73.9]      Priority: Medium    Wounds, multiple open, lower extremity [S81.809A]      Priority: Medium    Dizziness [R42]      Priority: Medium    Coronary artery disease involving native coronary artery of native heart without angina pectoris [I25.10]     DM2 (diabetes mellitus, type 2) ([facility] [E11.9]     Remote history of stroke [Z86.73]      PMR consult for ARU  Continue ASA  Continue Lovenox bid until INR > 2  Continue Coumadin  Continue Toprol XL and Lisinopril  Continue SSI  Neuro input appreciated  Check orthostatics  Get UA  Normal ammonia, TSH, B12  F/U A1C  Start Rocephin IV if UA positive for UTI  CTA Aortogram with BL LE runoff for PAD cancelled  Vascular consult for PAD appreciated  14. Wound consult for LE wounds    DVT Prophylaxis: Lovenox/Coumadin  Diet: ADULT DIET; Regular; 5 carb choices (75 gm/meal)  Code Status: DNR-CC  PT/OT Eval Status: Following    Dispo - ARU vs home    Discussed with patient, Dr Jamee Tello (Vascular) and nursing. Await Vascular plan of care. If no intervention, DC to ARU vs home.     Vazquez Santiago MD

## 2022-10-25 NOTE — PROGRESS NOTES
VASCULAR    D/w patient's daughter regarding known PAD. Patient had noninvasive testing done at [facility] which showed right SFA disease and is planning for CTA this Thursday 10/27 at 1:30 pm.    Okay from vascular standpoint to discharge and follow up with [facility] vascular surgery for further management and intervention. No further vascular testing or intervention at this time. D/w nursing, Dr. Victoria Stephen and patient's daughter Ashish Aldridge.     Electronically signed by Jaredist RAEANN Damon CNP on 10/25/2022 at 2:08 PM

## 2022-10-25 NOTE — DISCHARGE SUMMARY
Hospital Medicine Discharge Summary    Patient: Kacey López     Gender: female  : 1950   Age: 74 y.o. MRN: [id number]    Admitting Physician: Nikia Plummer MD  Discharge Physician: Silvino Torres MD     Code Status: DNR-CC     Admit Date: 10/23/2022   Discharge Date:   10/25/22    Disposition:  Home    Discharge Diagnoses: Active Hospital Problems    Diagnosis Date Noted    TIA (transient ischemic attack) [G45.9] 10/25/2022     Priority: Medium    Hypertensive urgency [I16.0] 10/24/2022     Priority: Medium    PAD (peripheral artery disease) ([facility] [address] [I73.9] 10/24/2022     Priority: Medium    Wounds, multiple open, lower extremity [S81.809A] 10/24/2022     Priority: Medium    Dizziness [R42] 10/23/2022     Priority: Medium    Coronary artery disease involving native coronary artery of native heart without angina pectoris [I25.10]     DM2 (diabetes mellitus, type 2) ([facility] [E11.9]     Remote history of stroke [Z86.73]        Follow-up appointments:  one week    Outpatient to do list: F/U with PCP and Vascular    Condition at Discharge:  Stable    Hospital Course:   73 yo F with CAD, h/o Afib on Coumadin with h/o stroke, DM2, hyperlipidemia came to ER with dizziness. Admitted as inpatient for dizziness secondary to hypertensive urgency vs stroke/TIA vs BPPV. Seen by Neurology. MRI Brain:     1. No acute intracranial abnormality. No acute infarct. 2. Areas of chronic infarct involving the left frontal and bilateral   occipital lobes. 3. Loss of the normal signal void within the right internal carotid artery   compatible with chronic occlusion. 4. Mild global parenchymal volume loss with mild chronic microvascular   ischemic changes. PT/OT recommend ARU. PMR consulted. Daughter notes ongoing wound in legs followed as OP. KAYLENE noted severe R femoral arterial disease. Vascular consult recommends outpatient follow up. Started on Nifedipine for HTN.     F/U with PCP and Vascular. Discharge Medications:   Current Discharge Medication List        START taking these medications    Details   NIFEdipine (PROCARDIA XL) 30 MG extended release tablet Take 1 tablet by mouth daily  Qty: 30 tablet, Refills: 0           Current Discharge Medication List        Current Discharge Medication List        CONTINUE these medications which have NOT CHANGED    Details   warfarin (COUMADIN) 5 MG tablet Take 5 mg by mouth Monday and Friday 2.5 mg with other days at 5mg      NONFORMULARY 1 applicator Lidocaine to ulcers to leg      collagenase 250 UNIT/GM ointment Apply 1 each topically daily Apply topically daily. To leg ulcer      albuterol (PROVENTIL) 2 MG tablet Take 2 mg by mouth as needed      metFORMIN (GLUCOPHAGE-XR) 500 MG extended release tablet Take 1 tablet by mouth 2 times daily  Qty: 30 tablet, Refills: 3      lisinopril (PRINIVIL;ZESTRIL) 10 MG tablet Take 1 tablet by mouth daily  Qty: 30 tablet, Refills: 3      budesonide-formoterol (SYMBICORT) 160-4.5 MCG/ACT AERO Inhale 2 puffs into the lungs 2 times daily  Qty: 1 Inhaler, Refills: 3      ezetimibe (ZETIA) 10 MG tablet Take 10 mg by mouth daily      magnesium gluconate (MAGONATE) 500 MG tablet Take 500 mg by mouth daily      aspirin (ASPIRIN CHILDRENS) 81 MG chewable tablet Take 1 tablet by mouth daily  Qty: 30 tablet, Refills: 3      sertraline (ZOLOFT) 100 MG tablet Take 150 mg by mouth daily      glimepiride (AMARYL) 4 MG tablet Take 4 mg by mouth 2 times daily       metoprolol (TOPROL-XL) 50 MG XL tablet Take 50 mg by mouth daily.              Current Discharge Medication List        STOP taking these medications       apixaban (ELIQUIS) 5 MG TABS tablet Comments:   Reason for Stopping:                 Discharge Exam:    BP (!) 176/79   Pulse 73   Temp 98.7 °F (37.1 °C) (Oral)   Resp 17   Ht 5' 4\" (1.626 m)   Wt 149 lb 4.8 oz (67.7 kg)   SpO2 95%   BMI 25.63 kg/m²   General appearance: No apparent distress, appears 10/23/2022 12:11 pm TECHNIQUE: CT of the head was performed without the administration of intravenous contrast. Automated exposure control, iterative reconstruction, and/or weight based adjustment of the mA/kV was utilized to reduce the radiation dose to as low as reasonably achievable. COMPARISON: Brain MRI 01/30/2021, head CT 01/29/2021 HISTORY: ORDERING SYSTEM PROVIDED HISTORY: Stroke Symptoms TECHNOLOGIST PROVIDED HISTORY: Has a \"code stroke\" or \"stroke alert\" been called? ->Yes Reason for exam:->Stroke Symptoms Decision Support Exception - unselect if not a suspected or confirmed emergency medical condition->Emergency Medical Condition (MA) Reason for Exam: Stroke Symptoms FINDINGS: BRAIN/VENTRICLES:  No masses nor acute intracranial hemorrhage. Unchanged encephalomalacia in the left precentral gyrus a near the margin of the left temporal neck septal lobes. New encephalomalacia in the right occipital lobe. Otherwise intact gray/white matter differentiation without findings of acute ischemia. No mass effect nor midline shift. Patent basilar cisterns and foramen magnum. No hydrocephalus. Age-appropriate mild diffuse atrophy. Minimal deep periventricular white matter hypodensities likely due to chronic small vessel ischemia. ORBITS:  Visualized portions appear normal without acute abnormality. SINUSES:  Partial opacification scattered minimal partial opacification of the left mastoid air cells likely due to effusion. SOFT TISSUES/SKULL:  No acute soft tissue abnormality. Severe atherosclerotic calcifications. No acute fracture. 1. No evident acute intracranial abnormality. 2. Chronic findings as above.      XR CHEST PORTABLE    Result Date: 10/23/2022  EXAMINATION: ONE XRAY VIEW OF THE CHEST 10/23/2022 12:14 pm COMPARISON: 01/30/2021 HISTORY: ORDERING SYSTEM PROVIDED HISTORY: stroke symptoms TECHNOLOGIST PROVIDED HISTORY: Reason for exam:->stroke symptoms Reason for Exam: Stroke symptoms FINDINGS: Sternotomy wires are present. Cardiac silhouette is normal.  Thoracic aortic calcification is noted. Hilar contours are normal.  No consolidation, pleural effusion or pneumothorax is identified. No acute cardiopulmonary disease. CTA HEAD NECK W CONTRAST    Result Date: 10/23/2022  EXAMINATION: CTA OF THE HEAD AND NECK WITH CONTRAST 10/23/2022 12:13 pm: TECHNIQUE: CTA of the head and neck was performed with the administration of intravenous contrast. Multiplanar reformatted images are provided for review. MIP images are provided for review. Stenosis of the internal carotid arteries measured using NASCET criteria. Automated exposure control, iterative reconstruction, and/or weight based adjustment of the mA/kV was utilized to reduce the radiation dose to as low as reasonably achievable. 3D reconstructed images were performed on a separate workstation and provided for review. This scan was analyzed using [employer] [place] contact LVO. Identification of suspected findings is not for diagnostic use beyond notification. Viz LVO is limited to analysis of imaging data and should not be used in-lieu of full patient evaluation or relied upon to make or confirm diagnosis. COMPARISON: 01/30/2021 HISTORY: ORDERING SYSTEM PROVIDED HISTORY: Stroke Symptoms TECHNOLOGIST PROVIDED HISTORY: Has a \"code stroke\" or \"stroke alert\" been called? ->Yes Reason for exam:->Stroke Symptoms Decision Support Exception - unselect if not a suspected or confirmed emergency medical condition->Emergency Medical Condition (MA) Reason for Exam: stroke symptoms Additional signs and symptoms: Dizziness (Arrived per [facility] from home d/t dizziness that started approx 0100 with hx of CVA; family concerned for another stroke; 190/100; afib; confusion; slurred speech is normal; eye drift in the left eye per normal; ) FINDINGS: CTA NECK: AORTIC ARCH/ARCH VESSELS: No dissection or arterial injury.   No significant stenosis of the brachiocephalic or subclavian arteries. Aberrant right subclavian artery with a retroesophageal course, anatomic variant. CAROTID ARTERIES: Chronic occlusion of the right cervical internal carotid artery starting just distal to the bifurcation. The bilateral common and left internal carotid artery appear patent without significant stenosis. VERTEBRAL ARTERIES: No dissection, arterial injury, or significant stenosis. SOFT TISSUES: The lung apices are clear. No cervical or superior mediastinal lymphadenopathy. The larynx and pharynx are unremarkable. No acute abnormality of the salivary and thyroid glands. Incidental 1.1 cm right posterior thyroid nodule. BONES: No acute osseous abnormality. Multilevel cervical spondylosis, most notable at C4-C5, C5-C6, and C6-C7. No high-grade bony spinal canal stenosis. CTA HEAD: ANTERIOR CIRCULATION: Reconstitution of flow in the right clinoid intracranial internal carotid artery likely related to collateral flow. No significant stenosis of the left intracranial internal carotid, bilateral anterior cerebral, or bilateral middle cerebral arteries. No aneurysm. POSTERIOR CIRCULATION: No significant stenosis of the vertebral, basilar, or left posterior cerebral arteries. Chronic severe stenosis/occlusion of the distal right posterior cerebral artery. Bilateral posterior communicating arteries present with a hypoplastic left P1 segment, anatomic variant. No aneurysm. OTHER: No dural venous sinus thrombosis on this non-dedicated study. Left upper central incisor periapical lucency. BRAIN: No mass effect or midline shift. No extra-axial fluid collection. Multifocal chronic encephalomalacia/gliosis. 1. 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 2. Chronic severe stenosis vs occlusion of the right distal PCA. 3. Otherwise, the remaining major arteries of the head and neck appear patent without significant stenosis.  4. Aberrant right subclavian artery with a retroesophageal course, anatomic variant. 5. Left upper central incisor periapical lucency. Can correlate with dental examination. RECOMMENDATIONS: 1.1 cm incidental right thyroid nodule. No follow-up imaging is recommended. Reference: J Am Jaqueline Radiol. 2015 Feb;12(2): 143-50     MRI brain without contrast    Result Date: 10/24/2022  EXAMINATION: MRI OF THE BRAIN WITHOUT CONTRAST  10/24/2022 7:52 am TECHNIQUE: Multiplanar multisequence MRI of the brain was performed without the administration of intravenous contrast. COMPARISON: 01/30/2021. HISTORY: ORDERING SYSTEM PROVIDED HISTORY: dizziness TECHNOLOGIST PROVIDED HISTORY: Reason for exam:->dizziness Decision Support Exception - unselect if not a suspected or confirmed emergency medical condition->Emergency Medical Condition (MA) Reason for Exam: Dizziness Initial evaluation. FINDINGS: INTRACRANIAL STRUCTURES/VENTRICLES: There is no evidence of an acute infarct. Areas of chronic infarct involving the left posterior frontal and bilateral occipital lobes. No mass effect or midline shift. No evidence of an acute intracranial hemorrhage. Areas of T2 FLAIR hyperintensity are seen in the periventricular and subcortical white matter, which are nonspecific, but may represent chronic microvascular ischemic change. There is mild global parenchymal volume loss. Otherwise, the ventricles and sulci are normal in size and configuration. The sellar/suprasellar regions appear unremarkable. There is loss of the normal signal void within the right internal carotid artery. ORBITS: The visualized portion of the orbits demonstrate no acute abnormality. SINUSES: The visualized paranasal sinuses and mastoid air cells demonstrate no acute abnormality. BONES/SOFT TISSUES: The bone marrow signal intensity appears normal. The soft tissues demonstrate no acute abnormality. 1. No acute intracranial abnormality. No acute infarct. 2. Areas of chronic infarct involving the left frontal and bilateral occipital lobes.  3. Loss of the normal signal void within the right internal carotid artery compatible with chronic occlusion. 4. Mild global parenchymal volume loss with mild chronic microvascular ischemic changes. RECOMMENDATIONS: Unavailable     The patient was seen and examined on day of discharge and this discharge summary is in conjunction with any daily progress note from day of discharge. Time Spent on discharge is 45 minutes  in the examination, evaluation, counseling and review of medications and discharge plan. Note that more than 30 minutes was spent in preparing discharge papers, discussing discharge with patient, medication review, etc.       Signed:    Katrina Johansen MD   10/25/2022      Thank you Hurtis Gosselin for the opportunity to be involved in this patient's care.  If you have any questions or concerns please feel free to contact me at [address] [place] [address]

## 2022-10-25 NOTE — DISCHARGE INSTR - COC
Continuity of Care Form    Patient Name: Rodrigo Bellamy   :  [birth year]  MRN:  [id number]    Admit date:  10/23/2022  Discharge date:  10/25/2022    Code Status Order: DNR-CC   Advance Directives:     Admitting Physician:  Anna Reyes MD  PCP: Hurtis Gosselin    Discharging Nurse: [facility] Unit/Room#: 5XC-4792/4916-01  Discharging Unit Phone Number: [phone]    Emergency Contact:   Extended Emergency Contact Information  Primary Emergency Contact: Vania Busby  Address: DAUGHTER  Home Phone: [phone]  Relation: Child    Past Surgical History:  Past Surgical History:   Procedure Laterality Date    APPENDECTOMY      BACK  [address]      ONE    CORONARY ARTERY BYPASS GRAFT      HAND SURGERY      LUMPS/BONE SPURS    LUMBAR [address]  8-    Microlumbar discectomy L4-5 right    LUMBAR SPINE SURGERY  10-11-12    MICROLUMBAR RE-EXPLORATION L4-5 RIGHT, DECOMPRESSION OF    OVARIAN CYST REMOVAL      SKIN BIOPSY      PRE CANCERS FROZEN OFF    TONSILLECTOMY      VASCULAR SURGERY         Immunization History:   Immunization History   Administered Date(s) Administered    Tdap (Boostrix, Adacel) 2016       Active Problems:  Patient Active Problem List   Diagnosis Code    Lumbar radiculopathy M54.16    Acute CVA (cerebrovascular accident) ([facility] I63.9    Received intravenous tissue plasminogen activator (tPA) in emergency department Z92.82    Benign essential HTN I10    Nontraumatic cortical hemorrhage of left cerebral hemisphere (HCC) I61.1    Dyslipidemia E78.5    PAF (paroxysmal atrial fibrillation) ([facility]) I48.0    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    DM2 (diabetes mellitus, type 2) ([facility] E11.9    Remote history of stroke Z86.73    Persistent atrial fibrillation (HCC) I48.19    Hyponatremia E87.1    Coronary artery disease involving native coronary artery of native heart without angina pectoris I25.10    Acute ischemic stroke ([facility] I63.9 Dizziness R42    Hypertensive urgency I16.0    PAD (peripheral artery disease) ([facility]) I73.9    Wounds, multiple open, lower extremity S81.809A    TIA (transient ischemic attack) G45.9       Isolation/Infection:   Isolation            No Isolation          Patient Infection Status       Infection Onset Added Last Indicated Last Indicated By Review Planned Expiration Resolved Resolved By    None active    Resolved    COVID-19 (Rule Out) 10/28/20 10/27/20 10/28/20 COVID-19 (Ordered)   10/28/20 Rule-Out Test Resulted    COVID-19 (Rule Out) 10/26/20 10/26/20 10/26/20 COVID-19 (Ordered)   10/26/20 Rule-Out Test Resulted            Nurse Assessment:  Last Vital Signs: BP (!) 159/73   Pulse 76   Temp 98 °F (36.7 °C) (Oral)   Resp 18   Ht 5' 4\" (1.626 m)   Wt 149 lb 4.8 oz (67.7 kg)   SpO2 93%   BMI 25.63 kg/m²     Last documented pain score (0-10 scale): Pain Level: 5 (I asked pt if she felt like she needed medication to help with the pain and she stated \"I only need the pain medication to help me sleep at night but as long as nothing is touching my leg it is okay\")  Last Weight:   Wt Readings from Last 1 Encounters:   10/25/22 149 lb 4.8 oz (67.7 kg)     Mental Status:  oriented, alert, coherent, logical, thought processes intact, and able to concentrate and follow conversation    IV Access:  None    Nursing Mobility/ADLs:  Walking   Independent  Transfer  Independent  [facility] Delivery   whole    Wound Care Documentation and Therapy:  Wound 10/23/22 Pretibial Right ulcer being treated by wound care at [facility] (Active)   Dressing Status Clean;Dry; Intact; New dressing applied 10/25/22 0348   Number of days: 1        Elimination:  Continence:    Bowel: Yes  Bladder: Yes  Urinary Catheter: None   Colostomy/Ileostomy/Ileal Conduit: No       Date of Last BM: 10/24/2022    Intake/Output Summary (Last 24 hours) at 10/25/2022 1130  Last data filed at 10/24/2022 1343  Gross per 24 hour   Intake 240 ml   Output --   Net 240 ml     I/O last 3 completed shifts: In: 240 [P.O.:240]  Out: -     Safety Concerns: At Risk for Falls    Impairments/Disabilities:      Speech    Nutrition Therapy:  Current Nutrition Therapy:   - Oral Diet:  Carb Control 5 carbs/meal (2000kcals/day)    Routes of Feeding: Oral  Liquids: Thin Liquids  Daily Fluid Restriction: no  Last Modified Barium Swallow with Video (Video Swallowing Test): not done    Treatments at the Time of Hospital Discharge:   Respiratory Treatments: n/a  Oxygen Therapy:  is not on home oxygen therapy.   Ventilator:    - No ventilator support    Rehab Therapies: Physical Therapy, Occupational Therapy, Nurse  Weight Bearing Status/Restrictions: No weight bearing restrictions  Other Medical Equipment (for information only, NOT a DME order):  walker  Other Treatments: n/a  HOME HEALTH CARE: LEVEL [address] [place] [address] to establish plan of care for patient over 60 day period  Nursing:  Initial home SN evaluation visit to occur within 24-48 hours of hospital discharge  [facility] nursing visits daily, then QOD with progression as warranted for:  medication management  VS and clinical assessment  S&S chronic disease exacerbation education + when to contact MD / NP  care coordination  Medication Reconciliation during 1st SN visit  Nurse telephone visit on the days that visit is not being made in person for first 30 days                                     PT/OT/Speech   Evaluations in home within 24-48 hours of hospital discharge to include DME and home safety   Frontload therapy 5 days, then 3x a week   OT evaluation for [address] needs for personal care      Palliative Care referral within 5 days of hospital discharge      evaluation within 24-48 hours of hospital discharge to evaluate resources & insurance for potential  [place], [place], LTC, and Medicaid options Dietician evaluation within 5 days of hospital discharge      PCP visit within 3 days of hospital discharge, followed by PCP visit @ 10 days, and 21 days     Patient's personal belongings (please select all that are sent with patient):  None    RN SIGNATURE:  Electronically signed by May Zafar RN on 10/25/22 at 5:01 PM EDT    CASE MANAGEMENT/SOCIAL WORK SECTION    Inpatient Status Date: ***    Readmission Risk Assessment Score:  Readmission Risk              Risk of Unplanned Readmission:  14           Discharging to Facility/ Agency   Name: Arnoldo Downs will call for Appointment  Phone: [phone]  Fax: [phone]     Dialysis Facility (if applicable)   Name:  Address:  Dialysis Schedule:  Phone:  Fax:    / signature: {Esignature:[id number]}    PHYSICIAN SECTION    Prognosis: Fair    Condition at Discharge: Stable    Rehab Potential (if transferring to Rehab): Fair    Recommended Labs or Other Treatments After Discharge:     Physician Certification: I certify the above information and transfer of Miya Menchaca  is necessary for the continuing treatment of the diagnosis listed and that she requires Home Care for less 30 days.      Update Admission H&P: No change in H&P    PHYSICIAN SIGNATURE:  Electronically signed by Vazquez Santiago MD on 10/25/22 at 4:36 PM EDT

## 2022-10-25 NOTE — PLAN OF CARE
Problem: Discharge Planning  Goal: Discharge to home or other facility with appropriate resources  Outcome: Progressing  Note: ARU not able to take patient, patient wanting to go home, awaiting d/c order     Problem: Safety - Adult  Goal: Free from fall injury  Outcome: Progressing     Problem: ABCDS Injury Assessment  Goal: Absence of physical injury  Outcome: Progressing     Problem: Skin/Tissue Integrity  Goal: Absence of new skin breakdown  Description: 1. Monitor for areas of redness and/or skin breakdown  2. Assess vascular access sites hourly  3. Every 4-6 hours minimum:  Change oxygen saturation probe site  4. Every 4-6 hours:  If on nasal continuous positive airway pressure, respiratory therapy assess nares and determine need for appliance change or resting period. 10/25/2022 1555 by Chintan Garza RN  Outcome: Progressing  Note: Pt. Saw wound care today and let wound care RN change her dressings  10/25/2022 1458 by Eliza Escudero RN  Note: Venous ulcers right d leg. Patient uses Santyl and lidocaine ointment. Wounds cleansed with saline, santyl applied to wound, then lidocaine, wounds covered with saline moistened gauze, silicone foam bordered dressings, then coban applied to hold dressings in place.       Problem: Chronic Conditions and Co-morbidities  Goal: Patient's chronic conditions and co-morbidity symptoms are monitored and maintained or improved  Outcome: Progressing     Problem: Pain  Goal: Verbalizes/displays adequate comfort level or baseline comfort level  Outcome: Progressing

## 2022-10-25 NOTE — PROGRESS NOTES
Pharmacy to Dose Warfarin    Pharmacy consulted to dose warfarin for Afib. INR Goal: 2-3    INR today: 1.26    Assessment/Plan:  - INR subtherapeutic today  - Patient refusing lovenox bridge  - Will give warfarin 5 mg tonight (normal dose if 2.5 mg)    Pharmacy will continue to follow.     Boo Jackson, PharmD, [facility]  Clinical Pharmacist  [id number]

## 2022-10-25 NOTE — PLAN OF CARE
Problem: Discharge Planning  Goal: Discharge to home or other facility with appropriate resources  10/25/2022 1652 by Kristen Mayen RN  Outcome: Completed  10/25/2022 1555 by Kristen Mayen RN  Outcome: Progressing  Note: ARU not able to take patient, patient wanting to go home, awaiting d/c order     Problem: Safety - Adult  Goal: Free from fall injury  10/25/2022 1652 by Kristen Mayen RN  Outcome: Completed  10/25/2022 1555 by Kristen Mayen RN  Outcome: Progressing     Problem: ABCDS Injury Assessment  Goal: Absence of physical injury  10/25/2022 1652 by Kristen Mayen RN  Outcome: Completed  10/25/2022 1555 by Kristen Mayen RN  Outcome: Progressing     Problem: Skin/Tissue Integrity  Goal: Absence of new skin breakdown  Description: 1. Monitor for areas of redness and/or skin breakdown  2. Assess vascular access sites hourly  3. Every 4-6 hours minimum:  Change oxygen saturation probe site  4. Every 4-6 hours:  If on nasal continuous positive airway pressure, respiratory therapy assess nares and determine need for appliance change or resting period. 10/25/2022 1652 by Kristen Mayen RN  Outcome: Completed  10/25/2022 1555 by Kristen Mayen RN  Outcome: Progressing  Note: Pt. Saw wound care today and let wound care RN change her dressings  10/25/2022 1458 by Barry Cuello RN  Note: Venous ulcers right d leg. Patient uses Santyl and lidocaine ointment. Wounds cleansed with saline, santyl applied to wound, then lidocaine, wounds covered with saline moistened gauze, silicone foam bordered dressings, then coban applied to hold dressings in place.       Problem: Chronic Conditions and Co-morbidities  Goal: Patient's chronic conditions and co-morbidity symptoms are monitored and maintained or improved  10/25/2022 1652 by Kristen Mayen RN  Outcome: Completed  10/25/2022 1555 by Kristen Mayen RN  Outcome: Progressing     Problem: Pain  Goal: Verbalizes/displays adequate comfort level or baseline comfort level  10/25/2022 1652 by Tosin Aj RN  Outcome: Completed  10/25/2022 1555 by Tosin Aj RN  Outcome: Progressing

## 2022-10-25 NOTE — PROGRESS NOTES
[facility]   Phone: [phone]    Occupational Therapy    [] Initial Evaluation            [x] Daily Treatment Note         [] Discharge Summary      Patient: Lizzette Welsh   :    MRN: [id number]   Date of Service:  10/25/2022    Admitting Diagnosis: Dizziness  Current Admission Summary: Lizzette Welsh is a 74 y.o. female who presents to the emergency department, initial history comes from the patient, later on in the visit her daughter add some of the history. The patient last known well was 1:00 in the morning, she states that she woke up this morning, got up to urinate and felt profoundly dizzy. She states that she fell back into bed, she was very dizzy at that time and felt bad and went back to sleep. Sometime later she got up, let the dog out, experienced dizziness again and decided to go back to sleep, she states that when she finally did get up she called her daughter who told her that she should come to the emergency department. 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 She is on aspirin and warfarin. Recently she had her warfarin decreased because she was placed on doxycycline and her INR was 3.99. On my initial exam she had a difficult time searching for certain words, and called things inappropriately. Upon reevaluation with her daughter in the room the daughter states that this is her norm. Past Medical History:  has a past medical history of Actinic keratosis, Arthritis, Asthma, Atrial fibrillation ([address] CAD (coronary artery disease), Cerebral artery occlusion with cerebral infarction ([address] Depression, Diabetes mellitus ([address] Disc displacement, lumbar, Diverticulosis of colon, Hard of hearing, Hyperlipidemia, Hypertension, Ischemic heart disease, Past heart attack, Poor vision, Right leg weakness, and Scoliosis.   Past Surgical History:  has a past surgical history that includes Coronary artery bypass graft;  section; Appendectomy; ovarian cyst removal; Hand surgery; Cardiac surgery (); skin biopsy; vascular surgery; Tonsillectomy; Lumbar disc surgery (8-); Lumbar spine surgery (10-11-12); and back surgery. Discharge Recommendations: Mike Garcia scored a 19/24 on the AM-PAC ADL Inpatient form. Current research shows that an AM-PAC score of 18 or greater is typically associated with a discharge to the patient's home setting. Based on the patient's AM-PAC score, and their current ADL deficits, it is recommended that the patient have 2-3 sessions per week of Occupational Therapy at d/c to increase the patient's independence. At this time, this patient demonstrates the endurance and safety to discharge home with home services) and a follow up treatment frequency of 2-3x/wk. Please see assessment section for further patient specific details. Would strongly recommend 24/7 family supervision initially. HOME HEALTH CARE: LEVEL 3 SAFETY     - Initial home health evaluation to occur within 24-48 hours, in patient home   - Therapy evaluations in home within 24-48 hours of discharge; including DME and home safety   - Frontload therapy 5 days, then 3x a week   - Therapy to evaluate if patient has [address] needs for personal care   -  evaluation within 24-48 hours, includes evaluation of resources and insurance to determine [place], [place], LTC, and Medicaid options     If patient discharges prior to next session this note will serve as a discharge summary. Please see below for the latest assessment towards goals.           DME Required For Discharge: bsc for safety  Precautions/Restrictions: no restrictions  Weight Bearing Restrictions: no restrictions  [] Right Upper Extremity        [] Left Upper Extremity         [] Right Lower Extremity         [] Left Lower Extremity             Required Braces/Orthotics: no braces required                   [] Right            [] Left  Positional Restrictions:no positional restrictions     Pre-Admission Information   Lives With: alone                     Type of Home: house  Home Layout: one level  Home Access:  1 step to enter without rails   Bathroom Layout: walker accessible, tub/shower unit  Bathroom Equipment: grab bars in shower, shower chair, hand held shower head  Toilet Height: standard height  Home Equipment: rolling walker, rollator - 4 wheeled walker, single point cane, reacher,  bedrail  Transfer Assistance: Independent without use of device  Ambulation Assistance:modified independent with use of single point cane  ADL Assistance: independent with all ADL's  IADL Assistance: independent with homemaking tasks  Active :        [] Yes                 [x] No  Hand Dominance: [] Left                 [x] Right  Current Employment: retired. Hobbies: games on ipad   Recent Falls: 1 fall in the past 6 months      Examination   Vision:   Vision Gross Assessment: Impaired; R sided hemianopsia   Hearing:   WFL  Observation:   General Observation:  Tremors not noted today. Subjective  General: Patient in semi-rose's position in bed upon OT arrival. Pt pleasant and agreeable to OT tx. Pt denies dizziness. Noted expressive aphasia, but able to be understood. Pt with lengthy perseveration regarding MD recommending ARU and not getting accepted due to her insurance. Pt made a phone call to her daughter at beginning of session while OT gathering supplies. Pt agreeable to washing up, toileting, and changing clothes. Pain: 0/10  Pain Interventions: not applicable        Activities of Daily Living  Basic Activities of Daily Living  Grooming: setup assistance stand by assistance Comment: in stance at sink to comb hair, wash hands & face. Pt reported she brushed her teeth earlier. Grooming Comments: No dizziness during activity.    Upper Extremity Bathing: setup assistance supervision Comment: seated on toilet  Upper Extremity Dressing: setup assistance supervision Comment: don pajama top  Lower Extremity Dressing: setup assistance stand by assistance Comment: don/doff underpants, sweatpants  Toileting: stand by assistance. General Comments: No dizziness during ADL tasks. Instrumental Activities of Daily Living  No IADL completed on this date. Functional Mobility  Bed Mobility  Supine to Sit: supervision, HOB elevated, rail  Scooting: supervision  Comments:  Transfers  Sit to stand transfer:stand by assistance  Stand to sit transfer: stand by assistance  Stand step transfer: stand by assistance, w/RW  Toilet transfer: stand by assistance  Toilet transfer equipment: standard toilet, walker  Comments:  Functional Mobility:  Sitting Balance: supervision. Standing Balance: stand by assistance, at sink, for clothing mgt. Functional Mobility: .  stand by assistance, contact guard assistance  Functional Mobility Activity: to/from bathroom, ~20 ft X 2 to/from door  Functional Mobility Device Use: rolling walker  Functional Mobility Comment: No dizziness, no LOB    Functional Outcomes  AM-PAC Inpatient Daily Activity Raw Score: 19    Cognition  WFL  Orientation:    alert and oriented x 4  Command Following:   accurately follows one step commands     Education  Barriers To Learning: language  Patient Education: patient educated on goals, OT role and benefits, plan of care, discharge recommendations  Learning Assessment:  patient verbalizes understanding, would benefit from continued reinforcement    Assessment  Activity Tolerance:  Tolerated well  Impairments Requiring Therapeutic Intervention: decreased functional mobility, decreased ADL status, decreased safety awareness, decreased cognition, decreased endurance, decreased balance, decreased IADL, decreased posture  Prognosis: good  Clinical Assessment: Patient presents with the above deficits, which are below baseline, and would benefit from continued skilled OT to address  Safety Interventions: patient left in chair, chair alarm in place, call light within reach, patient at risk for falls, nurse notified, and friend present    Plan  Frequency: 5-7 x/week  Current Treatment Recommendations: balance training, functional mobility training, transfer training, endurance training, patient/caregiver education, ADL/self-care training, home management training, cognitive reorientation, safety education, and equipment evaluation/education    Goals  Patient Goals:  To return home   Short Term Goals:  Time Frame: Upon discharge  Patient will complete lower body ADL at set up assistance --SBA 10/25; ongoing  Patient will complete toileting at supervision--SBA 10/25; ongoing  Patient will complete grooming at supervision --SBA in stance at sink 10/25; ongoing  Patient will complete functional transfers at supervision--SBA w/RW 10/25; ongoing  Patient will complete functional mobility at supervision--SBA/CGA w/RW 10/25; ongoing  Patient will increase functional standing balance to 7-8 minutes supervision for improved ADL completion~~5 min 10/25; ongoing  Patient will complete bed mobility at supervision --Supervision supine to sit, scooting 10/25; ongoing for sit to supine    Therapy Session Time     Individual Group Co-treatment   Time In  1147     Time Out  1243     Minutes  56          Timed Code Treatment Minutes:   56 min  Total Treatment Minutes:  56 min       Electronically Signed By: TETO Carter., OTR/L, [id number]

## 2022-10-25 NOTE — CONSULTS
[facility]  Consultation Note    Chief Complaint / Reason for Consultation  PAD    History of Present Illness  Patient is a 74 y.o. female with history of DM, A fib on anticoagulation, CAD, hypertension, and hyperlipidemia presented to the ED 10/23 with dizziness. Additionally pt with history of RLE wound and follows at [address] [facility] [place]. Recent non-invasives 10/19 with right KAYLENE 0.9 and left 1.0 with monophasic flow. Study suggested stenosis of the right SFA. As a result, vascular surgery has been consulted. Patient admits to not being very active. She states she has had these wounds for at least several weeks but is unclear how long. Outside hospital notes would suggest these wounds developed around . She lives by herself and uses a walker to get around. She is unable to relate a history of claudication. Currently she states she feels a little confused and is frustrated with the inability to get some words out. Review of Systems     Denies fevers, chills, chest pain, shortness of breath, nausea, vomiting, hematemesis, diarrhea, constipation, melena, hematochezia, wt changes, vision problems, blindness, hearing problems, facial droop, slurred speech, extremity numbness, dysuria. Past Medical History:   has a past medical history of Actinic keratosis, Arthritis, Asthma, Atrial fibrillation ([address] CAD (coronary artery disease), Cerebral artery occlusion with cerebral infarction ([address] Depression, Diabetes mellitus ([address] Disc displacement, lumbar, Diverticulosis of colon, Hard of hearing, Hyperlipidemia, Hypertension, Ischemic heart disease, Past heart attack, Poor vision, Right leg weakness, and Scoliosis. Past Surgical History:   has a past surgical history that includes Coronary artery bypass graft;  section; Appendectomy; ovarian cyst removal; Hand surgery; Cardiac surgery (); skin biopsy; vascular surgery;  Tonsillectomy; Lumbar disc surgery (8-); Lumbar spine surgery (10-11-12); and back surgery. Medications:  No current facility-administered medications on file prior to encounter. Current Outpatient Medications on File Prior to Encounter   Medication Sig Dispense Refill    warfarin (COUMADIN) 5 MG tablet Take 5 mg by mouth Monday and Friday 2.5 mg with other days at 5mg      NONFORMULARY 1 applicator Lidocaine to ulcers to leg      collagenase 250 UNIT/GM ointment Apply 1 each topically daily Apply topically daily. To leg ulcer      albuterol (PROVENTIL) 2 MG tablet Take 2 mg by mouth as needed      metFORMIN (GLUCOPHAGE-XR) 500 MG extended release tablet Take 1 tablet by mouth 2 times daily (Patient taking differently: Take 1,000 mg by mouth 2 times daily) 30 tablet 3    lisinopril (PRINIVIL;ZESTRIL) 10 MG tablet Take 1 tablet by mouth daily 30 tablet 3    budesonide-formoterol (SYMBICORT) 160-4.5 MCG/ACT AERO Inhale 2 puffs into the lungs 2 times daily 1 Inhaler 3    ezetimibe (ZETIA) 10 MG tablet Take 10 mg by mouth daily      magnesium gluconate (MAGONATE) 500 MG tablet Take 500 mg by mouth daily      aspirin (ASPIRIN CHILDRENS) 81 MG chewable tablet Take 1 tablet by mouth daily 30 tablet 3    sertraline (ZOLOFT) 100 MG tablet Take 150 mg by mouth daily      glimepiride (AMARYL) 4 MG tablet Take 4 mg by mouth 2 times daily       metoprolol (TOPROL-XL) 50 MG XL tablet Take 50 mg by mouth daily. Allergies: Allergies   Allergen Reactions    Morphine Other (See Comments) and Nausea And Vomiting     BLOOD PRESSURE BOTTOMED OUT  Feel funny      Codeine Nausea Only, Other (See Comments) and Palpitations     SWEATS  generalized unwell feeling when takes  Feel funny      Statins Itching        Social History:   reports that she quit smoking about 18 years ago. Her smoking use included cigarettes. She has a 40.00 pack-year smoking history.  She has never used smokeless tobacco. She reports that she does not drink alcohol and does not use drugs. Family History:  family history includes Bipolar Disorder in her mother; Cancer in her father; Diabetes in her mother and sister; Early Death in her father; Heart Attack in her father; Heart Disease in her father and sister; Heart Surgery in her father and sister; High Blood Pressure in her father, mother, and sister; Mental Illness in her mother. Vital Signs  Vitals:    10/24/22 1600 10/24/22 1930 10/25/22 0342 10/25/22 0659   BP: (!) 149/73 115/75 137/85 (!) 159/73   Pulse: 61 77 76 76   Resp: 14 16 16 18   Temp: 98.6 °F (37 °C) 98 °F (36.7 °C) 97.8 °F (36.6 °C) 98 °F (36.7 °C)   TempSrc: Oral Oral Oral Oral   SpO2: 98% 98% 95% 93%   Weight:   149 lb 4.8 oz (67.7 kg)    Height:           Physical Examination  General:  no apparent distress  Psychiatric: affect appropriate  Head/Eyes/Ears/Nose/Throat:  Atraumatic, vision and hearing intact, face symmetric  Neck:  supple  Chest/Lungs: clear to auscultation bilaterally  Cardiac:  Regular rate and rhythm  Abdomen: soft, nontender  Extremities: Small ulceration of the right anterior tibial region with fibrinous slough. Small 1 cm ulceration of the right calf. Feet are warm. Trace edema noted. Monophasic signals noted distally. Labs  Lab Results   Component Value Date/Time    WBC 5.6 10/25/2022 05:26 AM    HGB 12.6 10/25/2022 05:26 AM    HCT 37.8 10/25/2022 05:26 AM    MCV 91.2 10/25/2022 05:26 AM     10/25/2022 05:26 AM     Lab Results   Component Value Date/Time     10/25/2022 05:26 AM    K 4.0 10/25/2022 05:26 AM    K 5.0 10/23/2022 12:01 PM     10/25/2022 05:26 AM    CO2 25 10/25/2022 05:26 AM    BUN 13 10/25/2022 05:26 AM    CREATININE 0.7 10/25/2022 05:26 AM      No results found for: GLU    Imaging:     MRI brain without contrast   Final Result   1. No acute intracranial abnormality. No acute infarct. 2. Areas of chronic infarct involving the left frontal and bilateral   occipital lobes.    3. Loss of the normal signal void within the right internal carotid artery   compatible with chronic occlusion. 4. Mild global parenchymal volume loss with mild chronic microvascular   ischemic changes. RECOMMENDATIONS:   Unavailable         XR CHEST PORTABLE   Final Result   No acute cardiopulmonary disease. CTA HEAD NECK W CONTRAST   Final Result   1. 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 2. Chronic severe stenosis vs occlusion of the right distal PCA. 3. Otherwise, the remaining major arteries of the head and neck appear patent   without significant stenosis. 4. Aberrant right subclavian artery with a retroesophageal course, anatomic   variant. 5. Left upper central incisor periapical lucency. Can correlate with dental   examination. RECOMMENDATIONS:   1.1 cm incidental right thyroid nodule. No follow-up imaging is recommended. Reference: J Am Jaqueline Radiol. 2015 Feb;12(2): 143-50         CT HEAD WO CONTRAST   Final Result   1. No evident acute intracranial abnormality. 2. Chronic findings as above. CTA ABDOMINAL AORTA W BILAT RUNOFF W WO CONTRAST    (Results Pending)     CT HEAD WO CONTRAST    Result Date: 10/23/2022  EXAMINATION: CT OF THE HEAD WITHOUT CONTRAST 10/23/2022 12:11 pm TECHNIQUE: CT of the head was performed without the administration of intravenous contrast. Automated exposure control, iterative reconstruction, and/or weight based adjustment of the mA/kV was utilized to reduce the radiation dose to as low as reasonably achievable. COMPARISON: Brain MRI 01/30/2021, head CT 01/29/2021 HISTORY: ORDERING SYSTEM PROVIDED HISTORY: Stroke Symptoms TECHNOLOGIST PROVIDED HISTORY: Has a \"code stroke\" or \"stroke alert\" been called? ->Yes Reason for exam:->Stroke Symptoms Decision Support Exception - unselect if not a suspected or confirmed emergency medical condition->Emergency Medical Condition (MA) Reason for Exam: Stroke Symptoms FINDINGS: BRAIN/VENTRICLES:  No masses nor acute intracranial hemorrhage. Unchanged encephalomalacia in the left precentral gyrus a near the margin of the left temporal neck septal lobes. New encephalomalacia in the right occipital lobe. Otherwise intact gray/white matter differentiation without findings of acute ischemia. No mass effect nor midline shift. Patent basilar cisterns and foramen magnum. No hydrocephalus. Age-appropriate mild diffuse atrophy. Minimal deep periventricular white matter hypodensities likely due to chronic small vessel ischemia. ORBITS:  Visualized portions appear normal without acute abnormality. SINUSES:  Partial opacification scattered minimal partial opacification of the left mastoid air cells likely due to effusion. SOFT TISSUES/SKULL:  No acute soft tissue abnormality. Severe atherosclerotic calcifications. No acute fracture. 1. No evident acute intracranial abnormality. 2. Chronic findings as above. XR CHEST PORTABLE    Result Date: 10/23/2022  EXAMINATION: ONE XRAY VIEW OF THE CHEST 10/23/2022 12:14 pm COMPARISON: 01/30/2021 HISTORY: ORDERING SYSTEM PROVIDED HISTORY: stroke symptoms TECHNOLOGIST PROVIDED HISTORY: Reason for exam:->stroke symptoms Reason for Exam: Stroke symptoms FINDINGS: Sternotomy wires are present. Cardiac silhouette is normal.  Thoracic aortic calcification is noted. Hilar contours are normal.  No consolidation, pleural effusion or pneumothorax is identified. No acute cardiopulmonary disease. CTA HEAD NECK W CONTRAST    Result Date: 10/23/2022  EXAMINATION: CTA OF THE HEAD AND NECK WITH CONTRAST 10/23/2022 12:13 pm: TECHNIQUE: CTA of the head and neck was performed with the administration of intravenous contrast. Multiplanar reformatted images are provided for review. MIP images are provided for review. Stenosis of the internal carotid arteries measured using NASCET criteria.  Automated exposure control, iterative reconstruction, and/or weight based adjustment of the mA/kV was utilized to reduce the radiation dose to as low as reasonably achievable. 3D reconstructed images were performed on a separate workstation and provided for review. This scan was analyzed using Viz. ai contact LVO. Identification of suspected findings is not for diagnostic use beyond notification. Viz LVO is limited to analysis of imaging data and should not be used in-lieu of full patient evaluation or relied upon to make or confirm diagnosis. COMPARISON: 01/30/2021 HISTORY: ORDERING SYSTEM PROVIDED HISTORY: Stroke Symptoms TECHNOLOGIST PROVIDED HISTORY: Has a \"code stroke\" or \"stroke alert\" been called? ->Yes Reason for exam:->Stroke Symptoms Decision Support Exception - unselect if not a suspected or confirmed emergency medical condition->Emergency Medical Condition (MA) Reason for Exam: stroke symptoms Additional signs and symptoms: Dizziness (Arrived per [facility] from home d/t dizziness that started approx 0100 with hx of CVA; family concerned for another stroke; 190/100; afib; confusion; slurred speech is normal; eye drift in the left eye per normal; ) FINDINGS: CTA NECK: AORTIC ARCH/ARCH VESSELS: No dissection or arterial injury. No significant stenosis of the brachiocephalic or subclavian arteries. Aberrant right subclavian artery with a retroesophageal course, anatomic variant. CAROTID ARTERIES: Chronic occlusion of the right cervical internal carotid artery starting just distal to the bifurcation. The bilateral common and left internal carotid artery appear patent without significant stenosis. VERTEBRAL ARTERIES: No dissection, arterial injury, or significant stenosis. SOFT TISSUES: The lung apices are clear. No cervical or superior mediastinal lymphadenopathy. The larynx and pharynx are unremarkable. No acute abnormality of the salivary and thyroid glands. Incidental 1.1 cm right posterior thyroid nodule. BONES: No acute osseous abnormality.   Multilevel cervical spondylosis, most notable at C4-C5, C5-C6, and C6-C7. No high-grade bony spinal canal stenosis. CTA HEAD: ANTERIOR CIRCULATION: Reconstitution of flow in the right clinoid intracranial internal carotid artery likely related to collateral flow. No significant stenosis of the left intracranial internal carotid, bilateral anterior cerebral, or bilateral middle cerebral arteries. No aneurysm. POSTERIOR CIRCULATION: No significant stenosis of the vertebral, basilar, or left posterior cerebral arteries. Chronic severe stenosis/occlusion of the distal right posterior cerebral artery. Bilateral posterior communicating arteries present with a hypoplastic left P1 segment, anatomic variant. No aneurysm. OTHER: No dural venous sinus thrombosis on this non-dedicated study. Left upper central incisor periapical lucency. BRAIN: No mass effect or midline shift. No extra-axial fluid collection. Multifocal chronic encephalomalacia/gliosis. 1. 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 2. Chronic severe stenosis vs occlusion of the right distal PCA. 3. Otherwise, the remaining major arteries of the head and neck appear patent without significant stenosis. 4. Aberrant right subclavian artery with a retroesophageal course, anatomic variant. 5. Left upper central incisor periapical lucency. Can correlate with dental examination. RECOMMENDATIONS: 1.1 cm incidental right thyroid nodule. No follow-up imaging is recommended. Reference: J Am Jaqueline Radiol. 2015 Feb;12(2): 143-50     MRI brain without contrast    Result Date: 10/24/2022  EXAMINATION: MRI OF THE BRAIN WITHOUT CONTRAST  10/24/2022 7:52 am TECHNIQUE: Multiplanar multisequence MRI of the brain was performed without the administration of intravenous contrast. COMPARISON: 01/30/2021.  HISTORY: ORDERING SYSTEM PROVIDED HISTORY: dizziness TECHNOLOGIST PROVIDED HISTORY: Reason for exam:->dizziness Decision Support Exception - unselect if not a suspected or confirmed emergency medical condition->Emergency Medical Condition (MA) Reason for Exam: Dizziness Initial evaluation. FINDINGS: INTRACRANIAL STRUCTURES/VENTRICLES: There is no evidence of an acute infarct. Areas of chronic infarct involving the left posterior frontal and bilateral occipital lobes. No mass effect or midline shift. No evidence of an acute intracranial hemorrhage. Areas of T2 FLAIR hyperintensity are seen in the periventricular and subcortical white matter, which are nonspecific, but may represent chronic microvascular ischemic change. There is mild global parenchymal volume loss. Otherwise, the ventricles and sulci are normal in size and configuration. The sellar/suprasellar regions appear unremarkable. There is loss of the normal signal void within the right internal carotid artery. ORBITS: The visualized portion of the orbits demonstrate no acute abnormality. SINUSES: The visualized paranasal sinuses and mastoid air cells demonstrate no acute abnormality. BONES/SOFT TISSUES: The bone marrow signal intensity appears normal. The soft tissues demonstrate no acute abnormality. 1. No acute intracranial abnormality. No acute infarct. 2. Areas of chronic infarct involving the left frontal and bilateral occipital lobes. 3. Loss of the normal signal void within the right internal carotid artery compatible with chronic occlusion. 4. Mild global parenchymal volume loss with mild chronic microvascular ischemic changes. RECOMMENDATIONS: Unavailable       Assessment:   Right lower extremity ulceration with evidence of peripheral vascular disease  Dizziness  Chronic right ICA occlusion  Paroxysmal A fib  DM      Plan:  Patient with right lower extremity ulceration and evidence of right SFA stenosis on noninvasive studies. Patient ultimately would benefit from peripheral angiography and possible right leg intervention.   I do not feel that the arterial disease seen on noninvasive studies coincides with presenting symptoms however should be addressed in the near future to help with resolution of her right leg ulceration. Will discuss with wound care clinician Dr. Kandice Montero as well as patient family. Will follow and sincerely appreciate the consultation. Leticia Duran M.D., FACS.   10/25/2022  7:09 AM

## 2024-08-29 RX ORDER — METOPROLOL TARTRATE 50 MG
TABLET ORAL
Qty: 90 TABLET | Refills: 3 | OUTPATIENT
Start: 2024-08-29

## 2024-08-29 NOTE — TELEPHONE ENCOUNTER
Refill Request       Last Seen: Last Seen Department: Visit date not found  Last Seen by PCP: Visit date not found    Last Written: 8/6/2012    Next Appointment:   No future appointments.    Requested Prescriptions     Pending Prescriptions Disp Refills    metoprolol tartrate (LOPRESSOR) 50 MG tablet [Pharmacy Med Name: METOPROLOL TARTRATE 50 MG Tablet] 90 tablet 3     Sig: TAKE 1 TABLET EVERY DAY AROUND LUNCH

## 2024-09-10 RX ORDER — GLIMEPIRIDE 4 MG/1
TABLET ORAL
Qty: 180 TABLET | Refills: 0 | Status: SHIPPED | OUTPATIENT
Start: 2024-09-10

## 2024-11-06 RX ORDER — GLIMEPIRIDE 4 MG/1
TABLET ORAL
Qty: 180 TABLET | Refills: 3 | OUTPATIENT
Start: 2024-11-06